# Patient Record
Sex: MALE | Race: WHITE | NOT HISPANIC OR LATINO | URBAN - METROPOLITAN AREA
[De-identification: names, ages, dates, MRNs, and addresses within clinical notes are randomized per-mention and may not be internally consistent; named-entity substitution may affect disease eponyms.]

---

## 2000-06-17 NOTE — ED ADULT NURSE REASSESSMENT NOTE - NS ED NURSE REASSESS COMMENT FT1
Pt on bicarb drip, upon hourly rounding noted 900 Ml or 1L bag  administered, fluids stopped Pt assessed, VSS no acute distress or  AMS. Sheela Mcintyre notified repeated VBG ordered, continuous cardiac monitoring.

## 2018-07-04 ENCOUNTER — INPATIENT (INPATIENT)
Facility: HOSPITAL | Age: 68
LOS: 5 days | Discharge: HOME | End: 2018-07-10
Attending: INTERNAL MEDICINE | Admitting: INTERNAL MEDICINE
Payer: MEDICARE

## 2018-07-04 VITALS
SYSTOLIC BLOOD PRESSURE: 108 MMHG | DIASTOLIC BLOOD PRESSURE: 59 MMHG | TEMPERATURE: 99 F | HEIGHT: 73 IN | OXYGEN SATURATION: 100 % | RESPIRATION RATE: 18 BRPM | WEIGHT: 173.94 LBS | HEART RATE: 51 BPM

## 2018-07-04 DIAGNOSIS — Z95.5 PRESENCE OF CORONARY ANGIOPLASTY IMPLANT AND GRAFT: Chronic | ICD-10-CM

## 2018-07-04 DIAGNOSIS — L08.9 LOCAL INFECTION OF THE SKIN AND SUBCUTANEOUS TISSUE, UNSPECIFIED: ICD-10-CM

## 2018-07-04 LAB
ALBUMIN SERPL ELPH-MCNC: 2.7 G/DL — LOW (ref 3.5–5.2)
ALP SERPL-CCNC: 145 U/L — HIGH (ref 30–115)
ALT FLD-CCNC: 33 U/L — SIGNIFICANT CHANGE UP (ref 0–41)
ANION GAP SERPL CALC-SCNC: 13 MMOL/L — SIGNIFICANT CHANGE UP (ref 7–14)
APTT BLD: 31.6 SEC — SIGNIFICANT CHANGE UP (ref 27–39.2)
AST SERPL-CCNC: 67 U/L — HIGH (ref 0–41)
BASOPHILS # BLD AUTO: 0 K/UL — SIGNIFICANT CHANGE UP (ref 0–0.2)
BASOPHILS NFR BLD AUTO: 0 % — SIGNIFICANT CHANGE UP (ref 0–1)
BILIRUB SERPL-MCNC: 0.7 MG/DL — SIGNIFICANT CHANGE UP (ref 0.2–1.2)
BUN SERPL-MCNC: 15 MG/DL — SIGNIFICANT CHANGE UP (ref 10–20)
CALCIUM SERPL-MCNC: 8 MG/DL — LOW (ref 8.5–10.1)
CHLORIDE SERPL-SCNC: 97 MMOL/L — LOW (ref 98–110)
CK SERPL-CCNC: 392 U/L — HIGH (ref 0–225)
CK SERPL-CCNC: 498 U/L — HIGH (ref 0–225)
CO2 SERPL-SCNC: 28 MMOL/L — SIGNIFICANT CHANGE UP (ref 17–32)
CREAT SERPL-MCNC: 1.1 MG/DL — SIGNIFICANT CHANGE UP (ref 0.7–1.5)
EOSINOPHIL # BLD AUTO: 0.08 K/UL — SIGNIFICANT CHANGE UP (ref 0–0.7)
EOSINOPHIL NFR BLD AUTO: 4 % — SIGNIFICANT CHANGE UP (ref 0–8)
ESTIMATED AVERAGE GLUCOSE: 105 MG/DL — SIGNIFICANT CHANGE UP (ref 68–114)
GLUCOSE SERPL-MCNC: 170 MG/DL — HIGH (ref 70–99)
HBA1C BLD-MCNC: 5.3 % — SIGNIFICANT CHANGE UP (ref 4–5.6)
HCT VFR BLD CALC: 31.9 % — LOW (ref 42–52)
HGB BLD-MCNC: 10.9 G/DL — LOW (ref 14–18)
INR BLD: 1.14 RATIO — SIGNIFICANT CHANGE UP (ref 0.65–1.3)
LACTATE SERPL-SCNC: 2.3 MMOL/L — HIGH (ref 0.5–2.2)
LYMPHOCYTES # BLD AUTO: 0.51 K/UL — LOW (ref 1.2–3.4)
LYMPHOCYTES # BLD AUTO: 25 % — SIGNIFICANT CHANGE UP (ref 20.5–51.1)
MACROCYTES BLD QL: SLIGHT — SIGNIFICANT CHANGE UP
MAGNESIUM SERPL-MCNC: 1.7 MG/DL — LOW (ref 1.8–2.4)
MCHC RBC-ENTMCNC: 32.7 PG — HIGH (ref 27–31)
MCHC RBC-ENTMCNC: 34.2 G/DL — SIGNIFICANT CHANGE UP (ref 32–37)
MCV RBC AUTO: 95.8 FL — HIGH (ref 80–94)
MICROCYTES BLD QL: SLIGHT — SIGNIFICANT CHANGE UP
MONOCYTES # BLD AUTO: 0.2 K/UL — SIGNIFICANT CHANGE UP (ref 0.1–0.6)
MONOCYTES NFR BLD AUTO: 10 % — HIGH (ref 1.7–9.3)
NEUTROPHILS # BLD AUTO: 1.24 K/UL — LOW (ref 1.4–6.5)
NEUTROPHILS NFR BLD AUTO: 61 % — SIGNIFICANT CHANGE UP (ref 42.2–75.2)
NRBC # BLD: 0 /100 — SIGNIFICANT CHANGE UP (ref 0–0)
NRBC # BLD: SIGNIFICANT CHANGE UP /100 WBCS (ref 0–0)
PLAT MORPH BLD: (no result)
PLATELET # BLD AUTO: 59 K/UL — LOW (ref 130–400)
PLATELET COUNT - ESTIMATE: (no result)
POTASSIUM SERPL-MCNC: 3.5 MMOL/L — SIGNIFICANT CHANGE UP (ref 3.5–5)
POTASSIUM SERPL-SCNC: 3.5 MMOL/L — SIGNIFICANT CHANGE UP (ref 3.5–5)
PROT SERPL-MCNC: 6.5 G/DL — SIGNIFICANT CHANGE UP (ref 6–8)
PROTHROM AB SERPL-ACNC: 12.3 SEC — SIGNIFICANT CHANGE UP (ref 9.95–12.87)
RBC # BLD: 3.33 M/UL — LOW (ref 4.7–6.1)
RBC # FLD: 14 % — SIGNIFICANT CHANGE UP (ref 11.5–14.5)
RBC BLD AUTO: (no result)
SODIUM SERPL-SCNC: 138 MMOL/L — SIGNIFICANT CHANGE UP (ref 135–146)
TROPONIN T SERPL-MCNC: <0.01 NG/ML — SIGNIFICANT CHANGE UP
TROPONIN T SERPL-MCNC: <0.01 NG/ML — SIGNIFICANT CHANGE UP
WBC # BLD: 2.04 K/UL — LOW (ref 4.8–10.8)
WBC # FLD AUTO: 2.04 K/UL — LOW (ref 4.8–10.8)

## 2018-07-04 PROCEDURE — 93970 EXTREMITY STUDY: CPT | Mod: 26

## 2018-07-04 RX ORDER — CEFAZOLIN SODIUM 1 G
2000 VIAL (EA) INJECTION EVERY 8 HOURS
Qty: 0 | Refills: 0 | Status: DISCONTINUED | OUTPATIENT
Start: 2018-07-04 | End: 2018-07-10

## 2018-07-04 RX ORDER — CEPHALEXIN 500 MG
500 CAPSULE ORAL ONCE
Qty: 0 | Refills: 0 | Status: COMPLETED | OUTPATIENT
Start: 2018-07-04 | End: 2018-07-04

## 2018-07-04 RX ORDER — MAGNESIUM OXIDE 400 MG ORAL TABLET 241.3 MG
400 TABLET ORAL
Qty: 0 | Refills: 0 | Status: DISCONTINUED | OUTPATIENT
Start: 2018-07-04 | End: 2018-07-10

## 2018-07-04 RX ORDER — ONDANSETRON 8 MG/1
4 TABLET, FILM COATED ORAL ONCE
Qty: 0 | Refills: 0 | Status: COMPLETED | OUTPATIENT
Start: 2018-07-04 | End: 2018-07-04

## 2018-07-04 RX ORDER — POTASSIUM CHLORIDE 20 MEQ
20 PACKET (EA) ORAL DAILY
Qty: 0 | Refills: 0 | Status: DISCONTINUED | OUTPATIENT
Start: 2018-07-04 | End: 2018-07-10

## 2018-07-04 RX ORDER — ENOXAPARIN SODIUM 100 MG/ML
40 INJECTION SUBCUTANEOUS EVERY 24 HOURS
Qty: 0 | Refills: 0 | Status: DISCONTINUED | OUTPATIENT
Start: 2018-07-04 | End: 2018-07-04

## 2018-07-04 RX ADMIN — Medication 500 MILLIGRAM(S): at 15:42

## 2018-07-04 RX ADMIN — Medication 100 MILLIGRAM(S): at 21:40

## 2018-07-04 RX ADMIN — ENOXAPARIN SODIUM 40 MILLIGRAM(S): 100 INJECTION SUBCUTANEOUS at 17:10

## 2018-07-04 RX ADMIN — ONDANSETRON 4 MILLIGRAM(S): 8 TABLET, FILM COATED ORAL at 15:43

## 2018-07-04 RX ADMIN — Medication 20 MILLIEQUIVALENT(S): at 17:10

## 2018-07-04 RX ADMIN — MAGNESIUM OXIDE 400 MG ORAL TABLET 400 MILLIGRAM(S): 241.3 TABLET ORAL at 17:10

## 2018-07-04 RX ADMIN — Medication 25 MILLIGRAM(S): at 15:43

## 2018-07-04 NOTE — ED PROVIDER NOTE - ATTENDING CONTRIBUTION TO CARE
Pt is a 69yo male who comes in to the ED today for developing mild to moderate mid-sternal nonradiating chest pain while shooting up heroin.  No SOB.  Reports 1 week of R 3rd finger paronchyia.  No fever/chills.  No vomiting/diarrhea.  Also reports 3-4 months of b/l LE edema.  No calf pain.    Exam: b/l LE edema, no calf tenderness, soft nontender abdomen, clear lungs, normal cardiac exam, 2+ radial pulses, cap refill <2s, normal neuro exam  Imp: IVDA chest pain with hx of CAD w/ stents  Plan: EKG, XR chest, trop, d/w cardio Dr. Beck

## 2018-07-04 NOTE — ED PROVIDER NOTE - CARE PLAN
Principal Discharge DX:	Chest pain  Secondary Diagnosis:	Finger infection  Secondary Diagnosis:	Opiate abuse, continuous

## 2018-07-04 NOTE — H&P ADULT - ASSESSMENT
Chest pain  Alcohol Abuse  Hypomagnesemia  Hypokalemia  Pancytopenia  Hepatitis C Patient is a 68y Male with h/o untreated Hepatitis C, HTN (hypertension), NIDDM, CAD/MI with heart artery stent, IVDA Heroin and Crack abuse presenting with complaints of chest pain following use of heroin ~0800a am the morning of presentation Pain was sharp/stabbing, sever, substernal associated with nausea but no vomiting, dyspnea and diaphoresis, localised with no radiation. He also mentions worsening bilateral leg swelling over past 2 months and painful swelling of the Rt 3rd finger.  He denied any cough, fever, chills, abdominal distention. Patient was admitted to r/o ACS, Rt index finger abscess.     Assessment and Plan:    Chest pain  Alcohol Abuse  Hypomagnesemia  Hypokalemia  Pancytopenia  Hepatitis C Patient is a 68y Male with h/o untreated Hepatitis C, HTN (hypertension), NIDDM, CAD/MI with heart artery stent, IVDA Heroin and Crack abuse presenting with complaints of chest pain following use of heroin ~0800a am the morning of presentation Pain was sharp/stabbing, sever, substernal associated with nausea but no vomiting, dyspnea and diaphoresis, localised with no radiation. He also mentions worsening bilateral leg swelling over past 2 months and painful swelling of the Rt 3rd finger.  He denied any cough, fever, chills, abdominal distention. Patient was admitted to r/o ACS, Rt index finger abscess.     Assessment and Plan:    1. h/o CAD s/p stents with Chest pain: r/o ACS.  Trend Troponin, Continue Aspirin, Patient still on DAPT?  Continue Metroprolol. Not on statin.  Follow up Lipid Panel and A1c and ECHO.      2. Heroin Abuse:  Active IVDA, last use today.  Detox consulted. Busch tart Low dose methadone.      3. Hypomagnesemia/Hypokalemia:  Replaced. Follow up levels.      4. Pancytopenia:  Most likely due to hepatitis C and Alcohol abuse.  ? Baseline. Follow up counts.       5. Hepatitis C:  Untreated. Follow up PCR.        DVT prophylaxis: Heparin.

## 2018-07-04 NOTE — CONSULT NOTE ADULT - ASSESSMENT
69 y/o male came to er forchest pain, on and off for several days. + SOB, + diaphoresis. patient c/o worsening leg swelling over past 2 mos. Current IVDA use, infection right 3rd finger, RIght shoulder pain since fall several weeks ago. Patient would like Detox also. patient states he took extra doses of ASA PTA>pt c/o right middle finger swelling     Review of Systems:  REVIEW OF SYSTEMS:    CONSTITUTIONAL: No weakness, fevers or chills  EYES/ENT: No visual changes;  No vertigo or throat pain   NECK: No pain or stiffness  RESPIRATORY: No cough, wheezing, hemoptysis; No shortness of breath  CARDIOVASCULAR: No chest pain or palpitations  GASTROINTESTINAL: No abdominal or epigastric pain. No nausea, vomiting, or hematemesis; No diarrhea or constipation. No melena or hematochezia.  GENITOURINARY: No dysuria, frequency or hematuria  NEUROLOGICAL: No numbness or weakness  SKIN:  right middle finger swelling 67 y/o male came to er fo rchest pain, on and off for several days. pT NOW C/O RIGHT MIDDLE FINGER SWELLING AND TENDERNESS TO TOUCH. patient c/o worsening leg swelling over past 2 mos. Current IVDA use, infection right 3rd finger, RIght shoulder pain since fall several weeks ago. Patient would like Detox also. patient states he took extra doses of ASA PTA>pt c/o right middle finger swelling     Review of Systems:  REVIEW OF SYSTEMS:    CONSTITUTIONAL: No weakness, fevers or chills  EYES/ENT: No visual changes;  No vertigo or throat pain   NECK: No pain or stiffness  RESPIRATORY: No cough, wheezing, hemoptysis; No shortness of breath  CARDIOVASCULAR: No chest pain or palpitations  GASTROINTESTINAL: No abdominal or epigastric pain. No nausea, vomiting, or hematemesis; No diarrhea or constipation. No melena or hematochezia.  GENITOURINARY: No dysuria, frequency or hematuria  NEUROLOGICAL: No numbness or weakness  SKIN:  right middle finger swelling

## 2018-07-04 NOTE — H&P ADULT - NSHPPHYSICALEXAM_GEN_ALL_CORE
VITALS:  T(F): 99 (07-04-18 @ 13:14), Max: 99 (07-04-18 @ 13:14)  HR: 53 (07-04-18 @ 14:45) (50 - 53)  BP: 113/61 (07-04-18 @ 14:45) (108/59 - 121/63)  RR: 18 (07-04-18 @ 14:45) (18 - 18)  SpO2: 98% (07-04-18 @ 14:45) (98% - 100%)    PHYSICAL EXAM:  GENERAL: NAD, well-groomed, well-developed  HEAD:  Atraumatic, Normocephalic  EYES: EOMI, PERRLA, conjunctiva and sclera clear  ENMT: No tonsillar erythema, exudates, or enlargement; Moist mucous membranes, Good dentition, No lesions  NECK: Supple, No JVD, Normal thyroid  NERVOUS SYSTEM:  Alert & Oriented X3, Good concentration; Motor Strength 5/5 B/L upper and lower extremities; DTRs 2+ intact and symmetric  CHEST/LUNG: Clear to percussion bilaterally; No rales, rhonchi, wheezing, or rubs  HEART: Regular rate and rhythm; No murmurs, rubs, or gallops  ABDOMEN: Soft, Nontender, Nondistended; Bowel sounds present  EXTREMITIES:  2+ Peripheral Pulses, No clubbing, cyanosis, or edema  LYMPH: No lymphadenopathy noted  SKIN: No rashes or lesions VITALS:  T(F): 99 (07-04-18 @ 13:14), Max: 99 (07-04-18 @ 13:14)  HR: 53 (07-04-18 @ 14:45) (50 - 53)  BP: 113/61 (07-04-18 @ 14:45) (108/59 - 121/63)  RR: 18 (07-04-18 @ 14:45) (18 - 18)  SpO2: 98% (07-04-18 @ 14:45) (98% - 100%)    PHYSICAL EXAM:  GENERAL: NAD  HEAD:  Atraumatic, Normocephalic  EYES: conjunctiva and sclera clear  ENMT: Moist mucous membranes  NECK: Supple, Normal thyroid  NERVOUS SYSTEM:  Alert & Oriented X 3, Good concentration; Motor Strength 5/5 B/L upper and lower extremities  CHEST/LUNG: Clear to auscultation bilaterally; No rales, rhonchi, wheezing, or rubs  HEART: Regular rate and rhythm; No murmurs, rubs, or gallops  ABDOMEN: Soft, Nontender, Nondistended; Bowel sounds present  EXTREMITIES:  2+ Peripheral Pulses, No clubbing, cyanosis, Bipedal pitting edema to the knee  LYMPH: No lymphadenopathy noted  SKIN: Swollen right middle finger, tender++, Fluctuant.

## 2018-07-04 NOTE — H&P ADULT - NSHPLABSRESULTS_GEN_ALL_CORE
10.9   2.04  )-----------( 59       ( 04 Jul 2018 13:06 )             31.9     07-04    138  |  97<L>  |  15  ----------------------------<  170<H>  3.5   |  28  |  1.1    Ca    8.0<L>      04 Jul 2018 13:06  Mg     1.7     07-04    TPro  6.5  /  Alb  2.7<L>  /  TBili  0.7  /  DBili  x   /  AST  67<H>  /  ALT  33  /  AlkPhos  145<H>  07-04    CARDIAC MARKERS ( 04 Jul 2018 13:06 )  x     / <0.01 ng/mL / 498 U/L / x     / x          X-ray Hand 2 Views, Right (07.04.18 @ 13:29)     No definite radiographic evidence of osteomyelitis.         X-ray Shoulder 2 Views, Left (07.04.18 @ 13:29) >    No evidence of acute fracture or dislocation.   The partially imaged left lung is unremarkable.  Degenerative change of left AC joint.    Impression:  No acute osseous abnormality.      < from: X-ray Chest 2 Views PA/Lat (07.04.18 @ 13:28) >      No radiographic evidence of acute cardiopulmonary disease.

## 2018-07-04 NOTE — H&P ADULT - HISTORY OF PRESENT ILLNESS
chest pain, on and off for several days. + SOB, + diaphoresis. patient c/o worsening leg swelling over past 2 mos. Current IVDA use, infection right 3rd finger, RIght shoulder pain since fall several weeks ago. Patient would like Detox also. patient states he took extra doses of ASA PTA Patient is a 68y Male with h/o untreated Hepatitis C, HTN (hypertension), NIDDM, CAD/MI with heart artery stent, IVDA Heroin and Crack abuse presenting with complaints of chest pain following use of heroin ~0800a am the morning of presentation Pain was sharp/stabbing, sever, substernal associated with nausea but no vomiting, dyspnea and diaphoresis, localised with no radiation. He also mentions worsening bilateral leg swelling over past 2 months and painful swelling of the Rt 3rd finger.  He denied any cough, fever, chills, abdominal distention. Patient was admitted to r/o ACS, Rt index finger abscess.

## 2018-07-04 NOTE — ED PROVIDER NOTE - OBJECTIVE STATEMENT
chest pain, on and off for several days. + SOB, + diaphoresis. patient c/o worsening leg swelling over past 2 mos. Current IVDA use, infection right 3rd finger, RIght shoulder pain since fall several weeks ago. Patient would like Detox also. patient states he took extra doses of ASA PTA

## 2018-07-04 NOTE — CONSULT NOTE ADULT - PROBLEM SELECTOR RECOMMENDATION 9
r/o abscess  right hand x ray  no osteomylitis no  acute pathology  to be seen by attending on the floor r/o abscess OR TINY FOREIGN BODY  right hand x ray  no osteomylitis no  acute pathology  to be seen by attending on the floor

## 2018-07-04 NOTE — CONSULT NOTE ADULT - SUBJECTIVE AND OBJECTIVE BOX
History of Present Illness:  Reason for Admission: Chest pain	  History of Present Illness: 	  chest pain, on and off for several days. + SOB, + diaphoresis. patient c/o worsening leg swelling over past 2 mos. Current IVDA use, infection right 3rd finger, RIght shoulder pain since fall several weeks ago. Patient would like Detox also. patient states he took extra doses of ASA PTA     Review of Systems:  Review of Systems: CONSTITUTIONAL: No fever, weight loss, or fatigue  EYES: No eye pain, visual disturbances, or discharge  ENMT:  No difficulty hearing, tinnitus, vertigo; No sinus or throat pain  NECK: No pain or stiffness  BREASTS: No pain, masses, or nipple discharge  RESPIRATORY: No cough, wheezing, chills or hemoptysis; No shortness of breath  CARDIOVASCULAR: No chest pain, palpitations, dizziness, or leg swelling  GASTROINTESTINAL: No abdominal or epigastric pain. No nausea, vomiting, or hematemesis; No diarrhea or constipation. No melena or hematochezia.  GENITOURINARY: No dysuria, frequency, hematuria, or incontinence  NEUROLOGICAL: No headaches, memory loss, loss of strength, numbness, or tremors  SKIN: right middle finger swelling tender to touch  LYMPH NODES: No enlarged glands  ENDOCRINE: No heat or cold intolerance; No hair loss  MUSCULOSKELETAL: No joint pain or swelling; No muscle, back, or extremity pain  PSYCHIATRIC: No depression, anxiety, mood swings, or difficulty sleeping  HEME/LYMPH: No easy bruising, or bleeding gums ALLERGY AND IMMUNOLOGIC: No hives or eczema	      Allergies and Intolerances:        Allergies:  	No Known Allergies:     Home Medications:   * Patient Currently Takes Medications as of 04-Jul-2018 12:43 documented in Structured Notes  · 	Plavix 75 mg oral tablet: 1 tab(s) orally once a day, Last Dose Taken:    · 	Lopressor: orally 2 times a day, Last Dose Taken:    · 	enalapril: orally once a day, Last Dose Taken:    · 	gemfibrozil: Last Dose Taken:    · 	aspirin 81 mg oral tablet: 1 tab(s) orally once a day, Last Dose Taken:    · 	metFORMIN 500 mg oral tablet: 1 tab(s) orally 2 times a day, Last Dose Taken:      Patient History:    Tobacco Screening:  · Core Measure Site	No	       Physical Exam:  Physical Exam: VITALS:  T(F): 99 (07-04-18 @ 13:14), Max: 99 (07-04-18 @ 13:14)  HR: 53 (07-04-18 @ 14:45) (50 - 53)  BP: 113/61 (07-04-18 @ 14:45) (108/59 - 121/63)  RR: 18 (07-04-18 @ 14:45) (18 - 18)  SpO2: 98% (07-04-18 @ 14:45) (98% - 100%)   PHYSICAL EXAM:  GENERAL: NAD, well-groomed, well-developed  HEAD:  Atraumatic, Normocephalic  EYES: EOMI, PERRLA, conjunctiva and sclera clear  ENMT: No tonsillar erythema, exudates, or enlargement; Moist mucous membranes, Good dentition, No lesions  NECK: Supple, No JVD, Normal thyroid  NERVOUS SYSTEM:  Alert & Oriented X3, Good concentration; Motor Strength 5/5 B/L upper and lower extremities; DTRs 2+ intact and symmetric  CHEST/LUNG: Clear to percussion bilaterally; No rales, rhonchi, wheezing, or rubs  HEART: Regular rate and rhythm; No murmurs, rubs, or gallops  ABDOMEN: Soft, Nontender, Nondistended; Bowel sounds present  EXTREMITIES:  2+ Peripheral Pulses, No clubbing, cyanosis, or edema  LYMPH: No lymphadenopathy noted SKIN: No rashes or lesions	       Labs and Results:  Labs, Radiology, Cardiology, and Other Results: 10.9   2.04  )-----------( 59       ( 04 Jul 2018 13:06 )             31.9    07-04   138  |  97<L>  |  15  ----------------------------<  170<H>  3.5   |  28  |  1.1   Ca    8.0<L>      04 Jul 2018 13:06  Mg     1.7     07-04   TPro  6.5  /  Alb  2.7<L>  /  TBili  0.7  /  DBili  x   /  AST  67<H>  /  ALT  33  /  AlkPhos  145<H>  07-04   CARDIAC MARKERS ( 04 Jul 2018 13:06 )  x     / <0.01 ng/mL / 498 U/L / x     / x        X-ray Hand 2 Views, Right (07.04.18 @ 13:29)    No definite radiographic evidence of osteomyelitis.      X-ray Shoulder 2 Views, Left (07.04.18 @ 13:29) >   No evidence of acute fracture or dislocation.   The partially imaged left lung is unremarkable.  Degenerative change of left AC joint.   Impression:  No acute osseous abnormality.    < from: X-ray Chest 2 Views PA/Lat (07.04.18 @ 13:28) >    No radiographic evidence of acute cardiopulmonary disease. History of Present Illness:  Reason for Admission: Chest pain	  History of Present Illness: 	  67 y/o male addmitted for chest pain, on and off for several days. Pt now c/o right middle finger swelling and tenderness. + SOB, + diaphoresis. patient c/o worsening leg swelling over past 2 mos. Current IVDA use, infection right 3rd finger, RIght shoulder pain since fall several weeks ago. Patient would like Detox also. patient states he took extra doses of ASA PTA     Review of Systems:  Review of Systems: CONSTITUTIONAL: No fever, weight loss, or fatigue  EYES: No eye pain, visual disturbances, or discharge  ENMT:  No difficulty hearing, tinnitus, vertigo; No sinus or throat pain  NECK: No pain or stiffness  BREASTS: No pain, masses, or nipple discharge  RESPIRATORY: No cough, wheezing, chills or hemoptysis; No shortness of breath  CARDIOVASCULAR: No chest pain, palpitations, dizziness, or leg swelling  GASTROINTESTINAL: No abdominal or epigastric pain. No nausea, vomiting, or hematemesis; No diarrhea or constipation. No melena or hematochezia.  GENITOURINARY: No dysuria, frequency, hematuria, or incontinence  NEUROLOGICAL: No headaches, memory loss, loss of strength, numbness, or tremors  SKIN: right middle finger swelling tender to touch  LYMPH NODES: No enlarged glands  ENDOCRINE: No heat or cold intolerance; No hair loss  MUSCULOSKELETAL: No joint pain or swelling; No muscle, back, or extremity pain  PSYCHIATRIC: No depression, anxiety, mood swings, or difficulty sleeping  HEME/LYMPH: No easy bruising, or bleeding gums ALLERGY AND IMMUNOLOGIC: No hives or eczema	      Allergies and Intolerances:        Allergies:  	No Known Allergies:     Home Medications:   * Patient Currently Takes Medications as of 04-Jul-2018 12:43 documented in Structured Notes  · 	Plavix 75 mg oral tablet: 1 tab(s) orally once a day, Last Dose Taken:    · 	Lopressor: orally 2 times a day, Last Dose Taken:    · 	enalapril: orally once a day, Last Dose Taken:    · 	gemfibrozil: Last Dose Taken:    · 	aspirin 81 mg oral tablet: 1 tab(s) orally once a day, Last Dose Taken:    · 	metFORMIN 500 mg oral tablet: 1 tab(s) orally 2 times a day, Last Dose Taken:      Patient History:    Tobacco Screening:  · Core Measure Site	No	       Physical Exam:  Physical Exam: VITALS:  T(F): 99 (07-04-18 @ 13:14), Max: 99 (07-04-18 @ 13:14)  HR: 53 (07-04-18 @ 14:45) (50 - 53)  BP: 113/61 (07-04-18 @ 14:45) (108/59 - 121/63)  RR: 18 (07-04-18 @ 14:45) (18 - 18)  SpO2: 98% (07-04-18 @ 14:45) (98% - 100%)   PHYSICAL EXAM:  GENERAL: NAD, well-groomed, well-developed  HEAD:  Atraumatic, Normocephalic  EYES: EOMI, PERRLA, conjunctiva and sclera clear  ENMT: No tonsillar erythema, exudates, or enlargement; Moist mucous membranes, Good dentition, No lesions  NECK: Supple, No JVD, Normal thyroid  NERVOUS SYSTEM:  Alert & Oriented X3, Good concentration; Motor Strength 5/5 B/L upper and lower extremities; DTRs 2+ intact and symmetric  CHEST/LUNG: Clear to percussion bilaterally; No rales, rhonchi, wheezing, or rubs  HEART: Regular rate and rhythm; No murmurs, rubs, or gallops  ABDOMEN: Soft, Nontender, Nondistended; Bowel sounds present  EXTREMITIES:  2+ Peripheral Pulses, No clubbing, cyanosis, or edema  LYMPH: No lymphadenopathy noted SKIN: RIGHT MIDDLE FINGER SWELLING  AND TENDER TO TOUCH MOST LIKELY SEROUS FLUID DUE TO SMALL FOREIGN BODY IN THE MIDDLE FINGER NO PUS AT THIS TIME	       Labs and Results:  Labs, Radiology, Cardiology, and Other Results: 10.9   2.04  )-----------( 59       ( 04 Jul 2018 13:06 )             31.9    07-04   138  |  97<L>  |  15  ----------------------------<  170<H>  3.5   |  28  |  1.1   Ca    8.0<L>      04 Jul 2018 13:06  Mg     1.7     07-04   TPro  6.5  /  Alb  2.7<L>  /  TBili  0.7  /  DBili  x   /  AST  67<H>  /  ALT  33  /  AlkPhos  145<H>  07-04   CARDIAC MARKERS ( 04 Jul 2018 13:06 )  x     / <0.01 ng/mL / 498 U/L / x     / x        X-ray Hand 2 Views, Right (07.04.18 @ 13:29)    No definite radiographic evidence of osteomyelitis.      X-ray Shoulder 2 Views, Left (07.04.18 @ 13:29) >   No evidence of acute fracture or dislocation.   The partially imaged left lung is unremarkable.  Degenerative change of left AC joint.   Impression:  No acute osseous abnormality.    < from: X-ray Chest 2 Views PA/Lat (07.04.18 @ 13:28) >    No radiographic evidence of acute cardiopulmonary disease. History of Present Illness:  Reason for Admission: Chest pain	  History of Present Illness: 	  Called to evaluate this 67 y/o male addmitted for chest pain, on and off for several days. Pt now c/o right middle finger swelling and tenderness. + SOB, + diaphoresis. patient c/o worsening leg swelling over past 2 mos. Current IVDA use, infection right 3rd finger, RIght shoulder pain since fall several weeks ago. Patient would like Detox also. patient states he took extra doses of ASA PTA     Review of Systems:  Review of Systems: CONSTITUTIONAL: No fever, weight loss, or fatigue  EYES: No eye pain, visual disturbances, or discharge  ENMT:  No difficulty hearing, tinnitus, vertigo; No sinus or throat pain  NECK: No pain or stiffness  BREASTS: No pain, masses, or nipple discharge  RESPIRATORY: No cough, wheezing, chills or hemoptysis; No shortness of breath  CARDIOVASCULAR: No chest pain, palpitations, dizziness, or leg swelling  GASTROINTESTINAL: No abdominal or epigastric pain. No nausea, vomiting, or hematemesis; No diarrhea or constipation. No melena or hematochezia.  GENITOURINARY: No dysuria, frequency, hematuria, or incontinence  NEUROLOGICAL: No headaches, memory loss, loss of strength, numbness, or tremors  SKIN: right middle finger swelling tender to touch  LYMPH NODES: No enlarged glands  ENDOCRINE: No heat or cold intolerance; No hair loss  MUSCULOSKELETAL: No joint pain or swelling; No muscle, back, or extremity pain  PSYCHIATRIC: No depression, anxiety, mood swings, or difficulty sleeping  HEME/LYMPH: No easy bruising, or bleeding gums ALLERGY AND IMMUNOLOGIC: No hives or eczema	      Allergies and Intolerances:        Allergies:  	No Known Allergies:     Home Medications:   * Patient Currently Takes Medications as of 04-Jul-2018 12:43 documented in Structured Notes  · 	Plavix 75 mg oral tablet: 1 tab(s) orally once a day, Last Dose Taken:    · 	Lopressor: orally 2 times a day, Last Dose Taken:    · 	enalapril: orally once a day, Last Dose Taken:    · 	gemfibrozil: Last Dose Taken:    · 	aspirin 81 mg oral tablet: 1 tab(s) orally once a day, Last Dose Taken:    · 	metFORMIN 500 mg oral tablet: 1 tab(s) orally 2 times a day, Last Dose Taken:      Patient History:    Tobacco Screening:  · Core Measure Site	No	       Physical Exam:  Physical Exam: VITALS:  T(F): 99 (07-04-18 @ 13:14), Max: 99 (07-04-18 @ 13:14)  HR: 53 (07-04-18 @ 14:45) (50 - 53)  BP: 113/61 (07-04-18 @ 14:45) (108/59 - 121/63)  RR: 18 (07-04-18 @ 14:45) (18 - 18)  SpO2: 98% (07-04-18 @ 14:45) (98% - 100%)   PHYSICAL EXAM:  GENERAL: NAD, well-groomed, well-developed  HEAD:  Atraumatic, Normocephalic  EYES: EOMI, PERRLA, conjunctiva and sclera clear  ENMT: No tonsillar erythema, exudates, or enlargement; Moist mucous membranes, Good dentition, No lesions  NECK: Supple, No JVD, Normal thyroid  NERVOUS SYSTEM:  Alert & Oriented X3, Good concentration; Motor Strength 5/5 B/L upper and lower extremities; DTRs 2+ intact and symmetric  CHEST/LUNG: Clear to percussion bilaterally; No rales, rhonchi, wheezing, or rubs  HEART: Regular rate and rhythm; No murmurs, rubs, or gallops  ABDOMEN: Soft, Nontender, Nondistended; Bowel sounds present  EXTREMITIES:  2+ Peripheral Pulses, No clubbing, cyanosis, or edema  LYMPH: No lymphadenopathy noted SKIN: RIGHT MIDDLE FINGER SWELLING  AND TENDER TO TOUCH MOST LIKELY SEROUS FLUID DUE TO SMALL FOREIGN BODY IN THE MIDDLE FINGER NO PUS AT THIS TIME	       Labs and Results:  Labs, Radiology, Cardiology, and Other Results: 10.9   2.04  )-----------( 59       ( 04 Jul 2018 13:06 )             31.9    07-04   138  |  97<L>  |  15  ----------------------------<  170<H>  3.5   |  28  |  1.1   Ca    8.0<L>      04 Jul 2018 13:06  Mg     1.7     07-04   TPro  6.5  /  Alb  2.7<L>  /  TBili  0.7  /  DBili  x   /  AST  67<H>  /  ALT  33  /  AlkPhos  145<H>  07-04   CARDIAC MARKERS ( 04 Jul 2018 13:06 )  x     / <0.01 ng/mL / 498 U/L / x     / x        X-ray Hand 2 Views, Right (07.04.18 @ 13:29)    No definite radiographic evidence of osteomyelitis.      X-ray Shoulder 2 Views, Left (07.04.18 @ 13:29) >   No evidence of acute fracture or dislocation.   The partially imaged left lung is unremarkable.  Degenerative change of left AC joint.   Impression:  No acute osseous abnormality.    < from: X-ray Chest 2 Views PA/Lat (07.04.18 @ 13:28) >    No radiographic evidence of acute cardiopulmonary disease.

## 2018-07-04 NOTE — H&P ADULT - NSHPREVIEWOFSYSTEMS_GEN_ALL_CORE
CONSTITUTIONAL: No fever, weight loss, or fatigue  EYES: No eye pain, visual disturbances, or discharge  ENMT:  No difficulty hearing, tinnitus, vertigo; No sinus or throat pain  NECK: No pain or stiffness  BREASTS: No pain, masses, or nipple discharge  RESPIRATORY: No cough, wheezing, chills or hemoptysis; No shortness of breath  CARDIOVASCULAR: No chest pain, palpitations, dizziness, or leg swelling  GASTROINTESTINAL: No abdominal or epigastric pain. No nausea, vomiting, or hematemesis; No diarrhea or constipation. No melena or hematochezia.  GENITOURINARY: No dysuria, frequency, hematuria, or incontinence  NEUROLOGICAL: No headaches, memory loss, loss of strength, numbness, or tremors  SKIN: No itching, burning, rashes, or lesions   LYMPH NODES: No enlarged glands  ENDOCRINE: No heat or cold intolerance; No hair loss  MUSCULOSKELETAL: No joint pain or swelling; No muscle, back, or extremity pain  PSYCHIATRIC: No depression, anxiety, mood swings, or difficulty sleeping  HEME/LYMPH: No easy bruising, or bleeding gums  ALLERGY AND IMMUNOLOGIC: No hives or eczema CONSTITUTIONAL: No fever, weight loss, or fatigue  EYES: No eye pain, visual disturbances, or discharge  ENMT:  No difficulty hearing, tinnitus, vertigo; No sinus or throat pain  NECK: No pain or stiffness  BREASTS: No pain, masses, or nipple discharge  RESPIRATORY: No cough, wheezing, chills or hemoptysis; No shortness of breath  CARDIOVASCULAR: No chest pain, palpitations, dizziness, or leg swelling  GASTROINTESTINAL: No abdominal or epigastric pain. No nausea, vomiting, or hematemesis; No diarrhea or constipation. No melena or hematochezia.  GENITOURINARY: No dysuria, frequency, hematuria, or incontinence, +Straining, +Dribbling, incomplete emptying.  NEUROLOGICAL: No headaches, memory loss, loss of strength, numbness, or tremors  SKIN: Painful swelling of the Rt middle finger.  LYMPH NODES: No enlarged glands  ENDOCRINE: No heat or cold intolerance; No hair loss  MUSCULOSKELETAL: No joint pain or swelling; No muscle, back, or extremity pain  PSYCHIATRIC: No depression, anxiety, mood swings, or difficulty sleeping  HEME/LYMPH: No easy bruising, or bleeding gums  ALLERGY AND IMMUNOLOGIC: No hives or eczema

## 2018-07-05 DIAGNOSIS — L98.491 NON-PRESSURE CHRONIC ULCER OF SKIN OF OTHER SITES LIMITED TO BREAKDOWN OF SKIN: ICD-10-CM

## 2018-07-05 DIAGNOSIS — Z98.890 OTHER SPECIFIED POSTPROCEDURAL STATES: Chronic | ICD-10-CM

## 2018-07-05 LAB
ALBUMIN SERPL ELPH-MCNC: 2.8 G/DL — LOW (ref 3.5–5.2)
ALP SERPL-CCNC: 133 U/L — HIGH (ref 30–115)
ALT FLD-CCNC: 30 U/L — SIGNIFICANT CHANGE UP (ref 0–41)
ANION GAP SERPL CALC-SCNC: 13 MMOL/L — SIGNIFICANT CHANGE UP (ref 7–14)
AST SERPL-CCNC: 61 U/L — HIGH (ref 0–41)
BASOPHILS # BLD AUTO: 0.01 K/UL — SIGNIFICANT CHANGE UP (ref 0–0.2)
BASOPHILS NFR BLD AUTO: 0.4 % — SIGNIFICANT CHANGE UP (ref 0–1)
BILIRUB SERPL-MCNC: 0.7 MG/DL — SIGNIFICANT CHANGE UP (ref 0.2–1.2)
BUN SERPL-MCNC: 17 MG/DL — SIGNIFICANT CHANGE UP (ref 10–20)
CALCIUM SERPL-MCNC: 7.8 MG/DL — LOW (ref 8.5–10.1)
CHLORIDE SERPL-SCNC: 98 MMOL/L — SIGNIFICANT CHANGE UP (ref 98–110)
CHOLEST SERPL-MCNC: 117 MG/DL — SIGNIFICANT CHANGE UP (ref 100–200)
CK SERPL-CCNC: 251 U/L — HIGH (ref 0–225)
CO2 SERPL-SCNC: 29 MMOL/L — SIGNIFICANT CHANGE UP (ref 17–32)
CREAT SERPL-MCNC: 1.1 MG/DL — SIGNIFICANT CHANGE UP (ref 0.7–1.5)
EOSINOPHIL # BLD AUTO: 0.08 K/UL — SIGNIFICANT CHANGE UP (ref 0–0.7)
EOSINOPHIL NFR BLD AUTO: 3.5 % — SIGNIFICANT CHANGE UP (ref 0–8)
GLUCOSE SERPL-MCNC: 103 MG/DL — HIGH (ref 70–99)
GRAM STN FLD: SIGNIFICANT CHANGE UP
HCT VFR BLD CALC: 35.7 % — LOW (ref 42–52)
HDLC SERPL-MCNC: 50 MG/DL — SIGNIFICANT CHANGE UP (ref 40–125)
HGB BLD-MCNC: 11.8 G/DL — LOW (ref 14–18)
IMM GRANULOCYTES NFR BLD AUTO: 0 % — LOW (ref 0.1–0.3)
LIPID PNL WITH DIRECT LDL SERPL: 52 MG/DL — SIGNIFICANT CHANGE UP (ref 4–129)
LYMPHOCYTES # BLD AUTO: 0.52 K/UL — LOW (ref 1.2–3.4)
LYMPHOCYTES # BLD AUTO: 22.6 % — SIGNIFICANT CHANGE UP (ref 20.5–51.1)
MAGNESIUM SERPL-MCNC: 1.9 MG/DL — SIGNIFICANT CHANGE UP (ref 1.8–2.4)
MCHC RBC-ENTMCNC: 31.7 PG — HIGH (ref 27–31)
MCHC RBC-ENTMCNC: 33.1 G/DL — SIGNIFICANT CHANGE UP (ref 32–37)
MCV RBC AUTO: 96 FL — HIGH (ref 80–94)
METHOD TYPE: SIGNIFICANT CHANGE UP
MONOCYTES # BLD AUTO: 0.23 K/UL — SIGNIFICANT CHANGE UP (ref 0.1–0.6)
MONOCYTES NFR BLD AUTO: 10 % — HIGH (ref 1.7–9.3)
MSSA DNA SPEC QL NAA+PROBE: SIGNIFICANT CHANGE UP
NEUTROPHILS # BLD AUTO: 1.46 K/UL — SIGNIFICANT CHANGE UP (ref 1.4–6.5)
NEUTROPHILS NFR BLD AUTO: 63.5 % — SIGNIFICANT CHANGE UP (ref 42.2–75.2)
NRBC # BLD: 0 /100 WBCS — SIGNIFICANT CHANGE UP (ref 0–0)
PHOSPHATE SERPL-MCNC: 3.1 MG/DL — SIGNIFICANT CHANGE UP (ref 2.1–4.9)
PLATELET # BLD AUTO: 51 K/UL — LOW (ref 130–400)
POTASSIUM SERPL-MCNC: 3.6 MMOL/L — SIGNIFICANT CHANGE UP (ref 3.5–5)
POTASSIUM SERPL-SCNC: 3.6 MMOL/L — SIGNIFICANT CHANGE UP (ref 3.5–5)
PROT SERPL-MCNC: 6.4 G/DL — SIGNIFICANT CHANGE UP (ref 6–8)
RBC # BLD: 3.72 M/UL — LOW (ref 4.7–6.1)
RBC # FLD: 13.8 % — SIGNIFICANT CHANGE UP (ref 11.5–14.5)
SODIUM SERPL-SCNC: 140 MMOL/L — SIGNIFICANT CHANGE UP (ref 135–146)
SPECIMEN SOURCE: SIGNIFICANT CHANGE UP
TOTAL CHOLESTEROL/HDL RATIO MEASUREMENT: 2.3 RATIO — LOW (ref 4–5.5)
TRIGL SERPL-MCNC: 91 MG/DL — SIGNIFICANT CHANGE UP (ref 10–149)
TROPONIN T SERPL-MCNC: <0.01 NG/ML — SIGNIFICANT CHANGE UP
WBC # BLD: 2.3 K/UL — LOW (ref 4.8–10.8)
WBC # FLD AUTO: 2.3 K/UL — LOW (ref 4.8–10.8)

## 2018-07-05 RX ORDER — DEXTROSE 50 % IN WATER 50 %
15 SYRINGE (ML) INTRAVENOUS ONCE
Qty: 0 | Refills: 0 | Status: DISCONTINUED | OUTPATIENT
Start: 2018-07-05 | End: 2018-07-10

## 2018-07-05 RX ORDER — DEXTROSE 50 % IN WATER 50 %
25 SYRINGE (ML) INTRAVENOUS ONCE
Qty: 0 | Refills: 0 | Status: DISCONTINUED | OUTPATIENT
Start: 2018-07-05 | End: 2018-07-10

## 2018-07-05 RX ORDER — GEMFIBROZIL 600 MG
0 TABLET ORAL
Qty: 0 | Refills: 0 | COMMUNITY

## 2018-07-05 RX ORDER — METHADONE HYDROCHLORIDE 40 MG/1
15 TABLET ORAL ONCE
Qty: 0 | Refills: 0 | Status: DISCONTINUED | OUTPATIENT
Start: 2018-07-05 | End: 2018-07-05

## 2018-07-05 RX ORDER — DEXTROSE 50 % IN WATER 50 %
12.5 SYRINGE (ML) INTRAVENOUS ONCE
Qty: 0 | Refills: 0 | Status: DISCONTINUED | OUTPATIENT
Start: 2018-07-05 | End: 2018-07-10

## 2018-07-05 RX ORDER — METHADONE HYDROCHLORIDE 40 MG/1
10 TABLET ORAL ONCE
Qty: 0 | Refills: 0 | Status: DISCONTINUED | OUTPATIENT
Start: 2018-07-05 | End: 2018-07-05

## 2018-07-05 RX ORDER — METHADONE HYDROCHLORIDE 40 MG/1
5 TABLET ORAL EVERY 12 HOURS
Qty: 0 | Refills: 0 | Status: DISCONTINUED | OUTPATIENT
Start: 2018-07-06 | End: 2018-07-08

## 2018-07-05 RX ORDER — METHADONE HYDROCHLORIDE 40 MG/1
TABLET ORAL
Qty: 0 | Refills: 0 | Status: COMPLETED | OUTPATIENT
Start: 2018-07-06 | End: 2018-07-08

## 2018-07-05 RX ORDER — METHADONE HYDROCHLORIDE 40 MG/1
10 TABLET ORAL EVERY 12 HOURS
Qty: 0 | Refills: 0 | Status: DISCONTINUED | OUTPATIENT
Start: 2018-07-06 | End: 2018-07-06

## 2018-07-05 RX ORDER — INSULIN LISPRO 100/ML
VIAL (ML) SUBCUTANEOUS
Qty: 0 | Refills: 0 | Status: DISCONTINUED | OUTPATIENT
Start: 2018-07-05 | End: 2018-07-10

## 2018-07-05 RX ORDER — GEMFIBROZIL 600 MG
600 TABLET ORAL
Qty: 0 | Refills: 0 | Status: DISCONTINUED | OUTPATIENT
Start: 2018-07-05 | End: 2018-07-10

## 2018-07-05 RX ORDER — SODIUM CHLORIDE 9 MG/ML
1000 INJECTION, SOLUTION INTRAVENOUS
Qty: 0 | Refills: 0 | Status: DISCONTINUED | OUTPATIENT
Start: 2018-07-05 | End: 2018-07-10

## 2018-07-05 RX ORDER — METOPROLOL TARTRATE 50 MG
25 TABLET ORAL
Qty: 0 | Refills: 0 | Status: DISCONTINUED | OUTPATIENT
Start: 2018-07-05 | End: 2018-07-10

## 2018-07-05 RX ORDER — METFORMIN HYDROCHLORIDE 850 MG/1
500 TABLET ORAL
Qty: 0 | Refills: 0 | Status: DISCONTINUED | OUTPATIENT
Start: 2018-07-05 | End: 2018-07-10

## 2018-07-05 RX ORDER — GLUCAGON INJECTION, SOLUTION 0.5 MG/.1ML
1 INJECTION, SOLUTION SUBCUTANEOUS ONCE
Qty: 0 | Refills: 0 | Status: DISCONTINUED | OUTPATIENT
Start: 2018-07-05 | End: 2018-07-10

## 2018-07-05 RX ORDER — ASPIRIN/CALCIUM CARB/MAGNESIUM 324 MG
81 TABLET ORAL DAILY
Qty: 0 | Refills: 0 | Status: DISCONTINUED | OUTPATIENT
Start: 2018-07-05 | End: 2018-07-10

## 2018-07-05 RX ADMIN — Medication 1: at 12:35

## 2018-07-05 RX ADMIN — MAGNESIUM OXIDE 400 MG ORAL TABLET 400 MILLIGRAM(S): 241.3 TABLET ORAL at 08:06

## 2018-07-05 RX ADMIN — Medication 81 MILLIGRAM(S): at 12:35

## 2018-07-05 RX ADMIN — METFORMIN HYDROCHLORIDE 500 MILLIGRAM(S): 850 TABLET ORAL at 17:49

## 2018-07-05 RX ADMIN — MAGNESIUM OXIDE 400 MG ORAL TABLET 400 MILLIGRAM(S): 241.3 TABLET ORAL at 17:49

## 2018-07-05 RX ADMIN — Medication 100 MILLIGRAM(S): at 21:37

## 2018-07-05 RX ADMIN — MAGNESIUM OXIDE 400 MG ORAL TABLET 400 MILLIGRAM(S): 241.3 TABLET ORAL at 12:36

## 2018-07-05 RX ADMIN — Medication 20 MILLIEQUIVALENT(S): at 12:35

## 2018-07-05 RX ADMIN — Medication 600 MILLIGRAM(S): at 17:50

## 2018-07-05 RX ADMIN — METHADONE HYDROCHLORIDE 10 MILLIGRAM(S): 40 TABLET ORAL at 21:37

## 2018-07-05 RX ADMIN — Medication 100 MILLIGRAM(S): at 14:32

## 2018-07-05 RX ADMIN — Medication 100 MILLIGRAM(S): at 05:42

## 2018-07-05 RX ADMIN — METHADONE HYDROCHLORIDE 15 MILLIGRAM(S): 40 TABLET ORAL at 14:15

## 2018-07-05 NOTE — CONSULT NOTE ADULT - ASSESSMENT
Opioid abuse        counselling done  place on methadone taper protocol  interested in detox  May transfer to detox once medically cleared, if beds available

## 2018-07-05 NOTE — CONSULT NOTE ADULT - ASSESSMENT
Lupe of right 3rd finger, s/p bedside I&D 7/5     - Case D/W Dr. Pugh: pt to have I&d, then betadine soaks q 8 hrs   - Will follow

## 2018-07-05 NOTE — CONSULT NOTE ADULT - SUBJECTIVE AND OBJECTIVE BOX
HPI:  Patient is a 68y Male with h/o untreated Hepatitis C, HTN (hypertension), NIDDM, CAD/MI with heart artery stent, IVDA Heroin and Crack abuse presenting with complaints of chest pain following use of heroin ~0800a am the morning of presentation Pain was sharp/stabbing, sever, substernal associated with nausea but no vomiting, dyspnea and diaphoresis, localised with no radiation. He also mentions worsening bilateral leg swelling over past 2 months and painful swelling of the Rt 3rd finger.  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 Since then, the finger has become more swollen, especially around tip of finger. Denies any redness or streaking to hand/arm    PAST MEDICAL & SURGICAL HISTORY:  Hepatitis: Untreated Hepatitis C  HTN (hypertension)  Diabetes  H/O knee surgery  H/O heart artery stent    Home Medications:  aspirin 81 mg oral tablet: 1 tab(s) orally once a day (04 Jul 2018 12:43)  enalapril: orally once a day (04 Jul 2018 12:43)  gemfibrozil:  (04 Jul 2018 12:43)  Lopressor: orally 2 times a day (04 Jul 2018 12:43)  metFORMIN 500 mg oral tablet: 1 tab(s) orally 2 times a day (04 Jul 2018 12:43)  Plavix 75 mg oral tablet: 1 tab(s) orally once a day (04 Jul 2018 12:43)    Allergies    No Known Allergies    Social HX:  (+) polysubstance abuse (IVDA heroin; crack)  (+) tob  undomiciled at present;  HPI:  Patient is a 68y Male with h/o untreated Hepatitis C, HTN (hypertension), NIDDM, CAD/MI with heart artery stent, IVDA Heroin and Crack abuse presenting with complaints of chest pain following use of heroin ~0800a am the morning of presentation Pain was sharp/stabbing, sever, substernal associated with nausea but no vomiting, dyspnea and diaphoresis, localised with no radiation. He also mentions worsening bilateral leg swelling over past 2 months and painful swelling of the Rt 3rd finger.  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 Since then, the finger has become more swollen, especially around tip of finger. Denies any redness or streaking to hand/arm    PAST MEDICAL & SURGICAL HISTORY:  Hepatitis: Untreated Hepatitis C  HTN (hypertension)  Diabetes  H/O knee surgery  H/O heart artery stent    Home Medications:  aspirin 81 mg oral tablet: 1 tab(s) orally once a day (04 Jul 2018 12:43)  enalapril: orally once a day (04 Jul 2018 12:43)  gemfibrozil:  (04 Jul 2018 12:43)  Lopressor: orally 2 times a day (04 Jul 2018 12:43)  metFORMIN 500 mg oral tablet: 1 tab(s) orally 2 times a day (04 Jul 2018 12:43)  Plavix 75 mg oral tablet: 1 tab(s) orally once a day (04 Jul 2018 12:43)     Allergies    No Known Allergies    Social HX:  (+) polysubstance abuse (IVDA heroin; crack)  (+) tob  undomiciled at present;

## 2018-07-05 NOTE — CONSULT NOTE ADULT - EXTREMITIES COMMENTS
right 3rd digit with swelling from MCP to tip of finger; (+) tenderness (greatest around nail area and to palmar surface of finger); no ulcerations noted; no foreign object noted; able to move finger

## 2018-07-05 NOTE — PROCEDURE NOTE - NSASSISTBY_GEN_A_CORE
Dehydration    The human body is comprised largely of water. If you lose more fluids than you take in, you can become dehydrated. This means there are not enough fluids in your body for it to function right. Mild dehydration can cause weakness, confusion, or muscle cramps. In extreme cases, it can lead to brain damage and even death. That's why prompt treatment is crucial.  Risk factors  Anyone can become dehydrated. But infants, children, and older adults are at greatest risk. You are most likely to lose fluids with severe vomiting, diarrhea, or a fever. Exercising or working hard--especially in hot weather--can also cause excess fluid loss.  What to do  Drinking liquids is the best way to prevent dehydration. Water is best, but juice or frozen pops can also help. Your doctor may suggest electrolyte solutions for sick infants and young children.  When to go to the emergency room (ER)  Go to an ER right away for these symptoms:  Adults  · Very dark urine and little urine output  · Dizziness, weakness, confusion, fainting  Children  · Sunken eyes  · Little or no urine output (for infants, no wet diaper in 8 hours)  · Very dark urine  · Skin that doesn't bounce back quickly when pinched  · Crying without tears  What to expect in the ER  Your blood pressure, temperature, and heart rate will be checked. You may have blood or urine tests. The main treatment for dehydration is fluids. You may be given these to drink. Or, you may receive them through a vein in your arm. You also may be treated for diarrhea, vomiting, or a high fever.   Date Last Reviewed: 7/18/2015  © 4554-3473 [employer], [employer]. [address] [place] [address], [place] [address]. All rights reserved. This information is not intended as a substitute for professional medical care. Always follow your healthcare professional's instructions.        
Myself

## 2018-07-05 NOTE — CHART NOTE - NSCHARTNOTEFT_GEN_A_CORE
Case D/W Dr. Miles: pt has swollen third right digit...he should be seen by hand surgeon, not general surgeon. Will speak with hospitalist to cancel general surgery consult and request hand surgery consult.

## 2018-07-05 NOTE — CONSULT NOTE ADULT - SUBJECTIVE AND OBJECTIVE BOX
DINO LANCE 68yMalePatient is a 68y old  Male who presents with a chief complaint of Chest pain (04 Jul 2018 15:39)      Patient has history of:  No Known Allergies    PMH - hepatitis - ?? cirrhosis  subs abuse - poly    FSH - smoker    ROS - not contributory       Patient treated with:  ceFAZolin   IVPB 2000 milliGRAM(s) IV Intermittent every 8 hours    PHYSICAL EXAM  T(F): 98.8 (07-05-18 @ 06:03), Max: 99 (07-04-18 @ 13:14)  HR: 51 (07-05-18 @ 06:03) (50 - 53)  BP: 117/59 (07-05-18 @ 06:03) (108/59 - 132/62)  RR: 16 (07-05-18 @ 06:03) (14 - 18)  SpO2: 98% (07-04-18 @ 14:45) (98% - 100%)  Daily Height in cm: 185.42 (04 Jul 2018 16:08)    Daily     unkempt   comfortable   HEENT: normal, no nuchal rigidity  Cor: RSR Nl S1 S2  Lungs: clear  Abdomen: Nontender, Nl BS,   Ext: various cuts/ scratches all over his extremities. right middle finger tip - minor ulcer. tender & mild swelling . no crepitus / bullae    LAB & RADIOLOGIC RESULTS:                        10.9   2.04  )-----------( 59       ( 04 Jul 2018 13:06 )             31.9         07-04    138  |  97<L>  |  15  ----------------------------<  170<H>  3.5   |  28  |  1.1    Ca    8.0<L>      04 Jul 2018 13:06  Mg     1.7     07-04    TPro  6.5  /  Alb  2.7<L>  /  TBili  0.7  /  DBili  x   /  AST  67<H>  /  ALT  33  /  AlkPhos  145<H>  07-04    Sodium, Serum: 138 mmol/L (07-04-18 @ 13:06)     Creatinine, Serum: 1.1 mg/dL (07-04-18 @ 13:06)  eGFR if Non African American: 70 mL/min/1.73M2 (07-04-18 @ 13:06)  eGFR if : 81 mL/min/1.73M2 (07-04-18 @ 13:06)  LIVER FUNCTIONS - ( 04 Jul 2018 13:06 )  Alb: 2.7 g/dL / Pro: 6.5 g/dL / ALK PHOS: 145 U/L / ALT: 33 U/L / AST: 67 U/L / GGT: x           PT/INR - ( 04 Jul 2018 13:06 )   PT: 12.30 sec;   INR: 1.14 ratio         PTT - ( 04 Jul 2018 13:06 )  PTT:31.6 sec  Consult Note Adult-Surgery PA [BILLY Montes] (07-04-18 @ 22:57)  Patient Profile Adult [E. Antonov] (07-04-18 @ 16:19)  ED Provider Note [PATRICIA Dang] (07-04-18 @ 13:27)  ED ADULT Nurse Note [F. Amico, M. Crowe] (07-04-18 @ 12:36)  ED ADULT Triage Note [PATRICIA Colon] (07-04-18 @ 12:33)

## 2018-07-05 NOTE — CONSULT NOTE ADULT - ATTENDING COMMENTS
as above
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 Possible splinter related injury to right 3rd digit under his nail; no visible foreign object per pt.  Reports increasing finger pain and swelling at the fingertip.     Presentation and examination discussed with me in detail and is c/w right 3rd digit felon.  No acute pain at joints with flexion/extension. No clinical history suggesting septic arthritis.    Pancytopenic.    -c/w IV abx per ID  -recommend I&D of right 3rd digit felon  -betadine dilution soaks TID  -hand elevation  -packing change daily    -will continue to follow

## 2018-07-05 NOTE — CONSULT NOTE ADULT - PROBLEM SELECTOR RECOMMENDATION 9
? underlying infection   pt not permitting full exam    Surgical eval - r/o underlying abscess/ FB  IV abx for now    If no abscess - PO Keflex 750 mg Q 8 - 7 days    IF abscess - drainage & then PO abx

## 2018-07-05 NOTE — CONSULT NOTE ADULT - SUBJECTIVE AND OBJECTIVE BOX
detox consult      Pt has hx of IVDA/ and also been abusing opioid pills eg oxycodone  percocets. Uses __5-10___ bags/day average and or  _x____ pills / day.  has had variable periods of sobriety, Has attended some programs before  Hx of overdose_____Yes  __x__No  Currently preoccupied with his other medical issues.  was in [facility] over 20 yrs ago  clean for 20 yrs  and relapse past 6 mo  having opioid w/d symptoms    SOCIAL HISTORY:opioids  IVDA    MEDICATIONS  (STANDING):  aspirin enteric coated 81 milliGRAM(s) Oral daily  ceFAZolin   IVPB 2000 milliGRAM(s) IV Intermittent every 8 hours  dextrose 5%. 1000 milliLiter(s) (50 mL/Hr) IV Continuous <Continuous>  dextrose 50% Injectable 12.5 Gram(s) IV Push once  dextrose 50% Injectable 25 Gram(s) IV Push once  dextrose 50% Injectable 25 Gram(s) IV Push once  gemfibrozil 600 milliGRAM(s) Oral two times a day  insulin lispro (HumaLOG) corrective regimen sliding scale   SubCutaneous three times a day before meals  magnesium oxide 400 milliGRAM(s) Oral three times a day with meals  metFORMIN 500 milliGRAM(s) Oral two times a day  methadone    Tablet 10 milliGRAM(s) Oral once  methadone    Tablet 15 milliGRAM(s) Oral once  metoprolol tartrate 25 milliGRAM(s) Oral two times a day  potassium chloride    Tablet ER 20 milliEquivalent(s) Oral daily    MEDICATIONS  (PRN):  dextrose 40% Gel 15 Gram(s) Oral once PRN Blood Glucose LESS THAN 70 milliGRAM(s)/deciliter  glucagon  Injectable 1 milliGRAM(s) IntraMuscular once PRN Glucose LESS THAN 70 milligrams/deciliter      Vital Signs Last 24 Hrs  T(C): 37.1 (05 Jul 2018 06:03), Max: 37.2 (04 Jul 2018 13:14)  T(F): 98.8 (05 Jul 2018 06:03), Max: 99 (04 Jul 2018 13:14)  HR: 51 (05 Jul 2018 06:03) (50 - 53)  BP: 117/59 (05 Jul 2018 06:03) (108/59 - 132/62)  BP(mean): --  RR: 16 (05 Jul 2018 06:03) (14 - 18)  SpO2: 98% (04 Jul 2018 14:45) (98% - 100%)    PHYSICAL EXAM:    Constitutional: NAD, well-groomed, well-developed  HEENT: PERRLA, EOMI, Normal Hearing, MMM  Neck: No LAD, No JVD  Back: Normal spine flexure, No CVA tenderness  Respiratory: CTAB/L  Cardiovascular: S1 and S2, RRR, no M/G/R  Gastrointestinal: BS+, soft, NT/ND  Extremities: No peripheral edema  Vascular: 2+ peripheral pulses  Neurological: A/O x 3, no focal deficits    LABS:                        11.8   2.30  )-----------( 51       ( 05 Jul 2018 07:00 )             35.7     07-05    140  |  98  |  17  ----------------------------<  103<H>  3.6   |  29  |  1.1    Ca    7.8<L>      05 Jul 2018 07:00  Phos  3.1     07-05  Mg     1.9     07-05    TPro  6.4  /  Alb  2.8<L>  /  TBili  0.7  /  DBili  x   /  AST  61<H>  /  ALT  30  /  AlkPhos  133<H>  07-05    PT/INR - ( 04 Jul 2018 13:06 )   PT: 12.30 sec;   INR: 1.14 ratio         PTT - ( 04 Jul 2018 13:06 )  PTT:31.6 sec    Drug Screen Urine:  Alcohol Level n/a        RADIOLOGY & ADDITIONAL STUDIES:  noted

## 2018-07-06 DIAGNOSIS — L02.511 CUTANEOUS ABSCESS OF RIGHT HAND: ICD-10-CM

## 2018-07-06 DIAGNOSIS — R78.81 BACTEREMIA: ICD-10-CM

## 2018-07-06 LAB
ERYTHROCYTE [SEDIMENTATION RATE] IN BLOOD: 13 MM/HR — HIGH (ref 0–10)
ERYTHROCYTE [SEDIMENTATION RATE] IN BLOOD: 15 MM/HR — HIGH (ref 0–10)
ESTIMATED AVERAGE GLUCOSE: 108 MG/DL — SIGNIFICANT CHANGE UP (ref 68–114)
FERRITIN SERPL-MCNC: 55 NG/ML — SIGNIFICANT CHANGE UP (ref 30–400)
FOLATE SERPL-MCNC: 9.1 NG/ML — SIGNIFICANT CHANGE UP
GRAM STN FLD: SIGNIFICANT CHANGE UP
HBA1C BLD-MCNC: 5.4 % — SIGNIFICANT CHANGE UP (ref 4–5.6)
IRON SATN MFR SERPL: 23 % — SIGNIFICANT CHANGE UP (ref 15–50)
IRON SATN MFR SERPL: 63 UG/DL — SIGNIFICANT CHANGE UP (ref 35–150)
TIBC SERPL-MCNC: 279 UG/DL — SIGNIFICANT CHANGE UP (ref 220–430)
UIBC SERPL-MCNC: 216 UG/DL — SIGNIFICANT CHANGE UP (ref 110–370)
VIT B12 SERPL-MCNC: 478 PG/ML — SIGNIFICANT CHANGE UP (ref 232–1245)

## 2018-07-06 RX ADMIN — METFORMIN HYDROCHLORIDE 500 MILLIGRAM(S): 850 TABLET ORAL at 06:14

## 2018-07-06 RX ADMIN — Medication 600 MILLIGRAM(S): at 06:14

## 2018-07-06 RX ADMIN — MAGNESIUM OXIDE 400 MG ORAL TABLET 400 MILLIGRAM(S): 241.3 TABLET ORAL at 12:21

## 2018-07-06 RX ADMIN — Medication 100 MILLIGRAM(S): at 21:23

## 2018-07-06 RX ADMIN — Medication 600 MILLIGRAM(S): at 17:04

## 2018-07-06 RX ADMIN — Medication 25 MILLIGRAM(S): at 17:04

## 2018-07-06 RX ADMIN — METHADONE HYDROCHLORIDE 10 MILLIGRAM(S): 40 TABLET ORAL at 09:44

## 2018-07-06 RX ADMIN — MAGNESIUM OXIDE 400 MG ORAL TABLET 400 MILLIGRAM(S): 241.3 TABLET ORAL at 09:44

## 2018-07-06 RX ADMIN — Medication 25 MILLIGRAM(S): at 06:14

## 2018-07-06 RX ADMIN — Medication 100 MILLIGRAM(S): at 06:14

## 2018-07-06 RX ADMIN — METFORMIN HYDROCHLORIDE 500 MILLIGRAM(S): 850 TABLET ORAL at 17:04

## 2018-07-06 RX ADMIN — METHADONE HYDROCHLORIDE 5 MILLIGRAM(S): 40 TABLET ORAL at 21:21

## 2018-07-06 RX ADMIN — MAGNESIUM OXIDE 400 MG ORAL TABLET 400 MILLIGRAM(S): 241.3 TABLET ORAL at 17:04

## 2018-07-06 RX ADMIN — Medication 100 MILLIGRAM(S): at 15:12

## 2018-07-06 RX ADMIN — Medication 20 MILLIEQUIVALENT(S): at 12:21

## 2018-07-06 RX ADMIN — Medication 81 MILLIGRAM(S): at 12:21

## 2018-07-07 LAB
-  AMPICILLIN/SULBACTAM: SIGNIFICANT CHANGE UP
-  CEFAZOLIN: SIGNIFICANT CHANGE UP
-  CLINDAMYCIN: SIGNIFICANT CHANGE UP
-  ERYTHROMYCIN: SIGNIFICANT CHANGE UP
-  GENTAMICIN: SIGNIFICANT CHANGE UP
-  OXACILLIN: SIGNIFICANT CHANGE UP
-  PENICILLIN: SIGNIFICANT CHANGE UP
-  RIFAMPIN: SIGNIFICANT CHANGE UP
-  TETRACYCLINE: SIGNIFICANT CHANGE UP
-  TRIMETHOPRIM/SULFAMETHOXAZOLE: SIGNIFICANT CHANGE UP
-  VANCOMYCIN: SIGNIFICANT CHANGE UP
CRP SERPL-MCNC: <0.2 MG/DL — SIGNIFICANT CHANGE UP (ref 0–0.4)
CULTURE RESULTS: SIGNIFICANT CHANGE UP
METHOD TYPE: SIGNIFICANT CHANGE UP
ORGANISM # SPEC MICROSCOPIC CNT: SIGNIFICANT CHANGE UP
SPECIMEN SOURCE: SIGNIFICANT CHANGE UP

## 2018-07-07 RX ORDER — TRAMADOL HYDROCHLORIDE 50 MG/1
50 TABLET ORAL EVERY 6 HOURS
Qty: 0 | Refills: 0 | Status: DISCONTINUED | OUTPATIENT
Start: 2018-07-07 | End: 2018-07-10

## 2018-07-07 RX ADMIN — TRAMADOL HYDROCHLORIDE 50 MILLIGRAM(S): 50 TABLET ORAL at 17:48

## 2018-07-07 RX ADMIN — METHADONE HYDROCHLORIDE 5 MILLIGRAM(S): 40 TABLET ORAL at 21:39

## 2018-07-07 RX ADMIN — METFORMIN HYDROCHLORIDE 500 MILLIGRAM(S): 850 TABLET ORAL at 17:43

## 2018-07-07 RX ADMIN — METFORMIN HYDROCHLORIDE 500 MILLIGRAM(S): 850 TABLET ORAL at 05:59

## 2018-07-07 RX ADMIN — Medication 100 MILLIGRAM(S): at 05:58

## 2018-07-07 RX ADMIN — Medication 81 MILLIGRAM(S): at 11:12

## 2018-07-07 RX ADMIN — Medication 600 MILLIGRAM(S): at 05:59

## 2018-07-07 RX ADMIN — METHADONE HYDROCHLORIDE 5 MILLIGRAM(S): 40 TABLET ORAL at 08:28

## 2018-07-07 RX ADMIN — MAGNESIUM OXIDE 400 MG ORAL TABLET 400 MILLIGRAM(S): 241.3 TABLET ORAL at 08:28

## 2018-07-07 RX ADMIN — Medication 20 MILLIEQUIVALENT(S): at 11:12

## 2018-07-07 RX ADMIN — MAGNESIUM OXIDE 400 MG ORAL TABLET 400 MILLIGRAM(S): 241.3 TABLET ORAL at 17:44

## 2018-07-07 RX ADMIN — Medication 600 MILLIGRAM(S): at 17:43

## 2018-07-07 RX ADMIN — Medication 100 MILLIGRAM(S): at 15:40

## 2018-07-07 RX ADMIN — Medication 100 MILLIGRAM(S): at 21:40

## 2018-07-07 NOTE — PROGRESS NOTE ADULT - ATTENDING COMMENTS
Pt seen and examined at bedside this morning  No acute overnight events. Denies fevers, chills, SOB, CP  Ambulating. Pain controlled.    Gen: NAD  Right breast erythema less red, extent unchanged, +tender to palpation, I&D wound open with no purulent drainage, no additional loculations appreciated, packing changed    WBC    s/p I&D of right breast abscess; resolving cellulitis    -c/w IV abx per ID  -BID packing change to right breast wound  -pain control; pt packing change poorly tolerated with morphine  -DVT ppx with SCDs and ambulation; pt refusing HSQ  -follow up abscess cultures  -possible DC Monday after resolution of cellulitis and pre-authorization for home VNS Case d/w surgery resident  No new issues. compliant with hand soaks, but refusing packing to stent I&D site    CRP wnl    right 3 digit finger pad wound open with no drainage    c/w abx per ID  hand soaks TID  hand elevation  medical mgmt per primary team

## 2018-07-08 LAB
-  AMPICILLIN/SULBACTAM: SIGNIFICANT CHANGE UP
-  CEFAZOLIN: SIGNIFICANT CHANGE UP
-  CLINDAMYCIN: SIGNIFICANT CHANGE UP
-  ERYTHROMYCIN: SIGNIFICANT CHANGE UP
-  GENTAMICIN: SIGNIFICANT CHANGE UP
-  OXACILLIN: SIGNIFICANT CHANGE UP
-  PENICILLIN: SIGNIFICANT CHANGE UP
-  RIFAMPIN: SIGNIFICANT CHANGE UP
-  TETRACYCLINE: SIGNIFICANT CHANGE UP
-  TRIMETHOPRIM/SULFAMETHOXAZOLE: SIGNIFICANT CHANGE UP
-  VANCOMYCIN: SIGNIFICANT CHANGE UP
CK SERPL-CCNC: 104 U/L — SIGNIFICANT CHANGE UP (ref 0–225)
CULTURE RESULTS: SIGNIFICANT CHANGE UP
CULTURE RESULTS: SIGNIFICANT CHANGE UP
HCT VFR BLD CALC: 32.3 % — LOW (ref 42–52)
HGB BLD-MCNC: 10.8 G/DL — LOW (ref 14–18)
MCHC RBC-ENTMCNC: 32 PG — HIGH (ref 27–31)
MCHC RBC-ENTMCNC: 33.4 G/DL — SIGNIFICANT CHANGE UP (ref 32–37)
MCV RBC AUTO: 95.8 FL — HIGH (ref 80–94)
METHOD TYPE: SIGNIFICANT CHANGE UP
NRBC # BLD: 0 /100 WBCS — SIGNIFICANT CHANGE UP (ref 0–0)
ORGANISM # SPEC MICROSCOPIC CNT: SIGNIFICANT CHANGE UP
ORGANISM # SPEC MICROSCOPIC CNT: SIGNIFICANT CHANGE UP
PLATELET # BLD AUTO: 71 K/UL — LOW (ref 130–400)
RBC # BLD: 3.37 M/UL — LOW (ref 4.7–6.1)
RBC # FLD: 14.3 % — SIGNIFICANT CHANGE UP (ref 11.5–14.5)
SPECIMEN SOURCE: SIGNIFICANT CHANGE UP
SPECIMEN SOURCE: SIGNIFICANT CHANGE UP
WBC # BLD: 2.03 K/UL — LOW (ref 4.8–10.8)
WBC # FLD AUTO: 2.03 K/UL — LOW (ref 4.8–10.8)

## 2018-07-08 RX ORDER — METHADONE HYDROCHLORIDE 40 MG/1
5 TABLET ORAL ONCE
Qty: 0 | Refills: 0 | Status: DISCONTINUED | OUTPATIENT
Start: 2018-07-08 | End: 2018-07-08

## 2018-07-08 RX ADMIN — METFORMIN HYDROCHLORIDE 500 MILLIGRAM(S): 850 TABLET ORAL at 05:39

## 2018-07-08 RX ADMIN — Medication 1: at 12:00

## 2018-07-08 RX ADMIN — MAGNESIUM OXIDE 400 MG ORAL TABLET 400 MILLIGRAM(S): 241.3 TABLET ORAL at 11:17

## 2018-07-08 RX ADMIN — Medication 600 MILLIGRAM(S): at 05:39

## 2018-07-08 RX ADMIN — Medication 600 MILLIGRAM(S): at 18:01

## 2018-07-08 RX ADMIN — MAGNESIUM OXIDE 400 MG ORAL TABLET 400 MILLIGRAM(S): 241.3 TABLET ORAL at 08:15

## 2018-07-08 RX ADMIN — Medication 100 MILLIGRAM(S): at 15:04

## 2018-07-08 RX ADMIN — METFORMIN HYDROCHLORIDE 500 MILLIGRAM(S): 850 TABLET ORAL at 18:01

## 2018-07-08 RX ADMIN — Medication 20 MILLIEQUIVALENT(S): at 11:17

## 2018-07-08 RX ADMIN — TRAMADOL HYDROCHLORIDE 50 MILLIGRAM(S): 50 TABLET ORAL at 20:57

## 2018-07-08 RX ADMIN — TRAMADOL HYDROCHLORIDE 50 MILLIGRAM(S): 50 TABLET ORAL at 21:00

## 2018-07-08 RX ADMIN — Medication 100 MILLIGRAM(S): at 22:48

## 2018-07-08 RX ADMIN — Medication 100 MILLIGRAM(S): at 05:38

## 2018-07-08 RX ADMIN — METHADONE HYDROCHLORIDE 5 MILLIGRAM(S): 40 TABLET ORAL at 08:17

## 2018-07-08 RX ADMIN — METHADONE HYDROCHLORIDE 5 MILLIGRAM(S): 40 TABLET ORAL at 22:47

## 2018-07-08 RX ADMIN — MAGNESIUM OXIDE 400 MG ORAL TABLET 400 MILLIGRAM(S): 241.3 TABLET ORAL at 18:01

## 2018-07-08 RX ADMIN — Medication 81 MILLIGRAM(S): at 11:17

## 2018-07-09 LAB
ALBUMIN SERPL ELPH-MCNC: 2.7 G/DL — LOW (ref 3.5–5.2)
ALP SERPL-CCNC: 118 U/L — HIGH (ref 30–115)
ALT FLD-CCNC: 20 U/L — SIGNIFICANT CHANGE UP (ref 0–41)
ANION GAP SERPL CALC-SCNC: 10 MMOL/L — SIGNIFICANT CHANGE UP (ref 7–14)
AST SERPL-CCNC: 57 U/L — HIGH (ref 0–41)
BILIRUB SERPL-MCNC: 0.7 MG/DL — SIGNIFICANT CHANGE UP (ref 0.2–1.2)
BUN SERPL-MCNC: 16 MG/DL — SIGNIFICANT CHANGE UP (ref 10–20)
CALCIUM SERPL-MCNC: 7.9 MG/DL — LOW (ref 8.5–10.1)
CHLORIDE SERPL-SCNC: 104 MMOL/L — SIGNIFICANT CHANGE UP (ref 98–110)
CO2 SERPL-SCNC: 22 MMOL/L — SIGNIFICANT CHANGE UP (ref 17–32)
CREAT SERPL-MCNC: 0.8 MG/DL — SIGNIFICANT CHANGE UP (ref 0.7–1.5)
GLUCOSE SERPL-MCNC: 105 MG/DL — HIGH (ref 70–99)
MAGNESIUM SERPL-MCNC: 1.9 MG/DL — SIGNIFICANT CHANGE UP (ref 1.8–2.4)
PHOSPHATE SERPL-MCNC: 3.7 MG/DL — SIGNIFICANT CHANGE UP (ref 2.1–4.9)
POTASSIUM SERPL-MCNC: 4.3 MMOL/L — SIGNIFICANT CHANGE UP (ref 3.5–5)
POTASSIUM SERPL-SCNC: 4.3 MMOL/L — SIGNIFICANT CHANGE UP (ref 3.5–5)
PROT SERPL-MCNC: 6.2 G/DL — SIGNIFICANT CHANGE UP (ref 6–8)
SODIUM SERPL-SCNC: 136 MMOL/L — SIGNIFICANT CHANGE UP (ref 135–146)

## 2018-07-09 RX ORDER — METHADONE HYDROCHLORIDE 40 MG/1
10 TABLET ORAL ONCE
Qty: 0 | Refills: 0 | Status: DISCONTINUED | OUTPATIENT
Start: 2018-07-09 | End: 2018-07-09

## 2018-07-09 RX ORDER — METHADONE HYDROCHLORIDE 40 MG/1
15 TABLET ORAL ONCE
Qty: 0 | Refills: 0 | Status: DISCONTINUED | OUTPATIENT
Start: 2018-07-09 | End: 2018-07-09

## 2018-07-09 RX ADMIN — TRAMADOL HYDROCHLORIDE 50 MILLIGRAM(S): 50 TABLET ORAL at 19:05

## 2018-07-09 RX ADMIN — Medication 81 MILLIGRAM(S): at 12:14

## 2018-07-09 RX ADMIN — Medication 1: at 18:15

## 2018-07-09 RX ADMIN — Medication 20 MILLIEQUIVALENT(S): at 12:14

## 2018-07-09 RX ADMIN — Medication 600 MILLIGRAM(S): at 18:16

## 2018-07-09 RX ADMIN — METFORMIN HYDROCHLORIDE 500 MILLIGRAM(S): 850 TABLET ORAL at 18:16

## 2018-07-09 RX ADMIN — Medication 25 MILLIGRAM(S): at 06:03

## 2018-07-09 RX ADMIN — METHADONE HYDROCHLORIDE 10 MILLIGRAM(S): 40 TABLET ORAL at 22:21

## 2018-07-09 RX ADMIN — MAGNESIUM OXIDE 400 MG ORAL TABLET 400 MILLIGRAM(S): 241.3 TABLET ORAL at 18:16

## 2018-07-09 RX ADMIN — Medication 100 MILLIGRAM(S): at 16:00

## 2018-07-09 RX ADMIN — METFORMIN HYDROCHLORIDE 500 MILLIGRAM(S): 850 TABLET ORAL at 06:03

## 2018-07-09 RX ADMIN — MAGNESIUM OXIDE 400 MG ORAL TABLET 400 MILLIGRAM(S): 241.3 TABLET ORAL at 12:14

## 2018-07-09 RX ADMIN — METHADONE HYDROCHLORIDE 15 MILLIGRAM(S): 40 TABLET ORAL at 12:14

## 2018-07-09 RX ADMIN — TRAMADOL HYDROCHLORIDE 50 MILLIGRAM(S): 50 TABLET ORAL at 18:23

## 2018-07-09 RX ADMIN — Medication 100 MILLIGRAM(S): at 06:03

## 2018-07-09 RX ADMIN — Medication 600 MILLIGRAM(S): at 06:03

## 2018-07-09 RX ADMIN — Medication 100 MILLIGRAM(S): at 21:19

## 2018-07-10 ENCOUNTER — TRANSCRIPTION ENCOUNTER (OUTPATIENT)
Age: 68
End: 2018-07-10

## 2018-07-10 VITALS — HEART RATE: 51 BPM

## 2018-07-10 PROBLEM — K75.9 INFLAMMATORY LIVER DISEASE, UNSPECIFIED: Chronic | Status: ACTIVE | Noted: 2018-07-04

## 2018-07-10 PROBLEM — I10 ESSENTIAL (PRIMARY) HYPERTENSION: Chronic | Status: ACTIVE | Noted: 2018-07-04

## 2018-07-10 PROBLEM — E11.9 TYPE 2 DIABETES MELLITUS WITHOUT COMPLICATIONS: Chronic | Status: ACTIVE | Noted: 2018-07-04

## 2018-07-10 RX ORDER — FUROSEMIDE 40 MG
1 TABLET ORAL
Qty: 30 | Refills: 0
Start: 2018-07-10 | End: 2018-08-08

## 2018-07-10 RX ORDER — METOPROLOL TARTRATE 50 MG
0 TABLET ORAL
Qty: 0 | Refills: 0 | COMMUNITY

## 2018-07-10 RX ORDER — CLOPIDOGREL BISULFATE 75 MG/1
1 TABLET, FILM COATED ORAL
Qty: 0 | Refills: 0 | COMMUNITY

## 2018-07-10 RX ORDER — POTASSIUM CHLORIDE 20 MEQ
1 PACKET (EA) ORAL
Qty: 30 | Refills: 0 | OUTPATIENT
Start: 2018-07-10 | End: 2018-08-08

## 2018-07-10 RX ORDER — CEPHALEXIN 500 MG
750 CAPSULE ORAL EVERY 8 HOURS
Qty: 0 | Refills: 0 | Status: DISCONTINUED | OUTPATIENT
Start: 2018-07-10 | End: 2018-07-10

## 2018-07-10 RX ORDER — CEPHALEXIN 500 MG
1 CAPSULE ORAL
Qty: 30 | Refills: 0 | OUTPATIENT
Start: 2018-07-10 | End: 2018-07-19

## 2018-07-10 RX ORDER — SPIRONOLACTONE 25 MG/1
1 TABLET, FILM COATED ORAL
Qty: 30 | Refills: 0 | OUTPATIENT
Start: 2018-07-10 | End: 2018-08-08

## 2018-07-10 RX ADMIN — METFORMIN HYDROCHLORIDE 500 MILLIGRAM(S): 850 TABLET ORAL at 05:59

## 2018-07-10 RX ADMIN — Medication 600 MILLIGRAM(S): at 05:59

## 2018-07-10 RX ADMIN — TRAMADOL HYDROCHLORIDE 50 MILLIGRAM(S): 50 TABLET ORAL at 03:24

## 2018-07-10 RX ADMIN — Medication 100 MILLIGRAM(S): at 06:00

## 2018-07-10 NOTE — DISCHARGE NOTE ADULT - PROVIDER TOKENS
FREE:[LAST:[[facility]],PHONE:[(   )    -],FAX:[(   )    -]],TOKEN:'[id number]',FREE:[LAST:[Dr. Ivan Pugh],PHONE:[phone]],FAX:[(   )    -],ADDRESS:[[address] [place] [address]]],FREE:[LAST:[Gastroenetrologist/Hepatologist],PHONE:[(   )    -],FAX:[(   )    -]]

## 2018-07-10 NOTE — DISCHARGE NOTE ADULT - PATIENT PORTAL LINK FT
You can access the [facility] Patient Portal, offered by [facility], by registering with the following website: [email or web address]

## 2018-07-10 NOTE — PROGRESS NOTE ADULT - PROBLEM SELECTOR PLAN 2
ECHO - negative  ESR - normal    Sepsis sec to finger abscess
check Echo   check ESR    repeat blood cx in am    no change in abx     check blood cx q 48 till neg  if Positive Echo - will need 6 weeks of abx   if low ESR and negative  repeat cx - then likely sec to finger infection & IV to PO abx - 14 days

## 2018-07-10 NOTE — PROGRESS NOTE ADULT - ASSESSMENT
68M s/p I & D of Right Hand 3rd digit Felon w 2 cc of pus, all loculation ruptured, no FTS association. Doing well, patients compliant with TID betadine soaks, but refusing any further packing changes, wound cultures pending, blood cultures pending.  No acute pain at joints with flexion/extension. No clinical history suggesting septic arthritis.    PLAN  - F/U Wound cultures pending, IV-->PO abx per ID on discharge  - Hand soaks (baby shampoo) TID, hand elevation  - Strict glycemic control  - follow up in office in 1 week. call for appointment [phone]
Patient is a 68y Male with h/o untreated Hepatitis C, HTN (hypertension), NIDDM, CAD/MI with heart artery stent, IVDA Heroin and Crack abuse presenting with complaints of chest pain following use of heroin ~0800a am the morning of presentation Pain was sharp/stabbing, sever, substernal associated with nausea but no vomiting, dyspnea and diaphoresis, localised with no radiation. He also mentions worsening bilateral leg swelling over past 2 months and painful swelling of the Rt 3rd finger.  He denied any cough, fever, chills, abdominal distention. Patient was admitted to r/o ACS, Rt index finger abscess.     Assessment and Plan:    1. h/o CAD s/p stents with Chest pain: ACS ruled out.  Cardiac Enzymes negative x 3. Continue Aspirin, Patient still on DAPT?.  Follow up with Dr. Beck outpatient.  Continue Metroprolol, dose decreased. Not on statin.  Lipid Panel reviewed LDL 52 and A1c 5.3%.   ECHO done: EF >55%, no RWMA.       2. Heroin Abuse:  Active IVDA, last use 7/4/18.  Detox consulted: Completed Methadone protocol.  Discharge to Detox when medically cleared. Patient prefers an outpatient program.  Patient will be discharged today to Intake for enrollment in the Methadone clinic.      3. Hypomagnesemia/Hypokalemia:  Replaced. Follow up levels.      4. Pancytopenia:  Most likely due to hepatitis C/Cirrhosis and Alcohol abuse.  ? Baseline. Counts stable.       5. Hepatitis C with Liver cirrhosis:  Untreated. Follow up PCR.   Liver Ultrasound: Hepatic cirrhosis with moderate abdominal ascites.  Follow up with GI out patient. Low salt diet. Lasix and Aldactone low dose.      6. Right Middle finger Felon:  Surgery consulted: s/p I&D 7/5/18. Recommended soaking finger in betadine q8h  Still on Ancef. Cultures positive for MSSA.   Per ID if repeat Blood cultures and TTE negative and ESR low then PO antibiotics for 14 days will be recommended.  Final recommendation per Dr. Zimmerman:  PO Keflex 750 mg Q 8 for 10 days.      7. Gram Positive cocci Bacteremia: MSSA  Repeat Blood cultures negative  TTE negative for Endocarditis.  Continue Ancef and discharge home on PO Keflex 750 mg Q 8 for 10 days.                                                                                                                                                                                                                                                                                                                                                                                                                                                                                                                                                                                                                                                                                                                                                                                                                                                                                                                                                                                                                                                                                                                                                                                                                                                                                                                                                                                                                                                                                                                                                                                                                                                                                                                                                                                                                                                                                                                                                                                                                                                                                                                                                                                                                                                                                                                                                                                                                                                                                                                                                                                                                                                                                                                                                                                                                                                                                                                                                                                                                                                                                                                                                                                                                                                                                                                                                                                                                                                                                                                                                                                                                                                                                                                                                                                                                                                                                                                                                                                                                                                                                                            DVT prophylaxis: Ambulate as tolerated.
68M s/p I & D of Right Hand 3rd digit Felon w 2 cc of pus, all loculation ruptured, no FTS association. Doing well, patients compliant with TID betadine soaks, but refusing any further packing changes, wound cultures and blood cultures results showing MSSA.  No acute pain at joints with flexion/extension. No clinical history suggesting septic arthritis.    PLAN  - IV-->PO abx per ID on discharge  - Hand soaks (baby shampoo) TID, hand elevation  - Strict glycemic control  - follow up in office in 1 week. call for appointment [phone]
68M s/p I & D of Right Hand 3rd digit Felon w 2 cc of pus, all loculation ruptured, no FTS association. Wound cultures sent, packing and TID betadine soaks started. No acute pain at joints with flexion/extension. No clinical history suggesting septic arthritis.    PLAN  - Hand soaks (baby shampoo) TID, hand elevation  - Strict glycemic control  - PO abx per ID on discharge  - follow up in office in 1 week. call for appointment [phone]
Patient is a 68y Male with h/o untreated Hepatitis C, HTN (hypertension), NIDDM, CAD/MI with heart artery stent, IVDA Heroin and Crack abuse presenting with complaints of chest pain following use of heroin ~0800a am the morning of presentation Pain was sharp/stabbing, sever, substernal associated with nausea but no vomiting, dyspnea and diaphoresis, localised with no radiation. He also mentions worsening bilateral leg swelling over past 2 months and painful swelling of the Rt 3rd finger.  He denied any cough, fever, chills, abdominal distention. Patient was admitted to r/o ACS, Rt index finger abscess.     Assessment and Plan:    1. h/o CAD s/p stents with Chest pain: ACS ruled out.  Cardiac Enzymes negative x 3. Continue Aspirin, Patient still on DAPT?.  Follow up with Dr. Beck outpatient.  Continue Metroprolol, dose decreased. Not on statin.  Lipid Panel reviewed LDL 52 and A1c 5.3%.   ECHO done: EF >55%, no RWMA.       2. Heroin Abuse:  Active IVDA, last use 7/4/18.  Detox consulted: Completed Methadone protocol.  Discharge to Detox when medically cleared. Patient prefers an outpatient program.  Discussed with Dr. Murphy: Given the need for IV antibiotics he is not a candidate for the inpatient Detox program. Will discuss with CM to see if he can be enrolled whilst inpatient. If not will continue low dose methadone.       3. Hypomagnesemia/Hypokalemia:  Replaced. Follow up levels.      4. Pancytopenia:  Most likely due to hepatitis C/Cirrhosis and Alcohol abuse.  ? Baseline. Counts stable.       5. Hepatitis C with Liver cirrhosis:  Untreated. Follow up PCR.   Liver Ultrasound: Hepatic cirrhosis with moderate abdominal ascites.  Followup with GI out patient.      6. Right Middle finger Felon:  Surgery consulted: s/p I&D 7/5/18. Recommended soaking finger in betadine q8h  Still on Ancef. Cultures positive for MSSA.   Per ID if repeat Blood cultures and TTE negative and ESR low then PO antibiotics for 14 days will be recommended but case discussed with Dr. Ware who recommended IV antibiotics for 2 weeks given Staph aureus Bacteremia.      7. Gram Positive cocci Bacteremia: MSSA  Repeat Blood cultures negative  TTE negative for Endocarditis.  Continue Ancef and discharge home on                                                                                                                                                                                                                                                                                                                                                                                                                                                                                                                                                                                                                                                                                                                                                                                                                                                                                                                                                                                                                                                                                                                                                                                                                                                                                                                                                                                                                                                                                                                                                                                                                                                                                                                                                                                                                                                                                                                                                                                                                                                                                                                                                                                                                                                                                                                                                                                                                                                                                                                                                                                                                                                                                                                                                                                                                                                                                                                                                                                                                                                                                                                                                                                                                                                                                                                                                                                                                                                                                                                                                                                                                                                                                                                                                                                                                                                                                                                                                                                                                                                                                                                  DVT prophylaxis: Ambulate as tolerated.
Patient is a 68y Male with h/o untreated Hepatitis C, HTN (hypertension), NIDDM, CAD/MI with heart artery stent, IVDA Heroin and Crack abuse presenting with complaints of chest pain following use of heroin ~0800a am the morning of presentation Pain was sharp/stabbing, sever, substernal associated with nausea but no vomiting, dyspnea and diaphoresis, localised with no radiation. He also mentions worsening bilateral leg swelling over past 2 months and painful swelling of the Rt 3rd finger.  He denied any cough, fever, chills, abdominal distention. Patient was admitted to r/o ACS, Rt index finger abscess.     Assessment and Plan:    1. h/o CAD s/p stents with Chest pain: ACS ruled out.  Cardiac Enzymes negative x 3. Continue Aspirin, Patient still on DAPT?.  Follow up with Dr. Beck outpatient.  Continue Metroprolol. Not on statin.  Lipid Panel reviewed LDL 52 and A1c 5.3%.   ECHO done: EF >55%, no RWMA.       2. Heroin Abuse:  Active IVDA, last use 7/4/18.  Detox consulted: Started on Methadone protocol.  Discharge to Detox when medically cleared.      3. Hypomagnesemia/Hypokalemia:  Replaced. Follow up levels.      4. Pancytopenia:  Most likely due to hepatitis C/Cirrhosis and Alcohol abuse.  ? Baseline. Follow up counts.       5. Hepatitis C with Liver cirrhosis:  Untreated. Follow up PCR.  Liver Ultrasound: Hepatic cirrhosis with moderate abdominal ascites.  Followup with GI out patient.      6. Right Middle finger Felon:  Surgery consulted: s/p I&D. Recommended soaking finger in betadine q8h  Continue Ancef for now. ID following.      7. Gram Positive cocci Bacteremia:  Repeat Blood cultures pending.  TTE negative for Endocarditis.  Continue Ancef.       DVT prophylaxis: Ambulate as tolerated.
Patient is a 68y Male with h/o untreated Hepatitis C, HTN (hypertension), NIDDM, CAD/MI with heart artery stent, IVDA Heroin and Crack abuse presenting with complaints of chest pain following use of heroin ~0800a am the morning of presentation Pain was sharp/stabbing, sever, substernal associated with nausea but no vomiting, dyspnea and diaphoresis, localised with no radiation. He also mentions worsening bilateral leg swelling over past 2 months and painful swelling of the Rt 3rd finger.  He denied any cough, fever, chills, abdominal distention. Patient was admitted to r/o ACS, Rt index finger abscess.     Assessment and Plan:    1. h/o CAD s/p stents with Chest pain: ACS ruled out.  Cardiac Enzymes negative x 3. Continue Aspirin, Patient still on DAPT?.  Follow up with Dr. Beck outpatient.  Continue Metroprolol. Not on statin.  Lipid Panel reviewed LDL 52 and A1c 5.3%.   ECHO done: EF >55%, no RWMA.       2. Heroin Abuse:  Active IVDA, last use 7/4/18.  Detox consulted: Started on Methadone protocol.  Discharge to Detox when medically cleared.      3. Hypomagnesemia/Hypokalemia:  Replaced. Follow up levels.      4. Pancytopenia:  Most likely due to hepatitis C/Cirrhosis and Alcohol abuse.  ? Baseline. Follow up counts.       5. Hepatitis C with Liver cirrhosis:  Untreated. Follow up PCR.  Liver Ultrasound: Hepatic cirrhosis with moderate abdominal ascites.  Followup with GI out patient.      6. Right Middle finger Felon:  Surgery consulted: s/p I&D. Recommended soaking finger in betadine q8h  Continue Ancef for now. ID following.      7. Gram Positive cocci Bacteremia:  Repeat Blood cultures pending.  TTE negative for Endocarditis.  Continue Ancef.       DVT prophylaxis: Ambulate as tolerated.
Patient is a 68y Male with h/o untreated Hepatitis C, HTN (hypertension), NIDDM, CAD/MI with heart artery stent, IVDA Heroin and Crack abuse presenting with complaints of chest pain following use of heroin ~0800a am the morning of presentation Pain was sharp/stabbing, sever, substernal associated with nausea but no vomiting, dyspnea and diaphoresis, localised with no radiation. He also mentions worsening bilateral leg swelling over past 2 months and painful swelling of the Rt 3rd finger.  He denied any cough, fever, chills, abdominal distention. Patient was admitted to r/o ACS, Rt index finger abscess.     Assessment and Plan:    1. h/o CAD s/p stents with Chest pain: ACS ruled out.  Cardiac Enzymes negative x 3. Continue Aspirin, Patient still on DAPT?.  Follow up with Dr. Beck outpatient.  Continue Metroprolol. Not on statin.  Lipid Panel reviewed LDL 52 and A1c 5.3%.   ECHO done: EF >55%, no RWMA.       2. Heroin Abuse:  Active IVDA, last use 7/4/18.  Detox consulted: Started on Methadone protocol.  Discharge to Detox when medically cleared. Patient prefers an outpatient program.       3. Hypomagnesemia/Hypokalemia:  Replaced. Follow up levels.      4. Pancytopenia:  Most likely due to hepatitis C/Cirrhosis and Alcohol abuse.  ? Baseline. Counts stable.       5. Hepatitis C with Liver cirrhosis:  Untreated. Follow up PCR.   Liver Ultrasound: Hepatic cirrhosis with moderate abdominal ascites.  Followup with GI out patient.      6. Right Middle finger Felon:  Surgery consulted: s/p I&D 7/5/18. Recommended soaking finger in betadine q8h  Continue Ancef for now. ID following, Final recommendations pending.      7. Gram Positive cocci Bacteremia: MSSA  Repeat Blood cultures negative  TTE negative for Endocarditis.  Continue Ancef.       DVT prophylaxis: Ambulate as tolerated.
Patient is a 68y Male with h/o untreated Hepatitis C, HTN (hypertension), NIDDM, CAD/MI with heart artery stent, IVDA Heroin and Crack abuse presenting with complaints of chest pain following use of heroin ~0800a am the morning of presentation Pain was sharp/stabbing, sever, substernal associated with nausea but no vomiting, dyspnea and diaphoresis, localised with no radiation. He also mentions worsening bilateral leg swelling over past 2 months and painful swelling of the Rt 3rd finger.  He denied any cough, fever, chills, abdominal distention. Patient was admitted to r/o ACS, Rt index finger abscess.     Assessment and Plan:    1. h/o CAD s/p stents with Chest pain: r/o ACS.  Cardiac Enzymes negative x 3. Continue Aspirin, Patient still on DAPT?. Will Clarify with Dr. Beck.  Continue Metroprolol. Not on statin.  Lipid Panel reviewed LDL 52 and A1c 5.3%.   ECHO done, read pending.      2. Heroin Abuse:  Active IVDA, last use today.  Detox consulted: Started on Methadone protocol.  Discharge to Detox when medically cleared.      3. Hypomagnesemia/Hypokalemia:  Replaced. Follow up levels.      4. Pancytopenia:  Most likely due to hepatitis C and Alcohol abuse.  ? Baseline. Follow up counts.       5. Hepatitis C:  Untreated. Follow up PCR.      6. Right Middle finger swelling:  r/o Abscess. Surgery consulted  Continue Ancef for now. ID following.      DVT prophylaxis: Ambulate as tolerated.

## 2018-07-10 NOTE — DISCHARGE NOTE ADULT - MEDICATION SUMMARY - MEDICATIONS TO TAKE
I will START or STAY ON the medications listed below when I get home from the hospital:    Aldactone 25 mg oral tablet  -- 1 tab(s) by mouth once a day   -- It is very important that you take or use this exactly as directed.  Do not skip doses or discontinue unless directed by your doctor.  May cause drowsiness or dizziness.    -- Indication: For Liver Cirrhosis    aspirin 81 mg oral tablet  -- 1 tab(s) by mouth once a day  -- Indication: For Coronary Heart Disease    metFORMIN 500 mg oral tablet  -- 1 tab(s) by mouth 2 times a day  -- Indication: For Diabetes mellitus    gemfibrozil 600 mg oral tablet  -- 1 tab(s) by mouth 2 times a day  -- Indication: For Hyperlipidemia    cephalexin 750 mg oral capsule  -- 1 cap(s) by mouth every 8 hours   -- Indication: For Finger Abscess    Lasix 20 mg oral tablet  -- 1 tab(s) by mouth once a day   -- Avoid prolonged or excessive exposure to direct and/or artificial sunlight while taking this medication.  It is very important that you take or use this exactly as directed.  Do not skip doses or discontinue unless directed by your doctor.  It may be advisable to drink a full glass orange juice or eat a banana daily while taking this medication.    -- Indication: For Liver Cirrhosis    potassium chloride 10 mEq oral tablet, extended release  -- 1 tab(s) by mouth once a day   -- It is very important that you take or use this exactly as directed.  Do not skip doses or discontinue unless directed by your doctor.  Medication should be taken with plenty of water.  Take with food or milk.    -- Indication: For Hypokalemia

## 2018-07-10 NOTE — DISCHARGE NOTE ADULT - MEDICATION SUMMARY - MEDICATIONS TO STOP TAKING
I will STOP taking the medications listed below when I get home from the hospital:    Plavix 75 mg oral tablet  -- 1 tab(s) by mouth once a day    Lopressor  -- orally 2 times a day    enalapril  -- orally once a day

## 2018-07-10 NOTE — DISCHARGE NOTE ADULT - PLAN OF CARE
Complete healing. Continue Hand soaks BETADINE TID, hand elevation  Follow up in the office in 1 week with Dr. Ivan Pugh. Call for appointment [phone]. Ensure complete Treatment. Please Take antibiotics as prescribed. Resolved Given History of Coronary heart disease you will need to [id number] up with Your cardiologist ASAP.  Continue Aspirin but not Plavix due to Low platelet count. Prevent associated complications. History of untreated Hepatitis C with Ascites, Low white blood and Platelet count.   Please follow up with hepatologist / Gastroenterologist within 1 week after discharge.  Please stop using any Alcohol.  Take prescribed doses of Water pills until seen by Gastroenterologist. Continue a Low salt diet. Monitor Blood pressures closely on the water pills. You will need an Endoscopy to check for Esophageal varices. Abstinence Please Follow up at the methadone clinic immediately after discharge for Enrollment.

## 2018-07-10 NOTE — DISCHARGE NOTE ADULT - HOSPITAL COURSE
Patient is a 68y Male with h/o untreated Hepatitis C, HTN (hypertension), NIDDM, CAD/MI with heart artery stent, IVDA Heroin and Crack abuse presenting with complaints of chest pain following use of heroin ~0800a am the morning of presentation Pain was sharp/stabbing, sever, substernal associated with nausea but no vomiting, dyspnea and diaphoresis, localised with no radiation. He also mentions worsening bilateral leg swelling over past 2 months and painful swelling of the Rt 3rd finger.  He denied any cough, fever, chills, abdominal distention. Patient was admitted to r/o ACS, Rt index finger abscess.     Assessment and Plan:    1. h/o CAD s/p stents with Chest pain: ACS ruled out.  Cardiac Enzymes negative x 3. Continue Aspirin, Patient still on DAPT?.  Follow up with Dr. Beck outpatient.  Continue Metroprolol, dose decreased. Not on statin.  Lipid Panel reviewed LDL 52 and A1c 5.3%.   ECHO done: EF >55%, no RWMA.       2. Heroin Abuse:  Active IVDA, last use 7/4/18.  Detox consulted: Completed Methadone protocol.  Discharge to Detox when medically cleared. Patient prefers an outpatient program.  Patient will be discharged today to Intake for enrollment in the Methadone clinic.      3. Hypomagnesemia/Hypokalemia:  Replaced. Follow up levels.      4. Pancytopenia:  Most likely due to hepatitis C/Cirrhosis and Alcohol abuse.  ? Baseline. Counts stable.       5. Hepatitis C with Liver cirrhosis:  Untreated. Follow up PCR.   Liver Ultrasound: Hepatic cirrhosis with moderate abdominal ascites.  Follow up with GI out patient. Low salt diet. Lasix and Aldactone low dose.      6. Right Middle finger Felon:  Surgery consulted: s/p I&D 7/5/18. Recommended soaking finger in betadine q8h  Started on Ancef. Cultures positive for MSSA.   Per ID if repeat Blood cultures and TTE negative and ESR low then PO antibiotics for 14 days will be recommended.  Final recommendation per Dr. Zimmerman:  PO Keflex 750 mg Q 8 for 10 days.      7. Gram Positive cocci Bacteremia: MSSA  Repeat Blood cultures negative  TTE negative for Endocarditis.  Continue Ancef and discharge home on PO Keflex 750 mg Q 8 for 10 days.

## 2018-07-10 NOTE — PROGRESS NOTE ADULT - SUBJECTIVE AND OBJECTIVE BOX
DINO LANCE  68y  Male    Patient is a 68y old  Male who presents with a chief complaint of Chest pain (04 Jul 2018 15:39)      INTERVAL HPI/OVERNIGHT EVENTS:  Patient having symptoms of withdrawal. He was given another dose of Methadone 10 mg. Patient has no new complaints.      REVIEW OF SYSTEMS:  CONSTITUTIONAL: No fever, weight loss, or fatigue  EYES: No eye pain, visual disturbances, or discharge  ENMT:  No difficulty hearing, tinnitus, vertigo; No sinus or throat pain  NECK: No pain or stiffness  BREASTS: No pain, masses, or nipple discharge  RESPIRATORY: No cough, wheezing, chills or hemoptysis; No shortness of breath  CARDIOVASCULAR: No chest pain, palpitations, dizziness, or leg swelling  GASTROINTESTINAL: No abdominal or epigastric pain. No nausea, vomiting, or hematemesis; No diarrhea or constipation. No melena or hematochezia.  GENITOURINARY: No dysuria, frequency, hematuria, or incontinence  NEUROLOGICAL: No headaches, memory loss, loss of strength, numbness, or tremors  SKIN: No itching, burning, rashes, or lesions   LYMPH NODES: No enlarged glands  ENDOCRINE: No heat or cold intolerance; No hair loss  MUSCULOSKELETAL: Rt middle finger swelling and pain, diffuse joint pains.  PSYCHIATRIC: No depression, anxiety, mood swings, or difficulty sleeping  HEME/LYMPH: No easy bruising, or bleeding gums  ALLERGY AND IMMUNOLOGIC: No hives or eczema      VITALS:  T(C): 36.4 (10 Jul 2018 05:53), Max: 37.1 (09 Jul 2018 22:06)  T(F): 97.5 (10 Jul 2018 05:53), Max: 98.7 (09 Jul 2018 22:06)  HR: 51 (10 Jul 2018 05:57) (51 - 55)  BP: 114/59 (10 Jul 2018 05:53) (110/59 - 114/59)  BP(mean): --  RR: 16 (10 Jul 2018 05:53) (16 - 16)  SpO2: --      PHYSICAL EXAM:  GENERAL: NAD  HEAD:  Atraumatic, Normocephalic  EYES: conjunctiva and sclera clear  ENMT: Moist mucous membranes  NECK: Supple, Normal thyroid  NERVOUS SYSTEM:  Alert & Oriented X 3, Good concentration; Motor Strength 5/5 B/L upper and lower extremities  CHEST/LUNG: Clear to auscultation bilaterally; No rales, rhonchi, wheezing, or rubs  HEART: Regular rate and rhythm; No murmurs, rubs, or gallops  ABDOMEN: Soft, Nontender, Nondistended; Bowel sounds present  EXTREMITIES:  2+ Peripheral Pulses, No clubbing, cyanosis, Bipedal edema  LYMPH: No lymphadenopathy noted  SKIN: Right middle finger swelling improved, tender+. dressing c/d/i.      Consultant(s) Notes Reviewed:  [x ] YES  [ ] NO  Care Discussed with Consultants/Other Providers [ x] YES  [ ] NO    LABS:                        10.8   2.03  )-----------( 71       ( 08 Jul 2018 07:04 )             32.3                          07-09    136  |  104  |  16  ----------------------------<  105<H>  4.3   |  22  |  0.8    Ca    7.9<L>      09 Jul 2018 06:52  Phos  3.7     07-09  Mg     1.9     07-09    TPro  6.2  /  Alb  2.7<L>  /  TBili  0.7  /  DBili  x   /  AST  57<H>  /  ALT  20  /  AlkPhos  118<H>  07-09        CARDIAC MARKERS ( 05 Jul 2018 07:00 )  x     / <0.01 ng/mL / 251 U/L / x     / x      CARDIAC MARKERS ( 04 Jul 2018 18:54 )  x     / <0.01 ng/mL / 392 U/L / x     / x      CARDIAC MARKERS ( 04 Jul 2018 13:06 )  x     / <0.01 ng/mL / 498 U/L / x     / x        Lipid Profile in AM (07.05.18 @ 07:00)    Total Cholesterol/HDL Ratio Measurement: 2.3 Ratio    Cholesterol, Serum: 117 mg/dL    Triglycerides, Serum: 91 mg/dL    HDL Cholesterol, Serum: 50 mg/dL    Direct LDL: 52    Hemoglobin A1C, Whole Blood: 5.3 % (07.04.18 @ 18:54)      MICROBIOLOGY:   Culture - Blood in AM (07.08.18 @ 07:04)    Specimen Source: .Blood Blood-Peripheral    Culture Results: No growth to date.      Culture - Blood (07.06.18 @ 11:52)    Specimen Source: .Blood None    Culture Results: No growth to date.    Culture - Other (07.05.18 @ 18:55)    -  Ampicillin/Sulbactam: S <=8/4    -  Trimethoprim/Sulfamethoxazole: S <=0.5/9.5    -  Vancomycin: S 1    -  Cefazolin: S <=4    -  Clindamycin: S 0.5    -  Erythromycin: S <=0.25    -  Gentamicin: S <=1    -  Oxacillin: S <=0.25    -  Penicillin: R >8    -  RIF- Rifampin: S <=1    -  Tetra/Doxy: S <=1    Specimen Source: .Other Rt 3rd distal digit abscess    Culture Results:   Moderate Staphylococcus aureus    Organism Identification: Staphylococcus aureus    Organism: Staphylococcus aureus    Method Type: BIJAN      Culture - Blood (07.04.18 @ 13:06)    -  Cefazolin: S <=4    Gram Stain:   Growth in aerobic bottle: Gram Positive Cocci in Clusters    -  Ampicillin/Sulbactam: S <=8/4    -  Staphylococcus aureus: Detec    -  Vancomycin: S 2    -  Trimethoprim/Sulfamethoxazole: S <=0.5/9.5    -  Tetra/Doxy: S <=1    -  RIF- Rifampin: S <=1    -  Penicillin: R >8    -  Oxacillin: S <=0.25    -  Gentamicin: S <=1    -  Erythromycin: S 0.5    -  Clindamycin: S 0.5    Specimen Source: .Blood     Organism: Blood Culture PCR    Organism: Staphylococcus aureus    Culture Results:   Growth in aerobic bottle: Staphylococcus aureus  "Due to technical problems, Proteus sp. will Not be reported as part of  the BCID panel until further notice"    Organism Identification: Blood Culture PCR  Staphylococcus aureus    Method Type: PCR    Method Type: BIJAN      12 Lead ECG (07.04.18 @ 12:36)   Ventricular Rate 53 BPM  Atrial Rate 53 BPM  P-R Interval 170 ms  QRS Duration 96 ms  Q-T Interval 466 ms  QTC Calculation(Bezet) 437 ms  P Axis 81 degrees  R Axis 86 degrees  T Axis 45 degrees    Diagnosis Line: Sinus bradycardia with Premature supraventricular complexes  Nonspecific ST-T changes      RADIOLOGY & ADDITIONAL TESTS:  X-ray Hand 2 Views, Right (07.04.18 @ 13:29)   No definite radiographic evidence of osteomyelitis.  Mild degenerative change of interphalangeal joints.  No evidence of acute fracture or dislocation.    Impression:  No definite radiographic evidence of osteomyelitis.    X-ray Shoulder 2 Views, Left (07.04.18 @ 13:29)   No evidence of acute fracture or dislocation.   The partially imaged left lung is unremarkable.  Degenerative change of left AC joint.    Impression:  No acute osseous abnormality.      X-ray Chest 2 Views PA/Lat (07.04.18 @ 13:28)   No radiographic evidence of acute cardiopulmonary disease.        US Abdomen Limited (07.05.18 @ 10:21)   Hepatic cirrhosis with moderate abdominal ascites.    Gallbladder wall edema without definite acute sonographic evidence of   acute cholecystitis.    VA Duplex Lower Ext Vein Scan, Bilat (07.04.18 @ 13:44)  No evidence of deep venous thrombosis in the bilateral lower extremities.        Imaging Personally Reviewed:  [x] YES  [ ] NO    MEDICATIONS  (STANDING):  aspirin enteric coated 81 milliGRAM(s) Oral daily  cephalexin 750 milliGRAM(s) Oral every 8 hours  dextrose 5%. 1000 milliLiter(s) (50 mL/Hr) IV Continuous <Continuous>  dextrose 50% Injectable 12.5 Gram(s) IV Push once  dextrose 50% Injectable 25 Gram(s) IV Push once  dextrose 50% Injectable 25 Gram(s) IV Push once  gemfibrozil 600 milliGRAM(s) Oral two times a day  insulin lispro (HumaLOG) corrective regimen sliding scale   SubCutaneous three times a day before meals  magnesium oxide 400 milliGRAM(s) Oral three times a day with meals  metFORMIN 500 milliGRAM(s) Oral two times a day  metoprolol tartrate 25 milliGRAM(s) Oral two times a day  potassium chloride    Tablet ER 20 milliEquivalent(s) Oral daily    MEDICATIONS  (PRN):  dextrose 40% Gel 15 Gram(s) Oral once PRN Blood Glucose LESS THAN 70 milliGRAM(s)/deciliter  glucagon  Injectable 1 milliGRAM(s) IntraMuscular once PRN Glucose LESS THAN 70 milligrams/deciliter  traMADol 50 milliGRAM(s) Oral every 6 hours PRN Severe Pain (7 - 10)          HEALTH ISSUES - PROBLEM Dx:  Skin ulcer of finger, limited to breakdown of skin  Finger infection  Untreated Hepatitis C  HTN (hypertension)  Diabetes  H/O knee surgery  H/O heart artery stent
GENERAL SURGERY PROGRESS NOTE    Patient: DINO LANCE , 68y ([birth year])Male   MRN: [id number]  Location: [facility] [address] [place] [address] 2  Visit: 07-04-18 Inpatient  Date: 07-07-18 @ 08:29    Procedure/Dx/Injuries: Procedure/Dx/Injuries: S/P I & D of Right Hand 3rd digit Felon    Events of past 24 hours: Doing well, patients compliant with TID betadine soaks, but refusing any further packing changes, wound cultures pending, blood cultures pending.     PAST MEDICAL & SURGICAL HISTORY:  Hepatitis: Untreated Hepatitis C  HTN (hypertension)  Diabetes  H/O knee surgery  H/O heart artery stent    Vitals: T(F): 98.3 (07-07-18 @ 05:29), Max: 98.6 (07-06-18 @ 22:25)  HR: 52 (07-07-18 @ 05:29)  BP: 112/60 (07-07-18 @ 05:29)  RR: 16 (07-07-18 @ 05:29)    Diet, DASH/TLC:   Sodium & Cholesterol Restricted    Fluids: IVL    PHYSICAL EXAM  GEN: NAD  EXT: Right Hand 3rd digit distal finger tip pad w 1.5 cm incision packing in place. minimal drainage, no pus. sensation intact, improved ROM, no evidence of FTS involvement, no evidence of septic  arthritis.  INCISION: C/D/I    MEDICATIONS  (STANDING):  aspirin enteric coated 81 milliGRAM(s) Oral daily  ceFAZolin   IVPB 2000 milliGRAM(s) IV Intermittent every 8 hours  dextrose 5%. 1000 milliLiter(s) (50 mL/Hr) IV Continuous <Continuous>  dextrose 50% Injectable 12.5 Gram(s) IV Push once  dextrose 50% Injectable 25 Gram(s) IV Push once  dextrose 50% Injectable 25 Gram(s) IV Push once  gemfibrozil 600 milliGRAM(s) Oral two times a day  insulin lispro (HumaLOG) corrective regimen sliding scale   SubCutaneous three times a day before meals  magnesium oxide 400 milliGRAM(s) Oral three times a day with meals  metFORMIN 500 milliGRAM(s) Oral two times a day  methadone    Tablet 5 milliGRAM(s) Oral every 12 hours  metoprolol tartrate 25 milliGRAM(s) Oral two times a day  potassium chloride    Tablet ER 20 milliEquivalent(s) Oral daily    MEDICATIONS  (PRN):  dextrose 40% Gel 15 Gram(s) Oral once PRN Blood Glucose LESS THAN 70 milliGRAM(s)/deciliter  glucagon  Injectable 1 milliGRAM(s) IntraMuscular once PRN Glucose LESS THAN 70 milligrams/deciliter    ANTIBIOTICS: [x] YES [] NO ceFAZolin   IVPB 2000 milliGRAM(s)    LAB/STUDIES:  CAPILLARY BLOOD GLUCOSE  135 (07 Jul 2018 07:45)  163 (06 Jul 2018 22:25)  129 (06 Jul 2018 11:25)    Culture - Blood (collected 04 Jul 2018 13:06)  Source: .Blood Blood  Gram Stain (05 Jul 2018 19:07):    Growth in aerobic bottle: Gram Positive Cocci in Clusters  Preliminary Report (06 Jul 2018 18:04):    Growth in aerobic bottle: Staphylococcus aureus    "Due to technical problems, Proteus sp. will Not be reported as part of    the BCID panel until further notice"    ***Blood Panel PCR results on this specimen are available    approximately 3 hours after the Gram stain result.***    Gram stain, PCR, and/or culture results may not always    correspond due to difference in methodologies.    ************************************************************    This PCR assay was performed using [employer].    The following targets are tested for: Enterococcus,    vancomycin resistant enterococci, Listeria monocytogenes,    coagulase negative staphylococci, S. aureus,    methicillin resistant S. aureus, Streptococcus agalactiae    (Group B), S. pneumoniae, S. pyogenes (Group A),    Acinetobacter baumannii, Enterobacter cloacae, E. coli,    Klebsiella oxytoca, K. pneumoniae, Proteus sp.,    Serratia marcescens, Haemophilus influenzae,    Neisseria meningitidis, Pseudomonas aeruginosa, Candida    albicans, C. glabrata, C krusei, C parapsilosis,    C. tropicalis and the KPC resistance gene.  Organism: Blood Culture PCR (05 Jul 2018 20:58)  Organism: Blood Culture PCR (05 Jul 2018 20:58)    Culture - Blood (collected 04 Jul 2018 13:06)  Source: .Blood Blood  Gram Stain (06 Jul 2018 10:18):    Growth in anaerobic bottle:    Gram Positive Cocci in Clusters  Preliminary Report (06 Jul 2018 10:19):    Growth in anaerobic bottle:    Gram Positive Cocci in Clusters
infectious diseases progress note:  DINO LANCE is a 68yMale patient    CHEST PAIN FINGER INFECTION OPIATE ABUSE    Bacteremia due to Staphylococcus aureus  Abscess of finger, right  Skin ulcer of finger, limited to breakdown of skin  Finger infection      ROS:  not relevant     Allergies    No Known Allergies    Intolerances        ANTIBIOTICS/RELEVANT:  antimicrobials  ceFAZolin   IVPB 2000 milliGRAM(s) IV Intermittent every 8 hours    immunologic:    OTHER:  aspirin enteric coated 81 milliGRAM(s) Oral daily  dextrose 40% Gel 15 Gram(s) Oral once PRN  dextrose 5%. 1000 milliLiter(s) IV Continuous <Continuous>  dextrose 50% Injectable 12.5 Gram(s) IV Push once  dextrose 50% Injectable 25 Gram(s) IV Push once  dextrose 50% Injectable 25 Gram(s) IV Push once  gemfibrozil 600 milliGRAM(s) Oral two times a day  glucagon  Injectable 1 milliGRAM(s) IntraMuscular once PRN  insulin lispro (HumaLOG) corrective regimen sliding scale   SubCutaneous three times a day before meals  magnesium oxide 400 milliGRAM(s) Oral three times a day with meals  metFORMIN 500 milliGRAM(s) Oral two times a day  metoprolol tartrate 25 milliGRAM(s) Oral two times a day  potassium chloride    Tablet ER 20 milliEquivalent(s) Oral daily  traMADol 50 milliGRAM(s) Oral every 6 hours PRN      Objective:  T(F): 97.5 (07-10-18 @ 05:53), Max: 98.7 (07-09-18 @ 22:06)  HR: 51 (07-10-18 @ 05:57) (51 - 55)  BP: 114/59 (07-10-18 @ 05:53) (110/59 - 114/59)  RR: 16 (07-10-18 @ 05:53) (16 - 16)  SpO2: --    PHYSICAL EXAM:  Constitutional:Well-developed, well nourished  Eyes:SYEDA, EOMI  Ear/Nose/Throat: no oral lesion, no sinus tenderness on percussion	  Neck:no JVD, no lymphadenopathy, supple  Respiratory: CTA hector  Cardiovascular: S1S2 RRR, no murmurs  Gastrointestinal:soft, (+) BS, no HSM  Extremities: dressed finger - right hand         LABS:                        10.8   2.03  )-----------( 71       ( 08 Jul 2018 07:04 )             32.3     07-09    136  |  104  |  16  ----------------------------<  105<H>  4.3   |  22  |  0.8    Ca    7.9<L>      09 Jul 2018 06:52  Phos  3.7     07-09  Mg     1.9     07-09    TPro  6.2  /  Alb  2.7<L>  /  TBili  0.7  /  DBili  x   /  AST  57<H>  /  ALT  20  /  AlkPhos  118<H>  07-09            MICROBIOLOGY:    Culture - Blood (collected 07-08-18 @ 07:04)  Source: .Blood Blood-Peripheral  Preliminary Report (07-09-18 @ 17:01):    No growth to date.    Culture - Blood (collected 07-06-18 @ 11:52)  Source: .Blood None  Preliminary Report (07-08-18 @ 01:01):    No growth to date.    Culture - Blood (collected 07-04-18 @ 13:06)  Source: .Blood Blood  Gram Stain (07-05-18 @ 19:07):    Growth in aerobic bottle: Gram Positive Cocci in Clusters  Final Report (07-07-18 @ 23:08):    Growth in aerobic bottle: Staphylococcus aureus    "Due to technical problems, Proteus sp. will Not be reported as part of    the BCID panel until further notice"  Organism: Blood Culture PCR  Staphylococcus aureus (07-07-18 @ 23:08)  Organism: Staphylococcus aureus (07-07-18 @ 23:08)      -  Ampicillin/Sulbactam: S <=8/4      -  Cefazolin: S <=4      -  Clindamycin: S 0.5      -  Erythromycin: S 0.5      -  Gentamicin: S <=1      -  Oxacillin: S <=0.25      -  Penicillin: R >8      -  RIF- Rifampin: S <=1      -  Tetra/Doxy: S <=1      -  Trimethoprim/Sulfamethoxazole: S <=0.5/9.5      -  Vancomycin: S 2      Method Type: BIJAN  Organism: Blood Culture PCR (07-07-18 @ 23:08)      -  Staphylococcus aureus: Detec      Method Type: PCR    Culture - Blood (collected 07-04-18 @ 13:06)  Source: .Blood Blood  Gram Stain (07-06-18 @ 10:18):    Growth in anaerobic bottle:    Gram Positive Cocci in Clusters  Final Report (07-08-18 @ 11:33):    Growth in anaerobic bottle: Staphylococcus aureus    See previous culture [id number]        Culture - Blood (collected 08 Jul 2018 07:04)  Source: .Blood Blood-Peripheral  Preliminary Report (09 Jul 2018 17:01):    No growth to date.      Culture Results:   No growth to date. (07-08 @ 07:04)  Culture Results:   No growth to date. (07-06 @ 11:52)  Culture Results:   Moderate Staphylococcus aureus (07-05 @ 18:55)  Culture Results:   Growth in anaerobic bottle: Staphylococcus aureus  See previous culture [id number] (07-04 @ 13:06)  Culture Results:   Growth in aerobic bottle: Staphylococcus aureus  "Due to technical problems, Proteus sp. will Not be reported as part of  the BCID panel until further notice" (07-04 @ 13:06)        RADIOLOGY & ADDITIONAL STUDIES:
CEDURE: post op note  · Procedure Name	Incision & Drainage	  · Procedure Date/Time	05-Jul-2018 19:10	  · Informed Consent	Benefits, risks, and possible complications of procedure explained to patient/caregiver who verbalized understanding and gave written consent.	  · Procedure Performed By	Myself	  · Procedure Assisted By	Myself	  · Indications	abscess- Right hand 3rd distal phalanx	  · Procedure was	Therapeutic	  · Site Prep	povidone iodine (if allergic to chlorhexidine)	  · Anatomic Location	finger; Right hand 3rd distal phalanx	  · Finger	3rd digit, Right hand 3rd distal phalanx	  · Patient Position	sitting	  · Approach	open	  · Local Anesthesia	2% lidocaine	  · Procedure Details	incision left open, packing placed	  · Level	subqutaneous	  · Specimen Obtained and Sent to Lab	wound culture, sent	  · Complications	no complications	        a/p:   post i and d     soak finger  in betadine q8   pt tyolerated procedure well  rufino current management
Called for positive blood culture-GPC in clusters. Already on ancef
DINO LANCE  68y  Male    Patient is a 68y old  Male who presents with a chief complaint of Chest pain (04 Jul 2018 15:39)      INTERVAL HPI/OVERNIGHT EVENTS:  No interval events. Patient complaining of not "feeling well" most likely withdrawal for Heroin. Also having sever pain in the Finger.  Chest pain resolved.     REVIEW OF SYSTEMS:  CONSTITUTIONAL: No fever, weight loss, or fatigue  EYES: No eye pain, visual disturbances, or discharge  ENMT:  No difficulty hearing, tinnitus, vertigo; No sinus or throat pain  NECK: No pain or stiffness  BREASTS: No pain, masses, or nipple discharge  RESPIRATORY: No cough, wheezing, chills or hemoptysis; No shortness of breath  CARDIOVASCULAR: No chest pain, palpitations, dizziness, or leg swelling  GASTROINTESTINAL: No abdominal or epigastric pain. No nausea, vomiting, or hematemesis; No diarrhea or constipation. No melena or hematochezia.  GENITOURINARY: No dysuria, frequency, hematuria, or incontinence  NEUROLOGICAL: No headaches, memory loss, loss of strength, numbness, or tremors  SKIN: No itching, burning, rashes, or lesions   LYMPH NODES: No enlarged glands  ENDOCRINE: No heat or cold intolerance; No hair loss  MUSCULOSKELETAL: No joint pain or swelling; No muscle, back, or extremity pain  PSYCHIATRIC: No depression, anxiety, mood swings, or difficulty sleeping  HEME/LYMPH: No easy bruising, or bleeding gums  ALLERGY AND IMMUNOLOGIC: No hives or eczema    VITALS:  T(F): 98.8 (07-05-18 @ 06:03), Max: 98.8 (07-05-18 @ 06:03)  HR: 51 (07-05-18 @ 06:03) (50 - 53)  BP: 117/59 (07-05-18 @ 06:03) (113/61 - 132/62)  RR: 16 (07-05-18 @ 06:03) (14 - 18)  SpO2: 98% (07-04-18 @ 14:45) (98% - 98%)      PHYSICAL EXAM:  GENERAL: NAD  HEAD:  Atraumatic, Normocephalic  EYES: conjunctiva and sclera clear  ENMT: Moist mucous membranes  NECK: Supple, Normal thyroid  NERVOUS SYSTEM:  Alert & Oriented X3, Good concentration; Motor Strength 5/5 B/L upper and lower extremities  CHEST/LUNG: Clear to auscultation bilaterally; No rales, rhonchi, wheezing, or rubs  HEART: Regular rate and rhythm; No murmurs, rubs, or gallops  ABDOMEN: Soft, Nontender, Nondistended; Bowel sounds present  EXTREMITIES:  2+ Peripheral Pulses, No clubbing, cyanosis, Bipedal edema  LYMPH: No lymphadenopathy noted  SKIN: Right middle finger swelling.      Consultant(s) Notes Reviewed:  [x ] YES  [ ] NO  Care Discussed with Consultants/Other Providers [ x] YES  [ ] NO    LABS:                        11.8   2.30  )-----------( 51       ( 05 Jul 2018 07:00 )             35.7     07-05    140  |  98  |  17  ----------------------------<  103<H>  3.6   |  29  |  1.1    Ca    7.8<L>      05 Jul 2018 07:00  Phos  3.1     07-05  Mg     1.9     07-05    TPro  6.4  /  Alb  2.8<L>  /  TBili  0.7  /  DBili  x   /  AST  61<H>  /  ALT  30  /  AlkPhos  133<H>  07-05    CARDIAC MARKERS ( 05 Jul 2018 07:00 )  x     / <0.01 ng/mL / 251 U/L / x     / x      CARDIAC MARKERS ( 04 Jul 2018 18:54 )  x     / <0.01 ng/mL / 392 U/L / x     / x      CARDIAC MARKERS ( 04 Jul 2018 13:06 )  x     / <0.01 ng/mL / 498 U/L / x     / x        Lipid Profile in AM (07.05.18 @ 07:00)    Total Cholesterol/HDL Ratio Measurement: 2.3 Ratio    Cholesterol, Serum: 117 mg/dL    Triglycerides, Serum: 91 mg/dL    HDL Cholesterol, Serum: 50 mg/dL    Direct LDL: 52    Hemoglobin A1C, Whole Blood: 5.3 % (07.04.18 @ 18:54)      MICROBIOLOGY: None      12 Lead ECG (07.04.18 @ 12:36)   Ventricular Rate 53 BPM  Atrial Rate 53 BPM  P-R Interval 170 ms  QRS Duration 96 ms  Q-T Interval 466 ms  QTC Calculation(Bezet) 437 ms  P Axis 81 degrees  R Axis 86 degrees  T Axis 45 degrees    Diagnosis Line: Sinus bradycardia with Premature supraventricular complexes  Nonspecific ST-T changes      RADIOLOGY & ADDITIONAL TESTS:  X-ray Hand 2 Views, Right (07.04.18 @ 13:29)   No definite radiographic evidence of osteomyelitis.  Mild degenerative change of interphalangeal joints.  No evidence of acute fracture or dislocation.    Impression:  No definite radiographic evidence of osteomyelitis.    X-ray Shoulder 2 Views, Left (07.04.18 @ 13:29)   No evidence of acute fracture or dislocation.   The partially imaged left lung is unremarkable.  Degenerative change of left AC joint.    Impression:  No acute osseous abnormality.      Xray Chest 2 Views PA/Lat (07.04.18 @ 13:28)   No radiographic evidence of acute cardiopulmonary disease.        Imaging Personally Reviewed:  [x] YES  [ ] NO    MEDICATIONS  (STANDING):  aspirin enteric coated 81 milliGRAM(s) Oral daily  ceFAZolin   IVPB 2000 milliGRAM(s) IV Intermittent every 8 hours  dextrose 5%. 1000 milliLiter(s) (50 mL/Hr) IV Continuous <Continuous>  dextrose 50% Injectable 12.5 Gram(s) IV Push once  dextrose 50% Injectable 25 Gram(s) IV Push once  dextrose 50% Injectable 25 Gram(s) IV Push once  gemfibrozil 600 milliGRAM(s) Oral two times a day  insulin lispro (HumaLOG) corrective regimen sliding scale   SubCutaneous three times a day before meals  magnesium oxide 400 milliGRAM(s) Oral three times a day with meals  metFORMIN 500 milliGRAM(s) Oral two times a day  methadone    Tablet 10 milliGRAM(s) Oral once  methadone    Tablet 15 milliGRAM(s) Oral once  metoprolol tartrate 25 milliGRAM(s) Oral two times a day  potassium chloride    Tablet ER 20 milliEquivalent(s) Oral daily    MEDICATIONS  (PRN):  dextrose 40% Gel 15 Gram(s) Oral once PRN Blood Glucose LESS THAN 70 milliGRAM(s)/deciliter  glucagon  Injectable 1 milliGRAM(s) IntraMuscular once PRN Glucose LESS THAN 70 milligrams/deciliter        HEALTH ISSUES - PROBLEM Dx:  Skin ulcer of finger, limited to breakdown of skin  Finger infection  Untreated Hepatitis C  HTN (hypertension)  Diabetes  H/O knee surgery  H/O heart artery stent
DINO LANCE  68y  Male    Patient is a 68y old  Male who presents with a chief complaint of Chest pain (04 Jul 2018 15:39)      INTERVAL HPI/OVERNIGHT EVENTS:  No interval events. Patient has no new complaints.      REVIEW OF SYSTEMS:  CONSTITUTIONAL: No fever, weight loss, or fatigue  EYES: No eye pain, visual disturbances, or discharge  ENMT:  No difficulty hearing, tinnitus, vertigo; No sinus or throat pain  NECK: No pain or stiffness  BREASTS: No pain, masses, or nipple discharge  RESPIRATORY: No cough, wheezing, chills or hemoptysis; No shortness of breath  CARDIOVASCULAR: No chest pain, palpitations, dizziness, or leg swelling  GASTROINTESTINAL: No abdominal or epigastric pain. No nausea, vomiting, or hematemesis; No diarrhea or constipation. No melena or hematochezia.  GENITOURINARY: No dysuria, frequency, hematuria, or incontinence  NEUROLOGICAL: No headaches, memory loss, loss of strength, numbness, or tremors  SKIN: No itching, burning, rashes, or lesions   LYMPH NODES: No enlarged glands  ENDOCRINE: No heat or cold intolerance; No hair loss  MUSCULOSKELETAL: Rt middle finger swelling and pain, diffuse joint pains.  PSYCHIATRIC: No depression, anxiety, mood swings, or difficulty sleeping  HEME/LYMPH: No easy bruising, or bleeding gums  ALLERGY AND IMMUNOLOGIC: No hives or eczema      VITALS:  T(C): 36.4 (08 Jul 2018 05:31), Max: 36.7 (07 Jul 2018 22:00)  T(F): 97.6 (08 Jul 2018 05:31), Max: 98 (07 Jul 2018 22:00)  HR: 57 (08 Jul 2018 05:31) (57 - 61)  BP: 115/62 (08 Jul 2018 05:31) (114/59 - 116/62)  BP(mean): --  RR: 16 (08 Jul 2018 05:31) (16 - 16)  SpO2: --      PHYSICAL EXAM:  GENERAL: NAD  HEAD:  Atraumatic, Normocephalic  EYES: conjunctiva and sclera clear  ENMT: Moist mucous membranes  NECK: Supple, Normal thyroid  NERVOUS SYSTEM:  Alert & Oriented X3, Good concentration; Motor Strength 5/5 B/L upper and lower extremities  CHEST/LUNG: Clear to auscultation bilaterally; No rales, rhonchi, wheezing, or rubs  HEART: Regular rate and rhythm; No murmurs, rubs, or gallops  ABDOMEN: Soft, Nontender, Nondistended; Bowel sounds present  EXTREMITIES:  2+ Peripheral Pulses, No clubbing, cyanosis, Bipedal edema  LYMPH: No lymphadenopathy noted  SKIN: Right middle finger swelling, tender+. dressing c/d/i.      Consultant(s) Notes Reviewed:  [x ] YES  [ ] NO  Care Discussed with Consultants/Other Providers [ x] YES  [ ] NO    LABS:                                    10.8   2.03  )-----------( 71       ( 08 Jul 2018 07:04 )             32.3         CARDIAC MARKERS ( 05 Jul 2018 07:00 )  x     / <0.01 ng/mL / 251 U/L / x     / x      CARDIAC MARKERS ( 04 Jul 2018 18:54 )  x     / <0.01 ng/mL / 392 U/L / x     / x      CARDIAC MARKERS ( 04 Jul 2018 13:06 )  x     / <0.01 ng/mL / 498 U/L / x     / x        Lipid Profile in AM (07.05.18 @ 07:00)    Total Cholesterol/HDL Ratio Measurement: 2.3 Ratio    Cholesterol, Serum: 117 mg/dL    Triglycerides, Serum: 91 mg/dL    HDL Cholesterol, Serum: 50 mg/dL    Direct LDL: 52    Hemoglobin A1C, Whole Blood: 5.3 % (07.04.18 @ 18:54)      MICROBIOLOGY:   Culture - Blood (07.06.18 @ 11:52)    Specimen Source: .Blood None    Culture Results: No growth to date.    Culture - Other (07.05.18 @ 18:55)    -  Ampicillin/Sulbactam: S <=8/4    -  Trimethoprim/Sulfamethoxazole: S <=0.5/9.5    -  Vancomycin: S 1    -  Cefazolin: S <=4    -  Clindamycin: S 0.5    -  Erythromycin: S <=0.25    -  Gentamicin: S <=1    -  Oxacillin: S <=0.25    -  Penicillin: R >8    -  RIF- Rifampin: S <=1    -  Tetra/Doxy: S <=1    Specimen Source: .Other Rt 3rd distal digit abscess    Culture Results:   Moderate Staphylococcus aureus    Organism Identification: Staphylococcus aureus    Organism: Staphylococcus aureus    Method Type: BIJAN      Culture - Blood (07.04.18 @ 13:06)    -  Cefazolin: S <=4    Gram Stain:   Growth in aerobic bottle: Gram Positive Cocci in Clusters    -  Ampicillin/Sulbactam: S <=8/4    -  Staphylococcus aureus: Detec    -  Vancomycin: S 2    -  Trimethoprim/Sulfamethoxazole: S <=0.5/9.5    -  Tetra/Doxy: S <=1    -  RIF- Rifampin: S <=1    -  Penicillin: R >8    -  Oxacillin: S <=0.25    -  Gentamicin: S <=1    -  Erythromycin: S 0.5    -  Clindamycin: S 0.5    Specimen Source: .Blood Blood    Organism: Blood Culture PCR    Organism: Staphylococcus aureus    Culture Results:   Growth in aerobic bottle: Staphylococcus aureus  "Due to technical problems, Proteus sp. will Not be reported as part of  the BCID panel until further notice"    Organism Identification: Blood Culture PCR  Staphylococcus aureus    Method Type: PCR    Method Type: BIJAN      12 Lead ECG (07.04.18 @ 12:36)   Ventricular Rate 53 BPM  Atrial Rate 53 BPM  P-R Interval 170 ms  QRS Duration 96 ms  Q-T Interval 466 ms  QTC Calculation(Bezet) 437 ms  P Axis 81 degrees  R Axis 86 degrees  T Axis 45 degrees    Diagnosis Line: Sinus bradycardia with Premature supraventricular complexes  Nonspecific ST-T changes      RADIOLOGY & ADDITIONAL TESTS:  X-ray Hand 2 Views, Right (07.04.18 @ 13:29)   No definite radiographic evidence of osteomyelitis.  Mild degenerative change of interphalangeal joints.  No evidence of acute fracture or dislocation.    Impression:  No definite radiographic evidence of osteomyelitis.    X-ray Shoulder 2 Views, Left (07.04.18 @ 13:29)   No evidence of acute fracture or dislocation.   The partially imaged left lung is unremarkable.  Degenerative change of left AC joint.    Impression:  No acute osseous abnormality.      X-ray Chest 2 Views PA/Lat (07.04.18 @ 13:28)   No radiographic evidence of acute cardiopulmonary disease.        US Abdomen Limited (07.05.18 @ 10:21)   Hepatic cirrhosis with moderate abdominal ascites.    Gallbladder wall edema without definite acute sonographic evidence of   acute cholecystitis.    VA Duplex Lower Ext Vein Scan, Bilat (07.04.18 @ 13:44)  No evidence of deep venous thrombosis in the bilateral lower extremities.        Imaging Personally Reviewed:  [x] YES  [ ] NO    MEDICATIONS  (STANDING):  aspirin enteric coated 81 milliGRAM(s) Oral daily  ceFAZolin   IVPB 2000 milliGRAM(s) IV Intermittent every 8 hours  dextrose 5%. 1000 milliLiter(s) (50 mL/Hr) IV Continuous <Continuous>  dextrose 50% Injectable 12.5 Gram(s) IV Push once  dextrose 50% Injectable 25 Gram(s) IV Push once  dextrose 50% Injectable 25 Gram(s) IV Push once  gemfibrozil 600 milliGRAM(s) Oral two times a day  insulin lispro (HumaLOG) corrective regimen sliding scale   SubCutaneous three times a day before meals  magnesium oxide 400 milliGRAM(s) Oral three times a day with meals  metFORMIN 500 milliGRAM(s) Oral two times a day  metoprolol tartrate 25 milliGRAM(s) Oral two times a day  potassium chloride    Tablet ER 20 milliEquivalent(s) Oral daily    MEDICATIONS  (PRN):  dextrose 40% Gel 15 Gram(s) Oral once PRN Blood Glucose LESS THAN 70 milliGRAM(s)/deciliter  glucagon  Injectable 1 milliGRAM(s) IntraMuscular once PRN Glucose LESS THAN 70 milligrams/deciliter  traMADol 50 milliGRAM(s) Oral every 6 hours PRN Severe Pain (7 - 10)        HEALTH ISSUES - PROBLEM Dx:  Skin ulcer of finger, limited to breakdown of skin  Finger infection  Untreated Hepatitis C  HTN (hypertension)  Diabetes  H/O knee surgery  H/O heart artery stent
DINO LANCE  68y  Male    Patient is a 68y old  Male who presents with a chief complaint of Chest pain (04 Jul 2018 15:39)      INTERVAL HPI/OVERNIGHT EVENTS:  No interval events. Patient has no new complaints.   I& D done 7/5/18 at bedside. Chest pain resolved.     REVIEW OF SYSTEMS:  CONSTITUTIONAL: No fever, weight loss, or fatigue  EYES: No eye pain, visual disturbances, or discharge  ENMT:  No difficulty hearing, tinnitus, vertigo; No sinus or throat pain  NECK: No pain or stiffness  BREASTS: No pain, masses, or nipple discharge  RESPIRATORY: No cough, wheezing, chills or hemoptysis; No shortness of breath  CARDIOVASCULAR: No chest pain, palpitations, dizziness, or leg swelling  GASTROINTESTINAL: No abdominal or epigastric pain. No nausea, vomiting, or hematemesis; No diarrhea or constipation. No melena or hematochezia.  GENITOURINARY: No dysuria, frequency, hematuria, or incontinence  NEUROLOGICAL: No headaches, memory loss, loss of strength, numbness, or tremors  SKIN: No itching, burning, rashes, or lesions   LYMPH NODES: No enlarged glands  ENDOCRINE: No heat or cold intolerance; No hair loss  MUSCULOSKELETAL: No joint pain or swelling; No muscle, back, or extremity pain  PSYCHIATRIC: No depression, anxiety, mood swings, or difficulty sleeping  HEME/LYMPH: No easy bruising, or bleeding gums  ALLERGY AND IMMUNOLOGIC: No hives or eczema    VITALS:  T(C): 36.6 (06 Jul 2018 05:49), Max: 36.8 (05 Jul 2018 22:12)  T(F): 97.8 (06 Jul 2018 05:49), Max: 98.2 (05 Jul 2018 22:12)  HR: 56 (06 Jul 2018 05:49) (56 - 63)  BP: 123/66 (06 Jul 2018 05:49) (119/58 - 142/69)  BP(mean): --  RR: 16 (06 Jul 2018 05:49) (14 - 16)  SpO2: --      PHYSICAL EXAM:  GENERAL: NAD  HEAD:  Atraumatic, Normocephalic  EYES: conjunctiva and sclera clear  ENMT: Moist mucous membranes  NECK: Supple, Normal thyroid  NERVOUS SYSTEM:  Alert & Oriented X3, Good concentration; Motor Strength 5/5 B/L upper and lower extremities  CHEST/LUNG: Clear to auscultation bilaterally; No rales, rhonchi, wheezing, or rubs  HEART: Regular rate and rhythm; No murmurs, rubs, or gallops  ABDOMEN: Soft, Nontender, Nondistended; Bowel sounds present  EXTREMITIES:  2+ Peripheral Pulses, No clubbing, cyanosis, Bipedal edema  LYMPH: No lymphadenopathy noted  SKIN: Right middle finger swelling, tender+.      Consultant(s) Notes Reviewed:  [x ] YES  [ ] NO  Care Discussed with Consultants/Other Providers [ x] YES  [ ] NO    LABS:                         11.8   2.30  )-----------( 51       ( 05 Jul 2018 07:00 )             35.7     07-05    140  |  98  |  17  ----------------------------<  103<H>  3.6   |  29  |  1.1    Ca    7.8<L>      05 Jul 2018 07:00  Phos  3.1     07-05  Mg     1.9     07-05    TPro  6.4  /  Alb  2.8<L>  /  TBili  0.7  /  DBili  x   /  AST  61<H>  /  ALT  30  /  AlkPhos  133<H>  07-05    CARDIAC MARKERS ( 05 Jul 2018 07:00 )  x     / <0.01 ng/mL / 251 U/L / x     / x      CARDIAC MARKERS ( 04 Jul 2018 18:54 )  x     / <0.01 ng/mL / 392 U/L / x     / x      CARDIAC MARKERS ( 04 Jul 2018 13:06 )  x     / <0.01 ng/mL / 498 U/L / x     / x        Lipid Profile in AM (07.05.18 @ 07:00)    Total Cholesterol/HDL Ratio Measurement: 2.3 Ratio    Cholesterol, Serum: 117 mg/dL    Triglycerides, Serum: 91 mg/dL    HDL Cholesterol, Serum: 50 mg/dL    Direct LDL: 52    Hemoglobin A1C, Whole Blood: 5.3 % (07.04.18 @ 18:54)      MICROBIOLOGY: Culture - Blood (07.04.18 @ 13:06)    -  Staphylococcus aureus: Detec    Gram Stain:   Growth in aerobic bottle: Gram Positive Cocci in Clusters    Specimen Source: .Blood Blood    Organism: Blood Culture PCR    Culture Results:   Growth in aerobic bottle: Gram Positive Cocci in Clusters  "Due to technical problems, Proteus sp. will Not be reported as part of  the BCID panel until further notice"  ***Blood Panel PCR results on this specimen are available  approximately 3 hours after the Gram stain result.***      12 Lead ECG (07.04.18 @ 12:36)   Ventricular Rate 53 BPM  Atrial Rate 53 BPM  P-R Interval 170 ms  QRS Duration 96 ms  Q-T Interval 466 ms  QTC Calculation(Bezet) 437 ms  P Axis 81 degrees  R Axis 86 degrees  T Axis 45 degrees    Diagnosis Line: Sinus bradycardia with Premature supraventricular complexes  Nonspecific ST-T changes      RADIOLOGY & ADDITIONAL TESTS:  X-ray Hand 2 Views, Right (07.04.18 @ 13:29)   No definite radiographic evidence of osteomyelitis.  Mild degenerative change of interphalangeal joints.  No evidence of acute fracture or dislocation.    Impression:  No definite radiographic evidence of osteomyelitis.    X-ray Shoulder 2 Views, Left (07.04.18 @ 13:29)   No evidence of acute fracture or dislocation.   The partially imaged left lung is unremarkable.  Degenerative change of left AC joint.    Impression:  No acute osseous abnormality.      X-ray Chest 2 Views PA/Lat (07.04.18 @ 13:28)   No radiographic evidence of acute cardiopulmonary disease.        US Abdomen Limited (07.05.18 @ 10:21)   Hepatic cirrhosis with moderate abdominal ascites.    Gallbladder wall edema without definite acute sonographic evidence of   acute cholecystitis.    VA Duplex Lower Ext Vein Scan, Bilat (07.04.18 @ 13:44)  No evidence of deep venous thrombosis in the bilateral lower extremities.        Imaging Personally Reviewed:  [x] YES  [ ] NO    MEDICATIONS  (STANDING):  aspirin enteric coated 81 milliGRAM(s) Oral daily  ceFAZolin   IVPB 2000 milliGRAM(s) IV Intermittent every 8 hours  dextrose 5%. 1000 milliLiter(s) (50 mL/Hr) IV Continuous <Continuous>  dextrose 50% Injectable 12.5 Gram(s) IV Push once  dextrose 50% Injectable 25 Gram(s) IV Push once  dextrose 50% Injectable 25 Gram(s) IV Push once  gemfibrozil 600 milliGRAM(s) Oral two times a day  insulin lispro (HumaLOG) corrective regimen sliding scale   SubCutaneous three times a day before meals  magnesium oxide 400 milliGRAM(s) Oral three times a day with meals  metFORMIN 500 milliGRAM(s) Oral two times a day  methadone    Tablet 5 milliGRAM(s) Oral every 12 hours  metoprolol tartrate 25 milliGRAM(s) Oral two times a day  potassium chloride    Tablet ER 20 milliEquivalent(s) Oral daily    MEDICATIONS  (PRN):  dextrose 40% Gel 15 Gram(s) Oral once PRN Blood Glucose LESS THAN 70 milliGRAM(s)/deciliter  glucagon  Injectable 1 milliGRAM(s) IntraMuscular once PRN Glucose LESS THAN 70 milligrams/deciliter        HEALTH ISSUES - PROBLEM Dx:  Skin ulcer of finger, limited to breakdown of skin  Finger infection  Untreated Hepatitis C  HTN (hypertension)  Diabetes  H/O knee surgery  H/O heart artery stent
DINO LANCE  68y  Male    Patient is a 68y old  Male who presents with a chief complaint of Chest pain (04 Jul 2018 15:39)      INTERVAL HPI/OVERNIGHT EVENTS:  No interval events. Patient refused blood work this AM.  I& D done 7/5/18 at bedside. Chest pain resolved but complaining of generalized aches and pain and pain in the Rt Middle finger.       REVIEW OF SYSTEMS:  CONSTITUTIONAL: No fever, weight loss, or fatigue  EYES: No eye pain, visual disturbances, or discharge  ENMT:  No difficulty hearing, tinnitus, vertigo; No sinus or throat pain  NECK: No pain or stiffness  BREASTS: No pain, masses, or nipple discharge  RESPIRATORY: No cough, wheezing, chills or hemoptysis; No shortness of breath  CARDIOVASCULAR: No chest pain, palpitations, dizziness, or leg swelling  GASTROINTESTINAL: No abdominal or epigastric pain. No nausea, vomiting, or hematemesis; No diarrhea or constipation. No melena or hematochezia.  GENITOURINARY: No dysuria, frequency, hematuria, or incontinence  NEUROLOGICAL: No headaches, memory loss, loss of strength, numbness, or tremors  SKIN: No itching, burning, rashes, or lesions   LYMPH NODES: No enlarged glands  ENDOCRINE: No heat or cold intolerance; No hair loss  MUSCULOSKELETAL: Rt middle finger swelling and pain, diffuse joint pains.  PSYCHIATRIC: No depression, anxiety, mood swings, or difficulty sleeping  HEME/LYMPH: No easy bruising, or bleeding gums  ALLERGY AND IMMUNOLOGIC: No hives or eczema      VITALS:  T(C): 36.8 (07 Jul 2018 05:29), Max: 37 (06 Jul 2018 22:25)  T(F): 98.3 (07 Jul 2018 05:29), Max: 98.6 (06 Jul 2018 22:25)  HR: 52 (07 Jul 2018 05:29) (50 - 52)  BP: 112/60 (07 Jul 2018 05:29) (106/51 - 130/65)  BP(mean): --  RR: 16 (07 Jul 2018 05:29) (16 - 16)  SpO2: --      PHYSICAL EXAM:  GENERAL: NAD  HEAD:  Atraumatic, Normocephalic  EYES: conjunctiva and sclera clear  ENMT: Moist mucous membranes  NECK: Supple, Normal thyroid  NERVOUS SYSTEM:  Alert & Oriented X3, Good concentration; Motor Strength 5/5 B/L upper and lower extremities  CHEST/LUNG: Clear to auscultation bilaterally; No rales, rhonchi, wheezing, or rubs  HEART: Regular rate and rhythm; No murmurs, rubs, or gallops  ABDOMEN: Soft, Nontender, Nondistended; Bowel sounds present  EXTREMITIES:  2+ Peripheral Pulses, No clubbing, cyanosis, Bipedal edema  LYMPH: No lymphadenopathy noted  SKIN: Right middle finger swelling, tender+. dressing c/d/i.      Consultant(s) Notes Reviewed:  [x ] YES  [ ] NO  Care Discussed with Consultants/Other Providers [ x] YES  [ ] NO    LABS:                         11.8   2.30  )-----------( 51       ( 05 Jul 2018 07:00 )             35.7     07-05    140  |  98  |  17  ----------------------------<  103<H>  3.6   |  29  |  1.1    Ca    7.8<L>      05 Jul 2018 07:00  Phos  3.1     07-05  Mg     1.9     07-05    TPro  6.4  /  Alb  2.8<L>  /  TBili  0.7  /  DBili  x   /  AST  61<H>  /  ALT  30  /  AlkPhos  133<H>  07-05    CARDIAC MARKERS ( 05 Jul 2018 07:00 )  x     / <0.01 ng/mL / 251 U/L / x     / x      CARDIAC MARKERS ( 04 Jul 2018 18:54 )  x     / <0.01 ng/mL / 392 U/L / x     / x      CARDIAC MARKERS ( 04 Jul 2018 13:06 )  x     / <0.01 ng/mL / 498 U/L / x     / x        Lipid Profile in AM (07.05.18 @ 07:00)    Total Cholesterol/HDL Ratio Measurement: 2.3 Ratio    Cholesterol, Serum: 117 mg/dL    Triglycerides, Serum: 91 mg/dL    HDL Cholesterol, Serum: 50 mg/dL    Direct LDL: 52    Hemoglobin A1C, Whole Blood: 5.3 % (07.04.18 @ 18:54)      MICROBIOLOGY: Culture - Blood (07.04.18 @ 13:06)  Culture - Other (07.05.18 @ 18:55)    Specimen Source: .Other Rt 3rd distal digit abscess    Culture Results:   Moderate Staphylococcus aureus    Culture - Blood (07.04.18 @ 13:06)    -  Staphylococcus aureus: Detec    Gram Stain:   Growth in aerobic bottle: Gram Positive Cocci in Clusters    Specimen Source: .Blood Blood    Organism: Blood Culture PCR    Culture Results:   Growth in aerobic bottle: Staphylococcus aureus  "Due to technical problems, Proteus sp. will Not be reported as part of  the BCID panel until further notice"  ***Blood Panel PCR results on this specimen are available  approximately 3 hours after the Gram stain result.***  Gram stain, PCR, and/or culture results may not always  correspond due to difference in methodologies.      12 Lead ECG (07.04.18 @ 12:36)   Ventricular Rate 53 BPM  Atrial Rate 53 BPM  P-R Interval 170 ms  QRS Duration 96 ms  Q-T Interval 466 ms  QTC Calculation(Bezet) 437 ms  P Axis 81 degrees  R Axis 86 degrees  T Axis 45 degrees    Diagnosis Line: Sinus bradycardia with Premature supraventricular complexes  Nonspecific ST-T changes      RADIOLOGY & ADDITIONAL TESTS:  X-ray Hand 2 Views, Right (07.04.18 @ 13:29)   No definite radiographic evidence of osteomyelitis.  Mild degenerative change of interphalangeal joints.  No evidence of acute fracture or dislocation.    Impression:  No definite radiographic evidence of osteomyelitis.    X-ray Shoulder 2 Views, Left (07.04.18 @ 13:29)   No evidence of acute fracture or dislocation.   The partially imaged left lung is unremarkable.  Degenerative change of left AC joint.    Impression:  No acute osseous abnormality.      X-ray Chest 2 Views PA/Lat (07.04.18 @ 13:28)   No radiographic evidence of acute cardiopulmonary disease.        US Abdomen Limited (07.05.18 @ 10:21)   Hepatic cirrhosis with moderate abdominal ascites.    Gallbladder wall edema without definite acute sonographic evidence of   acute cholecystitis.    VA Duplex Lower Ext Vein Scan, Bilat (07.04.18 @ 13:44)  No evidence of deep venous thrombosis in the bilateral lower extremities.        Imaging Personally Reviewed:  [x] YES  [ ] NO    MEDICATIONS  (STANDING):  aspirin enteric coated 81 milliGRAM(s) Oral daily  ceFAZolin   IVPB 2000 milliGRAM(s) IV Intermittent every 8 hours  dextrose 5%. 1000 milliLiter(s) (50 mL/Hr) IV Continuous <Continuous>  dextrose 50% Injectable 12.5 Gram(s) IV Push once  dextrose 50% Injectable 25 Gram(s) IV Push once  dextrose 50% Injectable 25 Gram(s) IV Push once  gemfibrozil 600 milliGRAM(s) Oral two times a day  insulin lispro (HumaLOG) corrective regimen sliding scale   SubCutaneous three times a day before meals  magnesium oxide 400 milliGRAM(s) Oral three times a day with meals  metFORMIN 500 milliGRAM(s) Oral two times a day  methadone    Tablet 5 milliGRAM(s) Oral every 12 hours  metoprolol tartrate 25 milliGRAM(s) Oral two times a day  potassium chloride    Tablet ER 20 milliEquivalent(s) Oral daily    MEDICATIONS  (PRN):  dextrose 40% Gel 15 Gram(s) Oral once PRN Blood Glucose LESS THAN 70 milliGRAM(s)/deciliter  glucagon  Injectable 1 milliGRAM(s) IntraMuscular once PRN Glucose LESS THAN 70 milligrams/deciliter          HEALTH ISSUES - PROBLEM Dx:  Skin ulcer of finger, limited to breakdown of skin  Finger infection  Untreated Hepatitis C  HTN (hypertension)  Diabetes  H/O knee surgery  H/O heart artery stent
DINO LANEC  68y  Male    Patient is a 68y old  Male who presents with a chief complaint of Chest pain (04 Jul 2018 15:39)      INTERVAL HPI/OVERNIGHT EVENTS:  No interval events. Patient has no new complaints but feels ill after the methadone protocol.  He wants to be discharged so he can follow up at the Clinic outpatient top be enrolled in the Methadone program.       REVIEW OF SYSTEMS:  CONSTITUTIONAL: No fever, weight loss, or fatigue  EYES: No eye pain, visual disturbances, or discharge  ENMT:  No difficulty hearing, tinnitus, vertigo; No sinus or throat pain  NECK: No pain or stiffness  BREASTS: No pain, masses, or nipple discharge  RESPIRATORY: No cough, wheezing, chills or hemoptysis; No shortness of breath  CARDIOVASCULAR: No chest pain, palpitations, dizziness, or leg swelling  GASTROINTESTINAL: No abdominal or epigastric pain. No nausea, vomiting, or hematemesis; No diarrhea or constipation. No melena or hematochezia.  GENITOURINARY: No dysuria, frequency, hematuria, or incontinence  NEUROLOGICAL: No headaches, memory loss, loss of strength, numbness, or tremors  SKIN: No itching, burning, rashes, or lesions   LYMPH NODES: No enlarged glands  ENDOCRINE: No heat or cold intolerance; No hair loss  MUSCULOSKELETAL: Rt middle finger swelling and pain, diffuse joint pains.  PSYCHIATRIC: No depression, anxiety, mood swings, or difficulty sleeping  HEME/LYMPH: No easy bruising, or bleeding gums  ALLERGY AND IMMUNOLOGIC: No hives or eczema      VITALS:  T(C): 36.7 (09 Jul 2018 05:42), Max: 37.1 (08 Jul 2018 14:06)  T(F): 98 (09 Jul 2018 05:42), Max: 98.7 (08 Jul 2018 14:06)  HR: 106 (09 Jul 2018 05:42) (61 - 106)  BP: 113/59 (09 Jul 2018 05:42) (113/59 - 125/62)  BP(mean): --  RR: 16 (09 Jul 2018 05:42) (16 - 16)  SpO2: --      PHYSICAL EXAM:  GENERAL: NAD  HEAD:  Atraumatic, Normocephalic  EYES: conjunctiva and sclera clear  ENMT: Moist mucous membranes  NECK: Supple, Normal thyroid  NERVOUS SYSTEM:  Alert & Oriented X 3, Good concentration; Motor Strength 5/5 B/L upper and lower extremities  CHEST/LUNG: Clear to auscultation bilaterally; No rales, rhonchi, wheezing, or rubs  HEART: Regular rate and rhythm; No murmurs, rubs, or gallops  ABDOMEN: Soft, Nontender, Nondistended; Bowel sounds present  EXTREMITIES:  2+ Peripheral Pulses, No clubbing, cyanosis, Bipedal edema  LYMPH: No lymphadenopathy noted  SKIN: Right middle finger swelling improved, tender+. dressing c/d/i.      Consultant(s) Notes Reviewed:  [x ] YES  [ ] NO  Care Discussed with Consultants/Other Providers [ x] YES  [ ] NO    LABS:                        10.8   2.03  )-----------( 71       ( 08 Jul 2018 07:04 )             32.3                          07-09    136  |  104  |  16  ----------------------------<  105<H>  4.3   |  22  |  0.8    Ca    7.9<L>      09 Jul 2018 06:52  Phos  3.7     07-09  Mg     1.9     07-09    TPro  6.2  /  Alb  2.7<L>  /  TBili  0.7  /  DBili  x   /  AST  57<H>  /  ALT  20  /  AlkPhos  118<H>  07-09        CARDIAC MARKERS ( 05 Jul 2018 07:00 )  x     / <0.01 ng/mL / 251 U/L / x     / x      CARDIAC MARKERS ( 04 Jul 2018 18:54 )  x     / <0.01 ng/mL / 392 U/L / x     / x      CARDIAC MARKERS ( 04 Jul 2018 13:06 )  x     / <0.01 ng/mL / 498 U/L / x     / x        Lipid Profile in AM (07.05.18 @ 07:00)    Total Cholesterol/HDL Ratio Measurement: 2.3 Ratio    Cholesterol, Serum: 117 mg/dL    Triglycerides, Serum: 91 mg/dL    HDL Cholesterol, Serum: 50 mg/dL    Direct LDL: 52    Hemoglobin A1C, Whole Blood: 5.3 % (07.04.18 @ 18:54)      MICROBIOLOGY:     Culture - Blood (07.06.18 @ 11:52)    Specimen Source: .Blood None    Culture Results: No growth to date.    Culture - Other (07.05.18 @ 18:55)    -  Ampicillin/Sulbactam: S <=8/4    -  Trimethoprim/Sulfamethoxazole: S <=0.5/9.5    -  Vancomycin: S 1    -  Cefazolin: S <=4    -  Clindamycin: S 0.5    -  Erythromycin: S <=0.25    -  Gentamicin: S <=1    -  Oxacillin: S <=0.25    -  Penicillin: R >8    -  RIF- Rifampin: S <=1    -  Tetra/Doxy: S <=1    Specimen Source: .Other Rt 3rd distal digit abscess    Culture Results:   Moderate Staphylococcus aureus    Organism Identification: Staphylococcus aureus    Organism: Staphylococcus aureus    Method Type: BIJAN      Culture - Blood (07.04.18 @ 13:06)    -  Cefazolin: S <=4    Gram Stain:   Growth in aerobic bottle: Gram Positive Cocci in Clusters    -  Ampicillin/Sulbactam: S <=8/4    -  Staphylococcus aureus: Detec    -  Vancomycin: S 2    -  Trimethoprim/Sulfamethoxazole: S <=0.5/9.5    -  Tetra/Doxy: S <=1    -  RIF- Rifampin: S <=1    -  Penicillin: R >8    -  Oxacillin: S <=0.25    -  Gentamicin: S <=1    -  Erythromycin: S 0.5    -  Clindamycin: S 0.5    Specimen Source: .Blood     Organism: Blood Culture PCR    Organism: Staphylococcus aureus    Culture Results:   Growth in aerobic bottle: Staphylococcus aureus  "Due to technical problems, Proteus sp. will Not be reported as part of  the BCID panel until further notice"    Organism Identification: Blood Culture PCR  Staphylococcus aureus    Method Type: PCR    Method Type: BIJAN      12 Lead ECG (07.04.18 @ 12:36)   Ventricular Rate 53 BPM  Atrial Rate 53 BPM  P-R Interval 170 ms  QRS Duration 96 ms  Q-T Interval 466 ms  QTC Calculation(Bezet) 437 ms  P Axis 81 degrees  R Axis 86 degrees  T Axis 45 degrees    Diagnosis Line: Sinus bradycardia with Premature supraventricular complexes  Nonspecific ST-T changes      RADIOLOGY & ADDITIONAL TESTS:  X-ray Hand 2 Views, Right (07.04.18 @ 13:29)   No definite radiographic evidence of osteomyelitis.  Mild degenerative change of interphalangeal joints.  No evidence of acute fracture or dislocation.    Impression:  No definite radiographic evidence of osteomyelitis.    X-ray Shoulder 2 Views, Left (07.04.18 @ 13:29)   No evidence of acute fracture or dislocation.   The partially imaged left lung is unremarkable.  Degenerative change of left AC joint.    Impression:  No acute osseous abnormality.      X-ray Chest 2 Views PA/Lat (07.04.18 @ 13:28)   No radiographic evidence of acute cardiopulmonary disease.        US Abdomen Limited (07.05.18 @ 10:21)   Hepatic cirrhosis with moderate abdominal ascites.    Gallbladder wall edema without definite acute sonographic evidence of   acute cholecystitis.    VA Duplex Lower Ext Vein Scan, Bilat (07.04.18 @ 13:44)  No evidence of deep venous thrombosis in the bilateral lower extremities.        Imaging Personally Reviewed:  [x] YES  [ ] NO    MEDICATIONS  (STANDING):  aspirin enteric coated 81 milliGRAM(s) Oral daily  ceFAZolin   IVPB 2000 milliGRAM(s) IV Intermittent every 8 hours  dextrose 5%. 1000 milliLiter(s) (50 mL/Hr) IV Continuous <Continuous>  dextrose 50% Injectable 12.5 Gram(s) IV Push once  dextrose 50% Injectable 25 Gram(s) IV Push once  dextrose 50% Injectable 25 Gram(s) IV Push once  gemfibrozil 600 milliGRAM(s) Oral two times a day  insulin lispro (HumaLOG) corrective regimen sliding scale   SubCutaneous three times a day before meals  magnesium oxide 400 milliGRAM(s) Oral three times a day with meals  metFORMIN 500 milliGRAM(s) Oral two times a day  metoprolol tartrate 25 milliGRAM(s) Oral two times a day  potassium chloride    Tablet ER 20 milliEquivalent(s) Oral daily    MEDICATIONS  (PRN):  dextrose 40% Gel 15 Gram(s) Oral once PRN Blood Glucose LESS THAN 70 milliGRAM(s)/deciliter  glucagon  Injectable 1 milliGRAM(s) IntraMuscular once PRN Glucose LESS THAN 70 milligrams/deciliter  traMADol 50 milliGRAM(s) Oral every 6 hours PRN Severe Pain (7 - 10)          HEALTH ISSUES - PROBLEM Dx:  Skin ulcer of finger, limited to breakdown of skin  Finger infection  Untreated Hepatitis C  HTN (hypertension)  Diabetes  H/O knee surgery  H/O heart artery stent
GENERAL SURGERY PROGRESS NOTE    Patient: DINO LANCE , 68y ([birth year])Male   MRN: [id number]  Location: [facility] [address] [place] [address] 2  Visit: 07-04-18 Inpatient  Date: 07-06-18 @ 09:55    Procedure/Dx/Injuries: S/P I & D of Right Hand 3rd digit Felon    Events of past 24 hours: s/p I & D of Right Hand 3rd digit Felon w 2 cc of pus, all loculation ruptured, no FTS association. Wound cultures sent, packing and TID betadine soaks started.     PAST MEDICAL & SURGICAL HISTORY:  Hepatitis: Untreated Hepatitis C  HTN (hypertension)  Diabetes  H/O knee surgery  H/O heart artery stent    Vitals: T(F): 97.8 (07-06-18 @ 05:49), Max: 98.2 (07-05-18 @ 22:12)  HR: 56 (07-06-18 @ 05:49)  BP: 123/66 (07-06-18 @ 05:49)  RR: 16 (07-06-18 @ 05:49)    Diet, DASH/TLC:   Sodium & Cholesterol Restricted    Fluids: IVL    PHYSICAL EXAM  GEN:  EXT: Right Hand 3rd digit distal finger tip pad w 1.5 cm incision packing in place. minimal drainage, no pus. sensation intact, improved ROM, no evidence of FTS involvement, no evidence of septic  arthritis.  INCISION: C/D/I    MEDICATIONS  (STANDING):  aspirin enteric coated 81 milliGRAM(s) Oral daily  ceFAZolin   IVPB 2000 milliGRAM(s) IV Intermittent every 8 hours  dextrose 5%. 1000 milliLiter(s) (50 mL/Hr) IV Continuous <Continuous>  dextrose 50% Injectable 12.5 Gram(s) IV Push once  dextrose 50% Injectable 25 Gram(s) IV Push once  dextrose 50% Injectable 25 Gram(s) IV Push once  gemfibrozil 600 milliGRAM(s) Oral two times a day  insulin lispro (HumaLOG) corrective regimen sliding scale   SubCutaneous three times a day before meals  magnesium oxide 400 milliGRAM(s) Oral three times a day with meals  metFORMIN 500 milliGRAM(s) Oral two times a day  methadone    Tablet 5 milliGRAM(s) Oral every 12 hours  metoprolol tartrate 25 milliGRAM(s) Oral two times a day  potassium chloride    Tablet ER 20 milliEquivalent(s) Oral daily    MEDICATIONS  (PRN):  dextrose 40% Gel 15 Gram(s) Oral once PRN Blood Glucose LESS THAN 70 milliGRAM(s)/deciliter  glucagon  Injectable 1 milliGRAM(s) IntraMuscular once PRN Glucose LESS THAN 70 milligrams/deciliter    LAB/STUDIES:  CAPILLARY BLOOD GLUCOSE  105 (06 Jul 2018 07:20)  92 (05 Jul 2018 22:12)  150 (05 Jul 2018 16:03)  158 (05 Jul 2018 11:30)               11.8   2.30  )-----------( 51       ( 05 Jul 2018 07:00 )             35.7     07-05    140  |  98  |  17  ----------------------------<  103<H>  3.6   |  29  |  1.1    Ca    7.8<L>      05 Jul 2018 07:00  Phos  3.1     07-05  Mg     1.9     07-05    TPro  6.4  /  Alb  2.8<L>  /  TBili  0.7  /  DBili  x   /  AST  61<H>  /  ALT  30  /  AlkPhos  133<H>  07-05               6.4  | 0.7  | 61       ------------------[133     ( 05 Jul 2018 07:00 )  2.8  | x    | 30          PT/INR - ( 04 Jul 2018 13:06 )   PT: 12.30 sec;   INR: 1.14 ratio     PTT - ( 04 Jul 2018 13:06 )  PTT:31.6 sec    CARDIAC MARKERS ( 05 Jul 2018 07:00 )  x     / <0.01 ng/mL / 251 U/L / x     / x      CARDIAC MARKERS ( 04 Jul 2018 18:54 )  x     / <0.01 ng/mL / 392 U/L / x     / x      CARDIAC MARKERS ( 04 Jul 2018 13:06 )  x     / <0.01 ng/mL / 498 U/L / x     / x          Culture - Blood (collected 04 Jul 2018 13:06)  Source: .Blood Blood  Gram Stain (05 Jul 2018 19:07):    Growth in aerobic bottle: Gram Positive Cocci in Clusters  Preliminary Report (05 Jul 2018 19:08):    Growth in aerobic bottle: Gram Positive Cocci in Clusters    "Due to technical problems, Proteus sp. will Not be reported as part of    the BCID panel until further notice"    ***Blood Panel PCR results on this specimen are available    approximately 3 hours after the Gram stain result.***    Gram stain, PCR, and/or culture results may not always    correspond due to difference in methodologies.    ************************************************************    This PCR assay was performed using [employer].    The following targets are tested for: Enterococcus,    vancomycin resistant enterococci, Listeria monocytogenes,    coagulase negative staphylococci, S. aureus,    methicillin resistant S. aureus, Streptococcus agalactiae    (Group B), S. pneumoniae, S.pyogenes (Group A),    Acinetobacter baumannii, Enterobacter cloacae, E. coli,    Klebsiella oxytoca, K. pneumoniae, Proteus sp.,    Serratia marcescens, Haemophilus influenzae,    Neisseria meningitidis, Pseudomonas aeruginosa, Candida    albicans, C. glabrata, C krusei, C parapsilosis,    C. tropicalis and the KPC resistance gene.  Organism: Blood Culture PCR (05 Jul 2018 20:58)  Organism: Blood Culture PCR (05 Jul 2018 20:58)    Culture - Blood (collected 04 Jul 2018 13:06)  Source: .Blood Blood  Preliminary Report (05 Jul 2018 23:01):    No growth to date.
HPI:  Patient reports finger feels better. Has no new c/o.      PAST MEDICAL & SURGICAL HISTORY:  Hepatitis: Untreated Hepatitis C  HTN (hypertension)  Diabetes  H/O knee surgery  H/O heart artery stent      Allergies  No Known Allergies    MEDICATIONS  (STANDING):  aspirin enteric coated 81 milliGRAM(s) Oral daily  ceFAZolin   IVPB 2000 milliGRAM(s) IV Intermittent every 8 hours  dextrose 5%. 1000 milliLiter(s) (50 mL/Hr) IV Continuous <Continuous>  dextrose 50% Injectable 12.5 Gram(s) IV Push once  dextrose 50% Injectable 25 Gram(s) IV Push once  dextrose 50% Injectable 25 Gram(s) IV Push once  gemfibrozil 600 milliGRAM(s) Oral two times a day  insulin lispro (HumaLOG) corrective regimen sliding scale   SubCutaneous three times a day before meals  magnesium oxide 400 milliGRAM(s) Oral three times a day with meals  metFORMIN 500 milliGRAM(s) Oral two times a day  metoprolol tartrate 25 milliGRAM(s) Oral two times a day  potassium chloride    Tablet ER 20 milliEquivalent(s) Oral daily    MEDICATIONS  (PRN):  dextrose 40% Gel 15 Gram(s) Oral once PRN Blood Glucose LESS THAN 70 milliGRAM(s)/deciliter  glucagon  Injectable 1 milliGRAM(s) IntraMuscular once PRN Glucose LESS THAN 70 milligrams/deciliter  traMADol 50 milliGRAM(s) Oral every 6 hours PRN Severe Pain (7 - 10)      Vital Signs Last 24 Hrs  T(F): 97.6 (08 Jul 2018 05:31), Max: 98 (07 Jul 2018 22:00)  HR: 57 (08 Jul 2018 05:31) (57 - 61)  BP: 115/62 (08 Jul 2018 05:31) (114/59 - 116/62)  RR: 16 (08 Jul 2018 05:31) (16 - 16)      PHYSICAL EXAM:      Constitutional: A&Ox4, NAD, sitting in BED  EXT: Right Hand 3rd digit distal finger tip pad w 1.5 cm incision with no drainage, no pus, decreased swelling, sensation intact, improved ROM, no evidence of FTS involvement, no evidence of septic  arthritis.  INCISION: C/D/I                          10.8   2.03  )-----------( 71       ( 08 Jul 2018 07:04 )             32.3    .Blood None  07-06 @ 11:52   No growth to date.  --  --      .Other Rt 3rd distal digit abscess  07-05 @ 18:55   Moderate Staphylococcus aureus  --  Staphylococcus aureus      .Blood Blood  07-04 @ 13:06   Growth in anaerobic bottle: Staphylococcus aureus  See previous culture [id number]  --  Blood Culture PCR  Staphylococcus aureus
Will order a stat dose of methadone 10 mg
infectious diseases progress note:  DINO LANCE is a 68yMale patient    CHEST PAIN FINGER INFECTION OPIATE ABUSE    Skin ulcer of finger, limited to breakdown of skin  Finger infection      ROS:  not relevant     Allergies    No Known Allergies    Intolerances        ANTIBIOTICS/RELEVANT:  antimicrobials  ceFAZolin   IVPB 2000 milliGRAM(s) IV Intermittent every 8 hours    immunologic:    OTHER:  aspirin enteric coated 81 milliGRAM(s) Oral daily  dextrose 40% Gel 15 Gram(s) Oral once PRN  dextrose 5%. 1000 milliLiter(s) IV Continuous <Continuous>  dextrose 50% Injectable 12.5 Gram(s) IV Push once  dextrose 50% Injectable 25 Gram(s) IV Push once  dextrose 50% Injectable 25 Gram(s) IV Push once  gemfibrozil 600 milliGRAM(s) Oral two times a day  glucagon  Injectable 1 milliGRAM(s) IntraMuscular once PRN  insulin lispro (HumaLOG) corrective regimen sliding scale   SubCutaneous three times a day before meals  magnesium oxide 400 milliGRAM(s) Oral three times a day with meals  metFORMIN 500 milliGRAM(s) Oral two times a day  methadone    Tablet 10 milliGRAM(s) Oral every 12 hours  methadone    Tablet 5 milliGRAM(s) Oral every 12 hours  metoprolol tartrate 25 milliGRAM(s) Oral two times a day  potassium chloride    Tablet ER 20 milliEquivalent(s) Oral daily      Objective:  T(F): 97.8 (07-06-18 @ 05:49), Max: 98.8 (07-05-18 @ 06:03)  HR: 56 (07-06-18 @ 05:49) (51 - 63)  BP: 123/66 (07-06-18 @ 05:49) (117/59 - 142/69)  RR: 16 (07-06-18 @ 05:49) (14 - 16)  SpO2: --    PHYSICAL EXAM:  Constitutional: unkempt   Eyes:SYEDA, EOMI  Ear/Nose/Throat: no oral lesion, no sinus tenderness on percussion	  Neck:no JVD, no lymphadenopathy, supple  Respiratory: CTA hector  Cardiovascular: S1S2 RRR,  Gastrointestinal:soft, (+) BS, no HSM  Extremities: soaking finger - s/p I & D         LABS:                        11.8   2.30  )-----------( 51       ( 05 Jul 2018 07:00 )             35.7     07-05    140  |  98  |  17  ----------------------------<  103<H>  3.6   |  29  |  1.1    Ca    7.8<L>      05 Jul 2018 07:00  Phos  3.1     07-05  Mg     1.9     07-05    TPro  6.4  /  Alb  2.8<L>  /  TBili  0.7  /  DBili  x   /  AST  61<H>  /  ALT  30  /  AlkPhos  133<H>  07-05    PT/INR - ( 04 Jul 2018 13:06 )   PT: 12.30 sec;   INR: 1.14 ratio         PTT - ( 04 Jul 2018 13:06 )  PTT:31.6 sec        MICROBIOLOGY:    Culture - Blood (collected 07-04-18 @ 13:06)  Source: .Blood Blood  Gram Stain (07-05-18 @ 19:07):    Growth in aerobic bottle: Gram Positive Cocci in Clusters  Preliminary Report (07-05-18 @ 19:08):    Growth in aerobic bottle: Gram Positive Cocci in Clusters    "Due to technical problems, Proteus sp. will Not be reported as part of    the BCID panel until further notice"    ***Blood Panel PCR results on this specimen are available    approximately 3 hours after the Gram stain result.***    Gram stain, PCR, and/or culture results may not always    correspond due to difference in methodologies.    ************************************************************    This PCR assay was performed using [employer].    The following targets are tested for: Enterococcus,    vancomycin resistant enterococci, Listeria monocytogenes,    coagulase negative staphylococci, S. aureus,    methicillin resistant S. aureus, Streptococcus agalactiae    (Group B), S. pneumoniae, S.pyogenes (Group A),    Acinetobacter baumannii, Enterobacter cloacae, E. coli,    Klebsiella oxytoca, K. pneumoniae, Proteus sp.,    Serratia marcescens, Haemophilus influenzae,    Neisseria meningitidis, Pseudomonas aeruginosa, Candida    albicans, C. glabrata, C krusei, C parapsilosis,    C. tropicalis and the KPC resistance gene.  Organism: Blood Culture PCR (07-05-18 @ 20:58)  Organism: Blood Culture PCR (07-05-18 @ 20:58)      -  Staphylococcus aureus: Detec      Method Type: PCR    Culture - Blood (collected 07-04-18 @ 13:06)  Source: .Blood Blood  Preliminary Report (07-05-18 @ 23:01):    No growth to date.        Culture - Blood (collected 04 Jul 2018 13:06)  Source: .Blood Blood  Gram Stain (05 Jul 2018 19:07):    Growth in aerobic bottle: Gram Positive Cocci in Clusters  Preliminary Report (05 Jul 2018 19:08):    Growth in aerobic bottle: Gram Positive Cocci in Clusters    "Due to technical problems, Proteus sp. will Not be reported as part of    the BCID panel until further notice"    ***Blood Panel PCR results on this specimen are available    approximately 3 hours after the Gram stain result.***    Gram stain, PCR, and/or culture results may not always    correspond due to difference in methodologies.    ************************************************************    This PCR assay was performed using [employer].    The following targets are tested for: Enterococcus,    vancomycin resistant enterococci, Listeria monocytogenes,    coagulase negative staphylococci, S. aureus,    methicillin resistant S. aureus, Streptococcus agalactiae    (Group B), S. pneumoniae, S.pyogenes (Group A),    Acinetobacter baumannii, Enterobacter cloacae, E. coli,    Klebsiella oxytoca, K. pneumoniae, Proteus sp.,    Serratia marcescens, Haemophilus influenzae,    Neisseria meningitidis, Pseudomonas aeruginosa, Candida    albicans, C. glabrata, C krusei, C parapsilosis,    C. tropicalis and the KPC resistance gene.  Organism: Blood Culture PCR (05 Jul 2018 20:58)  Organism: Blood Culture PCR (05 Jul 2018 20:58)    Culture - Blood (collected 04 Jul 2018 13:06)  Source: .Blood Blood  Preliminary Report (05 Jul 2018 23:01):    No growth to date.      Culture Results:   No growth to date. (07-04 @ 13:06)  Culture Results:   Growth in aerobic bottle: Gram Positive Cocci in Clusters  "Due to technical problems, Proteus sp. will Not be reported as part of  the BCID panel until further notice"  ***Blood Panel PCR results on this specimen are available  approximately 3 hours after the Gram stain result.***  Gram stain, PCR, and/or culture results may not always  correspond due to difference in methodologies.  ************************************************************  This PCR assay was performed using [employer].  The following targets are tested for: Enterococcus,  vancomycin resistant enterococci, Listeria monocytogenes,  coagulase negative staphylococci, S. aureus,  methicillin resistant S. aureus, Streptococcus agalactiae  (Group B), S. pneumoniae, S.pyogenes (Group A),  Acinetobacter baumannii, Enterobacter cloacae, E. coli,  Klebsiella oxytoca, K. pneumoniae, Proteus sp.,  Serratia marcescens, Haemophilus influenzae,  Neisseria meningitidis, Pseudomonas aeruginosa, Candida  albicans, C. glabrata, C krusei, C parapsilosis,  C. tropicalis and the KPC resistance gene. (07-04 @ 13:06)        RADIOLOGY & ADDITIONAL STUDIES:

## 2018-07-10 NOTE — DISCHARGE NOTE ADULT - CARE PLAN
Principal Discharge DX:	Abscess of finger, right  Goal:	Complete healing.  Assessment and plan of treatment:	Continue Hand soaks BETADINE TID, hand elevation  Follow up in the office in 1 week with Dr. Ivan Pugh. Call for appointment [phone].  Secondary Diagnosis:	Bacteremia due to Staphylococcus aureus  Goal:	Ensure complete Treatment.  Assessment and plan of treatment:	Please Take antibiotics as prescribed.  Secondary Diagnosis:	Chest pain  Goal:	Resolved  Assessment and plan of treatment:	Given History of Coronary heart disease you will need to [id number] up with Your cardiologist ASAP.  Continue Aspirin but not Plavix due to Low platelet count.  Secondary Diagnosis:	Liver cirrhosis  Goal:	Prevent associated complications.  Assessment and plan of treatment:	History of untreated Hepatitis C with Ascites, Low white blood and Platelet count.   Please follow up with hepatologist / Gastroenterologist within 1 week after discharge.  Please stop using any Alcohol.  Take prescribed doses of Water pills until seen by Gastroenterologist. Continue a Low salt diet. Monitor Blood pressures closely on the water pills. You will need an Endoscopy to check for Esophageal varices.  Secondary Diagnosis:	Opiate abuse, continuous  Goal:	Abstinence  Assessment and plan of treatment:	Please Follow up at the methadone clinic immediately after discharge for Enrollment.

## 2018-07-12 LAB
CULTURE RESULTS: SIGNIFICANT CHANGE UP
HCV RNA SERPL NAA DL=5-ACNC: SIGNIFICANT CHANGE UP IU/ML
HCV RNA SPEC NAA+PROBE-LOG IU: 6.38 LOGIU/ML — SIGNIFICANT CHANGE UP
SPECIMEN SOURCE: SIGNIFICANT CHANGE UP

## 2018-07-13 LAB
CULTURE RESULTS: SIGNIFICANT CHANGE UP
SPECIMEN SOURCE: SIGNIFICANT CHANGE UP

## 2018-07-15 DIAGNOSIS — F11.23 OPIOID DEPENDENCE WITH WITHDRAWAL: ICD-10-CM

## 2018-07-15 DIAGNOSIS — D61.818 OTHER PANCYTOPENIA: ICD-10-CM

## 2018-07-15 DIAGNOSIS — F17.210 NICOTINE DEPENDENCE, CIGARETTES, UNCOMPLICATED: ICD-10-CM

## 2018-07-15 DIAGNOSIS — I25.2 OLD MYOCARDIAL INFARCTION: ICD-10-CM

## 2018-07-15 DIAGNOSIS — K74.69 OTHER CIRRHOSIS OF LIVER: ICD-10-CM

## 2018-07-15 DIAGNOSIS — B19.20 UNSPECIFIED VIRAL HEPATITIS C WITHOUT HEPATIC COMA: ICD-10-CM

## 2018-07-15 DIAGNOSIS — L02.511 CUTANEOUS ABSCESS OF RIGHT HAND: ICD-10-CM

## 2018-07-15 DIAGNOSIS — Z79.84 LONG TERM (CURRENT) USE OF ORAL HYPOGLYCEMIC DRUGS: ICD-10-CM

## 2018-07-15 DIAGNOSIS — A41.01 SEPSIS DUE TO METHICILLIN SUSCEPTIBLE STAPHYLOCOCCUS AUREUS: ICD-10-CM

## 2018-07-15 DIAGNOSIS — E87.6 HYPOKALEMIA: ICD-10-CM

## 2018-07-15 DIAGNOSIS — T40.1X1A POISONING BY HEROIN, ACCIDENTAL (UNINTENTIONAL), INITIAL ENCOUNTER: ICD-10-CM

## 2018-07-15 DIAGNOSIS — I10 ESSENTIAL (PRIMARY) HYPERTENSION: ICD-10-CM

## 2018-07-15 DIAGNOSIS — Z95.5 PRESENCE OF CORONARY ANGIOPLASTY IMPLANT AND GRAFT: ICD-10-CM

## 2018-07-15 DIAGNOSIS — R07.89 OTHER CHEST PAIN: ICD-10-CM

## 2018-07-15 DIAGNOSIS — R07.9 CHEST PAIN, UNSPECIFIED: ICD-10-CM

## 2018-07-15 DIAGNOSIS — R18.8 OTHER ASCITES: ICD-10-CM

## 2018-07-15 DIAGNOSIS — I25.10 ATHEROSCLEROTIC HEART DISEASE OF NATIVE CORONARY ARTERY WITHOUT ANGINA PECTORIS: ICD-10-CM

## 2018-07-15 DIAGNOSIS — L98.491 NON-PRESSURE CHRONIC ULCER OF SKIN OF OTHER SITES LIMITED TO BREAKDOWN OF SKIN: ICD-10-CM

## 2018-07-15 DIAGNOSIS — Z79.82 LONG TERM (CURRENT) USE OF ASPIRIN: ICD-10-CM

## 2018-07-15 DIAGNOSIS — E83.42 HYPOMAGNESEMIA: ICD-10-CM

## 2018-07-23 ENCOUNTER — APPOINTMENT (OUTPATIENT)
Dept: HEPATOLOGY | Facility: CLINIC | Age: 68
End: 2018-07-23

## 2018-11-06 NOTE — ED PROVIDER NOTE - TEMPLATE, MLM
VITAL SIGNS: I have reviewed nursing notes and confirm.  CONSTITUTIONAL: Well-developed; well-nourished; in no acute distress.  SKIN: Agree with RN documentation regarding decubitus evaluation. Remainder of skin exam is warm and dry, no acute rash.  HEAD: Normocephalic; atraumatic.  EYES: PERRL, EOM intact; conjunctiva and sclera clear.  ENT: No nasal discharge; airway clear.  NECK: Supple; non tender.  CARD: S1, S2 normal; no murmurs, gallops, or rubs. RRR  RESP: No wheezes, rales or rhonchi. CTA w/good excursion, no inc wob  ABD: Normal bowel sounds; soft; + TTP LLQ, no mcburney's pt TTP, negative walton's sign  EXT: Normal ROM. No clubbing, cyanosis or edema.  LYMPH: No acute cervical adenopathy.  NEURO: Alert, oriented. Grossly unremarkable.  PSYCH: Cooperative, appropriate.
General

## 2018-11-12 ENCOUNTER — INPATIENT (INPATIENT)
Facility: HOSPITAL | Age: 68
LOS: 0 days | Discharge: HOME | End: 2018-11-13
Attending: INTERNAL MEDICINE | Admitting: INTERNAL MEDICINE

## 2018-11-12 VITALS
TEMPERATURE: 97 F | RESPIRATION RATE: 18 BRPM | DIASTOLIC BLOOD PRESSURE: 35 MMHG | SYSTOLIC BLOOD PRESSURE: 100 MMHG | HEIGHT: 73 IN | WEIGHT: 184.09 LBS | HEART RATE: 51 BPM | OXYGEN SATURATION: 96 %

## 2018-11-12 DIAGNOSIS — Z95.5 PRESENCE OF CORONARY ANGIOPLASTY IMPLANT AND GRAFT: Chronic | ICD-10-CM

## 2018-11-12 DIAGNOSIS — Z98.890 OTHER SPECIFIED POSTPROCEDURAL STATES: Chronic | ICD-10-CM

## 2018-11-12 LAB
ALBUMIN SERPL ELPH-MCNC: 4.1 G/DL — SIGNIFICANT CHANGE UP (ref 3.5–5.2)
ALP SERPL-CCNC: 96 U/L — SIGNIFICANT CHANGE UP (ref 30–115)
ALT FLD-CCNC: 17 U/L — SIGNIFICANT CHANGE UP (ref 0–41)
ANION GAP SERPL CALC-SCNC: 13 MMOL/L — SIGNIFICANT CHANGE UP (ref 7–14)
APPEARANCE UR: CLEAR — SIGNIFICANT CHANGE UP
APTT BLD: 29.6 SEC — SIGNIFICANT CHANGE UP (ref 27–39.2)
AST SERPL-CCNC: 38 U/L — SIGNIFICANT CHANGE UP (ref 0–41)
BASOPHILS # BLD AUTO: 0.02 K/UL — SIGNIFICANT CHANGE UP (ref 0–0.2)
BASOPHILS NFR BLD AUTO: 0.4 % — SIGNIFICANT CHANGE UP (ref 0–1)
BILIRUB SERPL-MCNC: 0.8 MG/DL — SIGNIFICANT CHANGE UP (ref 0.2–1.2)
BILIRUB UR-MCNC: NEGATIVE — SIGNIFICANT CHANGE UP
BUN SERPL-MCNC: 78 MG/DL — CRITICAL HIGH (ref 10–20)
CALCIUM SERPL-MCNC: 9.2 MG/DL — SIGNIFICANT CHANGE UP (ref 8.5–10.1)
CHLORIDE SERPL-SCNC: 92 MMOL/L — LOW (ref 98–110)
CO2 SERPL-SCNC: 21 MMOL/L — SIGNIFICANT CHANGE UP (ref 17–32)
COLOR SPEC: YELLOW — SIGNIFICANT CHANGE UP
CREAT SERPL-MCNC: 1.1 MG/DL — SIGNIFICANT CHANGE UP (ref 0.7–1.5)
DIFF PNL FLD: NEGATIVE — SIGNIFICANT CHANGE UP
EOSINOPHIL # BLD AUTO: 0.05 K/UL — SIGNIFICANT CHANGE UP (ref 0–0.7)
EOSINOPHIL NFR BLD AUTO: 1.1 % — SIGNIFICANT CHANGE UP (ref 0–8)
GLUCOSE BLDC GLUCOMTR-MCNC: 102 MG/DL — HIGH (ref 70–99)
GLUCOSE BLDC GLUCOMTR-MCNC: 124 MG/DL — HIGH (ref 70–99)
GLUCOSE BLDC GLUCOMTR-MCNC: 133 MG/DL — HIGH (ref 70–99)
GLUCOSE BLDC GLUCOMTR-MCNC: 164 MG/DL — HIGH (ref 70–99)
GLUCOSE SERPL-MCNC: 125 MG/DL — HIGH (ref 70–99)
GLUCOSE UR QL: NEGATIVE MG/DL — SIGNIFICANT CHANGE UP
HCT VFR BLD CALC: 38 % — LOW (ref 42–52)
HGB BLD-MCNC: 13.6 G/DL — LOW (ref 14–18)
IMM GRANULOCYTES NFR BLD AUTO: 0.4 % — HIGH (ref 0.1–0.3)
INR BLD: 1.11 RATIO — SIGNIFICANT CHANGE UP (ref 0.65–1.3)
KETONES UR-MCNC: NEGATIVE — SIGNIFICANT CHANGE UP
LACTATE SERPL-SCNC: 0.8 MMOL/L — SIGNIFICANT CHANGE UP (ref 0.5–2.2)
LEUKOCYTE ESTERASE UR-ACNC: NEGATIVE — SIGNIFICANT CHANGE UP
LIDOCAIN IGE QN: 80 U/L — HIGH (ref 7–60)
LYMPHOCYTES # BLD AUTO: 0.6 K/UL — LOW (ref 1.2–3.4)
LYMPHOCYTES # BLD AUTO: 13.3 % — LOW (ref 20.5–51.1)
MCHC RBC-ENTMCNC: 31.9 PG — HIGH (ref 27–31)
MCHC RBC-ENTMCNC: 35.8 G/DL — SIGNIFICANT CHANGE UP (ref 32–37)
MCV RBC AUTO: 89 FL — SIGNIFICANT CHANGE UP (ref 80–94)
MONOCYTES # BLD AUTO: 0.35 K/UL — SIGNIFICANT CHANGE UP (ref 0.1–0.6)
MONOCYTES NFR BLD AUTO: 7.8 % — SIGNIFICANT CHANGE UP (ref 1.7–9.3)
NEUTROPHILS # BLD AUTO: 3.46 K/UL — SIGNIFICANT CHANGE UP (ref 1.4–6.5)
NEUTROPHILS NFR BLD AUTO: 77 % — HIGH (ref 42.2–75.2)
NITRITE UR-MCNC: NEGATIVE — SIGNIFICANT CHANGE UP
NRBC # BLD: 0 /100 WBCS — SIGNIFICANT CHANGE UP (ref 0–0)
PH UR: 6 — SIGNIFICANT CHANGE UP (ref 5–8)
PLATELET # BLD AUTO: 75 K/UL — LOW (ref 130–400)
POTASSIUM SERPL-MCNC: 5.7 MMOL/L — HIGH (ref 3.5–5)
POTASSIUM SERPL-SCNC: 5.7 MMOL/L — HIGH (ref 3.5–5)
PROT SERPL-MCNC: 8 G/DL — SIGNIFICANT CHANGE UP (ref 6–8)
PROT UR-MCNC: NEGATIVE MG/DL — SIGNIFICANT CHANGE UP
PROTHROM AB SERPL-ACNC: 12 SEC — SIGNIFICANT CHANGE UP (ref 9.95–12.87)
RBC # BLD: 4.27 M/UL — LOW (ref 4.7–6.1)
RBC # FLD: 14 % — SIGNIFICANT CHANGE UP (ref 11.5–14.5)
SODIUM SERPL-SCNC: 126 MMOL/L — LOW (ref 135–146)
SP GR SPEC: 1.01 — SIGNIFICANT CHANGE UP (ref 1.01–1.03)
UROBILINOGEN FLD QL: 0.2 MG/DL — SIGNIFICANT CHANGE UP (ref 0.2–0.2)
WBC # BLD: 4.5 K/UL — LOW (ref 4.8–10.8)
WBC # FLD AUTO: 4.5 K/UL — LOW (ref 4.8–10.8)

## 2018-11-12 RX ORDER — DIPHENHYDRAMINE HCL 50 MG
25 CAPSULE ORAL ONCE
Qty: 0 | Refills: 0 | Status: COMPLETED | OUTPATIENT
Start: 2018-11-12 | End: 2018-11-12

## 2018-11-12 RX ORDER — BUPRENORPHINE AND NALOXONE 2; .5 MG/1; MG/1
1 TABLET SUBLINGUAL AT BEDTIME
Qty: 0 | Refills: 0 | Status: DISCONTINUED | OUTPATIENT
Start: 2018-11-12 | End: 2018-11-13

## 2018-11-12 RX ORDER — METOPROLOL TARTRATE 50 MG
0 TABLET ORAL
Qty: 0 | Refills: 0 | COMMUNITY

## 2018-11-12 RX ORDER — NICOTINE POLACRILEX 2 MG
1 GUM BUCCAL DAILY
Qty: 0 | Refills: 0 | Status: DISCONTINUED | OUTPATIENT
Start: 2018-11-12 | End: 2018-11-13

## 2018-11-12 RX ORDER — ASPIRIN/CALCIUM CARB/MAGNESIUM 324 MG
81 TABLET ORAL DAILY
Qty: 0 | Refills: 0 | Status: DISCONTINUED | OUTPATIENT
Start: 2018-11-12 | End: 2018-11-13

## 2018-11-12 RX ORDER — SODIUM CHLORIDE 9 MG/ML
1000 INJECTION, SOLUTION INTRAVENOUS
Qty: 0 | Refills: 0 | Status: DISCONTINUED | OUTPATIENT
Start: 2018-11-12 | End: 2018-11-12

## 2018-11-12 RX ORDER — INFLUENZA VIRUS VACCINE 15; 15; 15; 15 UG/.5ML; UG/.5ML; UG/.5ML; UG/.5ML
0.5 SUSPENSION INTRAMUSCULAR ONCE
Qty: 0 | Refills: 0 | Status: COMPLETED | OUTPATIENT
Start: 2018-11-12 | End: 2018-11-13

## 2018-11-12 RX ORDER — SODIUM CHLORIDE 9 MG/ML
1000 INJECTION INTRAMUSCULAR; INTRAVENOUS; SUBCUTANEOUS
Qty: 0 | Refills: 0 | Status: DISCONTINUED | OUTPATIENT
Start: 2018-11-12 | End: 2018-11-13

## 2018-11-12 RX ORDER — LORATADINE 10 MG/1
10 TABLET ORAL DAILY
Qty: 0 | Refills: 0 | Status: DISCONTINUED | OUTPATIENT
Start: 2018-11-12 | End: 2018-11-13

## 2018-11-12 RX ORDER — HEPARIN SODIUM 5000 [USP'U]/ML
5000 INJECTION INTRAVENOUS; SUBCUTANEOUS EVERY 12 HOURS
Qty: 0 | Refills: 0 | Status: DISCONTINUED | OUTPATIENT
Start: 2018-11-12 | End: 2018-11-13

## 2018-11-12 RX ORDER — GEMFIBROZIL 600 MG
600 TABLET ORAL
Qty: 0 | Refills: 0 | Status: DISCONTINUED | OUTPATIENT
Start: 2018-11-12 | End: 2018-11-13

## 2018-11-12 RX ORDER — SPIRONOLACTONE 25 MG/1
100 TABLET, FILM COATED ORAL DAILY
Qty: 0 | Refills: 0 | Status: DISCONTINUED | OUTPATIENT
Start: 2018-11-12 | End: 2018-11-13

## 2018-11-12 RX ORDER — DIPHENHYDRAMINE HCL 50 MG
25 CAPSULE ORAL EVERY 6 HOURS
Qty: 0 | Refills: 0 | Status: DISCONTINUED | OUTPATIENT
Start: 2018-11-12 | End: 2018-11-12

## 2018-11-12 RX ORDER — BUPRENORPHINE AND NALOXONE 2; .5 MG/1; MG/1
2 TABLET SUBLINGUAL DAILY
Qty: 0 | Refills: 0 | Status: DISCONTINUED | OUTPATIENT
Start: 2018-11-12 | End: 2018-11-13

## 2018-11-12 RX ORDER — FAMOTIDINE 10 MG/ML
20 INJECTION INTRAVENOUS ONCE
Qty: 0 | Refills: 0 | Status: COMPLETED | OUTPATIENT
Start: 2018-11-12 | End: 2018-11-12

## 2018-11-12 RX ORDER — DIPHENHYDRAMINE HCL 50 MG
25 CAPSULE ORAL EVERY 6 HOURS
Qty: 0 | Refills: 0 | Status: DISCONTINUED | OUTPATIENT
Start: 2018-11-12 | End: 2018-11-13

## 2018-11-12 RX ORDER — FUROSEMIDE 40 MG
20 TABLET ORAL DAILY
Qty: 0 | Refills: 0 | Status: DISCONTINUED | OUTPATIENT
Start: 2018-11-12 | End: 2018-11-13

## 2018-11-12 RX ORDER — METOPROLOL TARTRATE 50 MG
25 TABLET ORAL
Qty: 0 | Refills: 0 | Status: DISCONTINUED | OUTPATIENT
Start: 2018-11-12 | End: 2018-11-13

## 2018-11-12 RX ORDER — FAMOTIDINE 10 MG/ML
20 INJECTION INTRAVENOUS
Qty: 0 | Refills: 0 | Status: DISCONTINUED | OUTPATIENT
Start: 2018-11-12 | End: 2018-11-13

## 2018-11-12 RX ADMIN — SODIUM CHLORIDE 75 MILLILITER(S): 9 INJECTION INTRAMUSCULAR; INTRAVENOUS; SUBCUTANEOUS at 09:06

## 2018-11-12 RX ADMIN — Medication 25 MILLIGRAM(S): at 17:14

## 2018-11-12 RX ADMIN — Medication 60 MILLIGRAM(S): at 13:41

## 2018-11-12 RX ADMIN — BUPRENORPHINE AND NALOXONE 1 TABLET(S): 2; .5 TABLET SUBLINGUAL at 22:29

## 2018-11-12 RX ADMIN — Medication 81 MILLIGRAM(S): at 13:02

## 2018-11-12 RX ADMIN — Medication 125 MILLIGRAM(S): at 03:27

## 2018-11-12 RX ADMIN — Medication 600 MILLIGRAM(S): at 17:14

## 2018-11-12 RX ADMIN — Medication 20 MILLIGRAM(S): at 13:02

## 2018-11-12 RX ADMIN — LORATADINE 10 MILLIGRAM(S): 10 TABLET ORAL at 11:43

## 2018-11-12 RX ADMIN — BUPRENORPHINE AND NALOXONE 1 TABLET(S): 2; .5 TABLET SUBLINGUAL at 21:48

## 2018-11-12 RX ADMIN — FAMOTIDINE 100 MILLIGRAM(S): 10 INJECTION INTRAVENOUS at 17:14

## 2018-11-12 RX ADMIN — BUPRENORPHINE AND NALOXONE 2 TABLET(S): 2; .5 TABLET SUBLINGUAL at 11:43

## 2018-11-12 RX ADMIN — Medication 60 MILLIGRAM(S): at 21:49

## 2018-11-12 RX ADMIN — HEPARIN SODIUM 5000 UNIT(S): 5000 INJECTION INTRAVENOUS; SUBCUTANEOUS at 07:02

## 2018-11-12 RX ADMIN — HEPARIN SODIUM 5000 UNIT(S): 5000 INJECTION INTRAVENOUS; SUBCUTANEOUS at 17:14

## 2018-11-12 RX ADMIN — Medication 1 PATCH: at 20:00

## 2018-11-12 RX ADMIN — Medication 25 MILLIGRAM(S): at 11:49

## 2018-11-12 RX ADMIN — SPIRONOLACTONE 100 MILLIGRAM(S): 25 TABLET, FILM COATED ORAL at 16:28

## 2018-11-12 RX ADMIN — SODIUM CHLORIDE 75 MILLILITER(S): 9 INJECTION INTRAMUSCULAR; INTRAVENOUS; SUBCUTANEOUS at 21:47

## 2018-11-12 RX ADMIN — Medication 1 PATCH: at 11:42

## 2018-11-12 RX ADMIN — SODIUM CHLORIDE 75 MILLILITER(S): 9 INJECTION, SOLUTION INTRAVENOUS at 06:31

## 2018-11-12 RX ADMIN — SODIUM CHLORIDE 75 MILLILITER(S): 9 INJECTION, SOLUTION INTRAVENOUS at 07:49

## 2018-11-12 RX ADMIN — Medication 25 MILLIGRAM(S): at 03:27

## 2018-11-12 RX ADMIN — BUPRENORPHINE AND NALOXONE 2 TABLET(S): 2; .5 TABLET SUBLINGUAL at 01:21

## 2018-11-12 NOTE — H&P ADULT - ATTENDING COMMENTS
Patient seen and evaluated independently medical resident note reviewed, I agree with plan and management, except as I have noted. would hold enalapril and Harvoni for now

## 2018-11-12 NOTE — PROGRESS NOTE ADULT - ASSESSMENT
IMPRESSION:    Angioedema - enalapril related possibly   Hepatitis C Cirrhosis on rx   Pancytopenia   IVDA hx on suboxone     PLAN:    CNS: Avoid sedatives     HEENT: Oral care, ENT follow up. Solumedrol 60mg for now, benadryl.     PULMONARY:  HOB @ 45 degrees.     CARDIOVASCULAR: I = O, DC enalapril.    GI:  NPO, Pepcid     RENAL:  Follow up bmp.  Correct as needed. DC LR and switch to normal saline    INFECTIOUS DISEASE: Follow up cultures    HEMATOLOGICAL:  DVT prophylaxis, get perioheral smear, hem eval for cytopenia    ENDOCRINE:  Follow up FS.      CBC, BMP     Can be downgraded to medical floor IMPRESSION:    Angioedema - enalapril related possibly   Hepatitis C Cirrhosis on rx   Pancytopenia   IVDA hx on suboxone     PLAN:    CNS: Avoid sedatives     HEENT: Oral care, ENT follow up. Solumedrol 60mg for now, benadryl.     PULMONARY:  HOB @ 45 degrees.     CARDIOVASCULAR: I = O, DC enalapril.    GI: PO diet as tolerated, Pepcid. continue hep c meds.    RENAL:  Follow up bmp.  Correct as needed. DC LR and switch to normal saline    INFECTIOUS DISEASE: Follow up cultures    HEMATOLOGICAL:  DVT prophylaxis, get perioheral smear, hem eval for cytopenia    ENDOCRINE:  Follow up FS.      Downgrade to tele    Can be downgraded to medical floor IMPRESSION:    Angioedema - enalapril related possibly   Hepatitis C Cirrhosis on rx   Pancytopenia   IVDA hx on suboxone     PLAN:    CNS: Avoid sedatives     HEENT: Oral care, ENT follow up. Solumedrol 60mg for now, benadryl.     PULMONARY:  HOB @ 45 degrees.     CARDIOVASCULAR: I = O, DC enalapril.    GI: PO diet as tolerated, Pepcid. continue hep c meds.    RENAL:  Follow up bmp.  Correct as needed. DC LR and switch to normal saline    INFECTIOUS DISEASE: Follow up cultures    HEMATOLOGICAL:  DVT prophylaxis, get perioheral smear, hem eval for cytopenia    ENDOCRINE:  Follow up FS.      Downgrade to tele

## 2018-11-12 NOTE — ED ADULT NURSE REASSESSMENT NOTE - NS ED NURSE REASSESS COMMENT FT1
Patient is being admitted to ICU for further evaluation. Alert and oriented X 3. VSS. Report given to receiving nurse Adrienne. Awaiting transport.

## 2018-11-12 NOTE — H&P ADULT - ASSESSMENT
68 M with PMH DM, HTN, Hep C, former IVDA presents with acute onset of facial edema. Was on Enalapril for many years, no known history of allergies and no family history of edema.     1) Angioedema likely 2/2 ACE-I   - Hold all ACE-I/ARBs  - Monitor facial edema: now much improved  - NPO for now- Speech/swallow eval to assess ability to eat  - No evidence of airway compromise    2) DM-   - Monitor FS  - Start Insulin if FS elevated and on diet 68 M with PMH DM, HTN, Hep C, Cirrhosis, former IVDA presents with acute onset of facial edema. Was on Enalapril for many years, no known history of allergies and no family history of edema.     1) Angioedema likely 2/2 ACE-I   - Hold all ACE-I/ARBs  - Monitor facial edema: now much improved  - NPO for now- bedside swallow eval  - No evidence of airway compromise  - Solumedrol/Benadryl    2) DM  - Monitor FS  - Start Insulin if FS elevated and on diet    3) HTN: well controlled  - Hold ACE/ARB  - c/w Metoprolol, Spironolactone, Lasix    4) Hep C/Cirrhosis  - c/w Harvoni, Spironolactone, Lasix  - Cytopenia likely 2/2 cirrhosis, chronic  - OP GI and Heme onc follow up     5) PPx/Diet/Dispo  - NPO until Swallow eval, then carb consistent diet  - OOB to chair  - Full code, from home  - Nicotine patch

## 2018-11-12 NOTE — SWALLOW BEDSIDE ASSESSMENT ADULT - SWALLOW EVAL: DIAGNOSIS
Oropharyngeal function appears to be within functional limits. No overt signs or symptoms of aspiration or penetration observed with regular consistency and thin liquid trials. Pt reports he feels swallowing has significantly improved as swelling has reduced. Recommend regular diet with thin liquids.

## 2018-11-12 NOTE — SWALLOW BEDSIDE ASSESSMENT ADULT - SLP GENERAL OBSERVATIONS
Alert and attentive, received upright in bed. O2 via nasal cannula at 2L/min. Noted with wet cough at baseline

## 2018-11-12 NOTE — H&P ADULT - HISTORY OF PRESENT ILLNESS
69 YO M PMH HTN, DM, Hep C on Harvoni, IVDA on suboxone, OA, p/w c/o lip swelling, nausea, sob, feels like his throat is closing up. His symptoms started last night. Pt is on enalapril for years. Recently started on Harvoni. Ate salad last night that he usually eats. No suspected or unusual foods yesterday, no history of allegies. Denies itching. Denies change in voice. Feels his symptoms have improved since he presented to ED.     ICU Vital Signs Last 24 Hrs  T(C): 36.7 (12 Nov 2018 07:01), Max: 36.7 (12 Nov 2018 07:01)  T(F): 98 (12 Nov 2018 07:01), Max: 98 (12 Nov 2018 07:01)  HR: 55 (12 Nov 2018 09:05) (51 - 59)  BP: 106/53 (12 Nov 2018 09:05) (100/35 - 124/58)  BP(mean): 76 (12 Nov 2018 09:05) (76 - 83)  ABP: --  ABP(mean): --  RR: 19 (12 Nov 2018 09:05) (18 - 19)  SpO2: 97% (12 Nov 2018 09:05) (95% - 99%)

## 2018-11-12 NOTE — H&P ADULT - NSHPPHYSICALEXAM_GEN_ALL_CORE
PHYSICAL EXAM:  GENERAL: Elderly M, sitting in chair, in NAD  HEAD:  Atraumatic, Normocephalic  EYES: R>L sided facial swelling, tongue appears normal, uvula visualized, no oropharynx edema. EOMI, PERRLA, conjunctiva and sclera clear  NECK: Supple, No JVD  CHEST/LUNG: Clear to auscultation bilaterally; No wheeze  HEART: Regular rate and rhythm; No murmurs, rubs, or gallops  ABDOMEN: Soft, Nontender, Nondistended; Bowel sounds present  EXTREMITIES:  2+ Peripheral Pulses, No clubbing, cyanosis, or edema  PSYCH: AAOx3  NEUROLOGY: non-focal  SKIN: No rashes or lesions

## 2018-11-12 NOTE — CONSULT NOTE ADULT - SUBJECTIVE AND OBJECTIVE BOX
Patient is a 68y old  Male who presents with a chief complaint of angioedema    HPI:  69 YO M p/w c/o lip swelling, nausea, sob, feels like his throat is closing up. His symptoms started last night. Pt is on enalapril for years. Recently started on harvoni. Ate salad last night that he usually eats. No suspected or unsual food yesterday. Denies itching. Denies change in voice. Feels his symptoms have improved since he presented to ED.     PAST MEDICAL & SURGICAL HISTORY:  Hepatitis: Untreated Hepatitis C  HTN (hypertension)  Diabetes  H/O knee surgery  H/O heart artery stent      SOCIAL HX:   Smoking      1ppd 52 years                   ETOH           -ve                  Other ivda, SMOKES MARIJUANA daily     FAMILY HISTORY:  Family history of diabetes mellitus (DM) (Father, Mother): Parents  :  No known cardiovacular family hisotry     ROS:  See HPI     Allergies    No Known Allergies    Intolerances          PHYSICAL EXAM    ICU Vital Signs Last 24 Hrs  T(C): 36.2 (12 Nov 2018 02:31), Max: 36.2 (12 Nov 2018 02:31)  T(F): 97.1 (12 Nov 2018 02:31), Max: 97.1 (12 Nov 2018 02:31)  HR: 51 (12 Nov 2018 02:31) (51 - 51)  BP: 100/35 (12 Nov 2018 02:31) (100/35 - 100/35)  BP(mean): --  ABP: --  ABP(mean): --  RR: 18 (12 Nov 2018 02:31) (18 - 18)  SpO2: 96% (12 Nov 2018 02:31) (96% - 96%)      General: In NAD   HEENT:  SYEDA            lip swelling, no hoarsens in voice, no posterior pharyngeal swelling    Lymphatic system: No cervical LN   Lungs: Bilateral BS, bilateral ae,   Cardiovascular: Regular,   Gastrointestinal: Soft, Positive BS  Musculoskeletal: No clubbing.  Moves all extremities.  Full range of motion   Skin: Warm.  Intact  Neurological: No motor or sensory deficit     LABS:                                                  PENDING                                              X-Rays        none                                                                              ECHO    MEDICATIONS  (STANDING):  famotidine  IVPB 20 milliGRAM(s) IV Intermittent Once    MEDICATIONS  (PRN):

## 2018-11-12 NOTE — ED PROVIDER NOTE - OBJECTIVE STATEMENT
Patient is a 69 yo m who presents for evaluation of lip and facial swelling that started suddenly within 6 hours prior to arrival. this has never occurred in the past he denies any other new medications at this time.. He denies any headaches, visucal changes, he denies any sore throat, he deneis any chest pain. he denies any feelings of dyspnea. of note the patient takes enalapril.

## 2018-11-12 NOTE — CONSULT NOTE ADULT - ASSESSMENT
IMPRESSION:    Angioedema - enalapril related possibly   Hepatitis C Cirrhosis on rx   Pancytopenia   IVDA hx on suboxone     PLAN:    CNS: Avoid sedatives     HEENT: Oral care, ENT evaluation, ICU monitoring, If worsening status low threshold for intubation - early intuation. Dexamethasone 4 q 6, benadryl. Loratidine     PULMONARY:  HOB @ 45 degrees.     CARDIOVASCULAR: I = O, DC enalapril, hold all meds, restart all meds once syx improved     GI:  NPO, Pepcid     RENAL:  Follow up bmp.  Correct as needed    INFECTIOUS DISEASE: Follow up cultures    HEMATOLOGICAL:  DVT prophylaxis.    ENDOCRINE:  Follow up FS.      CBC, BMP     Admit to ICU. IMPRESSION:    Angioedema - enalapril related possibly   Hepatitis C Cirrhosis on rx   Pancytopenia   IVDA hx on suboxone     PLAN:    CNS: Avoid sedatives     HEENT: Oral care, ENT evaluation, ICU monitoring, If worsening status low threshold for intubation - early intuation. Solumedrol 60 q 8, benadryl. Loratidine     PULMONARY:  HOB @ 45 degrees.     CARDIOVASCULAR: I = O, DC enalapril, hold all meds, restart all meds once syx improved     GI:  NPO, Pepcid     RENAL:  Follow up bmp.  Correct as needed    INFECTIOUS DISEASE: Follow up cultures    HEMATOLOGICAL:  DVT prophylaxis.    ENDOCRINE:  Follow up FS.      CBC, BMP     Admit to ICU.

## 2018-11-12 NOTE — ED ADULT NURSE NOTE - NSIMPLEMENTINTERV_GEN_ALL_ED
Implemented All Universal Safety Interventions:  [place] to call system. Call bell, personal items and telephone within reach. Instruct patient to call for assistance. Room bathroom lighting operational. Non-slip footwear when patient is off stretcher. Physically safe environment: no spills, clutter or unnecessary equipment. Stretcher in lowest position, wheels locked, appropriate side rails in place.

## 2018-11-12 NOTE — ED PROVIDER NOTE - MEDICAL DECISION MAKING DETAILS
Patient presents for evaluation of lip swelling most consistent with angioedema, he was given IV solumedrol, iv benadryl,  he is able to speak in clear full sentences with no resp compromise he is clear I consulted ICU who evaluated patient in the ED and recommend ICU admission

## 2018-11-12 NOTE — H&P ADULT - NSHPLABSRESULTS_GEN_ALL_CORE
13.6   4.50  )-----------( 75       ( 2018 03:10 )             38.0           126<L>  |  92<L>  |  78<HH>  ----------------------------<  125<H>  5.7<H>   |  21  |  1.1    Ca    9.2      2018 03:10    TPro  8.0  /  Alb  4.1  /  TBili  0.8  /  DBili  x   /  AST  38  /  ALT  17  /  AlkPhos  96                Urinalysis Basic - ( 2018 08:41 )    Color: Yellow / Appearance: Clear / S.015 / pH: x  Gluc: x / Ketone: Negative  / Bili: Negative / Urobili: 0.2 mg/dL   Blood: x / Protein: Negative mg/dL / Nitrite: Negative   Leuk Esterase: Negative / RBC: x / WBC x   Sq Epi: x / Non Sq Epi: x / Bacteria: x      PT/INR - ( 2018 03:10 )   PT: 12.00 sec;   INR: 1.11 ratio         PTT - ( 2018 03:10 )  PTT:29.6 sec    Lactate Trend   @ 03:10 Lactate:0.8       POCT Blood Glucose.: 133 mg/dL (2018 08:12)        CXR: clear, no evidence of CP disease, prelim

## 2018-11-13 ENCOUNTER — TRANSCRIPTION ENCOUNTER (OUTPATIENT)
Age: 68
End: 2018-11-13

## 2018-11-13 VITALS — SYSTOLIC BLOOD PRESSURE: 135 MMHG | OXYGEN SATURATION: 96 % | HEART RATE: 55 BPM | DIASTOLIC BLOOD PRESSURE: 65 MMHG

## 2018-11-13 LAB
ALBUMIN SERPL ELPH-MCNC: 3.7 G/DL — SIGNIFICANT CHANGE UP (ref 3.5–5.2)
ALP SERPL-CCNC: 69 U/L — SIGNIFICANT CHANGE UP (ref 30–115)
ALT FLD-CCNC: 15 U/L — SIGNIFICANT CHANGE UP (ref 0–41)
ANION GAP SERPL CALC-SCNC: 12 MMOL/L — SIGNIFICANT CHANGE UP (ref 7–14)
AST SERPL-CCNC: 29 U/L — SIGNIFICANT CHANGE UP (ref 0–41)
BASOPHILS # BLD AUTO: 0 K/UL — SIGNIFICANT CHANGE UP (ref 0–0.2)
BASOPHILS NFR BLD AUTO: 0 % — SIGNIFICANT CHANGE UP (ref 0–1)
BILIRUB SERPL-MCNC: 0.8 MG/DL — SIGNIFICANT CHANGE UP (ref 0.2–1.2)
BUN SERPL-MCNC: 49 MG/DL — HIGH (ref 10–20)
CALCIUM SERPL-MCNC: 9.2 MG/DL — SIGNIFICANT CHANGE UP (ref 8.5–10.1)
CHLORIDE SERPL-SCNC: 98 MMOL/L — SIGNIFICANT CHANGE UP (ref 98–110)
CO2 SERPL-SCNC: 23 MMOL/L — SIGNIFICANT CHANGE UP (ref 17–32)
CREAT SERPL-MCNC: 1.2 MG/DL — SIGNIFICANT CHANGE UP (ref 0.7–1.5)
EOSINOPHIL # BLD AUTO: 0 K/UL — SIGNIFICANT CHANGE UP (ref 0–0.7)
EOSINOPHIL NFR BLD AUTO: 0 % — SIGNIFICANT CHANGE UP (ref 0–8)
GLUCOSE BLDC GLUCOMTR-MCNC: 120 MG/DL — HIGH (ref 70–99)
GLUCOSE BLDC GLUCOMTR-MCNC: 129 MG/DL — HIGH (ref 70–99)
GLUCOSE BLDC GLUCOMTR-MCNC: 130 MG/DL — HIGH (ref 70–99)
GLUCOSE SERPL-MCNC: 127 MG/DL — HIGH (ref 70–99)
HCT VFR BLD CALC: 34.3 % — LOW (ref 42–52)
HGB BLD-MCNC: 12.2 G/DL — LOW (ref 14–18)
IMM GRANULOCYTES NFR BLD AUTO: 0.3 % — SIGNIFICANT CHANGE UP (ref 0.1–0.3)
LYMPHOCYTES # BLD AUTO: 0.46 K/UL — LOW (ref 1.2–3.4)
LYMPHOCYTES # BLD AUTO: 14.3 % — LOW (ref 20.5–51.1)
MAGNESIUM SERPL-MCNC: 2.4 MG/DL — SIGNIFICANT CHANGE UP (ref 1.8–2.4)
MCHC RBC-ENTMCNC: 31.9 PG — HIGH (ref 27–31)
MCHC RBC-ENTMCNC: 35.6 G/DL — SIGNIFICANT CHANGE UP (ref 32–37)
MCV RBC AUTO: 89.8 FL — SIGNIFICANT CHANGE UP (ref 80–94)
MONOCYTES # BLD AUTO: 0.17 K/UL — SIGNIFICANT CHANGE UP (ref 0.1–0.6)
MONOCYTES NFR BLD AUTO: 5.3 % — SIGNIFICANT CHANGE UP (ref 1.7–9.3)
NEUTROPHILS # BLD AUTO: 2.58 K/UL — SIGNIFICANT CHANGE UP (ref 1.4–6.5)
NEUTROPHILS NFR BLD AUTO: 80.1 % — HIGH (ref 42.2–75.2)
PLATELET # BLD AUTO: 53 K/UL — LOW (ref 130–400)
POTASSIUM SERPL-MCNC: 5.8 MMOL/L — HIGH (ref 3.5–5)
POTASSIUM SERPL-SCNC: 5.8 MMOL/L — HIGH (ref 3.5–5)
PROT SERPL-MCNC: 7.3 G/DL — SIGNIFICANT CHANGE UP (ref 6–8)
RBC # BLD: 3.82 M/UL — LOW (ref 4.7–6.1)
RBC # FLD: 13.5 % — SIGNIFICANT CHANGE UP (ref 11.5–14.5)
SODIUM SERPL-SCNC: 133 MMOL/L — LOW (ref 135–146)
WBC # BLD: 3.22 K/UL — LOW (ref 4.8–10.8)
WBC # FLD AUTO: 3.22 K/UL — LOW (ref 4.8–10.8)

## 2018-11-13 RX ORDER — LEDIPASVIR AND SOFOSBUVIR 90; 400 MG/1; MG/1
1 TABLET, FILM COATED ORAL
Qty: 0 | Refills: 0 | COMMUNITY

## 2018-11-13 RX ORDER — DIPHENHYDRAMINE HCL 50 MG
1 CAPSULE ORAL
Qty: 30 | Refills: 0
Start: 2018-11-13

## 2018-11-13 RX ORDER — EPINEPHRINE 0.3 MG/.3ML
0.3 INJECTION INTRAMUSCULAR; SUBCUTANEOUS
Qty: 1 | Refills: 0
Start: 2018-11-13

## 2018-11-13 RX ADMIN — Medication 1 PATCH: at 07:59

## 2018-11-13 RX ADMIN — HEPARIN SODIUM 5000 UNIT(S): 5000 INJECTION INTRAVENOUS; SUBCUTANEOUS at 05:21

## 2018-11-13 RX ADMIN — Medication 60 MILLIGRAM(S): at 05:21

## 2018-11-13 RX ADMIN — Medication 1 PATCH: at 11:57

## 2018-11-13 RX ADMIN — Medication 20 MILLIGRAM(S): at 05:22

## 2018-11-13 RX ADMIN — Medication 600 MILLIGRAM(S): at 05:22

## 2018-11-13 RX ADMIN — INFLUENZA VIRUS VACCINE 0.5 MILLILITER(S): 15; 15; 15; 15 SUSPENSION INTRAMUSCULAR at 17:05

## 2018-11-13 RX ADMIN — SPIRONOLACTONE 100 MILLIGRAM(S): 25 TABLET, FILM COATED ORAL at 05:21

## 2018-11-13 RX ADMIN — LORATADINE 10 MILLIGRAM(S): 10 TABLET ORAL at 11:56

## 2018-11-13 RX ADMIN — BUPRENORPHINE AND NALOXONE 2 TABLET(S): 2; .5 TABLET SUBLINGUAL at 16:11

## 2018-11-13 RX ADMIN — SODIUM CHLORIDE 75 MILLILITER(S): 9 INJECTION INTRAMUSCULAR; INTRAVENOUS; SUBCUTANEOUS at 08:00

## 2018-11-13 RX ADMIN — Medication 40 MILLIGRAM(S): at 11:56

## 2018-11-13 RX ADMIN — Medication 25 MILLIGRAM(S): at 05:22

## 2018-11-13 RX ADMIN — FAMOTIDINE 100 MILLIGRAM(S): 10 INJECTION INTRAVENOUS at 05:21

## 2018-11-13 RX ADMIN — BUPRENORPHINE AND NALOXONE 2 TABLET(S): 2; .5 TABLET SUBLINGUAL at 11:57

## 2018-11-13 RX ADMIN — Medication 81 MILLIGRAM(S): at 11:56

## 2018-11-13 NOTE — PROGRESS NOTE ADULT - ASSESSMENT
68 M with PMH DM, HTN, Hep C, Cirrhosis, former IVDA presents with acute onset of facial edema. Was on Enalapril for many years, no known history of allergies and no family history of edema.     1) Angioedema likely 2/2 ACE-I vs recently started Harvoni  - Hold all ACE-I/ARBs, hold Harvoni for now  - Monitor facial edema: now much improved  - Soft diet  - No evidence of airway compromise  - switched to prednisone/Benadryl    2) DM  - Monitor FS  - Start Insulin if FS elevated and on diet    3) HTN: well controlled  - Hold ACE/ARB  - c/w Metoprolol, Spironolactone, Lasix    4) Hep C/Cirrhosis  - holding harvoni, Spironolactone, Lasix  - Cytopenia likely 2/2 cirrhosis, chronic  - OP GI for Hep C, and Heme onc follow up     5) PPx/Diet/Dispo  - soft carb consistent diet  - OOB to chair  - Full code, from home  - Nicotine patch  - Likely DC today

## 2018-11-13 NOTE — PROGRESS NOTE ADULT - SUBJECTIVE AND OBJECTIVE BOX
Patient is a 68y old  Male who presents with a chief complaint of Facial swelling/angioedema (2018 10:46)      Over Night Events:  Patient seen and examined.   feel good sitting in bed eating no complain     ROS:  See HPI    PHYSICAL EXAM    ICU Vital Signs Last 24 Hrs  T(C): 36.2 (2018 07:15), Max: 36.8 (2018 23:33)  T(F): 97.2 (2018 07:15), Max: 98.2 (2018 23:33)  HR: 55 (2018 23:03) (55 - 68)  BP: 117/59 (2018 23:03) (106/53 - 128/64)  BP(mean): 85 (2018 23:03) (76 - 90)  ABP: --  ABP(mean): --  RR: 18 (2018 07:15) (17 - 19)  SpO2: 97% (2018 23:03) (96% - 97%)      General: Aox3  HEENT:    Julee            Lymph Nodes: NO cervical LN   Lungs: Bilateral BS  Cardiovascular: Regular   Abdomen: Soft, Positive BS  Extremities: No clubbing   Skin: warm   Neurological: no focal deficit   Musculoskeletal: move all ext     I&O's Detail    2018 07:01  -  2018 07:00  --------------------------------------------------------  IN:    IV PiggyBack: 50 mL    lactated ringers.: 225 mL    Oral Fluid: 340 mL    sodium chloride 0.9%.: 1500 mL  Total IN: 2115 mL    OUT:    Voided: 3175 mL  Total OUT: 3175 mL    Total NET: -1060 mL      2018 07:01  -  2018 08:24  --------------------------------------------------------  IN:  Total IN: 0 mL    OUT:    Voided: 950 mL  Total OUT: 950 mL    Total NET: -950 mL          LABS:                          12.2   3.22  )-----------( 53       ( 2018 05:13 )             34.3         2018 05:13    133    |  98     |  49     ----------------------------<  127    5.8     |  23     |  1.2      Ca    9.2        2018 05:13  Mg     2.4       2018 05:13    TPro  7.3    /  Alb  3.7    /  TBili  0.8    /  DBili  x      /  AST  29     /  ALT  15     /  AlkPhos  69     2018 05:13  Amylase x     lipase x                                                 PT/INR - ( 2018 03:10 )   PT: 12.00 sec;   INR: 1.11 ratio         PTT - ( 2018 03:10 )  PTT:29.6 sec                                       Urinalysis Basic - ( 2018 08:41 )    Color: Yellow / Appearance: Clear / S.015 / pH: x  Gluc: x / Ketone: Negative  / Bili: Negative / Urobili: 0.2 mg/dL   Blood: x / Protein: Negative mg/dL / Nitrite: Negative   Leuk Esterase: Negative / RBC: x / WBC x   Sq Epi: x / Non Sq Epi: x / Bacteria: x        Lactate, Blood: 0.8 mmol/L (18 @ 03:10)                                                                                                                                               MEDICATIONS  (STANDING):  aspirin  chewable 81 milliGRAM(s) Oral daily  buprenorphine 2 mG/naloxone 0.5 mG SL  Tablet 2 Tablet(s) SubLingual daily  buprenorphine 2 mG/naloxone 0.5 mG SL  Tablet 1 Tablet(s) SubLingual at bedtime  famotidine  IVPB 20 milliGRAM(s) IV Intermittent two times a day  furosemide    Tablet 20 milliGRAM(s) Oral daily  gemfibrozil 600 milliGRAM(s) Oral two times a day  heparin  Injectable 5000 Unit(s) SubCutaneous every 12 hours  influenza   Vaccine 0.5 milliLiter(s) IntraMuscular once  loratadine 10 milliGRAM(s) Oral daily  methylPREDNISolone sodium succinate Injectable 60 milliGRAM(s) IV Push three times a day  metoprolol tartrate 25 milliGRAM(s) Oral two times a day  nicotine - 21 mG/24Hr(s) Patch 1 patch Transdermal daily  sodium chloride 0.9%. 1000 milliLiter(s) (75 mL/Hr) IV Continuous <Continuous>  spironolactone 100 milliGRAM(s) Oral daily    MEDICATIONS  (PRN):  diphenhydrAMINE 25 milliGRAM(s) Oral every 6 hours PRN Rash and/or Itching          Xrays:  TLC:  OG:  ET tube:                                                                                       ECHO:

## 2018-11-13 NOTE — DISCHARGE NOTE ADULT - PATIENT PORTAL LINK FT
You can access the [facility] Patient Portal, offered by [facility], by registering with the following website: [email or web address]

## 2018-11-13 NOTE — DISCHARGE NOTE ADULT - MEDICATION SUMMARY - MEDICATIONS TO STOP TAKING
I will STOP taking the medications listed below when I get home from the hospital:    Harvoni 90 mg-400 mg oral tablet  -- 1 tab(s) by mouth once a day    enalapril 5 mg oral tablet  -- 1 tab(s) by mouth once a day

## 2018-11-13 NOTE — DISCHARGE NOTE ADULT - CARE PLAN
Principal Discharge DX:	Angioedema, initial encounter  Goal:	Resolved  Assessment and plan of treatment:	Please follow up with your PMD within 3-5 days  Please stop taking enalapril and Harvoni  Please monitor for swelling or difficulty swallowing, if recurs please use EpiPen and go to the nearest ER.  Please follow up with Allergist Dr. Croft  Secondary Diagnosis:	Hepatitis  Assessment and plan of treatment:	Please stop Harvoni for now and follow up with your Hepatologist as soon as possible  Secondary Diagnosis:	Hypertension, unspecified type  Assessment and plan of treatment:	Please stop enalapril and all ACE-I or ARB medications  continue other home medications as prescribed

## 2018-11-13 NOTE — DISCHARGE NOTE ADULT - NURSING SECTION COMPLETE
Bedside and Verbal shift change report given to Angie Bedolla RN (oncoming nurse) by Nabila Martin RN (offgoing nurse). Report given with SBAR, Kardex, Intake/Output and MAR. Patient/Caregiver provided printed discharge information.

## 2018-11-13 NOTE — DISCHARGE NOTE ADULT - PROVIDER TOKENS
TOKEN:'[id number]',FREE:[LAST:[Barrington],FIRST:[Michael],PHONE:[phone]],FAX:[(   )    -]],TOKEN:'[id number]'

## 2018-11-13 NOTE — PROGRESS NOTE ADULT - ASSESSMENT
IMPRESSION:    Angioedema - enalapril related possibly   Hepatitis C Cirrhosis on rx   Pancytopenia   IVDA hx on suboxone   hyperkalemia   PLAN:    CNS: Avoid sedatives     HEENT: Oral care, ENT follow up. switch to prednisone 40 mg and taper   benadryl   PULMONARY:  HOB @ 45 degrees.     CARDIOVASCULAR: I = O, DC enalapril.    GI: PO diet as tolerated, Pepcid. continue hep c meds. hold monse for now for elevated K   repeat BMP   follow GI to adjust K monse    RENAL:  Follow up bmp.  Correct as needed. DC LR and switch to normal saline    INFECTIOUS DISEASE: Follow up cultures    HEMATOLOGICAL:  DVT prophylaxis, get perioheral smear, hem eval for cytopenia    ENDOCRINE:  Follow up FS.      transfer to floor

## 2018-11-13 NOTE — DISCHARGE NOTE ADULT - CARE PROVIDER_API CALL
Fransisco Pierre (PA-C), Physician Assistant Services  [address] [place] [address]  Phone: [phone]  Fax: [phone]    Michael Ferris  Phone: [phone]  Fax: (   )    -    Katharine Croft), Allergy and Immunology; Internal Medicine  [address] [place] [address]  Phone: [phone]  Fax: [phone]

## 2018-11-13 NOTE — DISCHARGE NOTE ADULT - MEDICATION SUMMARY - MEDICATIONS TO TAKE
I will START or STAY ON the medications listed below when I get home from the hospital:    predniSONE 20 mg oral tablet  -- 2 tab(s) by mouth once a day   -- Indication: For ANGIOEDEMA    spironolactone 100 mg oral tablet  -- 1 tab(s) by mouth once a day  -- Indication: For Cirrhosis    aspirin 81 mg oral tablet  -- 1 tab(s) by mouth once a day  -- Indication: For CAD    Suboxone 8 mg-2 mg sublingual tablet  -- 2 tab(s) under tongue once a day  -- Indication: For Opiate dependenace    metFORMIN 500 mg oral tablet  -- 1 tab(s) by mouth 2 times a day  -- Indication: For Diabetes    diphenhydrAMINE 25 mg oral capsule  -- 1 cap(s) by mouth every 6 hours, As needed, Rash and/or Itching  -- Indication: For ANGIOEDEMA    gemfibrozil 600 mg oral tablet  -- 1 tab(s) by mouth 2 times a day  -- Indication: For Hyperlipidemia    metoprolol succinate  -- 25 milligram(s) by mouth once a day  -- Indication: For Hypertension    EpiPen Auto-Injector 0.3 mg injectable kit  -- 0.3 milligram(s) intramuscularly once a day   -- Obtain medical advice before taking any non-prescription drugs as some may affect the action of this medication.    -- Indication: For ANGIOEDEMA    Lasix 20 mg oral tablet  -- 1 tab(s) by mouth once a day   -- Avoid prolonged or excessive exposure to direct and/or artificial sunlight while taking this medication.  It is very important that you take or use this exactly as directed.  Do not skip doses or discontinue unless directed by your doctor.  It may be advisable to drink a full glass orange juice or eat a banana daily while taking this medication.    -- Indication: For Cirrhosis

## 2018-11-13 NOTE — DISCHARGE NOTE ADULT - PLAN OF CARE
Resolved Please follow up with your PMD within 3-5 days  Please stop taking enalapril and Harvoni  Please monitor for swelling or difficulty swallowing, if recurs please use EpiPen and go to the nearest ER.  Please follow up with Allergist Dr. Croft Please stop Hermelinda for now and follow up with your Hepatologist as soon as possible Please stop enalapril and all ACE-I or ARB medications  continue other home medications as prescribed

## 2018-11-13 NOTE — PROGRESS NOTE ADULT - SUBJECTIVE AND OBJECTIVE BOX
SUBJECTIVE:    Patient is a 68y old Male who presents with a chief complaint of Facial swelling/angioedema (2018 08:23)    Currently admitted to medicine with the primary diagnosis of Angioedema     Today is hospital day 1d. This morning he is resting comfortably in bed and reports no new issues or overnight events. No complaints this am. Swelling improving.    PAST MEDICAL & SURGICAL HISTORY  Arthritis  Hepatitis: Untreated Hepatitis C  HTN (hypertension)  Diabetes  H/O knee surgery  H/O heart artery stent    SOCIAL HISTORY:  Negative for smoking/alcohol/drug use.     ALLERGIES:  No Known Allergies    MEDICATIONS:  STANDING MEDICATIONS  aspirin  chewable 81 milliGRAM(s) Oral daily  buprenorphine 2 mG/naloxone 0.5 mG SL  Tablet 2 Tablet(s) SubLingual daily  buprenorphine 2 mG/naloxone 0.5 mG SL  Tablet 1 Tablet(s) SubLingual at bedtime  famotidine  IVPB 20 milliGRAM(s) IV Intermittent two times a day  furosemide    Tablet 20 milliGRAM(s) Oral daily  gemfibrozil 600 milliGRAM(s) Oral two times a day  heparin  Injectable 5000 Unit(s) SubCutaneous every 12 hours  influenza   Vaccine 0.5 milliLiter(s) IntraMuscular once  loratadine 10 milliGRAM(s) Oral daily  metoprolol tartrate 25 milliGRAM(s) Oral two times a day  nicotine - 21 mG/24Hr(s) Patch 1 patch Transdermal daily  predniSONE   Tablet 40 milliGRAM(s) Oral daily  sodium chloride 0.9%. 1000 milliLiter(s) IV Continuous <Continuous>  spironolactone 100 milliGRAM(s) Oral daily    PRN MEDICATIONS  diphenhydrAMINE 25 milliGRAM(s) Oral every 6 hours PRN    VITALS:   T(F): 97.2  HR: 55  BP: 117/59  RR: 18  SpO2: 97%    LABS:                        12.2   3.22  )-----------( 53       ( 2018 05:13 )             34.3     11-13    133<L>  |  98  |  49<H>  ----------------------------<  127<H>  5.8<H>   |  23  |  1.2    Ca    9.2      2018 05:13  Mg     2.4     11-13    TPro  7.3  /  Alb  3.7  /  TBili  0.8  /  DBili  x   /  AST  29  /  ALT  15  /  AlkPhos  69  11-13    PT/INR - ( 2018 03:10 )   PT: 12.00 sec;   INR: 1.11 ratio         PTT - ( 2018 03:10 )  PTT:29.6 sec  Urinalysis Basic - ( 2018 08:41 )    Color: Yellow / Appearance: Clear / S.015 / pH: x  Gluc: x / Ketone: Negative  / Bili: Negative / Urobili: 0.2 mg/dL   Blood: x / Protein: Negative mg/dL / Nitrite: Negative   Leuk Esterase: Negative / RBC: x / WBC x   Sq Epi: x / Non Sq Epi: x / Bacteria: x        PHYSICAL EXAM:  GEN: Middle aged M, lying in bed. In  No acute distress  HEENT: Improved R sided swelling  LUNGS: Clear to auscultation bilaterally   HEART: S1/S2 present. RRR.   ABD: Soft, non-tender, non-distended. Bowel sounds present  EXT: NC/NC/NE/2+PP/CHOI  NEURO: AAOX3

## 2018-11-13 NOTE — DISCHARGE NOTE ADULT - HOSPITAL COURSE
Patient admitted for angioedema due to ACE-I or Harvoni. Facial swelling resolved, patient to be discharged with 5 day course of prednisone, Benadryl PRN, and EpiPen, with instructions to DC enalapril and Harvoni and follow up with PMD, GI, and Allergist as in office.

## 2018-11-20 DIAGNOSIS — D61.818 OTHER PANCYTOPENIA: ICD-10-CM

## 2018-11-20 DIAGNOSIS — T78.3XXA ANGIONEUROTIC EDEMA, INITIAL ENCOUNTER: ICD-10-CM

## 2018-11-20 DIAGNOSIS — T50.995A ADVERSE EFFECT OF OTHER DRUGS, MEDICAMENTS AND BIOLOGICAL SUBSTANCES, INITIAL ENCOUNTER: ICD-10-CM

## 2018-11-20 DIAGNOSIS — Z87.891 PERSONAL HISTORY OF NICOTINE DEPENDENCE: ICD-10-CM
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
## 2018-11-20 DIAGNOSIS — B18.2 CHRONIC VIRAL HEPATITIS C: ICD-10-CM

## 2018-11-20 DIAGNOSIS — T46.4X5A ADVERSE EFFECT OF ANGIOTENSIN-CONVERTING-ENZYME INHIBITORS, INITIAL ENCOUNTER: ICD-10-CM

## 2018-11-20 DIAGNOSIS — E87.5 HYPERKALEMIA: ICD-10-CM

## 2018-11-20 DIAGNOSIS — F11.20 OPIOID DEPENDENCE, UNCOMPLICATED: ICD-10-CM

## 2018-11-20 DIAGNOSIS — Z79.84 LONG TERM (CURRENT) USE OF ORAL HYPOGLYCEMIC DRUGS: ICD-10-CM

## 2018-11-20 DIAGNOSIS — K74.60 UNSPECIFIED CIRRHOSIS OF LIVER: ICD-10-CM

## 2018-11-20 DIAGNOSIS — E11.9 TYPE 2 DIABETES MELLITUS WITHOUT COMPLICATIONS: ICD-10-CM

## 2018-11-20 DIAGNOSIS — I10 ESSENTIAL (PRIMARY) HYPERTENSION: ICD-10-CM

## 2018-11-20 DIAGNOSIS — F12.20 CANNABIS DEPENDENCE, UNCOMPLICATED: ICD-10-CM

## 2018-11-20 DIAGNOSIS — M19.90 UNSPECIFIED OSTEOARTHRITIS, UNSPECIFIED SITE: ICD-10-CM

## 2018-11-20 DIAGNOSIS — Z95.5 PRESENCE OF CORONARY ANGIOPLASTY IMPLANT AND GRAFT: ICD-10-CM

## 2018-12-04 PROBLEM — M19.90 UNSPECIFIED OSTEOARTHRITIS, UNSPECIFIED SITE: Chronic | Status: ACTIVE | Noted: 2018-11-12

## 2018-12-19 NOTE — ED PROVIDER NOTE - RESPIRATORY NEGATIVE STATEMENT, MLM
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    
no chest pain, no cough, and no shortness of breath.

## 2019-01-02 ENCOUNTER — OUTPATIENT (OUTPATIENT)
Dept: OUTPATIENT SERVICES | Facility: HOSPITAL | Age: 69
LOS: 1 days | Discharge: HOME | End: 2019-01-02

## 2019-01-02 DIAGNOSIS — Z95.5 PRESENCE OF CORONARY ANGIOPLASTY IMPLANT AND GRAFT: Chronic | ICD-10-CM

## 2019-01-02 DIAGNOSIS — F11.20 OPIOID DEPENDENCE, UNCOMPLICATED: ICD-10-CM

## 2019-01-02 DIAGNOSIS — Z98.890 OTHER SPECIFIED POSTPROCEDURAL STATES: Chronic | ICD-10-CM

## 2019-01-30 ENCOUNTER — OUTPATIENT (OUTPATIENT)
Dept: OUTPATIENT SERVICES | Facility: HOSPITAL | Age: 69
LOS: 1 days | Discharge: HOME | End: 2019-01-30

## 2019-01-30 DIAGNOSIS — Z95.5 PRESENCE OF CORONARY ANGIOPLASTY IMPLANT AND GRAFT: Chronic | ICD-10-CM

## 2019-01-30 DIAGNOSIS — F11.20 OPIOID DEPENDENCE, UNCOMPLICATED: ICD-10-CM

## 2019-01-30 DIAGNOSIS — Z98.890 OTHER SPECIFIED POSTPROCEDURAL STATES: Chronic | ICD-10-CM

## 2019-01-31 ENCOUNTER — OUTPATIENT (OUTPATIENT)
Dept: OUTPATIENT SERVICES | Facility: HOSPITAL | Age: 69
LOS: 1 days | Discharge: HOME | End: 2019-01-31

## 2019-01-31 DIAGNOSIS — E55.9 VITAMIN D DEFICIENCY, UNSPECIFIED: ICD-10-CM

## 2019-01-31 DIAGNOSIS — Z95.5 PRESENCE OF CORONARY ANGIOPLASTY IMPLANT AND GRAFT: Chronic | ICD-10-CM

## 2019-01-31 DIAGNOSIS — Z98.890 OTHER SPECIFIED POSTPROCEDURAL STATES: Chronic | ICD-10-CM

## 2019-01-31 DIAGNOSIS — Z02.89 ENCOUNTER FOR OTHER ADMINISTRATIVE EXAMINATIONS: ICD-10-CM

## 2019-01-31 DIAGNOSIS — R68.89 OTHER GENERAL SYMPTOMS AND SIGNS: ICD-10-CM

## 2019-03-01 ENCOUNTER — OUTPATIENT (OUTPATIENT)
Dept: OUTPATIENT SERVICES | Facility: HOSPITAL | Age: 69
LOS: 1 days | Discharge: HOME | End: 2019-03-01

## 2019-03-01 DIAGNOSIS — F11.20 OPIOID DEPENDENCE, UNCOMPLICATED: ICD-10-CM

## 2019-03-01 DIAGNOSIS — Z95.5 PRESENCE OF CORONARY ANGIOPLASTY IMPLANT AND GRAFT: Chronic | ICD-10-CM

## 2019-03-01 DIAGNOSIS — Z98.890 OTHER SPECIFIED POSTPROCEDURAL STATES: Chronic | ICD-10-CM

## 2019-04-02 ENCOUNTER — OUTPATIENT (OUTPATIENT)
Dept: OUTPATIENT SERVICES | Facility: HOSPITAL | Age: 69
LOS: 1 days | Discharge: HOME | End: 2019-04-02

## 2019-04-02 DIAGNOSIS — F11.20 OPIOID DEPENDENCE, UNCOMPLICATED: ICD-10-CM

## 2019-04-02 DIAGNOSIS — Z95.5 PRESENCE OF CORONARY ANGIOPLASTY IMPLANT AND GRAFT: Chronic | ICD-10-CM

## 2019-04-02 DIAGNOSIS — Z98.890 OTHER SPECIFIED POSTPROCEDURAL STATES: Chronic | ICD-10-CM

## 2019-05-01 ENCOUNTER — OUTPATIENT (OUTPATIENT)
Dept: OUTPATIENT SERVICES | Facility: HOSPITAL | Age: 69
LOS: 1 days | Discharge: HOME | End: 2019-05-01
Payer: MEDICARE

## 2019-05-01 DIAGNOSIS — F11.20 OPIOID DEPENDENCE, UNCOMPLICATED: ICD-10-CM

## 2019-05-01 DIAGNOSIS — Z95.5 PRESENCE OF CORONARY ANGIOPLASTY IMPLANT AND GRAFT: Chronic | ICD-10-CM

## 2019-05-01 DIAGNOSIS — Z98.890 OTHER SPECIFIED POSTPROCEDURAL STATES: Chronic | ICD-10-CM

## 2019-05-01 PROCEDURE — 99213 OFFICE O/P EST LOW 20 MIN: CPT

## 2019-05-23 ENCOUNTER — OUTPATIENT (OUTPATIENT)
Dept: OUTPATIENT SERVICES | Facility: HOSPITAL | Age: 69
LOS: 1 days | Discharge: HOME | End: 2019-05-23
Payer: MEDICARE

## 2019-05-23 DIAGNOSIS — F11.20 OPIOID DEPENDENCE, UNCOMPLICATED: ICD-10-CM

## 2019-05-23 DIAGNOSIS — Z95.5 PRESENCE OF CORONARY ANGIOPLASTY IMPLANT AND GRAFT: Chronic | ICD-10-CM

## 2019-05-23 DIAGNOSIS — Z98.890 OTHER SPECIFIED POSTPROCEDURAL STATES: Chronic | ICD-10-CM

## 2019-05-23 PROCEDURE — 99213 OFFICE O/P EST LOW 20 MIN: CPT

## 2019-06-24 ENCOUNTER — OUTPATIENT (OUTPATIENT)
Dept: OUTPATIENT SERVICES | Facility: HOSPITAL | Age: 69
LOS: 1 days | Discharge: HOME | End: 2019-06-24

## 2019-06-24 DIAGNOSIS — Z95.5 PRESENCE OF CORONARY ANGIOPLASTY IMPLANT AND GRAFT: Chronic | ICD-10-CM

## 2019-06-24 DIAGNOSIS — F11.20 OPIOID DEPENDENCE, UNCOMPLICATED: ICD-10-CM

## 2019-06-24 DIAGNOSIS — Z98.890 OTHER SPECIFIED POSTPROCEDURAL STATES: Chronic | ICD-10-CM

## 2019-07-22 ENCOUNTER — OUTPATIENT (OUTPATIENT)
Dept: OUTPATIENT SERVICES | Facility: HOSPITAL | Age: 69
LOS: 1 days | Discharge: HOME | End: 2019-07-22
Payer: MEDICARE

## 2019-07-22 DIAGNOSIS — F11.20 OPIOID DEPENDENCE, UNCOMPLICATED: ICD-10-CM

## 2019-07-22 DIAGNOSIS — Z98.890 OTHER SPECIFIED POSTPROCEDURAL STATES: Chronic | ICD-10-CM

## 2019-07-22 DIAGNOSIS — Z95.5 PRESENCE OF CORONARY ANGIOPLASTY IMPLANT AND GRAFT: Chronic | ICD-10-CM

## 2019-07-22 PROCEDURE — 99213 OFFICE O/P EST LOW 20 MIN: CPT

## 2019-08-23 ENCOUNTER — OUTPATIENT (OUTPATIENT)
Dept: OUTPATIENT SERVICES | Facility: HOSPITAL | Age: 69
LOS: 1 days | Discharge: HOME | End: 2019-08-23

## 2019-08-23 DIAGNOSIS — Z98.890 OTHER SPECIFIED POSTPROCEDURAL STATES: Chronic | ICD-10-CM

## 2019-08-23 DIAGNOSIS — Z95.5 PRESENCE OF CORONARY ANGIOPLASTY IMPLANT AND GRAFT: Chronic | ICD-10-CM

## 2019-08-23 DIAGNOSIS — F11.20 OPIOID DEPENDENCE, UNCOMPLICATED: ICD-10-CM

## 2019-09-20 ENCOUNTER — OUTPATIENT (OUTPATIENT)
Dept: OUTPATIENT SERVICES | Facility: HOSPITAL | Age: 69
LOS: 1 days | Discharge: HOME | End: 2019-09-20

## 2019-09-20 DIAGNOSIS — Z95.5 PRESENCE OF CORONARY ANGIOPLASTY IMPLANT AND GRAFT: Chronic | ICD-10-CM

## 2019-09-20 DIAGNOSIS — F11.20 OPIOID DEPENDENCE, UNCOMPLICATED: ICD-10-CM

## 2019-09-20 DIAGNOSIS — Z98.890 OTHER SPECIFIED POSTPROCEDURAL STATES: Chronic | ICD-10-CM

## 2019-10-22 ENCOUNTER — OUTPATIENT (OUTPATIENT)
Dept: OUTPATIENT SERVICES | Facility: HOSPITAL | Age: 69
LOS: 1 days | Discharge: HOME | End: 2019-10-22

## 2019-10-22 DIAGNOSIS — F11.20 OPIOID DEPENDENCE, UNCOMPLICATED: ICD-10-CM

## 2019-10-22 DIAGNOSIS — Z98.890 OTHER SPECIFIED POSTPROCEDURAL STATES: Chronic | ICD-10-CM

## 2019-10-22 DIAGNOSIS — Z95.5 PRESENCE OF CORONARY ANGIOPLASTY IMPLANT AND GRAFT: Chronic | ICD-10-CM

## 2019-11-20 ENCOUNTER — OUTPATIENT (OUTPATIENT)
Dept: OUTPATIENT SERVICES | Facility: HOSPITAL | Age: 69
LOS: 1 days | Discharge: HOME | End: 2019-11-20

## 2019-11-20 DIAGNOSIS — Z95.5 PRESENCE OF CORONARY ANGIOPLASTY IMPLANT AND GRAFT: Chronic | ICD-10-CM

## 2019-11-20 DIAGNOSIS — Z98.890 OTHER SPECIFIED POSTPROCEDURAL STATES: Chronic | ICD-10-CM

## 2019-11-20 DIAGNOSIS — F11.20 OPIOID DEPENDENCE, UNCOMPLICATED: ICD-10-CM

## 2019-12-20 ENCOUNTER — OUTPATIENT (OUTPATIENT)
Dept: OUTPATIENT SERVICES | Facility: HOSPITAL | Age: 69
LOS: 1 days | Discharge: HOME | End: 2019-12-20

## 2019-12-20 DIAGNOSIS — F11.20 OPIOID DEPENDENCE, UNCOMPLICATED: ICD-10-CM

## 2019-12-20 DIAGNOSIS — Z95.5 PRESENCE OF CORONARY ANGIOPLASTY IMPLANT AND GRAFT: Chronic | ICD-10-CM

## 2019-12-20 DIAGNOSIS — Z98.890 OTHER SPECIFIED POSTPROCEDURAL STATES: Chronic | ICD-10-CM

## 2020-01-21 ENCOUNTER — OUTPATIENT (OUTPATIENT)
Dept: OUTPATIENT SERVICES | Facility: HOSPITAL | Age: 70
LOS: 1 days | Discharge: HOME | End: 2020-01-21
Payer: MEDICARE

## 2020-01-21 DIAGNOSIS — F11.20 OPIOID DEPENDENCE, UNCOMPLICATED: ICD-10-CM

## 2020-01-21 DIAGNOSIS — Z98.890 OTHER SPECIFIED POSTPROCEDURAL STATES: Chronic | ICD-10-CM

## 2020-01-21 DIAGNOSIS — Z95.5 PRESENCE OF CORONARY ANGIOPLASTY IMPLANT AND GRAFT: Chronic | ICD-10-CM

## 2020-01-21 PROCEDURE — 99212 OFFICE O/P EST SF 10 MIN: CPT

## 2020-02-21 ENCOUNTER — OUTPATIENT (OUTPATIENT)
Dept: OUTPATIENT SERVICES | Facility: HOSPITAL | Age: 70
LOS: 1 days | Discharge: HOME | End: 2020-02-21
Payer: MEDICARE

## 2020-02-21 DIAGNOSIS — F11.20 OPIOID DEPENDENCE, UNCOMPLICATED: ICD-10-CM

## 2020-02-21 DIAGNOSIS — Z98.890 OTHER SPECIFIED POSTPROCEDURAL STATES: Chronic | ICD-10-CM

## 2020-02-21 DIAGNOSIS — Z95.5 PRESENCE OF CORONARY ANGIOPLASTY IMPLANT AND GRAFT: Chronic | ICD-10-CM

## 2020-02-21 PROCEDURE — 99212 OFFICE O/P EST SF 10 MIN: CPT

## 2020-04-17 ENCOUNTER — OUTPATIENT (OUTPATIENT)
Dept: OUTPATIENT SERVICES | Facility: HOSPITAL | Age: 70
LOS: 1 days | Discharge: HOME | End: 2020-04-17

## 2020-04-17 DIAGNOSIS — Z95.5 PRESENCE OF CORONARY ANGIOPLASTY IMPLANT AND GRAFT: Chronic | ICD-10-CM

## 2020-04-17 DIAGNOSIS — F11.20 OPIOID DEPENDENCE, UNCOMPLICATED: ICD-10-CM

## 2020-04-17 DIAGNOSIS — Z98.890 OTHER SPECIFIED POSTPROCEDURAL STATES: Chronic | ICD-10-CM

## 2020-04-20 DIAGNOSIS — F11.20 OPIOID DEPENDENCE, UNCOMPLICATED: ICD-10-CM

## 2020-05-15 ENCOUNTER — OUTPATIENT (OUTPATIENT)
Dept: OUTPATIENT SERVICES | Facility: HOSPITAL | Age: 70
LOS: 1 days | Discharge: HOME | End: 2020-05-15

## 2020-05-15 DIAGNOSIS — Z98.890 OTHER SPECIFIED POSTPROCEDURAL STATES: Chronic | ICD-10-CM

## 2020-05-15 DIAGNOSIS — Z95.5 PRESENCE OF CORONARY ANGIOPLASTY IMPLANT AND GRAFT: Chronic | ICD-10-CM

## 2020-05-18 DIAGNOSIS — F11.20 OPIOID DEPENDENCE, UNCOMPLICATED: ICD-10-CM

## 2020-07-02 ENCOUNTER — OUTPATIENT (OUTPATIENT)
Dept: OUTPATIENT SERVICES | Facility: HOSPITAL | Age: 70
LOS: 1 days | Discharge: HOME | End: 2020-07-02
Payer: COMMERCIAL

## 2020-07-02 DIAGNOSIS — F11.20 OPIOID DEPENDENCE, UNCOMPLICATED: ICD-10-CM

## 2020-07-02 DIAGNOSIS — Z98.890 OTHER SPECIFIED POSTPROCEDURAL STATES: Chronic | ICD-10-CM

## 2020-07-02 DIAGNOSIS — Z95.5 PRESENCE OF CORONARY ANGIOPLASTY IMPLANT AND GRAFT: Chronic | ICD-10-CM

## 2020-07-02 PROCEDURE — 99212 OFFICE O/P EST SF 10 MIN: CPT

## 2020-07-30 ENCOUNTER — OUTPATIENT (OUTPATIENT)
Dept: OUTPATIENT SERVICES | Facility: HOSPITAL | Age: 70
LOS: 1 days | Discharge: HOME | End: 2020-07-30
Payer: COMMERCIAL

## 2020-07-30 DIAGNOSIS — Z95.5 PRESENCE OF CORONARY ANGIOPLASTY IMPLANT AND GRAFT: Chronic | ICD-10-CM

## 2020-07-30 DIAGNOSIS — F11.20 OPIOID DEPENDENCE, UNCOMPLICATED: ICD-10-CM

## 2020-07-30 DIAGNOSIS — Z98.890 OTHER SPECIFIED POSTPROCEDURAL STATES: Chronic | ICD-10-CM

## 2020-07-30 PROCEDURE — 99212 OFFICE O/P EST SF 10 MIN: CPT

## 2020-08-28 ENCOUNTER — OUTPATIENT (OUTPATIENT)
Dept: OUTPATIENT SERVICES | Facility: HOSPITAL | Age: 70
LOS: 1 days | Discharge: HOME | End: 2020-08-28

## 2020-08-28 DIAGNOSIS — F11.20 OPIOID DEPENDENCE, UNCOMPLICATED: ICD-10-CM

## 2020-08-28 DIAGNOSIS — Z95.5 PRESENCE OF CORONARY ANGIOPLASTY IMPLANT AND GRAFT: Chronic | ICD-10-CM

## 2020-08-28 DIAGNOSIS — Z98.890 OTHER SPECIFIED POSTPROCEDURAL STATES: Chronic | ICD-10-CM

## 2020-09-28 ENCOUNTER — OUTPATIENT (OUTPATIENT)
Dept: OUTPATIENT SERVICES | Facility: HOSPITAL | Age: 70
LOS: 1 days | Discharge: HOME | End: 2020-09-28
Payer: MEDICARE

## 2020-09-28 DIAGNOSIS — Z95.5 PRESENCE OF CORONARY ANGIOPLASTY IMPLANT AND GRAFT: Chronic | ICD-10-CM

## 2020-09-28 DIAGNOSIS — R07.9 CHEST PAIN, UNSPECIFIED: ICD-10-CM

## 2020-09-28 DIAGNOSIS — Z98.890 OTHER SPECIFIED POSTPROCEDURAL STATES: Chronic | ICD-10-CM

## 2020-09-28 PROCEDURE — 78452 HT MUSCLE IMAGE SPECT MULT: CPT | Mod: 26

## 2020-09-30 ENCOUNTER — OUTPATIENT (OUTPATIENT)
Dept: OUTPATIENT SERVICES | Facility: HOSPITAL | Age: 70
LOS: 1 days | Discharge: HOME | End: 2020-09-30
Payer: MEDICARE

## 2020-09-30 DIAGNOSIS — F11.20 OPIOID DEPENDENCE, UNCOMPLICATED: ICD-10-CM

## 2020-09-30 DIAGNOSIS — Z95.5 PRESENCE OF CORONARY ANGIOPLASTY IMPLANT AND GRAFT: Chronic | ICD-10-CM

## 2020-09-30 DIAGNOSIS — Z98.890 OTHER SPECIFIED POSTPROCEDURAL STATES: Chronic | ICD-10-CM

## 2020-09-30 PROCEDURE — 99212 OFFICE O/P EST SF 10 MIN: CPT

## 2020-10-05 NOTE — DISCHARGE NOTE ADULT - CARE PROVIDER_API CALL
[facility],   Phone: (   )    -  Fax: (   )    -    Joaquim Beck), Cardiovascular Disease; Interventional Cardiology  [address] [place] [address]  Phone: [phone]  Fax: [phone]    Dr. Ivan Pugh,   [address] [place] [address]  Phone: [phone]  Fax: (   )    -    Gastroenetrologist/Hepatologist,   Phone: (   )    -  Fax: (   )    -
hurts

## 2020-10-29 ENCOUNTER — OUTPATIENT (OUTPATIENT)
Dept: OUTPATIENT SERVICES | Facility: HOSPITAL | Age: 70
LOS: 1 days | Discharge: HOME | End: 2020-10-29

## 2020-10-29 DIAGNOSIS — Z98.890 OTHER SPECIFIED POSTPROCEDURAL STATES: Chronic | ICD-10-CM

## 2020-10-29 DIAGNOSIS — F11.20 OPIOID DEPENDENCE, UNCOMPLICATED: ICD-10-CM

## 2020-10-29 DIAGNOSIS — Z95.5 PRESENCE OF CORONARY ANGIOPLASTY IMPLANT AND GRAFT: Chronic | ICD-10-CM

## 2020-11-23 ENCOUNTER — OUTPATIENT (OUTPATIENT)
Dept: OUTPATIENT SERVICES | Facility: HOSPITAL | Age: 70
LOS: 1 days | Discharge: HOME | End: 2020-11-23

## 2020-11-23 DIAGNOSIS — F11.20 OPIOID DEPENDENCE, UNCOMPLICATED: ICD-10-CM

## 2020-11-23 DIAGNOSIS — Z95.5 PRESENCE OF CORONARY ANGIOPLASTY IMPLANT AND GRAFT: Chronic | ICD-10-CM

## 2020-11-23 DIAGNOSIS — Z98.890 OTHER SPECIFIED POSTPROCEDURAL STATES: Chronic | ICD-10-CM

## 2020-12-24 ENCOUNTER — OUTPATIENT (OUTPATIENT)
Dept: OUTPATIENT SERVICES | Facility: HOSPITAL | Age: 70
LOS: 1 days | Discharge: HOME | End: 2020-12-24
Payer: MEDICARE

## 2020-12-24 ENCOUNTER — APPOINTMENT (OUTPATIENT)
Dept: PSYCHIATRY | Facility: CLINIC | Age: 70
End: 2020-12-24

## 2020-12-24 DIAGNOSIS — Z95.5 PRESENCE OF CORONARY ANGIOPLASTY IMPLANT AND GRAFT: Chronic | ICD-10-CM

## 2020-12-24 DIAGNOSIS — Z98.890 OTHER SPECIFIED POSTPROCEDURAL STATES: Chronic | ICD-10-CM

## 2020-12-24 DIAGNOSIS — F11.20 OPIOID DEPENDENCE, UNCOMPLICATED: ICD-10-CM

## 2020-12-24 PROCEDURE — 99213 OFFICE O/P EST LOW 20 MIN: CPT

## 2021-01-12 ENCOUNTER — APPOINTMENT (OUTPATIENT)
Dept: INTERNAL MEDICINE | Facility: CLINIC | Age: 71
End: 2021-01-12

## 2021-01-28 ENCOUNTER — OUTPATIENT (OUTPATIENT)
Dept: OUTPATIENT SERVICES | Facility: HOSPITAL | Age: 71
LOS: 1 days | Discharge: HOME | End: 2021-01-28
Payer: COMMERCIAL

## 2021-01-28 ENCOUNTER — APPOINTMENT (OUTPATIENT)
Dept: PSYCHIATRY | Facility: CLINIC | Age: 71
End: 2021-01-28

## 2021-01-28 DIAGNOSIS — F11.20 OPIOID DEPENDENCE, UNCOMPLICATED: ICD-10-CM

## 2021-01-28 DIAGNOSIS — Z95.5 PRESENCE OF CORONARY ANGIOPLASTY IMPLANT AND GRAFT: Chronic | ICD-10-CM

## 2021-01-28 DIAGNOSIS — Z98.890 OTHER SPECIFIED POSTPROCEDURAL STATES: Chronic | ICD-10-CM

## 2021-01-28 PROCEDURE — 99213 OFFICE O/P EST LOW 20 MIN: CPT

## 2021-02-05 ENCOUNTER — OUTPATIENT (OUTPATIENT)
Dept: OUTPATIENT SERVICES | Facility: HOSPITAL | Age: 71
LOS: 1 days | Discharge: HOME | End: 2021-02-05

## 2021-02-05 DIAGNOSIS — Z95.5 PRESENCE OF CORONARY ANGIOPLASTY IMPLANT AND GRAFT: Chronic | ICD-10-CM

## 2021-02-05 DIAGNOSIS — Z98.890 OTHER SPECIFIED POSTPROCEDURAL STATES: Chronic | ICD-10-CM

## 2021-02-05 DIAGNOSIS — F11.20 OPIOID DEPENDENCE, UNCOMPLICATED: ICD-10-CM

## 2021-02-25 ENCOUNTER — OUTPATIENT (OUTPATIENT)
Dept: OUTPATIENT SERVICES | Facility: HOSPITAL | Age: 71
LOS: 1 days | Discharge: HOME | End: 2021-02-25

## 2021-02-25 DIAGNOSIS — Z98.890 OTHER SPECIFIED POSTPROCEDURAL STATES: Chronic | ICD-10-CM

## 2021-02-25 DIAGNOSIS — Z95.5 PRESENCE OF CORONARY ANGIOPLASTY IMPLANT AND GRAFT: Chronic | ICD-10-CM

## 2021-02-25 DIAGNOSIS — F11.20 OPIOID DEPENDENCE, UNCOMPLICATED: ICD-10-CM

## 2021-03-17 ENCOUNTER — OUTPATIENT (OUTPATIENT)
Dept: OUTPATIENT SERVICES | Facility: HOSPITAL | Age: 71
LOS: 1 days | Discharge: HOME | End: 2021-03-17
Payer: MEDICARE

## 2021-03-17 ENCOUNTER — APPOINTMENT (OUTPATIENT)
Dept: INTERNAL MEDICINE | Facility: CLINIC | Age: 71
End: 2021-03-17
Payer: MEDICARE

## 2021-03-17 VITALS
TEMPERATURE: 98 F | DIASTOLIC BLOOD PRESSURE: 61 MMHG | HEART RATE: 54 BPM | WEIGHT: 203 LBS | SYSTOLIC BLOOD PRESSURE: 123 MMHG | HEIGHT: 73 IN | OXYGEN SATURATION: 98 % | BODY MASS INDEX: 26.9 KG/M2

## 2021-03-17 DIAGNOSIS — Z00.00 ENCOUNTER FOR GENERAL ADULT MEDICAL EXAMINATION W/OUT ABNORMAL FINDINGS: ICD-10-CM

## 2021-03-17 DIAGNOSIS — R60.0 LOCALIZED EDEMA: ICD-10-CM

## 2021-03-17 DIAGNOSIS — Z95.5 PRESENCE OF CORONARY ANGIOPLASTY IMPLANT AND GRAFT: Chronic | ICD-10-CM

## 2021-03-17 DIAGNOSIS — F19.10 OTHER PSYCHOACTIVE SUBSTANCE ABUSE, UNCOMPLICATED: ICD-10-CM

## 2021-03-17 DIAGNOSIS — E11.9 TYPE 2 DIABETES MELLITUS W/OUT COMPLICATIONS: ICD-10-CM

## 2021-03-17 DIAGNOSIS — N63.0 UNSPECIFIED LUMP IN UNSPECIFIED BREAST: ICD-10-CM

## 2021-03-17 DIAGNOSIS — Z98.890 OTHER SPECIFIED POSTPROCEDURAL STATES: Chronic | ICD-10-CM

## 2021-03-17 DIAGNOSIS — M15.9 POLYOSTEOARTHRITIS, UNSPECIFIED: ICD-10-CM

## 2021-03-17 DIAGNOSIS — I25.10 ATHEROSCLEROTIC HEART DISEASE OF NATIVE CORONARY ARTERY W/OUT ANGINA PECTORIS: ICD-10-CM

## 2021-03-17 DIAGNOSIS — E78.5 HYPERLIPIDEMIA, UNSPECIFIED: ICD-10-CM

## 2021-03-17 DIAGNOSIS — I10 ESSENTIAL (PRIMARY) HYPERTENSION: ICD-10-CM

## 2021-03-17 DIAGNOSIS — R25.2 CRAMP AND SPASM: ICD-10-CM

## 2021-03-17 DIAGNOSIS — F17.200 NICOTINE DEPENDENCE, UNSPECIFIED, UNCOMPLICATED: ICD-10-CM

## 2021-03-17 PROCEDURE — 99406 BEHAV CHNG SMOKING 3-10 MIN: CPT | Mod: GC

## 2021-03-17 PROCEDURE — 99214 OFFICE O/P EST MOD 30 MIN: CPT | Mod: GC

## 2021-03-17 PROCEDURE — 93010 ELECTROCARDIOGRAM REPORT: CPT

## 2021-03-17 RX ORDER — BUPRENORPHINE HYDROCHLORIDE, NALOXONE HYDROCHLORIDE 8; 2 MG/1; MG/1
8-2 FILM, SOLUBLE BUCCAL; SUBLINGUAL
Refills: 0 | Status: ACTIVE | COMMUNITY
Start: 2021-03-17

## 2021-03-17 RX ORDER — ELECTROLYTES/DEXTROSE
SOLUTION, ORAL ORAL
Refills: 0 | Status: ACTIVE | COMMUNITY
Start: 2021-03-17

## 2021-03-17 RX ORDER — METFORMIN HYDROCHLORIDE 500 MG/1
500 TABLET, COATED ORAL
Qty: 60 | Refills: 3 | Status: ACTIVE | COMMUNITY
Start: 2021-03-17

## 2021-03-17 RX ORDER — ASPIRIN 81 MG/1
81 TABLET, COATED ORAL
Refills: 0 | Status: ACTIVE | COMMUNITY
Start: 2021-03-17

## 2021-03-17 RX ORDER — SPIRONOLACTONE 100 MG/1
100 TABLET ORAL DAILY
Refills: 0 | Status: ACTIVE | COMMUNITY
Start: 2021-03-17 | End: 1900-01-01

## 2021-03-17 RX ORDER — FUROSEMIDE 40 MG/1
40 TABLET ORAL DAILY
Refills: 0 | Status: ACTIVE | COMMUNITY
Start: 2021-03-17

## 2021-03-17 NOTE — END OF VISIT
[] : Resident [FreeTextEntry3] : 71yo male with CAD, s/p stent (follow by Dr. Beck, had negative nuclear stress test done 9/28/20), DM, HTN, hypothyroidism, vitamin d insuff., Hep. C s/p Harvoni treatment, cirrhosis, thrombocytopenia, substance abuse on suboxone.  Here to establish care.   \par Hep. C PCR, A1C, vitamin D, AFP; urine microalbumin), EKG ordered.  GI referral for colorectal cancer screening, podiatry/ophthal referral, diabetic education.  RTC 3 months.  Follow cardiology given pt. has 1 degree AV block on EKG.  Pt. needs low dose chest CT for lung cancer screening.  Pt. also has lump on chest, will refer to general surgery for evaluation

## 2021-03-17 NOTE — COUNSELING
[Risk of tobacco use and health benefits of smoking cessation discussed] : Risk of tobacco use and health benefits of smoking cessation discussed [Cessation strategies including cessation program discussed] : Cessation strategies including cessation program discussed [Use of nicotine replacement therapies and other medications discussed] : Use of nicotine replacement therapies and other medications discussed [Encouraged to pick a quit date and identify support needed to quit] : Encouraged to pick a quit date and identify support needed to quit [Willing to Quit Smoking] : Willing to quit smoking [Tobacco Use Cessation Intermediate Greater Than 3 Minutes Up to 10 Minutes] : Tobacco Use Cessation Intermediate Greater Than 3 Minutes Up to 10 Minutes [ - Annual Lung Cancer Screening/Share Decision Making Discussion] : Annual Lung Cancer Screening/Share Decision Making Discussion. (I have advised this patient to have a Low Dose CT (LDCT) scan of the lungs and have discussed the following with the patient in a shared decision making discussion:   Benefits of Detection and Early Treatment: There is adequate evidence that annual screening for lung cancer with LDCT in a population of high-risk persons can prevent a substantial number of lung cancer–related deaths. The magnitude of benefit depends on the individual patient's risk for lung cancer, as those who are at highest risk are most likely to benefit. Screening cannot prevent most lung cancer–related deaths, and does not replace smoking cessation. Harms of Detection and Early Intervention and Treatment: The harms associated with LDCT screening include false-negative and false-positive results, incidental findings, over diagnosis, and radiation exposure. False-positive LDCT results occur in a substantial proportion of screened persons; 95% of all positive results do not lead to a diagnosis of cancer. In a high-quality screening program, further imaging can resolve most false-positive results; however, some patients may require invasive procedures. Radiation harms, including cancer resulting from cumulative exposure to radiation, vary depending on the age at the start of screening; the number of scans received; and the person's exposure to other sources of radiation, particularly other medical imaging.) [Good understanding] : Patient has a good understanding of lifestyle changes and steps needed to achieve self management goal [FreeTextEntry1] : 10

## 2021-03-17 NOTE — ASSESSMENT
[FreeTextEntry1] : #HCM\par - NLCST Low Dose Chest CT\par - Routine labs\par - AAA screening\par - GI referral for c-scope\par - EKG 1 deg HB f/u cardio\par - podiatry\par - dental- optometry

## 2021-03-17 NOTE — PHYSICAL EXAM
[No Acute Distress] : no acute distress [Well Nourished] : well nourished [Well Developed] : well developed [Well-Appearing] : well-appearing [Normal Sclera/Conjunctiva] : normal sclera/conjunctiva [PERRL] : pupils equal round and reactive to light [Normal Outer Ear/Nose] : the outer ears and nose were normal in appearance [Normal Oropharynx] : the oropharynx was normal [No JVD] : no jugular venous distention [No Lymphadenopathy] : no lymphadenopathy [No Respiratory Distress] : no respiratory distress  [No Accessory Muscle Use] : no accessory muscle use [Clear to Auscultation] : lungs were clear to auscultation bilaterally [Regular Rhythm] : with a regular rhythm [Normal S1, S2] : normal S1 and S2 [Normal Rate] : normal [Rhythm Regular] : regular [Normal S1] : normal S1 [Normal S2] : normal S2 [Crescendo-Decrescendo] : crescendo-decrescendo [Medium] : medium pitched [___ +] : bilateral [unfilled]U+ pretibial pitting edema [Breast Palpation Diffuse Fibrous Tissue Bilateral] : fibrocystic changes [Normal] : normal [The Right Breast Was Examined] : a normal appearance [Mobile] : mobile [1:00] : in the 1:00 position [___cm] : a ~M [unfilled] ~Ucm superior medial quadrant mass was palpated [Superficial] : superficial [Firm] : firm [Fixed] : fixed [Nontender] : nontender [] : in the 11:00 position [Soft] : abdomen soft [Non Tender] : non-tender [Non-distended] : non-distended [No HSM] : no HSM [Normal Bowel Sounds] : normal bowel sounds [No Joint Swelling] : no joint swelling [Grossly Normal Strength/Tone] : grossly normal strength/tone [No Rash] : no rash [Coordination Grossly Intact] : coordination grossly intact [No Focal Deficits] : no focal deficits [Normal Gait] : normal gait [Speech Grossly Normal] : speech grossly normal [Normal Affect] : the affect was normal [Normal Mood] : the mood was normal [Normal Insight/Judgement] : insight and judgment were intact

## 2021-03-17 NOTE — REVIEW OF SYSTEMS
[Claudication] : leg claudication [Lower Ext Edema] : lower extremity edema [Joint Pain] : joint pain [Muscle Pain] : muscle pain [Negative] : Neurological [Chest Pain] : no chest pain [Palpitations] : no palpitations [Orthopena] : no orthopnea [Shortness Of Breath] : no shortness of breath [Wheezing] : no wheezing [Abdominal Pain] : no abdominal pain [Nausea] : no nausea [Vomiting] : no vomiting

## 2021-03-17 NOTE — HISTORY OF PRESENT ILLNESS
[FreeTextEntry1] : CPE [de-identified] : 71 YO M w PMHx of untreated Hepatitis C, HTN (hypertension), OA, NIDDM, CAD/MI with stent placed 2011, former IVDU Heroin and Crack abuse. Presenting to establish care after referral from [facility] suboxone clinic. Complaining of leg cramps twisting/tightening in nature, when they occur he cannot stand on his legs, occur spontaneously and intermittently, is brought on by walking a short distance, standing too long or doing light physical activity. Is relieved by xanax and icy/hot by his own report. Last IV drug usage 2 years ago. Also complaining of R nipple lump, x 2 years, growing in size.

## 2021-03-18 ENCOUNTER — NON-APPOINTMENT (OUTPATIENT)
Age: 71
End: 2021-03-18

## 2021-03-18 DIAGNOSIS — R60.0 LOCALIZED EDEMA: ICD-10-CM

## 2021-03-18 DIAGNOSIS — I10 ESSENTIAL (PRIMARY) HYPERTENSION: ICD-10-CM

## 2021-03-18 DIAGNOSIS — F19.10 OTHER PSYCHOACTIVE SUBSTANCE ABUSE, UNCOMPLICATED: ICD-10-CM

## 2021-03-18 DIAGNOSIS — B19.20 UNSPECIFIED VIRAL HEPATITIS C WITHOUT HEPATIC COMA: ICD-10-CM

## 2021-03-18 DIAGNOSIS — I25.10 ATHEROSCLEROTIC HEART DISEASE OF NATIVE CORONARY ARTERY WITHOUT ANGINA PECTORIS: ICD-10-CM

## 2021-03-18 DIAGNOSIS — M15.9 POLYOSTEOARTHRITIS, UNSPECIFIED: ICD-10-CM

## 2021-03-18 DIAGNOSIS — Z00.00 ENCOUNTER FOR GENERAL ADULT MEDICAL EXAMINATION WITHOUT ABNORMAL FINDINGS: ICD-10-CM

## 2021-03-18 DIAGNOSIS — K74.60 UNSPECIFIED CIRRHOSIS OF LIVER: ICD-10-CM

## 2021-03-18 DIAGNOSIS — N63.0 UNSPECIFIED LUMP IN UNSPECIFIED BREAST: ICD-10-CM

## 2021-03-18 DIAGNOSIS — E11.9 TYPE 2 DIABETES MELLITUS WITHOUT COMPLICATIONS: ICD-10-CM

## 2021-03-26 ENCOUNTER — APPOINTMENT (OUTPATIENT)
Dept: PSYCHIATRY | Facility: CLINIC | Age: 71
End: 2021-03-26

## 2021-03-26 ENCOUNTER — OUTPATIENT (OUTPATIENT)
Dept: OUTPATIENT SERVICES | Facility: HOSPITAL | Age: 71
LOS: 1 days | Discharge: HOME | End: 2021-03-26

## 2021-03-26 DIAGNOSIS — Z98.890 OTHER SPECIFIED POSTPROCEDURAL STATES: Chronic | ICD-10-CM

## 2021-03-26 DIAGNOSIS — F11.20 OPIOID DEPENDENCE, UNCOMPLICATED: ICD-10-CM

## 2021-03-26 DIAGNOSIS — Z95.5 PRESENCE OF CORONARY ANGIOPLASTY IMPLANT AND GRAFT: Chronic | ICD-10-CM

## 2021-04-16 ENCOUNTER — LABORATORY RESULT (OUTPATIENT)
Age: 71
End: 2021-04-16

## 2021-04-16 ENCOUNTER — APPOINTMENT (OUTPATIENT)
Dept: GASTROENTEROLOGY | Facility: CLINIC | Age: 71
End: 2021-04-16
Payer: MEDICARE

## 2021-04-16 ENCOUNTER — OUTPATIENT (OUTPATIENT)
Dept: OUTPATIENT SERVICES | Facility: HOSPITAL | Age: 71
LOS: 1 days | Discharge: HOME | End: 2021-04-16

## 2021-04-16 VITALS
HEART RATE: 60 BPM | HEIGHT: 73 IN | WEIGHT: 218 LBS | DIASTOLIC BLOOD PRESSURE: 72 MMHG | SYSTOLIC BLOOD PRESSURE: 136 MMHG | BODY MASS INDEX: 28.89 KG/M2 | TEMPERATURE: 96.6 F

## 2021-04-16 DIAGNOSIS — Z95.5 PRESENCE OF CORONARY ANGIOPLASTY IMPLANT AND GRAFT: Chronic | ICD-10-CM

## 2021-04-16 DIAGNOSIS — Z98.890 OTHER SPECIFIED POSTPROCEDURAL STATES: Chronic | ICD-10-CM

## 2021-04-16 DIAGNOSIS — K74.60 UNSPECIFIED CIRRHOSIS OF LIVER: ICD-10-CM

## 2021-04-16 DIAGNOSIS — B19.20 UNSPECIFIED VIRAL HEPATITIS C W/OUT HEPATIC COMA: ICD-10-CM

## 2021-04-16 PROCEDURE — 99204 OFFICE O/P NEW MOD 45 MIN: CPT | Mod: GC

## 2021-04-16 NOTE — ASSESSMENT
[FreeTextEntry1] : This is a 71 year old male with PMHx of untreated Hepatitis C, HTN (hypertension), OA, NIDDM, CAD/MI with stent placed 2011, former IVDU Heroin and Crack abuse now on suboxone who presented to the GI clinic from his PCP for history of Cirrhosis.\par \par #H/o Hep C\par - Awaiting lab results from labs drawn today\par - States he no longer has any hep C. \par - Not on any medications \par \par #Cirrhosis\par - Continue on Aldactone 100mg and Lasix 40mg daily \par - Check US of Abdomen, AFP for HCC screening\par - No history of Variceal bleeds; will follow up patient's CBC to assess plt levels - pt would like to hold off on EGD for now until labs are back\par - Check K and Creatinine on recent drawn labs \par - F/u with hepatology clinic in 1 month \par \par #Colon Cancer Screening\par - Discussed with patient about colonoscopy; he would like to avoid procedures\par - Will do FIT-DNA stool test to assess

## 2021-04-16 NOTE — PHYSICAL EXAM
[General Appearance - Alert] : alert [General Appearance - In No Acute Distress] : in no acute distress [General Appearance - Well Nourished] : well nourished [General Appearance - Well Developed] : well developed [Sclera] : the sclera and conjunctiva were normal [PERRL With Normal Accommodation] : pupils were equal in size, round, and reactive to light [Extraocular Movements] : extraocular movements were intact [Outer Ear] : the ears and nose were normal in appearance [Both Tympanic Membranes Were Examined] : both tympanic membranes were normal [Neck Appearance] : the appearance of the neck was normal [Neck Cervical Mass (___cm)] : no neck mass was observed [Respiration, Rhythm And Depth] : normal respiratory rhythm and effort [Exaggerated Use Of Accessory Muscles For Inspiration] : no accessory muscle use [Apical Impulse] : the apical impulse was normal [Heart Rate And Rhythm] : heart rate was normal and rhythm regular [Heart Sounds] : normal S1 and S2 [Heart Sounds Gallop] : no gallops [Murmurs] : no murmurs [Heart Sounds Pericardial Friction Rub] : no pericardial rub [Bowel Sounds] : normal bowel sounds [Abdomen Soft] : soft [Abdomen Tenderness] : non-tender [] : no hepato-splenomegaly [FreeTextEntry1] : 2-3+ Pitting Edema; worse on the left

## 2021-04-16 NOTE — END OF VISIT
[] : Resident [FreeTextEntry3] : pt with hx of EtOh abuse and HCV (reports eradicated), hx of cirrhosis, presents for initial visit to GI clinic. Denies hx of varices bleed or EGD, no hx of HE or ascites. Currently on lasix and aldactone, will cont for now. F/u on recent bloodwork for Cr and K. Pt also likely need EGD but would like to hold off for now until labs are back (spoke to pt that chances of varices are lower with higher platelets). AFP and US for HCC screening. Pt would like to hold off on colonoscopy, will order cologuard for now (explained that colonoscopy could potentially PREVENT colon cancer while cologuard just detects colon cancer or precancerous polyps). f/u HCV PCR to confirm HCV eradication.

## 2021-04-16 NOTE — HISTORY OF PRESENT ILLNESS
[Heartburn] : denies heartburn [Nausea] : denies nausea [Vomiting] : denies vomiting [Diarrhea] : denies diarrhea [Constipation] : denies constipation [Yellow Skin Or Eyes (Jaundice)] : denies jaundice [Abdominal Pain] : denies abdominal pain [Abdominal Swelling] : denies abdominal swelling [Rectal Pain] : denies rectal pain [GERD] : no gastroesophageal reflux disease [Peptic Ulcer Disease] : no peptic ulcer disease [Pancreatitis] : no pancreatitis [Cholelithiasis] : no cholelithiasis [Kidney Stone] : no kidney stone [Inflammatory Bowel Disease] : no inflammatory bowel disease [Irritable Bowel Syndrome] : no irritable bowel syndrome [Diverticulitis] : no diverticulitis [Alcohol Abuse] : no alcohol abuse [Malignancy] : no malignancy [Abdominal Surgery] : no abdominal surgery [Appendectomy] : no appendectomy [Cholecystectomy] : no cholecystectomy [de-identified] : This is a 71 year old male with PMHx of Hepatitis C (reported treated), HTN, OA, NIDDM, CAD/MI with stent placed 2011, former IVDU Heroin and Crack abuse now on suboxone who presented to the GI clinic from his PCP for history of Cirrhosis. As per the patient he was a former alcoholic drinking a case a day with cessation about 10 years ago. States that he has not seen a GI doctor for a long time. Of note history of Hep C he reports has been eradicated. Went for labs just prior to this visit. He states maybe being diagnosed with Cirrhosis about 4 years ago by a Dr. Ferris. He has been taking Aldactone 100 and Lasix 40. States minimal weight gain which he states is intentional. He has never had a colonoscopy and prefers never to have one. He reports no history of ascites, spontaneous bleeding or episodes of confusion

## 2021-04-17 DIAGNOSIS — D69.6 THROMBOCYTOPENIA, UNSPECIFIED: ICD-10-CM

## 2021-04-17 DIAGNOSIS — R76.8 OTHER SPECIFIED ABNORMAL IMMUNOLOGICAL FINDINGS IN SERUM: ICD-10-CM

## 2021-04-17 DIAGNOSIS — E55.9 VITAMIN D DEFICIENCY, UNSPECIFIED: ICD-10-CM

## 2021-04-17 DIAGNOSIS — E03.9 HYPOTHYROIDISM, UNSPECIFIED: ICD-10-CM

## 2021-04-17 DIAGNOSIS — D72.819 DECREASED WHITE BLOOD CELL COUNT, UNSPECIFIED: ICD-10-CM

## 2021-04-17 LAB
25(OH)D3 SERPL-MCNC: 13 NG/ML
AFP-TM SERPL-MCNC: 6 NG/ML
ALBUMIN SERPL ELPH-MCNC: 4.6 G/DL
ALP BLD-CCNC: 112 U/L
ALT SERPL-CCNC: 12 U/L
ANION GAP SERPL CALC-SCNC: 13 MMOL/L
APTT BLD: 36 SEC
AST SERPL-CCNC: 29 U/L
BASOPHILS # BLD AUTO: 0.01 K/UL
BASOPHILS NFR BLD AUTO: 0.5 %
BILIRUB SERPL-MCNC: 1 MG/DL
BUN SERPL-MCNC: 13 MG/DL
CALCIUM SERPL-MCNC: 9.1 MG/DL
CHLORIDE SERPL-SCNC: 94 MMOL/L
CHOLEST SERPL-MCNC: 154 MG/DL
CO2 SERPL-SCNC: 25 MMOL/L
CREAT SERPL-MCNC: 1 MG/DL
CREAT SPEC-SCNC: 124 MG/DL
EOSINOPHIL # BLD AUTO: 0.11 K/UL
EOSINOPHIL NFR BLD AUTO: 5.8 %
ESTIMATED AVERAGE GLUCOSE: 100 MG/DL
GLUCOSE SERPL-MCNC: 97 MG/DL
HBA1C MFR BLD HPLC: 5.1 %
HCT VFR BLD CALC: 34.2 %
HCV AB SER QL: REACTIVE
HCV S/CO RATIO: 14.93 S/CO
HDLC SERPL-MCNC: 93 MG/DL
HGB BLD-MCNC: 11.4 G/DL
IMM GRANULOCYTES NFR BLD AUTO: 0 %
INR PPP: 1.14 RATIO
LDLC SERPL CALC-MCNC: 67 MG/DL
LYMPHOCYTES # BLD AUTO: 0.48 K/UL
LYMPHOCYTES NFR BLD AUTO: 25.1 %
MAN DIFF?: NORMAL
MCHC RBC-ENTMCNC: 31.2 PG
MCHC RBC-ENTMCNC: 33.3 G/DL
MCV RBC AUTO: 93.7 FL
MICROALBUMIN 24H UR DL<=1MG/L-MCNC: <1.2 MG/DL
MICROALBUMIN/CREAT 24H UR-RTO: NORMAL MG/G
MONOCYTES # BLD AUTO: 0.26 K/UL
MONOCYTES NFR BLD AUTO: 13.6 %
NEUTROPHILS # BLD AUTO: 1.05 K/UL
NEUTROPHILS NFR BLD AUTO: 55 %
NONHDLC SERPL-MCNC: 61 MG/DL
PLATELET # BLD AUTO: 78 K/UL
POTASSIUM SERPL-SCNC: 4.8 MMOL/L
PROT SERPL-MCNC: 7.3 G/DL
PT BLD: 13.1 SEC
RBC # BLD: 3.65 M/UL
RBC # FLD: 12.9 %
SODIUM SERPL-SCNC: 132 MMOL/L
TRIGL SERPL-MCNC: 55 MG/DL
TSH SERPL-ACNC: 8.44 UIU/ML
WBC # FLD AUTO: 1.91 K/UL

## 2021-04-20 DIAGNOSIS — B19.20 UNSPECIFIED VIRAL HEPATITIS C WITHOUT HEPATIC COMA: ICD-10-CM

## 2021-04-20 DIAGNOSIS — K74.60 UNSPECIFIED CIRRHOSIS OF LIVER: ICD-10-CM

## 2021-04-22 ENCOUNTER — NON-APPOINTMENT (OUTPATIENT)
Age: 71
End: 2021-04-22

## 2021-04-23 ENCOUNTER — APPOINTMENT (OUTPATIENT)
Dept: PSYCHIATRY | Facility: CLINIC | Age: 71
End: 2021-04-23

## 2021-04-23 ENCOUNTER — OUTPATIENT (OUTPATIENT)
Dept: OUTPATIENT SERVICES | Facility: HOSPITAL | Age: 71
LOS: 1 days | Discharge: HOME | End: 2021-04-23

## 2021-04-23 DIAGNOSIS — Z98.890 OTHER SPECIFIED POSTPROCEDURAL STATES: Chronic | ICD-10-CM

## 2021-04-23 DIAGNOSIS — F11.20 OPIOID DEPENDENCE, UNCOMPLICATED: ICD-10-CM

## 2021-04-23 DIAGNOSIS — Z95.5 PRESENCE OF CORONARY ANGIOPLASTY IMPLANT AND GRAFT: Chronic | ICD-10-CM

## 2021-04-23 LAB
HCV RNA SERPL NAA DL=5-ACNC: NOT DETECTED IU/ML
HCV RNA SERPL NAA+PROBE-LOG IU: NOT DETECTED LOG10IU/ML

## 2021-04-28 ENCOUNTER — APPOINTMENT (OUTPATIENT)
Dept: INTERNAL MEDICINE | Facility: CLINIC | Age: 71
End: 2021-04-28

## 2021-04-28 ENCOUNTER — OUTPATIENT (OUTPATIENT)
Dept: OUTPATIENT SERVICES | Facility: HOSPITAL | Age: 71
LOS: 1 days | Discharge: HOME | End: 2021-04-28
Payer: COMMERCIAL

## 2021-04-28 ENCOUNTER — APPOINTMENT (OUTPATIENT)
Dept: PSYCHIATRY | Facility: CLINIC | Age: 71
End: 2021-04-28

## 2021-04-28 VITALS
OXYGEN SATURATION: 98 % | SYSTOLIC BLOOD PRESSURE: 129 MMHG | HEIGHT: 73 IN | WEIGHT: 218 LBS | TEMPERATURE: 97.5 F | BODY MASS INDEX: 28.89 KG/M2 | DIASTOLIC BLOOD PRESSURE: 81 MMHG | HEART RATE: 59 BPM

## 2021-04-28 DIAGNOSIS — Z95.5 PRESENCE OF CORONARY ANGIOPLASTY IMPLANT AND GRAFT: Chronic | ICD-10-CM

## 2021-04-28 DIAGNOSIS — Z98.890 OTHER SPECIFIED POSTPROCEDURAL STATES: Chronic | ICD-10-CM

## 2021-04-28 DIAGNOSIS — F11.20 OPIOID DEPENDENCE, UNCOMPLICATED: ICD-10-CM

## 2021-04-28 PROCEDURE — 99212 OFFICE O/P EST SF 10 MIN: CPT

## 2021-04-30 ENCOUNTER — NON-APPOINTMENT (OUTPATIENT)
Age: 71
End: 2021-04-30

## 2021-05-09 DIAGNOSIS — F10.11 ALCOHOL ABUSE, IN REMISSION: ICD-10-CM

## 2021-05-26 ENCOUNTER — OUTPATIENT (OUTPATIENT)
Dept: OUTPATIENT SERVICES | Facility: HOSPITAL | Age: 71
LOS: 1 days | Discharge: HOME | End: 2021-05-26
Payer: MEDICARE

## 2021-05-26 ENCOUNTER — APPOINTMENT (OUTPATIENT)
Dept: PSYCHIATRY | Facility: CLINIC | Age: 71
End: 2021-05-26

## 2021-05-26 DIAGNOSIS — Z95.5 PRESENCE OF CORONARY ANGIOPLASTY IMPLANT AND GRAFT: Chronic | ICD-10-CM

## 2021-05-26 DIAGNOSIS — F11.20 OPIOID DEPENDENCE, UNCOMPLICATED: ICD-10-CM

## 2021-05-26 DIAGNOSIS — Z98.890 OTHER SPECIFIED POSTPROCEDURAL STATES: Chronic | ICD-10-CM

## 2021-05-26 PROCEDURE — 99212 OFFICE O/P EST SF 10 MIN: CPT

## 2021-05-27 ENCOUNTER — OUTPATIENT (OUTPATIENT)
Dept: OUTPATIENT SERVICES | Facility: HOSPITAL | Age: 71
LOS: 1 days | Discharge: HOME | End: 2021-05-27

## 2021-05-27 ENCOUNTER — APPOINTMENT (OUTPATIENT)
Dept: PSYCHIATRY | Facility: CLINIC | Age: 71
End: 2021-05-27

## 2021-05-27 DIAGNOSIS — Z95.5 PRESENCE OF CORONARY ANGIOPLASTY IMPLANT AND GRAFT: Chronic | ICD-10-CM

## 2021-05-27 DIAGNOSIS — F11.20 OPIOID DEPENDENCE, UNCOMPLICATED: ICD-10-CM

## 2021-05-27 DIAGNOSIS — Z98.890 OTHER SPECIFIED POSTPROCEDURAL STATES: Chronic | ICD-10-CM

## 2021-05-28 ENCOUNTER — APPOINTMENT (OUTPATIENT)
Dept: GASTROENTEROLOGY | Facility: CLINIC | Age: 71
End: 2021-05-28

## 2021-06-23 ENCOUNTER — APPOINTMENT (OUTPATIENT)
Dept: PSYCHIATRY | Facility: CLINIC | Age: 71
End: 2021-06-23

## 2021-06-24 ENCOUNTER — APPOINTMENT (OUTPATIENT)
Dept: PSYCHIATRY | Facility: CLINIC | Age: 71
End: 2021-06-24

## 2021-06-24 ENCOUNTER — OUTPATIENT (OUTPATIENT)
Dept: OUTPATIENT SERVICES | Facility: HOSPITAL | Age: 71
LOS: 1 days | Discharge: HOME | End: 2021-06-24

## 2021-06-24 DIAGNOSIS — F11.20 OPIOID DEPENDENCE, UNCOMPLICATED: ICD-10-CM

## 2021-06-24 DIAGNOSIS — Z98.890 OTHER SPECIFIED POSTPROCEDURAL STATES: Chronic | ICD-10-CM

## 2021-06-24 DIAGNOSIS — Z95.5 PRESENCE OF CORONARY ANGIOPLASTY IMPLANT AND GRAFT: Chronic | ICD-10-CM

## 2021-07-06 ENCOUNTER — APPOINTMENT (OUTPATIENT)
Dept: CARDIOLOGY | Facility: CLINIC | Age: 71
End: 2021-07-06

## 2021-07-11 ENCOUNTER — RX RENEWAL (OUTPATIENT)
Age: 71
End: 2021-07-11

## 2021-07-21 ENCOUNTER — OUTPATIENT (OUTPATIENT)
Dept: OUTPATIENT SERVICES | Facility: HOSPITAL | Age: 71
LOS: 1 days | Discharge: HOME | End: 2021-07-21
Payer: MEDICARE

## 2021-07-21 ENCOUNTER — APPOINTMENT (OUTPATIENT)
Dept: PSYCHIATRY | Facility: CLINIC | Age: 71
End: 2021-07-21

## 2021-07-21 DIAGNOSIS — Z95.5 PRESENCE OF CORONARY ANGIOPLASTY IMPLANT AND GRAFT: Chronic | ICD-10-CM

## 2021-07-21 DIAGNOSIS — F11.20 OPIOID DEPENDENCE, UNCOMPLICATED: ICD-10-CM

## 2021-07-21 DIAGNOSIS — Z98.890 OTHER SPECIFIED POSTPROCEDURAL STATES: Chronic | ICD-10-CM

## 2021-07-21 PROCEDURE — 99213 OFFICE O/P EST LOW 20 MIN: CPT

## 2021-08-21 ENCOUNTER — APPOINTMENT (OUTPATIENT)
Dept: INTERNAL MEDICINE | Facility: CLINIC | Age: 71
End: 2021-08-21

## 2021-08-25 ENCOUNTER — APPOINTMENT (OUTPATIENT)
Dept: PSYCHIATRY | Facility: CLINIC | Age: 71
End: 2021-08-25

## 2021-10-06 PROBLEM — I10 ESSENTIAL HYPERTENSION: Status: ACTIVE | Noted: 2021-03-17

## 2022-01-01 ENCOUNTER — TRANSCRIPTION ENCOUNTER (OUTPATIENT)
Age: 72
End: 2022-01-01

## 2022-01-01 ENCOUNTER — APPOINTMENT (OUTPATIENT)
Dept: CARDIOTHORACIC SURGERY | Facility: CLINIC | Age: 72
End: 2022-01-01

## 2022-01-01 ENCOUNTER — APPOINTMENT (OUTPATIENT)
Dept: HEMATOLOGY ONCOLOGY | Facility: CLINIC | Age: 72
End: 2022-01-01

## 2022-01-01 ENCOUNTER — APPOINTMENT (OUTPATIENT)
Dept: VASCULAR SURGERY | Facility: CLINIC | Age: 72
End: 2022-01-01

## 2022-01-01 ENCOUNTER — INPATIENT (INPATIENT)
Facility: HOSPITAL | Age: 72
LOS: 0 days | Discharge: HOME | End: 2022-03-16
Attending: INTERNAL MEDICINE | Admitting: INTERNAL MEDICINE
Payer: COMMERCIAL

## 2022-01-01 ENCOUNTER — INPATIENT (INPATIENT)
Facility: HOSPITAL | Age: 72
LOS: 5 days | Discharge: HOME | End: 2022-03-31
Attending: STUDENT IN AN ORGANIZED HEALTH CARE EDUCATION/TRAINING PROGRAM | Admitting: STUDENT IN AN ORGANIZED HEALTH CARE EDUCATION/TRAINING PROGRAM
Payer: COMMERCIAL

## 2022-01-01 ENCOUNTER — INPATIENT (INPATIENT)
Facility: HOSPITAL | Age: 72
LOS: 12 days | Discharge: HOME HEALTH | End: 2022-06-30
Attending: INTERNAL MEDICINE | Admitting: INTERNAL MEDICINE
Payer: COMMERCIAL

## 2022-01-01 ENCOUNTER — APPOINTMENT (OUTPATIENT)
Dept: CARDIOLOGY | Facility: CLINIC | Age: 72
End: 2022-01-01

## 2022-01-01 ENCOUNTER — EMERGENCY (EMERGENCY)
Facility: HOSPITAL | Age: 72
LOS: 0 days | End: 2022-09-11
Attending: EMERGENCY MEDICINE | Admitting: EMERGENCY MEDICINE

## 2022-01-01 VITALS
RESPIRATION RATE: 18 BRPM | TEMPERATURE: 98 F | DIASTOLIC BLOOD PRESSURE: 57 MMHG | HEART RATE: 66 BPM | SYSTOLIC BLOOD PRESSURE: 116 MMHG

## 2022-01-01 VITALS — TEMPERATURE: 95 F | HEIGHT: 73 IN

## 2022-01-01 VITALS
RESPIRATION RATE: 18 BRPM | HEART RATE: 58 BPM | DIASTOLIC BLOOD PRESSURE: 60 MMHG | SYSTOLIC BLOOD PRESSURE: 126 MMHG | WEIGHT: 178.57 LBS | TEMPERATURE: 97 F

## 2022-01-01 VITALS
HEIGHT: 73 IN | WEIGHT: 160.06 LBS | OXYGEN SATURATION: 96 % | TEMPERATURE: 98 F | RESPIRATION RATE: 20 BRPM | DIASTOLIC BLOOD PRESSURE: 62 MMHG | SYSTOLIC BLOOD PRESSURE: 152 MMHG | HEART RATE: 70 BPM

## 2022-01-01 VITALS
OXYGEN SATURATION: 95 % | RESPIRATION RATE: 20 BRPM | DIASTOLIC BLOOD PRESSURE: 65 MMHG | HEIGHT: 73 IN | SYSTOLIC BLOOD PRESSURE: 145 MMHG | HEART RATE: 68 BPM | TEMPERATURE: 98 F

## 2022-01-01 VITALS
HEART RATE: 55 BPM | TEMPERATURE: 97 F | SYSTOLIC BLOOD PRESSURE: 118 MMHG | DIASTOLIC BLOOD PRESSURE: 56 MMHG | RESPIRATION RATE: 18 BRPM

## 2022-01-01 VITALS
HEIGHT: 73 IN | WEIGHT: 225.09 LBS | SYSTOLIC BLOOD PRESSURE: 107 MMHG | RESPIRATION RATE: 20 BRPM | OXYGEN SATURATION: 95 % | DIASTOLIC BLOOD PRESSURE: 48 MMHG | HEART RATE: 50 BPM

## 2022-01-01 DIAGNOSIS — R60.0 LOCALIZED EDEMA: ICD-10-CM

## 2022-01-01 DIAGNOSIS — F17.210 NICOTINE DEPENDENCE, CIGARETTES, UNCOMPLICATED: ICD-10-CM

## 2022-01-01 DIAGNOSIS — R16.1 SPLENOMEGALY, NOT ELSEWHERE CLASSIFIED: ICD-10-CM

## 2022-01-01 DIAGNOSIS — Z95.5 PRESENCE OF CORONARY ANGIOPLASTY IMPLANT AND GRAFT: ICD-10-CM

## 2022-01-01 DIAGNOSIS — Z86.19 PERSONAL HISTORY OF OTHER INFECTIOUS AND PARASITIC DISEASES: ICD-10-CM

## 2022-01-01 DIAGNOSIS — E11.22 TYPE 2 DIABETES MELLITUS WITH DIABETIC CHRONIC KIDNEY DISEASE: ICD-10-CM

## 2022-01-01 DIAGNOSIS — F19.11 OTHER PSYCHOACTIVE SUBSTANCE ABUSE, IN REMISSION: ICD-10-CM

## 2022-01-01 DIAGNOSIS — E03.9 HYPOTHYROIDISM, UNSPECIFIED: ICD-10-CM

## 2022-01-01 DIAGNOSIS — Z95.5 PRESENCE OF CORONARY ANGIOPLASTY IMPLANT AND GRAFT: Chronic | ICD-10-CM

## 2022-01-01 DIAGNOSIS — F17.200 NICOTINE DEPENDENCE, UNSPECIFIED, UNCOMPLICATED: ICD-10-CM

## 2022-01-01 DIAGNOSIS — E11.9 TYPE 2 DIABETES MELLITUS WITHOUT COMPLICATIONS: ICD-10-CM

## 2022-01-01 DIAGNOSIS — D61.818 OTHER PANCYTOPENIA: ICD-10-CM

## 2022-01-01 DIAGNOSIS — Z79.84 LONG TERM (CURRENT) USE OF ORAL HYPOGLYCEMIC DRUGS: ICD-10-CM

## 2022-01-01 DIAGNOSIS — E87.5 HYPERKALEMIA: ICD-10-CM

## 2022-01-01 DIAGNOSIS — K74.60 UNSPECIFIED CIRRHOSIS OF LIVER: ICD-10-CM

## 2022-01-01 DIAGNOSIS — D69.6 THROMBOCYTOPENIA, UNSPECIFIED: ICD-10-CM

## 2022-01-01 DIAGNOSIS — I82.452 ACUTE EMBOLISM AND THROMBOSIS OF LEFT PERONEAL VEIN: ICD-10-CM

## 2022-01-01 DIAGNOSIS — I25.110 ATHEROSCLEROTIC HEART DISEASE OF NATIVE CORONARY ARTERY WITH UNSTABLE ANGINA PECTORIS: ICD-10-CM

## 2022-01-01 DIAGNOSIS — N17.0 ACUTE KIDNEY FAILURE WITH TUBULAR NECROSIS: ICD-10-CM

## 2022-01-01 DIAGNOSIS — E87.1 HYPO-OSMOLALITY AND HYPONATREMIA: ICD-10-CM

## 2022-01-01 DIAGNOSIS — E87.2 ACIDOSIS: ICD-10-CM

## 2022-01-01 DIAGNOSIS — Z87.19 PERSONAL HISTORY OF OTHER DISEASES OF THE DIGESTIVE SYSTEM: ICD-10-CM

## 2022-01-01 DIAGNOSIS — I10 ESSENTIAL (PRIMARY) HYPERTENSION: ICD-10-CM

## 2022-01-01 DIAGNOSIS — I25.10 ATHEROSCLEROTIC HEART DISEASE OF NATIVE CORONARY ARTERY WITHOUT ANGINA PECTORIS: ICD-10-CM

## 2022-01-01 DIAGNOSIS — K76.6 PORTAL HYPERTENSION: ICD-10-CM

## 2022-01-01 DIAGNOSIS — I50.33 ACUTE ON CHRONIC DIASTOLIC (CONGESTIVE) HEART FAILURE: ICD-10-CM

## 2022-01-01 DIAGNOSIS — J44.1 CHRONIC OBSTRUCTIVE PULMONARY DISEASE WITH (ACUTE) EXACERBATION: ICD-10-CM

## 2022-01-01 DIAGNOSIS — J44.9 CHRONIC OBSTRUCTIVE PULMONARY DISEASE, UNSPECIFIED: ICD-10-CM

## 2022-01-01 DIAGNOSIS — E78.5 HYPERLIPIDEMIA, UNSPECIFIED: ICD-10-CM

## 2022-01-01 DIAGNOSIS — B19.20 UNSPECIFIED VIRAL HEPATITIS C WITHOUT HEPATIC COMA: ICD-10-CM

## 2022-01-01 DIAGNOSIS — D68.9 COAGULATION DEFECT, UNSPECIFIED: ICD-10-CM

## 2022-01-01 DIAGNOSIS — I13.0 HYPERTENSIVE HEART AND CHRONIC KIDNEY DISEASE WITH HEART FAILURE AND STAGE 1 THROUGH STAGE 4 CHRONIC KIDNEY DISEASE, OR UNSPECIFIED CHRONIC KIDNEY DISEASE: ICD-10-CM

## 2022-01-01 DIAGNOSIS — R00.1 BRADYCARDIA, UNSPECIFIED: ICD-10-CM

## 2022-01-01 DIAGNOSIS — Z79.82 LONG TERM (CURRENT) USE OF ASPIRIN: ICD-10-CM

## 2022-01-01 DIAGNOSIS — I11.0 HYPERTENSIVE HEART DISEASE WITH HEART FAILURE: ICD-10-CM

## 2022-01-01 DIAGNOSIS — D62 ACUTE POSTHEMORRHAGIC ANEMIA: ICD-10-CM

## 2022-01-01 DIAGNOSIS — I44.0 ATRIOVENTRICULAR BLOCK, FIRST DEGREE: ICD-10-CM

## 2022-01-01 DIAGNOSIS — I08.1 RHEUMATIC DISORDERS OF BOTH MITRAL AND TRICUSPID VALVES: ICD-10-CM

## 2022-01-01 DIAGNOSIS — Z98.890 OTHER SPECIFIED POSTPROCEDURAL STATES: Chronic | ICD-10-CM

## 2022-01-01 DIAGNOSIS — K74.69 OTHER CIRRHOSIS OF LIVER: ICD-10-CM

## 2022-01-01 DIAGNOSIS — R07.9 CHEST PAIN, UNSPECIFIED: ICD-10-CM

## 2022-01-01 DIAGNOSIS — I50.32 CHRONIC DIASTOLIC (CONGESTIVE) HEART FAILURE: ICD-10-CM

## 2022-01-01 DIAGNOSIS — F11.20 OPIOID DEPENDENCE, UNCOMPLICATED: ICD-10-CM

## 2022-01-01 DIAGNOSIS — I85.00 ESOPHAGEAL VARICES WITHOUT BLEEDING: ICD-10-CM

## 2022-01-01 DIAGNOSIS — I49.5 SICK SINUS SYNDROME: ICD-10-CM

## 2022-01-01 DIAGNOSIS — E87.70 FLUID OVERLOAD, UNSPECIFIED: ICD-10-CM

## 2022-01-01 DIAGNOSIS — I46.9 CARDIAC ARREST, CAUSE UNSPECIFIED: ICD-10-CM

## 2022-01-01 DIAGNOSIS — M19.90 UNSPECIFIED OSTEOARTHRITIS, UNSPECIFIED SITE: ICD-10-CM

## 2022-01-01 DIAGNOSIS — D50.9 IRON DEFICIENCY ANEMIA, UNSPECIFIED: ICD-10-CM

## 2022-01-01 DIAGNOSIS — Z95.0 PRESENCE OF CARDIAC PACEMAKER: ICD-10-CM

## 2022-01-01 DIAGNOSIS — D64.9 ANEMIA, UNSPECIFIED: ICD-10-CM

## 2022-01-01 DIAGNOSIS — I85.10 SECONDARY ESOPHAGEAL VARICES WITHOUT BLEEDING: ICD-10-CM

## 2022-01-01 DIAGNOSIS — I25.84 CORONARY ATHEROSCLEROSIS DUE TO CALCIFIED CORONARY LESION: ICD-10-CM

## 2022-01-01 DIAGNOSIS — Z83.3 FAMILY HISTORY OF DIABETES MELLITUS: ICD-10-CM

## 2022-01-01 DIAGNOSIS — N18.9 CHRONIC KIDNEY DISEASE, UNSPECIFIED: ICD-10-CM

## 2022-01-01 DIAGNOSIS — B17.10 ACUTE HEPATITIS C WITHOUT HEPATIC COMA: ICD-10-CM

## 2022-01-01 DIAGNOSIS — I48.0 PAROXYSMAL ATRIAL FIBRILLATION: ICD-10-CM

## 2022-01-01 LAB
A1C WITH ESTIMATED AVERAGE GLUCOSE RESULT: 4.7 % — SIGNIFICANT CHANGE UP (ref 4–5.6)
A1C WITH ESTIMATED AVERAGE GLUCOSE RESULT: 5.3 % — SIGNIFICANT CHANGE UP (ref 4–5.6)
ABO RH CONFIRMATION: SIGNIFICANT CHANGE UP
AFP-TM SERPL-MCNC: 4.5 NG/ML — SIGNIFICANT CHANGE UP
ALBUMIN SERPL ELPH-MCNC: 3.3 G/DL — LOW (ref 3.5–5.2)
ALBUMIN SERPL ELPH-MCNC: 3.4 G/DL — LOW (ref 3.5–5.2)
ALBUMIN SERPL ELPH-MCNC: 3.5 G/DL — SIGNIFICANT CHANGE UP (ref 3.5–5.2)
ALBUMIN SERPL ELPH-MCNC: 3.5 G/DL — SIGNIFICANT CHANGE UP (ref 3.5–5.2)
ALBUMIN SERPL ELPH-MCNC: 3.6 G/DL — SIGNIFICANT CHANGE UP (ref 3.5–5.2)
ALBUMIN SERPL ELPH-MCNC: 3.7 G/DL — SIGNIFICANT CHANGE UP (ref 3.5–5.2)
ALBUMIN SERPL ELPH-MCNC: 3.8 G/DL — SIGNIFICANT CHANGE UP (ref 3.5–5.2)
ALBUMIN SERPL ELPH-MCNC: 3.9 G/DL — SIGNIFICANT CHANGE UP (ref 3.5–5.2)
ALBUMIN SERPL ELPH-MCNC: 3.9 G/DL — SIGNIFICANT CHANGE UP (ref 3.5–5.2)
ALBUMIN SERPL ELPH-MCNC: 4 G/DL — SIGNIFICANT CHANGE UP (ref 3.5–5.2)
ALBUMIN SERPL ELPH-MCNC: 4.1 G/DL — SIGNIFICANT CHANGE UP (ref 3.5–5.2)
ALBUMIN SERPL ELPH-MCNC: 4.2 G/DL — SIGNIFICANT CHANGE UP (ref 3.5–5.2)
ALBUMIN SERPL ELPH-MCNC: 4.2 G/DL — SIGNIFICANT CHANGE UP (ref 3.5–5.2)
ALBUMIN SERPL ELPH-MCNC: 4.5 G/DL — SIGNIFICANT CHANGE UP (ref 3.5–5.2)
ALP SERPL-CCNC: 100 U/L — SIGNIFICANT CHANGE UP (ref 30–115)
ALP SERPL-CCNC: 101 U/L — SIGNIFICANT CHANGE UP (ref 30–115)
ALP SERPL-CCNC: 103 U/L — SIGNIFICANT CHANGE UP (ref 30–115)
ALP SERPL-CCNC: 112 U/L — SIGNIFICANT CHANGE UP (ref 30–115)
ALP SERPL-CCNC: 68 U/L — SIGNIFICANT CHANGE UP (ref 30–115)
ALP SERPL-CCNC: 69 U/L — SIGNIFICANT CHANGE UP (ref 30–115)
ALP SERPL-CCNC: 71 U/L — SIGNIFICANT CHANGE UP (ref 30–115)
ALP SERPL-CCNC: 72 U/L — SIGNIFICANT CHANGE UP (ref 30–115)
ALP SERPL-CCNC: 73 U/L — SIGNIFICANT CHANGE UP (ref 30–115)
ALP SERPL-CCNC: 74 U/L — SIGNIFICANT CHANGE UP (ref 30–115)
ALP SERPL-CCNC: 77 U/L — SIGNIFICANT CHANGE UP (ref 30–115)
ALP SERPL-CCNC: 81 U/L — SIGNIFICANT CHANGE UP (ref 30–115)
ALP SERPL-CCNC: 81 U/L — SIGNIFICANT CHANGE UP (ref 30–115)
ALP SERPL-CCNC: 85 U/L — SIGNIFICANT CHANGE UP (ref 30–115)
ALP SERPL-CCNC: 95 U/L — SIGNIFICANT CHANGE UP (ref 30–115)
ALP SERPL-CCNC: 97 U/L — SIGNIFICANT CHANGE UP (ref 30–115)
ALT FLD-CCNC: 10 U/L — SIGNIFICANT CHANGE UP (ref 0–41)
ALT FLD-CCNC: 10 U/L — SIGNIFICANT CHANGE UP (ref 0–41)
ALT FLD-CCNC: 11 U/L — SIGNIFICANT CHANGE UP (ref 0–41)
ALT FLD-CCNC: 12 U/L — SIGNIFICANT CHANGE UP (ref 0–41)
ALT FLD-CCNC: 13 U/L — SIGNIFICANT CHANGE UP (ref 0–41)
ALT FLD-CCNC: 14 U/L — SIGNIFICANT CHANGE UP (ref 0–41)
ALT FLD-CCNC: 15 U/L — SIGNIFICANT CHANGE UP (ref 0–41)
ALT FLD-CCNC: 15 U/L — SIGNIFICANT CHANGE UP (ref 0–41)
ALT FLD-CCNC: 16 U/L — SIGNIFICANT CHANGE UP (ref 0–41)
ALT FLD-CCNC: 18 U/L — SIGNIFICANT CHANGE UP (ref 0–41)
ALT FLD-CCNC: 20 U/L — SIGNIFICANT CHANGE UP (ref 0–41)
ALT FLD-CCNC: 20 U/L — SIGNIFICANT CHANGE UP (ref 0–41)
ALT FLD-CCNC: 23 U/L — SIGNIFICANT CHANGE UP (ref 0–41)
AMMONIA BLD-MCNC: 59 UMOL/L — HIGH (ref 11–55)
ANION GAP SERPL CALC-SCNC: 10 MMOL/L — SIGNIFICANT CHANGE UP (ref 7–14)
ANION GAP SERPL CALC-SCNC: 11 MMOL/L — SIGNIFICANT CHANGE UP (ref 7–14)
ANION GAP SERPL CALC-SCNC: 12 MMOL/L — SIGNIFICANT CHANGE UP (ref 7–14)
ANION GAP SERPL CALC-SCNC: 14 MMOL/L — SIGNIFICANT CHANGE UP (ref 7–14)
ANION GAP SERPL CALC-SCNC: 14 MMOL/L — SIGNIFICANT CHANGE UP (ref 7–14)
ANION GAP SERPL CALC-SCNC: 17 MMOL/L — HIGH (ref 7–14)
ANION GAP SERPL CALC-SCNC: 18 MMOL/L — HIGH (ref 7–14)
ANION GAP SERPL CALC-SCNC: 7 MMOL/L — SIGNIFICANT CHANGE UP (ref 7–14)
ANION GAP SERPL CALC-SCNC: 8 MMOL/L — SIGNIFICANT CHANGE UP (ref 7–14)
ANION GAP SERPL CALC-SCNC: 9 MMOL/L — SIGNIFICANT CHANGE UP (ref 7–14)
ANISOCYTOSIS BLD QL: SLIGHT — SIGNIFICANT CHANGE UP
APPEARANCE UR: CLEAR — SIGNIFICANT CHANGE UP
APTT BLD: 29.2 SEC — SIGNIFICANT CHANGE UP (ref 27–39.2)
APTT BLD: 29.6 SEC — SIGNIFICANT CHANGE UP (ref 27–39.2)
APTT BLD: 31.2 SEC — SIGNIFICANT CHANGE UP (ref 27–39.2)
APTT BLD: 31.7 SEC — SIGNIFICANT CHANGE UP (ref 27–39.2)
APTT BLD: 31.8 SEC — SIGNIFICANT CHANGE UP (ref 27–39.2)
APTT BLD: 34.9 SEC — SIGNIFICANT CHANGE UP (ref 27–39.2)
AST SERPL-CCNC: 24 U/L — SIGNIFICANT CHANGE UP (ref 0–41)
AST SERPL-CCNC: 24 U/L — SIGNIFICANT CHANGE UP (ref 0–41)
AST SERPL-CCNC: 25 U/L — SIGNIFICANT CHANGE UP (ref 0–41)
AST SERPL-CCNC: 28 U/L — SIGNIFICANT CHANGE UP (ref 0–41)
AST SERPL-CCNC: 29 U/L — SIGNIFICANT CHANGE UP (ref 0–41)
AST SERPL-CCNC: 30 U/L — SIGNIFICANT CHANGE UP (ref 0–41)
AST SERPL-CCNC: 31 U/L — SIGNIFICANT CHANGE UP (ref 0–41)
AST SERPL-CCNC: 31 U/L — SIGNIFICANT CHANGE UP (ref 0–41)
AST SERPL-CCNC: 32 U/L — SIGNIFICANT CHANGE UP (ref 0–41)
AST SERPL-CCNC: 33 U/L — SIGNIFICANT CHANGE UP (ref 0–41)
AST SERPL-CCNC: 35 U/L — SIGNIFICANT CHANGE UP (ref 0–41)
AST SERPL-CCNC: 35 U/L — SIGNIFICANT CHANGE UP (ref 0–41)
AST SERPL-CCNC: 37 U/L — SIGNIFICANT CHANGE UP (ref 0–41)
AST SERPL-CCNC: 37 U/L — SIGNIFICANT CHANGE UP (ref 0–41)
AST SERPL-CCNC: 47 U/L — HIGH (ref 0–41)
BACTERIA # UR AUTO: NEGATIVE — SIGNIFICANT CHANGE UP
BASE EXCESS BLDV CALC-SCNC: -0.4 MMOL/L — SIGNIFICANT CHANGE UP (ref -2–3)
BASE EXCESS BLDV CALC-SCNC: -9.7 MMOL/L — LOW (ref -2–3)
BASE EXCESS BLDV CALC-SCNC: 6.3 MMOL/L — HIGH (ref -2–3)
BASOPHILS # BLD AUTO: 0 K/UL — SIGNIFICANT CHANGE UP (ref 0–0.2)
BASOPHILS # BLD AUTO: 0.01 K/UL — SIGNIFICANT CHANGE UP (ref 0–0.2)
BASOPHILS # BLD AUTO: 0.02 K/UL — SIGNIFICANT CHANGE UP (ref 0–0.2)
BASOPHILS # BLD AUTO: 0.03 K/UL — SIGNIFICANT CHANGE UP (ref 0–0.2)
BASOPHILS # BLD AUTO: 0.04 K/UL — SIGNIFICANT CHANGE UP (ref 0–0.2)
BASOPHILS # BLD AUTO: 0.04 K/UL — SIGNIFICANT CHANGE UP (ref 0–0.2)
BASOPHILS # BLD AUTO: 0.05 K/UL — SIGNIFICANT CHANGE UP (ref 0–0.2)
BASOPHILS NFR BLD AUTO: 0 % — SIGNIFICANT CHANGE UP (ref 0–1)
BASOPHILS NFR BLD AUTO: 0.3 % — SIGNIFICANT CHANGE UP (ref 0–1)
BASOPHILS NFR BLD AUTO: 0.5 % — SIGNIFICANT CHANGE UP (ref 0–1)
BASOPHILS NFR BLD AUTO: 0.6 % — SIGNIFICANT CHANGE UP (ref 0–1)
BASOPHILS NFR BLD AUTO: 0.6 % — SIGNIFICANT CHANGE UP (ref 0–1)
BASOPHILS NFR BLD AUTO: 0.8 % — SIGNIFICANT CHANGE UP (ref 0–1)
BASOPHILS NFR BLD AUTO: 0.9 % — SIGNIFICANT CHANGE UP (ref 0–1)
BASOPHILS NFR BLD AUTO: 1.1 % — HIGH (ref 0–1)
BASOPHILS NFR BLD AUTO: 1.7 % — HIGH (ref 0–1)
BASOPHILS NFR BLD AUTO: 2.6 % — HIGH (ref 0–1)
BASOPHILS NFR BLD AUTO: 2.6 % — HIGH (ref 0–1)
BILIRUB DIRECT SERPL-MCNC: 0.8 MG/DL — HIGH (ref 0–0.3)
BILIRUB INDIRECT FLD-MCNC: 2.6 MG/DL — HIGH (ref 0.2–1.2)
BILIRUB SERPL-MCNC: 0.6 MG/DL — SIGNIFICANT CHANGE UP (ref 0.2–1.2)
BILIRUB SERPL-MCNC: 0.7 MG/DL — SIGNIFICANT CHANGE UP (ref 0.2–1.2)
BILIRUB SERPL-MCNC: 0.8 MG/DL — SIGNIFICANT CHANGE UP (ref 0.2–1.2)
BILIRUB SERPL-MCNC: 0.9 MG/DL — SIGNIFICANT CHANGE UP (ref 0.2–1.2)
BILIRUB SERPL-MCNC: 1 MG/DL — SIGNIFICANT CHANGE UP (ref 0.2–1.2)
BILIRUB SERPL-MCNC: 1.1 MG/DL — SIGNIFICANT CHANGE UP (ref 0.2–1.2)
BILIRUB SERPL-MCNC: 1.1 MG/DL — SIGNIFICANT CHANGE UP (ref 0.2–1.2)
BILIRUB SERPL-MCNC: 1.2 MG/DL — SIGNIFICANT CHANGE UP (ref 0.2–1.2)
BILIRUB SERPL-MCNC: 1.3 MG/DL — HIGH (ref 0.2–1.2)
BILIRUB SERPL-MCNC: 1.3 MG/DL — HIGH (ref 0.2–1.2)
BILIRUB SERPL-MCNC: 1.4 MG/DL — HIGH (ref 0.2–1.2)
BILIRUB SERPL-MCNC: 1.5 MG/DL — HIGH (ref 0.2–1.2)
BILIRUB SERPL-MCNC: 1.5 MG/DL — HIGH (ref 0.2–1.2)
BILIRUB SERPL-MCNC: 1.6 MG/DL — HIGH (ref 0.2–1.2)
BILIRUB SERPL-MCNC: 3.4 MG/DL — HIGH (ref 0.2–1.2)
BILIRUB UR-MCNC: NEGATIVE — SIGNIFICANT CHANGE UP
BLD GP AB SCN SERPL QL: SIGNIFICANT CHANGE UP
BUN SERPL-MCNC: 101 MG/DL — CRITICAL HIGH (ref 10–20)
BUN SERPL-MCNC: 111 MG/DL — CRITICAL HIGH (ref 10–20)
BUN SERPL-MCNC: 14 MG/DL — SIGNIFICANT CHANGE UP (ref 10–20)
BUN SERPL-MCNC: 15 MG/DL — SIGNIFICANT CHANGE UP (ref 10–20)
BUN SERPL-MCNC: 16 MG/DL — SIGNIFICANT CHANGE UP (ref 10–20)
BUN SERPL-MCNC: 17 MG/DL — SIGNIFICANT CHANGE UP (ref 10–20)
BUN SERPL-MCNC: 18 MG/DL — SIGNIFICANT CHANGE UP (ref 10–20)
BUN SERPL-MCNC: 18 MG/DL — SIGNIFICANT CHANGE UP (ref 10–20)
BUN SERPL-MCNC: 19 MG/DL — SIGNIFICANT CHANGE UP (ref 10–20)
BUN SERPL-MCNC: 20 MG/DL — SIGNIFICANT CHANGE UP (ref 10–20)
BUN SERPL-MCNC: 21 MG/DL — HIGH (ref 10–20)
BUN SERPL-MCNC: 23 MG/DL — HIGH (ref 10–20)
BUN SERPL-MCNC: 25 MG/DL — HIGH (ref 10–20)
BUN SERPL-MCNC: 27 MG/DL — HIGH (ref 10–20)
BUN SERPL-MCNC: 35 MG/DL — HIGH (ref 10–20)
BUN SERPL-MCNC: 43 MG/DL — HIGH (ref 10–20)
BUN SERPL-MCNC: 50 MG/DL — HIGH (ref 10–20)
BUN SERPL-MCNC: 61 MG/DL — CRITICAL HIGH (ref 10–20)
BUN SERPL-MCNC: 70 MG/DL — CRITICAL HIGH (ref 10–20)
BUN SERPL-MCNC: 73 MG/DL — CRITICAL HIGH (ref 10–20)
BUN SERPL-MCNC: 83 MG/DL — CRITICAL HIGH (ref 10–20)
BUN SERPL-MCNC: 89 MG/DL — CRITICAL HIGH (ref 10–20)
BUN SERPL-MCNC: 93 MG/DL — CRITICAL HIGH (ref 10–20)
BURR CELLS BLD QL SMEAR: PRESENT — SIGNIFICANT CHANGE UP
BURR CELLS BLD QL SMEAR: SIGNIFICANT CHANGE UP
CA-I SERPL-SCNC: 1.08 MMOL/L — LOW (ref 1.15–1.33)
CA-I SERPL-SCNC: 1.1 MMOL/L — LOW (ref 1.15–1.33)
CA-I SERPL-SCNC: 1.13 MMOL/L — LOW (ref 1.15–1.33)
CALCIUM SERPL-MCNC: 8.3 MG/DL — LOW (ref 8.5–10.1)
CALCIUM SERPL-MCNC: 8.4 MG/DL — LOW (ref 8.5–10.1)
CALCIUM SERPL-MCNC: 8.5 MG/DL — SIGNIFICANT CHANGE UP (ref 8.5–10.1)
CALCIUM SERPL-MCNC: 8.6 MG/DL — SIGNIFICANT CHANGE UP (ref 8.5–10.1)
CALCIUM SERPL-MCNC: 8.7 MG/DL — SIGNIFICANT CHANGE UP (ref 8.5–10.1)
CALCIUM SERPL-MCNC: 8.8 MG/DL — SIGNIFICANT CHANGE UP (ref 8.5–10.1)
CALCIUM SERPL-MCNC: 8.9 MG/DL — SIGNIFICANT CHANGE UP (ref 8.5–10.1)
CALCIUM SERPL-MCNC: 9 MG/DL — SIGNIFICANT CHANGE UP (ref 8.5–10.1)
CALCIUM SERPL-MCNC: 9.1 MG/DL — SIGNIFICANT CHANGE UP (ref 8.5–10.1)
CALCIUM SERPL-MCNC: 9.2 MG/DL — SIGNIFICANT CHANGE UP (ref 8.5–10.1)
CALCIUM SERPL-MCNC: 9.3 MG/DL — SIGNIFICANT CHANGE UP (ref 8.5–10.1)
CHLORIDE SERPL-SCNC: 101 MMOL/L — SIGNIFICANT CHANGE UP (ref 98–110)
CHLORIDE SERPL-SCNC: 80 MMOL/L — LOW (ref 98–110)
CHLORIDE SERPL-SCNC: 80 MMOL/L — LOW (ref 98–110)
CHLORIDE SERPL-SCNC: 82 MMOL/L — LOW (ref 98–110)
CHLORIDE SERPL-SCNC: 84 MMOL/L — LOW (ref 98–110)
CHLORIDE SERPL-SCNC: 86 MMOL/L — LOW (ref 98–110)
CHLORIDE SERPL-SCNC: 88 MMOL/L — LOW (ref 98–110)
CHLORIDE SERPL-SCNC: 89 MMOL/L — LOW (ref 98–110)
CHLORIDE SERPL-SCNC: 92 MMOL/L — LOW (ref 98–110)
CHLORIDE SERPL-SCNC: 93 MMOL/L — LOW (ref 98–110)
CHLORIDE SERPL-SCNC: 94 MMOL/L — LOW (ref 98–110)
CHLORIDE SERPL-SCNC: 95 MMOL/L — LOW (ref 98–110)
CHLORIDE SERPL-SCNC: 95 MMOL/L — LOW (ref 98–110)
CHLORIDE SERPL-SCNC: 96 MMOL/L — LOW (ref 98–110)
CHLORIDE SERPL-SCNC: 97 MMOL/L — LOW (ref 98–110)
CHLORIDE SERPL-SCNC: 98 MMOL/L — SIGNIFICANT CHANGE UP (ref 98–110)
CHLORIDE SERPL-SCNC: 99 MMOL/L — SIGNIFICANT CHANGE UP (ref 98–110)
CHLORIDE SERPL-SCNC: 99 MMOL/L — SIGNIFICANT CHANGE UP (ref 98–110)
CHLORIDE UR-SCNC: <20 — SIGNIFICANT CHANGE UP
CHOLEST SERPL-MCNC: 118 MG/DL — SIGNIFICANT CHANGE UP
CHROM ANALY OVERALL INTERP SPEC-IMP: SIGNIFICANT CHANGE UP
CO2 SERPL-SCNC: 13 MMOL/L — LOW (ref 17–32)
CO2 SERPL-SCNC: 18 MMOL/L — SIGNIFICANT CHANGE UP (ref 17–32)
CO2 SERPL-SCNC: 19 MMOL/L — SIGNIFICANT CHANGE UP (ref 17–32)
CO2 SERPL-SCNC: 21 MMOL/L — SIGNIFICANT CHANGE UP (ref 17–32)
CO2 SERPL-SCNC: 21 MMOL/L — SIGNIFICANT CHANGE UP (ref 17–32)
CO2 SERPL-SCNC: 22 MMOL/L — SIGNIFICANT CHANGE UP (ref 17–32)
CO2 SERPL-SCNC: 23 MMOL/L — SIGNIFICANT CHANGE UP (ref 17–32)
CO2 SERPL-SCNC: 24 MMOL/L — SIGNIFICANT CHANGE UP (ref 17–32)
CO2 SERPL-SCNC: 25 MMOL/L — SIGNIFICANT CHANGE UP (ref 17–32)
CO2 SERPL-SCNC: 26 MMOL/L — SIGNIFICANT CHANGE UP (ref 17–32)
CO2 SERPL-SCNC: 27 MMOL/L — SIGNIFICANT CHANGE UP (ref 17–32)
CO2 SERPL-SCNC: 28 MMOL/L — SIGNIFICANT CHANGE UP (ref 17–32)
CO2 SERPL-SCNC: 29 MMOL/L — SIGNIFICANT CHANGE UP (ref 17–32)
CO2 SERPL-SCNC: 29 MMOL/L — SIGNIFICANT CHANGE UP (ref 17–32)
CO2 SERPL-SCNC: 30 MMOL/L — SIGNIFICANT CHANGE UP (ref 17–32)
COLOR SPEC: SIGNIFICANT CHANGE UP
CREAT ?TM UR-MCNC: 35 MG/DL — SIGNIFICANT CHANGE UP
CREAT SERPL-MCNC: 0.7 MG/DL — SIGNIFICANT CHANGE UP (ref 0.7–1.5)
CREAT SERPL-MCNC: 0.8 MG/DL — SIGNIFICANT CHANGE UP (ref 0.7–1.5)
CREAT SERPL-MCNC: 0.9 MG/DL — SIGNIFICANT CHANGE UP (ref 0.7–1.5)
CREAT SERPL-MCNC: 1 MG/DL — SIGNIFICANT CHANGE UP (ref 0.7–1.5)
CREAT SERPL-MCNC: 1.1 MG/DL — SIGNIFICANT CHANGE UP (ref 0.7–1.5)
CREAT SERPL-MCNC: 1.3 MG/DL — SIGNIFICANT CHANGE UP (ref 0.7–1.5)
CREAT SERPL-MCNC: 1.3 MG/DL — SIGNIFICANT CHANGE UP (ref 0.7–1.5)
CREAT SERPL-MCNC: 1.5 MG/DL — SIGNIFICANT CHANGE UP (ref 0.7–1.5)
CREAT SERPL-MCNC: 1.6 MG/DL — HIGH (ref 0.7–1.5)
CREAT SERPL-MCNC: 1.6 MG/DL — HIGH (ref 0.7–1.5)
CREAT SERPL-MCNC: 1.7 MG/DL — HIGH (ref 0.7–1.5)
CREAT SERPL-MCNC: 1.8 MG/DL — HIGH (ref 0.7–1.5)
CREAT SERPL-MCNC: 1.9 MG/DL — HIGH (ref 0.7–1.5)
CREAT SERPL-MCNC: 2.2 MG/DL — HIGH (ref 0.7–1.5)
D DIMER BLD IA.RAPID-MCNC: 3415 NG/ML DDU — HIGH (ref 0–230)
D DIMER BLD IA.RAPID-MCNC: 706 NG/ML DDU — HIGH (ref 0–230)
DACRYOCYTES BLD QL SMEAR: SLIGHT — SIGNIFICANT CHANGE UP
DIFF PNL FLD: NEGATIVE — SIGNIFICANT CHANGE UP
EGFR: 31 ML/MIN/1.73M2 — LOW
EGFR: 37 ML/MIN/1.73M2 — LOW
EGFR: 40 ML/MIN/1.73M2 — LOW
EGFR: 43 ML/MIN/1.73M2 — LOW
EGFR: 46 ML/MIN/1.73M2 — LOW
EGFR: 46 ML/MIN/1.73M2 — LOW
EGFR: 50 ML/MIN/1.73M2 — LOW
EGFR: 59 ML/MIN/1.73M2 — LOW
EGFR: 59 ML/MIN/1.73M2 — LOW
EGFR: 72 ML/MIN/1.73M2 — SIGNIFICANT CHANGE UP
EGFR: 81 ML/MIN/1.73M2 — SIGNIFICANT CHANGE UP
EGFR: 92 ML/MIN/1.73M2 — SIGNIFICANT CHANGE UP
EGFR: 95 ML/MIN/1.73M2 — SIGNIFICANT CHANGE UP
EGFR: 96 ML/MIN/1.73M2 — SIGNIFICANT CHANGE UP
EGFR: 99 ML/MIN/1.73M2 — SIGNIFICANT CHANGE UP
EOSINOPHIL # BLD AUTO: 0 K/UL — SIGNIFICANT CHANGE UP (ref 0–0.7)
EOSINOPHIL # BLD AUTO: 0 K/UL — SIGNIFICANT CHANGE UP (ref 0–0.7)
EOSINOPHIL # BLD AUTO: 0.01 K/UL — SIGNIFICANT CHANGE UP (ref 0–0.7)
EOSINOPHIL # BLD AUTO: 0.01 K/UL — SIGNIFICANT CHANGE UP (ref 0–0.7)
EOSINOPHIL # BLD AUTO: 0.03 K/UL — SIGNIFICANT CHANGE UP (ref 0–0.7)
EOSINOPHIL # BLD AUTO: 0.04 K/UL — SIGNIFICANT CHANGE UP (ref 0–0.7)
EOSINOPHIL # BLD AUTO: 0.04 K/UL — SIGNIFICANT CHANGE UP (ref 0–0.7)
EOSINOPHIL # BLD AUTO: 0.06 K/UL — SIGNIFICANT CHANGE UP (ref 0–0.7)
EOSINOPHIL # BLD AUTO: 0.08 K/UL — SIGNIFICANT CHANGE UP (ref 0–0.7)
EOSINOPHIL # BLD AUTO: 0.08 K/UL — SIGNIFICANT CHANGE UP (ref 0–0.7)
EOSINOPHIL # BLD AUTO: 0.09 K/UL — SIGNIFICANT CHANGE UP (ref 0–0.7)
EOSINOPHIL # BLD AUTO: 0.09 K/UL — SIGNIFICANT CHANGE UP (ref 0–0.7)
EOSINOPHIL # BLD AUTO: 0.15 K/UL — SIGNIFICANT CHANGE UP (ref 0–0.7)
EOSINOPHIL # BLD AUTO: 0.16 K/UL — SIGNIFICANT CHANGE UP (ref 0–0.7)
EOSINOPHIL NFR BLD AUTO: 0 % — SIGNIFICANT CHANGE UP (ref 0–8)
EOSINOPHIL NFR BLD AUTO: 0 % — SIGNIFICANT CHANGE UP (ref 0–8)
EOSINOPHIL NFR BLD AUTO: 0.3 % — SIGNIFICANT CHANGE UP (ref 0–8)
EOSINOPHIL NFR BLD AUTO: 0.9 % — SIGNIFICANT CHANGE UP (ref 0–8)
EOSINOPHIL NFR BLD AUTO: 1.8 % — SIGNIFICANT CHANGE UP (ref 0–8)
EOSINOPHIL NFR BLD AUTO: 2.6 % — SIGNIFICANT CHANGE UP (ref 0–8)
EOSINOPHIL NFR BLD AUTO: 2.8 % — SIGNIFICANT CHANGE UP (ref 0–8)
EOSINOPHIL NFR BLD AUTO: 3.5 % — SIGNIFICANT CHANGE UP (ref 0–8)
EOSINOPHIL NFR BLD AUTO: 3.7 % — SIGNIFICANT CHANGE UP (ref 0–8)
EOSINOPHIL NFR BLD AUTO: 3.8 % — SIGNIFICANT CHANGE UP (ref 0–8)
EOSINOPHIL NFR BLD AUTO: 4.1 % — SIGNIFICANT CHANGE UP (ref 0–8)
EOSINOPHIL NFR BLD AUTO: 4.5 % — SIGNIFICANT CHANGE UP (ref 0–8)
EOSINOPHIL NFR BLD AUTO: 4.8 % — SIGNIFICANT CHANGE UP (ref 0–8)
EOSINOPHIL NFR BLD AUTO: 4.9 % — SIGNIFICANT CHANGE UP (ref 0–8)
EOSINOPHIL NFR BLD AUTO: 5.4 % — SIGNIFICANT CHANGE UP (ref 0–8)
EOSINOPHIL NFR BLD AUTO: 7.1 % — SIGNIFICANT CHANGE UP (ref 0–8)
EOSINOPHIL NFR BLD AUTO: 7.5 % — SIGNIFICANT CHANGE UP (ref 0–8)
EPI CELLS # UR: 6 /HPF — HIGH (ref 0–5)
ESTIMATED AVERAGE GLUCOSE: 105 MG/DL — SIGNIFICANT CHANGE UP (ref 68–114)
ESTIMATED AVERAGE GLUCOSE: 88 MG/DL — SIGNIFICANT CHANGE UP (ref 68–114)
FERRITIN SERPL-MCNC: 66 NG/ML — SIGNIFICANT CHANGE UP (ref 30–400)
FERRITIN SERPL-MCNC: 78 NG/ML — SIGNIFICANT CHANGE UP (ref 30–400)
FIBRINOGEN PPP-MCNC: 464 MG/DL — SIGNIFICANT CHANGE UP (ref 204.4–570.6)
FOLATE SERPL-MCNC: 11.8 NG/ML — SIGNIFICANT CHANGE UP
FOLATE SERPL-MCNC: 4 NG/ML — LOW
FOLATE SERPL-MCNC: 4.8 NG/ML — SIGNIFICANT CHANGE UP
GAS PNL BLDV: 108 MMOL/L — CRITICAL LOW (ref 136–145)
GAS PNL BLDV: 112 MMOL/L — CRITICAL LOW (ref 136–145)
GAS PNL BLDV: 128 MMOL/L — LOW (ref 136–145)
GAS PNL BLDV: SIGNIFICANT CHANGE UP
GIANT PLATELETS BLD QL SMEAR: PRESENT — SIGNIFICANT CHANGE UP
GLUCOSE BLDC GLUCOMTR-MCNC: 100 MG/DL — HIGH (ref 70–99)
GLUCOSE BLDC GLUCOMTR-MCNC: 102 MG/DL — HIGH (ref 70–99)
GLUCOSE BLDC GLUCOMTR-MCNC: 104 MG/DL — HIGH (ref 70–99)
GLUCOSE BLDC GLUCOMTR-MCNC: 105 MG/DL — HIGH (ref 70–99)
GLUCOSE BLDC GLUCOMTR-MCNC: 107 MG/DL — HIGH (ref 70–99)
GLUCOSE BLDC GLUCOMTR-MCNC: 108 MG/DL — HIGH (ref 70–99)
GLUCOSE BLDC GLUCOMTR-MCNC: 109 MG/DL — HIGH (ref 70–99)
GLUCOSE BLDC GLUCOMTR-MCNC: 111 MG/DL — HIGH (ref 70–99)
GLUCOSE BLDC GLUCOMTR-MCNC: 112 MG/DL — HIGH (ref 70–99)
GLUCOSE BLDC GLUCOMTR-MCNC: 113 MG/DL — HIGH (ref 70–99)
GLUCOSE BLDC GLUCOMTR-MCNC: 114 MG/DL — HIGH (ref 70–99)
GLUCOSE BLDC GLUCOMTR-MCNC: 115 MG/DL — HIGH (ref 70–99)
GLUCOSE BLDC GLUCOMTR-MCNC: 116 MG/DL — HIGH (ref 70–99)
GLUCOSE BLDC GLUCOMTR-MCNC: 118 MG/DL — HIGH (ref 70–99)
GLUCOSE BLDC GLUCOMTR-MCNC: 119 MG/DL — HIGH (ref 70–99)
GLUCOSE BLDC GLUCOMTR-MCNC: 120 MG/DL — HIGH (ref 70–99)
GLUCOSE BLDC GLUCOMTR-MCNC: 121 MG/DL — HIGH (ref 70–99)
GLUCOSE BLDC GLUCOMTR-MCNC: 122 MG/DL — HIGH (ref 70–99)
GLUCOSE BLDC GLUCOMTR-MCNC: 122 MG/DL — HIGH (ref 70–99)
GLUCOSE BLDC GLUCOMTR-MCNC: 123 MG/DL — HIGH (ref 70–99)
GLUCOSE BLDC GLUCOMTR-MCNC: 124 MG/DL — HIGH (ref 70–99)
GLUCOSE BLDC GLUCOMTR-MCNC: 125 MG/DL — HIGH (ref 70–99)
GLUCOSE BLDC GLUCOMTR-MCNC: 126 MG/DL — HIGH (ref 70–99)
GLUCOSE BLDC GLUCOMTR-MCNC: 128 MG/DL — HIGH (ref 70–99)
GLUCOSE BLDC GLUCOMTR-MCNC: 130 MG/DL — HIGH (ref 70–99)
GLUCOSE BLDC GLUCOMTR-MCNC: 132 MG/DL — HIGH (ref 70–99)
GLUCOSE BLDC GLUCOMTR-MCNC: 134 MG/DL — HIGH (ref 70–99)
GLUCOSE BLDC GLUCOMTR-MCNC: 136 MG/DL — HIGH (ref 70–99)
GLUCOSE BLDC GLUCOMTR-MCNC: 136 MG/DL — HIGH (ref 70–99)
GLUCOSE BLDC GLUCOMTR-MCNC: 137 MG/DL — HIGH (ref 70–99)
GLUCOSE BLDC GLUCOMTR-MCNC: 137 MG/DL — HIGH (ref 70–99)
GLUCOSE BLDC GLUCOMTR-MCNC: 138 MG/DL — HIGH (ref 70–99)
GLUCOSE BLDC GLUCOMTR-MCNC: 141 MG/DL — HIGH (ref 70–99)
GLUCOSE BLDC GLUCOMTR-MCNC: 142 MG/DL — HIGH (ref 70–99)
GLUCOSE BLDC GLUCOMTR-MCNC: 145 MG/DL — HIGH (ref 70–99)
GLUCOSE BLDC GLUCOMTR-MCNC: 146 MG/DL — HIGH (ref 70–99)
GLUCOSE BLDC GLUCOMTR-MCNC: 149 MG/DL — HIGH (ref 70–99)
GLUCOSE BLDC GLUCOMTR-MCNC: 149 MG/DL — HIGH (ref 70–99)
GLUCOSE BLDC GLUCOMTR-MCNC: 150 MG/DL — HIGH (ref 70–99)
GLUCOSE BLDC GLUCOMTR-MCNC: 160 MG/DL — HIGH (ref 70–99)
GLUCOSE BLDC GLUCOMTR-MCNC: 162 MG/DL — HIGH (ref 70–99)
GLUCOSE BLDC GLUCOMTR-MCNC: 188 MG/DL — HIGH (ref 70–99)
GLUCOSE BLDC GLUCOMTR-MCNC: 252 MG/DL — HIGH (ref 70–99)
GLUCOSE BLDC GLUCOMTR-MCNC: 261 MG/DL — HIGH (ref 70–99)
GLUCOSE BLDC GLUCOMTR-MCNC: 354 MG/DL — HIGH (ref 70–99)
GLUCOSE BLDC GLUCOMTR-MCNC: 85 MG/DL — SIGNIFICANT CHANGE UP (ref 70–99)
GLUCOSE BLDC GLUCOMTR-MCNC: 89 MG/DL — SIGNIFICANT CHANGE UP (ref 70–99)
GLUCOSE BLDC GLUCOMTR-MCNC: 93 MG/DL — SIGNIFICANT CHANGE UP (ref 70–99)
GLUCOSE BLDC GLUCOMTR-MCNC: 99 MG/DL — SIGNIFICANT CHANGE UP (ref 70–99)
GLUCOSE BLDC GLUCOMTR-MCNC: 99 MG/DL — SIGNIFICANT CHANGE UP (ref 70–99)
GLUCOSE SERPL-MCNC: 101 MG/DL — HIGH (ref 70–99)
GLUCOSE SERPL-MCNC: 102 MG/DL — HIGH (ref 70–99)
GLUCOSE SERPL-MCNC: 102 MG/DL — HIGH (ref 70–99)
GLUCOSE SERPL-MCNC: 103 MG/DL — HIGH (ref 70–99)
GLUCOSE SERPL-MCNC: 106 MG/DL — HIGH (ref 70–99)
GLUCOSE SERPL-MCNC: 107 MG/DL — HIGH (ref 70–99)
GLUCOSE SERPL-MCNC: 108 MG/DL — HIGH (ref 70–99)
GLUCOSE SERPL-MCNC: 109 MG/DL — HIGH (ref 70–99)
GLUCOSE SERPL-MCNC: 110 MG/DL — HIGH (ref 70–99)
GLUCOSE SERPL-MCNC: 110 MG/DL — HIGH (ref 70–99)
GLUCOSE SERPL-MCNC: 114 MG/DL — HIGH (ref 70–99)
GLUCOSE SERPL-MCNC: 116 MG/DL — HIGH (ref 70–99)
GLUCOSE SERPL-MCNC: 123 MG/DL — HIGH (ref 70–99)
GLUCOSE SERPL-MCNC: 124 MG/DL — HIGH (ref 70–99)
GLUCOSE SERPL-MCNC: 128 MG/DL — HIGH (ref 70–99)
GLUCOSE SERPL-MCNC: 146 MG/DL — HIGH (ref 70–99)
GLUCOSE SERPL-MCNC: 170 MG/DL — HIGH (ref 70–99)
GLUCOSE SERPL-MCNC: 177 MG/DL — HIGH (ref 70–99)
GLUCOSE SERPL-MCNC: 56 MG/DL — LOW (ref 70–99)
GLUCOSE SERPL-MCNC: 87 MG/DL — SIGNIFICANT CHANGE UP (ref 70–99)
GLUCOSE SERPL-MCNC: 91 MG/DL — SIGNIFICANT CHANGE UP (ref 70–99)
GLUCOSE SERPL-MCNC: 92 MG/DL — SIGNIFICANT CHANGE UP (ref 70–99)
GLUCOSE SERPL-MCNC: 93 MG/DL — SIGNIFICANT CHANGE UP (ref 70–99)
GLUCOSE SERPL-MCNC: 94 MG/DL — SIGNIFICANT CHANGE UP (ref 70–99)
GLUCOSE SERPL-MCNC: 95 MG/DL — SIGNIFICANT CHANGE UP (ref 70–99)
GLUCOSE SERPL-MCNC: 96 MG/DL — SIGNIFICANT CHANGE UP (ref 70–99)
GLUCOSE SERPL-MCNC: 97 MG/DL — SIGNIFICANT CHANGE UP (ref 70–99)
GLUCOSE SERPL-MCNC: 97 MG/DL — SIGNIFICANT CHANGE UP (ref 70–99)
GLUCOSE SERPL-MCNC: 98 MG/DL — SIGNIFICANT CHANGE UP (ref 70–99)
GLUCOSE SERPL-MCNC: 99 MG/DL — SIGNIFICANT CHANGE UP (ref 70–99)
GLUCOSE UR QL: NEGATIVE — SIGNIFICANT CHANGE UP
HAPTOGLOB SERPL-MCNC: 55 MG/DL — SIGNIFICANT CHANGE UP (ref 34–200)
HBV SURFACE AB SER-ACNC: ABNORMAL
HBV SURFACE AG SER-ACNC: SIGNIFICANT CHANGE UP
HCO3 BLDV-SCNC: 16 MMOL/L — LOW (ref 22–29)
HCO3 BLDV-SCNC: 24 MMOL/L — SIGNIFICANT CHANGE UP (ref 22–29)
HCO3 BLDV-SCNC: 32 MMOL/L — HIGH (ref 22–29)
HCT VFR BLD CALC: 19.9 % — LOW (ref 42–52)
HCT VFR BLD CALC: 23.3 % — LOW (ref 42–52)
HCT VFR BLD CALC: 23.7 % — LOW (ref 42–52)
HCT VFR BLD CALC: 24.2 % — LOW (ref 42–52)
HCT VFR BLD CALC: 24.7 % — LOW (ref 42–52)
HCT VFR BLD CALC: 24.9 % — LOW (ref 42–52)
HCT VFR BLD CALC: 24.9 % — LOW (ref 42–52)
HCT VFR BLD CALC: 25 % — LOW (ref 42–52)
HCT VFR BLD CALC: 25.2 % — LOW (ref 42–52)
HCT VFR BLD CALC: 25.4 % — LOW (ref 42–52)
HCT VFR BLD CALC: 25.5 % — LOW (ref 42–52)
HCT VFR BLD CALC: 25.8 % — LOW (ref 42–52)
HCT VFR BLD CALC: 26 % — LOW (ref 42–52)
HCT VFR BLD CALC: 26 % — LOW (ref 42–52)
HCT VFR BLD CALC: 26.3 % — LOW (ref 42–52)
HCT VFR BLD CALC: 26.3 % — LOW (ref 42–52)
HCT VFR BLD CALC: 26.5 % — LOW (ref 42–52)
HCT VFR BLD CALC: 27.6 % — LOW (ref 42–52)
HCT VFR BLD CALC: 27.7 % — LOW (ref 42–52)
HCT VFR BLD CALC: 28.5 % — LOW (ref 42–52)
HCT VFR BLD CALC: 29 % — LOW (ref 42–52)
HCT VFR BLD CALC: 29.8 % — LOW (ref 42–52)
HCT VFR BLD CALC: 30.9 % — LOW (ref 42–52)
HCT VFR BLD CALC: 31.6 % — LOW (ref 42–52)
HCT VFR BLDA CALC: 21 % — CRITICAL LOW (ref 39–51)
HCT VFR BLDA CALC: 28 % — LOW (ref 39–51)
HCT VFR BLDA CALC: 33 % — LOW (ref 39–51)
HCV AB S/CO SERPL IA: 47.96 COI — HIGH
HCV AB SERPL-IMP: REACTIVE
HDLC SERPL-MCNC: 51 MG/DL — SIGNIFICANT CHANGE UP
HGB BLD CALC-MCNC: 10.9 G/DL — LOW (ref 12.6–17.4)
HGB BLD CALC-MCNC: 7 G/DL — CRITICAL LOW (ref 12.6–17.4)
HGB BLD CALC-MCNC: 9.2 G/DL — LOW (ref 12.6–17.4)
HGB BLD-MCNC: 10 G/DL — LOW (ref 14–18)
HGB BLD-MCNC: 10.3 G/DL — LOW (ref 14–18)
HGB BLD-MCNC: 6.6 G/DL — CRITICAL LOW (ref 14–18)
HGB BLD-MCNC: 7.8 G/DL — LOW (ref 14–18)
HGB BLD-MCNC: 7.9 G/DL — LOW (ref 14–18)
HGB BLD-MCNC: 8 G/DL — LOW (ref 14–18)
HGB BLD-MCNC: 8 G/DL — LOW (ref 14–18)
HGB BLD-MCNC: 8.1 G/DL — LOW (ref 14–18)
HGB BLD-MCNC: 8.2 G/DL — LOW (ref 14–18)
HGB BLD-MCNC: 8.3 G/DL — LOW (ref 14–18)
HGB BLD-MCNC: 8.3 G/DL — LOW (ref 14–18)
HGB BLD-MCNC: 8.5 G/DL — LOW (ref 14–18)
HGB BLD-MCNC: 8.6 G/DL — LOW (ref 14–18)
HGB BLD-MCNC: 8.7 G/DL — LOW (ref 14–18)
HGB BLD-MCNC: 9.3 G/DL — LOW (ref 14–18)
HGB BLD-MCNC: 9.4 G/DL — LOW (ref 14–18)
HGB BLD-MCNC: 9.5 G/DL — LOW (ref 14–18)
HGB BLD-MCNC: 9.6 G/DL — LOW (ref 14–18)
HGB BLD-MCNC: 9.9 G/DL — LOW (ref 14–18)
HIV 1+2 AB+HIV1 P24 AG SERPL QL IA: SIGNIFICANT CHANGE UP
HYALINE CASTS # UR AUTO: 39 /LPF — HIGH (ref 0–7)
HYPOCHROMIA BLD QL: SIGNIFICANT CHANGE UP
IMM GRANULOCYTES NFR BLD AUTO: 0 % — LOW (ref 0.1–0.3)
IMM GRANULOCYTES NFR BLD AUTO: 0.3 % — SIGNIFICANT CHANGE UP (ref 0.1–0.3)
IMM GRANULOCYTES NFR BLD AUTO: 0.4 % — HIGH (ref 0.1–0.3)
IMM GRANULOCYTES NFR BLD AUTO: 0.4 % — HIGH (ref 0.1–0.3)
IMM GRANULOCYTES NFR BLD AUTO: 0.5 % — HIGH (ref 0.1–0.3)
IMM GRANULOCYTES NFR BLD AUTO: 0.5 % — HIGH (ref 0.1–0.3)
IMM GRANULOCYTES NFR BLD AUTO: 0.6 % — HIGH (ref 0.1–0.3)
IMM GRANULOCYTES NFR BLD AUTO: 0.7 % — HIGH (ref 0.1–0.3)
IMM GRANULOCYTES NFR BLD AUTO: 0.8 % — HIGH (ref 0.1–0.3)
IMM GRANULOCYTES NFR BLD AUTO: 1.8 % — HIGH (ref 0.1–0.3)
INR BLD: 1.18 RATIO — SIGNIFICANT CHANGE UP (ref 0.65–1.3)
INR BLD: 1.21 RATIO — SIGNIFICANT CHANGE UP (ref 0.65–1.3)
INR BLD: 1.28 RATIO — SIGNIFICANT CHANGE UP (ref 0.65–1.3)
IRON SATN MFR SERPL: 13 % — LOW (ref 15–50)
IRON SATN MFR SERPL: 50 UG/DL — SIGNIFICANT CHANGE UP (ref 35–150)
KETONES UR-MCNC: NEGATIVE — SIGNIFICANT CHANGE UP
LACTATE BLDV-MCNC: 1.1 MMOL/L — SIGNIFICANT CHANGE UP (ref 0.5–2)
LACTATE BLDV-MCNC: 1.9 MMOL/L — SIGNIFICANT CHANGE UP (ref 0.5–2)
LACTATE BLDV-MCNC: 2.4 MMOL/L — HIGH (ref 0.5–2)
LACTATE SERPL-SCNC: 1.4 MMOL/L — SIGNIFICANT CHANGE UP (ref 0.7–2)
LDH SERPL L TO P-CCNC: 166 U/L — SIGNIFICANT CHANGE UP (ref 50–242)
LEGIONELLA AG UR QL: NEGATIVE — SIGNIFICANT CHANGE UP
LEUKOCYTE ESTERASE UR-ACNC: ABNORMAL
LIPID PNL WITH DIRECT LDL SERPL: 55 MG/DL — SIGNIFICANT CHANGE UP
LYMPHOCYTES # BLD AUTO: 0.21 K/UL — LOW (ref 1.2–3.4)
LYMPHOCYTES # BLD AUTO: 0.26 K/UL — LOW (ref 1.2–3.4)
LYMPHOCYTES # BLD AUTO: 0.31 K/UL — LOW (ref 1.2–3.4)
LYMPHOCYTES # BLD AUTO: 0.34 K/UL — LOW (ref 1.2–3.4)
LYMPHOCYTES # BLD AUTO: 0.35 K/UL — LOW (ref 1.2–3.4)
LYMPHOCYTES # BLD AUTO: 0.36 K/UL — LOW (ref 1.2–3.4)
LYMPHOCYTES # BLD AUTO: 0.38 K/UL — LOW (ref 1.2–3.4)
LYMPHOCYTES # BLD AUTO: 0.43 K/UL — LOW (ref 1.2–3.4)
LYMPHOCYTES # BLD AUTO: 0.44 K/UL — LOW (ref 1.2–3.4)
LYMPHOCYTES # BLD AUTO: 0.45 K/UL — LOW (ref 1.2–3.4)
LYMPHOCYTES # BLD AUTO: 0.45 K/UL — LOW (ref 1.2–3.4)
LYMPHOCYTES # BLD AUTO: 0.46 K/UL — LOW (ref 1.2–3.4)
LYMPHOCYTES # BLD AUTO: 0.64 K/UL — LOW (ref 1.2–3.4)
LYMPHOCYTES # BLD AUTO: 0.71 K/UL — LOW (ref 1.2–3.4)
LYMPHOCYTES # BLD AUTO: 0.83 K/UL — LOW (ref 1.2–3.4)
LYMPHOCYTES # BLD AUTO: 12.9 % — LOW (ref 20.5–51.1)
LYMPHOCYTES # BLD AUTO: 13 % — LOW (ref 20.5–51.1)
LYMPHOCYTES # BLD AUTO: 17.5 % — LOW (ref 20.5–51.1)
LYMPHOCYTES # BLD AUTO: 18.2 % — LOW (ref 20.5–51.1)
LYMPHOCYTES # BLD AUTO: 23.4 % — SIGNIFICANT CHANGE UP (ref 20.5–51.1)
LYMPHOCYTES # BLD AUTO: 23.9 % — SIGNIFICANT CHANGE UP (ref 20.5–51.1)
LYMPHOCYTES # BLD AUTO: 24.1 % — SIGNIFICANT CHANGE UP (ref 20.5–51.1)
LYMPHOCYTES # BLD AUTO: 25.7 % — SIGNIFICANT CHANGE UP (ref 20.5–51.1)
LYMPHOCYTES # BLD AUTO: 27.7 % — SIGNIFICANT CHANGE UP (ref 20.5–51.1)
LYMPHOCYTES # BLD AUTO: 29.2 % — SIGNIFICANT CHANGE UP (ref 20.5–51.1)
LYMPHOCYTES # BLD AUTO: 29.5 % — SIGNIFICANT CHANGE UP (ref 20.5–51.1)
LYMPHOCYTES # BLD AUTO: 29.9 % — SIGNIFICANT CHANGE UP (ref 20.5–51.1)
LYMPHOCYTES # BLD AUTO: 30.2 % — SIGNIFICANT CHANGE UP (ref 20.5–51.1)
LYMPHOCYTES # BLD AUTO: 32.6 % — SIGNIFICANT CHANGE UP (ref 20.5–51.1)
LYMPHOCYTES # BLD AUTO: 34.4 % — SIGNIFICANT CHANGE UP (ref 20.5–51.1)
LYMPHOCYTES # BLD AUTO: 37.7 % — SIGNIFICANT CHANGE UP (ref 20.5–51.1)
LYMPHOCYTES # BLD AUTO: 9.7 % — LOW (ref 20.5–51.1)
MAGNESIUM SERPL-MCNC: 1.6 MG/DL — LOW (ref 1.8–2.4)
MAGNESIUM SERPL-MCNC: 1.7 MG/DL — LOW (ref 1.8–2.4)
MAGNESIUM SERPL-MCNC: 1.8 MG/DL — SIGNIFICANT CHANGE UP (ref 1.8–2.4)
MAGNESIUM SERPL-MCNC: 1.8 MG/DL — SIGNIFICANT CHANGE UP (ref 1.8–2.4)
MAGNESIUM SERPL-MCNC: 1.9 MG/DL — SIGNIFICANT CHANGE UP (ref 1.8–2.4)
MAGNESIUM SERPL-MCNC: 2 MG/DL — SIGNIFICANT CHANGE UP (ref 1.8–2.4)
MAGNESIUM SERPL-MCNC: 2.1 MG/DL — SIGNIFICANT CHANGE UP (ref 1.8–2.4)
MAGNESIUM SERPL-MCNC: 2.2 MG/DL — SIGNIFICANT CHANGE UP (ref 1.8–2.4)
MAGNESIUM SERPL-MCNC: 2.3 MG/DL — SIGNIFICANT CHANGE UP (ref 1.8–2.4)
MAGNESIUM SERPL-MCNC: 2.4 MG/DL — SIGNIFICANT CHANGE UP (ref 1.8–2.4)
MAGNESIUM SERPL-MCNC: 2.8 MG/DL — HIGH (ref 1.8–2.4)
MANUAL SMEAR VERIFICATION: SIGNIFICANT CHANGE UP
MCHC RBC-ENTMCNC: 25 PG — LOW (ref 27–31)
MCHC RBC-ENTMCNC: 26 PG — LOW (ref 27–31)
MCHC RBC-ENTMCNC: 26.1 PG — LOW (ref 27–31)
MCHC RBC-ENTMCNC: 26.1 PG — LOW (ref 27–31)
MCHC RBC-ENTMCNC: 26.3 PG — LOW (ref 27–31)
MCHC RBC-ENTMCNC: 26.3 PG — LOW (ref 27–31)
MCHC RBC-ENTMCNC: 26.4 PG — LOW (ref 27–31)
MCHC RBC-ENTMCNC: 26.5 PG — LOW (ref 27–31)
MCHC RBC-ENTMCNC: 26.6 PG — LOW (ref 27–31)
MCHC RBC-ENTMCNC: 26.7 PG — LOW (ref 27–31)
MCHC RBC-ENTMCNC: 26.7 PG — LOW (ref 27–31)
MCHC RBC-ENTMCNC: 30 PG — SIGNIFICANT CHANGE UP (ref 27–31)
MCHC RBC-ENTMCNC: 30 PG — SIGNIFICANT CHANGE UP (ref 27–31)
MCHC RBC-ENTMCNC: 30.2 PG — SIGNIFICANT CHANGE UP (ref 27–31)
MCHC RBC-ENTMCNC: 30.2 PG — SIGNIFICANT CHANGE UP (ref 27–31)
MCHC RBC-ENTMCNC: 30.4 PG — SIGNIFICANT CHANGE UP (ref 27–31)
MCHC RBC-ENTMCNC: 30.5 PG — SIGNIFICANT CHANGE UP (ref 27–31)
MCHC RBC-ENTMCNC: 30.7 PG — SIGNIFICANT CHANGE UP (ref 27–31)
MCHC RBC-ENTMCNC: 30.8 PG — SIGNIFICANT CHANGE UP (ref 27–31)
MCHC RBC-ENTMCNC: 31.2 G/DL — LOW (ref 32–37)
MCHC RBC-ENTMCNC: 31.2 PG — HIGH (ref 27–31)
MCHC RBC-ENTMCNC: 32.3 G/DL — SIGNIFICANT CHANGE UP (ref 32–37)
MCHC RBC-ENTMCNC: 32.3 G/DL — SIGNIFICANT CHANGE UP (ref 32–37)
MCHC RBC-ENTMCNC: 32.4 G/DL — SIGNIFICANT CHANGE UP (ref 32–37)
MCHC RBC-ENTMCNC: 32.5 G/DL — SIGNIFICANT CHANGE UP (ref 32–37)
MCHC RBC-ENTMCNC: 32.6 G/DL — SIGNIFICANT CHANGE UP (ref 32–37)
MCHC RBC-ENTMCNC: 32.6 G/DL — SIGNIFICANT CHANGE UP (ref 32–37)
MCHC RBC-ENTMCNC: 32.7 G/DL — SIGNIFICANT CHANGE UP (ref 32–37)
MCHC RBC-ENTMCNC: 32.7 G/DL — SIGNIFICANT CHANGE UP (ref 32–37)
MCHC RBC-ENTMCNC: 32.8 G/DL — SIGNIFICANT CHANGE UP (ref 32–37)
MCHC RBC-ENTMCNC: 32.9 G/DL — SIGNIFICANT CHANGE UP (ref 32–37)
MCHC RBC-ENTMCNC: 32.9 G/DL — SIGNIFICANT CHANGE UP (ref 32–37)
MCHC RBC-ENTMCNC: 33.2 G/DL — SIGNIFICANT CHANGE UP (ref 32–37)
MCHC RBC-ENTMCNC: 33.2 G/DL — SIGNIFICANT CHANGE UP (ref 32–37)
MCHC RBC-ENTMCNC: 33.5 G/DL — SIGNIFICANT CHANGE UP (ref 32–37)
MCHC RBC-ENTMCNC: 33.7 G/DL — SIGNIFICANT CHANGE UP (ref 32–37)
MCHC RBC-ENTMCNC: 33.7 G/DL — SIGNIFICANT CHANGE UP (ref 32–37)
MCHC RBC-ENTMCNC: 33.8 G/DL — SIGNIFICANT CHANGE UP (ref 32–37)
MCHC RBC-ENTMCNC: 33.9 G/DL — SIGNIFICANT CHANGE UP (ref 32–37)
MCHC RBC-ENTMCNC: 34.5 G/DL — SIGNIFICANT CHANGE UP (ref 32–37)
MCV RBC AUTO: 75.4 FL — LOW (ref 80–94)
MCV RBC AUTO: 77.2 FL — LOW (ref 80–94)
MCV RBC AUTO: 78.7 FL — LOW (ref 80–94)
MCV RBC AUTO: 80.3 FL — SIGNIFICANT CHANGE UP (ref 80–94)
MCV RBC AUTO: 80.5 FL — SIGNIFICANT CHANGE UP (ref 80–94)
MCV RBC AUTO: 80.7 FL — SIGNIFICANT CHANGE UP (ref 80–94)
MCV RBC AUTO: 81 FL — SIGNIFICANT CHANGE UP (ref 80–94)
MCV RBC AUTO: 81.2 FL — SIGNIFICANT CHANGE UP (ref 80–94)
MCV RBC AUTO: 81.5 FL — SIGNIFICANT CHANGE UP (ref 80–94)
MCV RBC AUTO: 81.6 FL — SIGNIFICANT CHANGE UP (ref 80–94)
MCV RBC AUTO: 81.7 FL — SIGNIFICANT CHANGE UP (ref 80–94)
MCV RBC AUTO: 81.7 FL — SIGNIFICANT CHANGE UP (ref 80–94)
MCV RBC AUTO: 81.8 FL — SIGNIFICANT CHANGE UP (ref 80–94)
MCV RBC AUTO: 82.3 FL — SIGNIFICANT CHANGE UP (ref 80–94)
MCV RBC AUTO: 83.3 FL — SIGNIFICANT CHANGE UP (ref 80–94)
MCV RBC AUTO: 90.2 FL — SIGNIFICANT CHANGE UP (ref 80–94)
MCV RBC AUTO: 90.3 FL — SIGNIFICANT CHANGE UP (ref 80–94)
MCV RBC AUTO: 90.5 FL — SIGNIFICANT CHANGE UP (ref 80–94)
MCV RBC AUTO: 90.9 FL — SIGNIFICANT CHANGE UP (ref 80–94)
MCV RBC AUTO: 91.2 FL — SIGNIFICANT CHANGE UP (ref 80–94)
MCV RBC AUTO: 91.4 FL — SIGNIFICANT CHANGE UP (ref 80–94)
MCV RBC AUTO: 92.7 FL — SIGNIFICANT CHANGE UP (ref 80–94)
MCV RBC AUTO: 92.8 FL — SIGNIFICANT CHANGE UP (ref 80–94)
MCV RBC AUTO: 93.2 FL — SIGNIFICANT CHANGE UP (ref 80–94)
METAMYELOCYTES # FLD: 0.9 % — HIGH (ref 0–0)
METHYLMALONATE SERPL-SCNC: 313 NMOL/L — SIGNIFICANT CHANGE UP (ref 0–378)
MICROCYTES BLD QL: SLIGHT — SIGNIFICANT CHANGE UP
MICROCYTES BLD QL: SLIGHT — SIGNIFICANT CHANGE UP
MONOCYTES # BLD AUTO: 0.04 K/UL — LOW (ref 0.1–0.6)
MONOCYTES # BLD AUTO: 0.14 K/UL — SIGNIFICANT CHANGE UP (ref 0.1–0.6)
MONOCYTES # BLD AUTO: 0.15 K/UL — SIGNIFICANT CHANGE UP (ref 0.1–0.6)
MONOCYTES # BLD AUTO: 0.17 K/UL — SIGNIFICANT CHANGE UP (ref 0.1–0.6)
MONOCYTES # BLD AUTO: 0.19 K/UL — SIGNIFICANT CHANGE UP (ref 0.1–0.6)
MONOCYTES # BLD AUTO: 0.2 K/UL — SIGNIFICANT CHANGE UP (ref 0.1–0.6)
MONOCYTES # BLD AUTO: 0.21 K/UL — SIGNIFICANT CHANGE UP (ref 0.1–0.6)
MONOCYTES # BLD AUTO: 0.23 K/UL — SIGNIFICANT CHANGE UP (ref 0.1–0.6)
MONOCYTES # BLD AUTO: 0.24 K/UL — SIGNIFICANT CHANGE UP (ref 0.1–0.6)
MONOCYTES # BLD AUTO: 0.25 K/UL — SIGNIFICANT CHANGE UP (ref 0.1–0.6)
MONOCYTES # BLD AUTO: 0.25 K/UL — SIGNIFICANT CHANGE UP (ref 0.1–0.6)
MONOCYTES # BLD AUTO: 0.29 K/UL — SIGNIFICANT CHANGE UP (ref 0.1–0.6)
MONOCYTES # BLD AUTO: 0.29 K/UL — SIGNIFICANT CHANGE UP (ref 0.1–0.6)
MONOCYTES # BLD AUTO: 0.35 K/UL — SIGNIFICANT CHANGE UP (ref 0.1–0.6)
MONOCYTES # BLD AUTO: 0.38 K/UL — SIGNIFICANT CHANGE UP (ref 0.1–0.6)
MONOCYTES # BLD AUTO: 0.39 K/UL — SIGNIFICANT CHANGE UP (ref 0.1–0.6)
MONOCYTES # BLD AUTO: 0.41 K/UL — SIGNIFICANT CHANGE UP (ref 0.1–0.6)
MONOCYTES NFR BLD AUTO: 11.1 % — HIGH (ref 1.7–9.3)
MONOCYTES NFR BLD AUTO: 12 % — HIGH (ref 1.7–9.3)
MONOCYTES NFR BLD AUTO: 13 % — HIGH (ref 1.7–9.3)
MONOCYTES NFR BLD AUTO: 13.9 % — HIGH (ref 1.7–9.3)
MONOCYTES NFR BLD AUTO: 14.9 % — HIGH (ref 1.7–9.3)
MONOCYTES NFR BLD AUTO: 15.9 % — HIGH (ref 1.7–9.3)
MONOCYTES NFR BLD AUTO: 16 % — HIGH (ref 1.7–9.3)
MONOCYTES NFR BLD AUTO: 17.8 % — HIGH (ref 1.7–9.3)
MONOCYTES NFR BLD AUTO: 17.8 % — HIGH (ref 1.7–9.3)
MONOCYTES NFR BLD AUTO: 19.8 % — HIGH (ref 1.7–9.3)
MONOCYTES NFR BLD AUTO: 2.5 % — SIGNIFICANT CHANGE UP (ref 1.7–9.3)
MONOCYTES NFR BLD AUTO: 21.4 % — HIGH (ref 1.7–9.3)
MONOCYTES NFR BLD AUTO: 6.1 % — SIGNIFICANT CHANGE UP (ref 1.7–9.3)
MONOCYTES NFR BLD AUTO: 6.9 % — SIGNIFICANT CHANGE UP (ref 1.7–9.3)
MONOCYTES NFR BLD AUTO: 9.7 % — HIGH (ref 1.7–9.3)
MYELOCYTES NFR BLD: 0.9 % — HIGH (ref 0–0)
NEUTROPHILS # BLD AUTO: 0.46 K/UL — LOW (ref 1.4–6.5)
NEUTROPHILS # BLD AUTO: 0.53 K/UL — LOW (ref 1.4–6.5)
NEUTROPHILS # BLD AUTO: 0.55 K/UL — LOW (ref 1.4–6.5)
NEUTROPHILS # BLD AUTO: 0.61 K/UL — LOW (ref 1.4–6.5)
NEUTROPHILS # BLD AUTO: 0.78 K/UL — LOW (ref 1.4–6.5)
NEUTROPHILS # BLD AUTO: 0.82 K/UL — LOW (ref 1.4–6.5)
NEUTROPHILS # BLD AUTO: 0.83 K/UL — LOW (ref 1.4–6.5)
NEUTROPHILS # BLD AUTO: 0.93 K/UL — LOW (ref 1.4–6.5)
NEUTROPHILS # BLD AUTO: 1.02 K/UL — LOW (ref 1.4–6.5)
NEUTROPHILS # BLD AUTO: 1.02 K/UL — LOW (ref 1.4–6.5)
NEUTROPHILS # BLD AUTO: 1.06 K/UL — LOW (ref 1.4–6.5)
NEUTROPHILS # BLD AUTO: 1.17 K/UL — LOW (ref 1.4–6.5)
NEUTROPHILS # BLD AUTO: 1.38 K/UL — LOW (ref 1.4–6.5)
NEUTROPHILS # BLD AUTO: 1.45 K/UL — SIGNIFICANT CHANGE UP (ref 1.4–6.5)
NEUTROPHILS # BLD AUTO: 1.73 K/UL — SIGNIFICANT CHANGE UP (ref 1.4–6.5)
NEUTROPHILS # BLD AUTO: 2.06 K/UL — SIGNIFICANT CHANGE UP (ref 1.4–6.5)
NEUTROPHILS # BLD AUTO: 2.76 K/UL — SIGNIFICANT CHANGE UP (ref 1.4–6.5)
NEUTROPHILS NFR BLD AUTO: 41.1 % — LOW (ref 42.2–75.2)
NEUTROPHILS NFR BLD AUTO: 43.4 % — SIGNIFICANT CHANGE UP (ref 42.2–75.2)
NEUTROPHILS NFR BLD AUTO: 43.5 % — SIGNIFICANT CHANGE UP (ref 42.2–75.2)
NEUTROPHILS NFR BLD AUTO: 43.6 % — SIGNIFICANT CHANGE UP (ref 42.2–75.2)
NEUTROPHILS NFR BLD AUTO: 46.2 % — SIGNIFICANT CHANGE UP (ref 42.2–75.2)
NEUTROPHILS NFR BLD AUTO: 48.7 % — SIGNIFICANT CHANGE UP (ref 42.2–75.2)
NEUTROPHILS NFR BLD AUTO: 50.1 % — SIGNIFICANT CHANGE UP (ref 42.2–75.2)
NEUTROPHILS NFR BLD AUTO: 52.5 % — SIGNIFICANT CHANGE UP (ref 42.2–75.2)
NEUTROPHILS NFR BLD AUTO: 53.7 % — SIGNIFICANT CHANGE UP (ref 42.2–75.2)
NEUTROPHILS NFR BLD AUTO: 54 % — SIGNIFICANT CHANGE UP (ref 42.2–75.2)
NEUTROPHILS NFR BLD AUTO: 54.9 % — SIGNIFICANT CHANGE UP (ref 42.2–75.2)
NEUTROPHILS NFR BLD AUTO: 56.5 % — SIGNIFICANT CHANGE UP (ref 42.2–75.2)
NEUTROPHILS NFR BLD AUTO: 69.3 % — SIGNIFICANT CHANGE UP (ref 42.2–75.2)
NEUTROPHILS NFR BLD AUTO: 69.6 % — SIGNIFICANT CHANGE UP (ref 42.2–75.2)
NEUTROPHILS NFR BLD AUTO: 74.8 % — SIGNIFICANT CHANGE UP (ref 42.2–75.2)
NEUTROPHILS NFR BLD AUTO: 78.3 % — HIGH (ref 42.2–75.2)
NEUTROPHILS NFR BLD AUTO: 84.6 % — HIGH (ref 42.2–75.2)
NEUTS BAND # BLD: 0.9 % — SIGNIFICANT CHANGE UP (ref 0–6)
NITRITE UR-MCNC: NEGATIVE — SIGNIFICANT CHANGE UP
NON HDL CHOLESTEROL: 67 MG/DL — SIGNIFICANT CHANGE UP
NRBC # BLD: 0 /100 WBCS — SIGNIFICANT CHANGE UP (ref 0–0)
NRBC # BLD: 1 /100 WBCS — HIGH (ref 0–0)
NRBC # BLD: 2 /100 — HIGH (ref 0–0)
NRBC # BLD: SIGNIFICANT CHANGE UP /100 WBCS (ref 0–0)
NT-PROBNP SERPL-SCNC: 251 PG/ML — SIGNIFICANT CHANGE UP (ref 0–300)
NT-PROBNP SERPL-SCNC: 322 PG/ML — HIGH (ref 0–300)
NT-PROBNP SERPL-SCNC: 7783 PG/ML — HIGH (ref 0–300)
OSMOLALITY SERPL: 292 MOS/KG — SIGNIFICANT CHANGE UP (ref 280–301)
OSMOLALITY UR: 291 MOS/KG — SIGNIFICANT CHANGE UP (ref 50–1200)
OSMOLALITY UR: 312 MOS/KG — SIGNIFICANT CHANGE UP (ref 50–1200)
OVALOCYTES BLD QL SMEAR: SLIGHT — SIGNIFICANT CHANGE UP
PCO2 BLDV: 37 MMHG — LOW (ref 42–55)
PCO2 BLDV: 37 MMHG — LOW (ref 42–55)
PCO2 BLDV: 52 MMHG — SIGNIFICANT CHANGE UP (ref 42–55)
PH BLDV: 7.26 — LOW (ref 7.32–7.43)
PH BLDV: 7.4 — SIGNIFICANT CHANGE UP (ref 7.32–7.43)
PH BLDV: 7.42 — SIGNIFICANT CHANGE UP (ref 7.32–7.43)
PH UR: 6 — SIGNIFICANT CHANGE UP (ref 5–8)
PHOSPHATE SERPL-MCNC: 3 MG/DL — SIGNIFICANT CHANGE UP (ref 2.1–4.9)
PHOSPHATE SERPL-MCNC: 3.2 MG/DL — SIGNIFICANT CHANGE UP (ref 2.1–4.9)
PHOSPHATE SERPL-MCNC: 3.6 MG/DL — SIGNIFICANT CHANGE UP (ref 2.1–4.9)
PLAT MORPH BLD: ABNORMAL
PLAT MORPH BLD: NORMAL — SIGNIFICANT CHANGE UP
PLATELET # BLD AUTO: 32 K/UL — LOW (ref 130–400)
PLATELET # BLD AUTO: 37 K/UL — LOW (ref 130–400)
PLATELET # BLD AUTO: 39 K/UL — LOW (ref 130–400)
PLATELET # BLD AUTO: 41 K/UL — LOW (ref 130–400)
PLATELET # BLD AUTO: 44 K/UL — LOW (ref 130–400)
PLATELET # BLD AUTO: 44 K/UL — LOW (ref 130–400)
PLATELET # BLD AUTO: 46 K/UL — LOW (ref 130–400)
PLATELET # BLD AUTO: 48 K/UL — LOW (ref 130–400)
PLATELET # BLD AUTO: 50 K/UL — LOW (ref 130–400)
PLATELET # BLD AUTO: 51 K/UL — LOW (ref 130–400)
PLATELET # BLD AUTO: 52 K/UL — LOW (ref 130–400)
PLATELET # BLD AUTO: 58 K/UL — LOW (ref 130–400)
PLATELET # BLD AUTO: 59 K/UL — LOW (ref 130–400)
PLATELET # BLD AUTO: 61 K/UL — LOW (ref 130–400)
PLATELET # BLD AUTO: 63 K/UL — LOW (ref 130–400)
PLATELET # BLD AUTO: 63 K/UL — LOW (ref 130–400)
PLATELET # BLD AUTO: 66 K/UL — LOW (ref 130–400)
PLATELET # BLD AUTO: 68 K/UL — LOW (ref 130–400)
PLATELET # BLD AUTO: 70 K/UL — LOW (ref 130–400)
PLATELET # BLD AUTO: 75 K/UL — LOW (ref 130–400)
PLATELET # BLD AUTO: 78 K/UL — LOW (ref 130–400)
PLATELET # BLD AUTO: 81 K/UL — LOW (ref 130–400)
PLATELET # BLD AUTO: 90 K/UL — LOW (ref 130–400)
PLATELET # BLD AUTO: 96 K/UL — LOW (ref 130–400)
PO2 BLDV: 21 MMHG — SIGNIFICANT CHANGE UP
PO2 BLDV: 27 MMHG — SIGNIFICANT CHANGE UP
PO2 BLDV: 30 MMHG — SIGNIFICANT CHANGE UP
POIKILOCYTOSIS BLD QL AUTO: SIGNIFICANT CHANGE UP
POIKILOCYTOSIS BLD QL AUTO: SIGNIFICANT CHANGE UP
POIKILOCYTOSIS BLD QL AUTO: SLIGHT — SIGNIFICANT CHANGE UP
POLYCHROMASIA BLD QL SMEAR: SLIGHT — SIGNIFICANT CHANGE UP
POTASSIUM BLDV-SCNC: 5 MMOL/L — SIGNIFICANT CHANGE UP (ref 3.5–5.1)
POTASSIUM BLDV-SCNC: 6.2 MMOL/L — CRITICAL HIGH (ref 3.5–5.1)
POTASSIUM BLDV-SCNC: 8.2 MMOL/L — CRITICAL HIGH (ref 3.5–5.1)
POTASSIUM SERPL-MCNC: 3.8 MMOL/L — SIGNIFICANT CHANGE UP (ref 3.5–5)
POTASSIUM SERPL-MCNC: 4 MMOL/L — SIGNIFICANT CHANGE UP (ref 3.5–5)
POTASSIUM SERPL-MCNC: 4 MMOL/L — SIGNIFICANT CHANGE UP (ref 3.5–5)
POTASSIUM SERPL-MCNC: 4.1 MMOL/L — SIGNIFICANT CHANGE UP (ref 3.5–5)
POTASSIUM SERPL-MCNC: 4.2 MMOL/L — SIGNIFICANT CHANGE UP (ref 3.5–5)
POTASSIUM SERPL-MCNC: 4.2 MMOL/L — SIGNIFICANT CHANGE UP (ref 3.5–5)
POTASSIUM SERPL-MCNC: 4.3 MMOL/L — SIGNIFICANT CHANGE UP (ref 3.5–5)
POTASSIUM SERPL-MCNC: 4.4 MMOL/L — SIGNIFICANT CHANGE UP (ref 3.5–5)
POTASSIUM SERPL-MCNC: 4.4 MMOL/L — SIGNIFICANT CHANGE UP (ref 3.5–5)
POTASSIUM SERPL-MCNC: 4.5 MMOL/L — SIGNIFICANT CHANGE UP (ref 3.5–5)
POTASSIUM SERPL-MCNC: 4.5 MMOL/L — SIGNIFICANT CHANGE UP (ref 3.5–5)
POTASSIUM SERPL-MCNC: 4.6 MMOL/L — SIGNIFICANT CHANGE UP (ref 3.5–5)
POTASSIUM SERPL-MCNC: 4.7 MMOL/L — SIGNIFICANT CHANGE UP (ref 3.5–5)
POTASSIUM SERPL-MCNC: 4.8 MMOL/L — SIGNIFICANT CHANGE UP (ref 3.5–5)
POTASSIUM SERPL-MCNC: 5.2 MMOL/L — HIGH (ref 3.5–5)
POTASSIUM SERPL-MCNC: 5.3 MMOL/L — HIGH (ref 3.5–5)
POTASSIUM SERPL-MCNC: 5.6 MMOL/L — HIGH (ref 3.5–5)
POTASSIUM SERPL-MCNC: 5.8 MMOL/L — HIGH (ref 3.5–5)
POTASSIUM SERPL-MCNC: 5.9 MMOL/L — HIGH (ref 3.5–5)
POTASSIUM SERPL-MCNC: 6.1 MMOL/L — CRITICAL HIGH (ref 3.5–5)
POTASSIUM SERPL-MCNC: 6.2 MMOL/L — CRITICAL HIGH (ref 3.5–5)
POTASSIUM SERPL-MCNC: 6.3 MMOL/L — CRITICAL HIGH (ref 3.5–5)
POTASSIUM SERPL-MCNC: 7.7 MMOL/L — CRITICAL HIGH (ref 3.5–5)
POTASSIUM SERPL-SCNC: 3.8 MMOL/L — SIGNIFICANT CHANGE UP (ref 3.5–5)
POTASSIUM SERPL-SCNC: 4 MMOL/L — SIGNIFICANT CHANGE UP (ref 3.5–5)
POTASSIUM SERPL-SCNC: 4 MMOL/L — SIGNIFICANT CHANGE UP (ref 3.5–5)
POTASSIUM SERPL-SCNC: 4.1 MMOL/L — SIGNIFICANT CHANGE UP (ref 3.5–5)
POTASSIUM SERPL-SCNC: 4.2 MMOL/L — SIGNIFICANT CHANGE UP (ref 3.5–5)
POTASSIUM SERPL-SCNC: 4.2 MMOL/L — SIGNIFICANT CHANGE UP (ref 3.5–5)
POTASSIUM SERPL-SCNC: 4.3 MMOL/L — SIGNIFICANT CHANGE UP (ref 3.5–5)
POTASSIUM SERPL-SCNC: 4.4 MMOL/L — SIGNIFICANT CHANGE UP (ref 3.5–5)
POTASSIUM SERPL-SCNC: 4.4 MMOL/L — SIGNIFICANT CHANGE UP (ref 3.5–5)
POTASSIUM SERPL-SCNC: 4.5 MMOL/L — SIGNIFICANT CHANGE UP (ref 3.5–5)
POTASSIUM SERPL-SCNC: 4.5 MMOL/L — SIGNIFICANT CHANGE UP (ref 3.5–5)
POTASSIUM SERPL-SCNC: 4.6 MMOL/L — SIGNIFICANT CHANGE UP (ref 3.5–5)
POTASSIUM SERPL-SCNC: 4.7 MMOL/L — SIGNIFICANT CHANGE UP (ref 3.5–5)
POTASSIUM SERPL-SCNC: 4.8 MMOL/L — SIGNIFICANT CHANGE UP (ref 3.5–5)
POTASSIUM SERPL-SCNC: 5.2 MMOL/L — HIGH (ref 3.5–5)
POTASSIUM SERPL-SCNC: 5.3 MMOL/L — HIGH (ref 3.5–5)
POTASSIUM SERPL-SCNC: 5.6 MMOL/L — HIGH (ref 3.5–5)
POTASSIUM SERPL-SCNC: 5.8 MMOL/L — HIGH (ref 3.5–5)
POTASSIUM SERPL-SCNC: 5.9 MMOL/L — HIGH (ref 3.5–5)
POTASSIUM SERPL-SCNC: 6.1 MMOL/L — CRITICAL HIGH (ref 3.5–5)
POTASSIUM SERPL-SCNC: 6.2 MMOL/L — CRITICAL HIGH (ref 3.5–5)
POTASSIUM SERPL-SCNC: 6.3 MMOL/L — CRITICAL HIGH (ref 3.5–5)
POTASSIUM SERPL-SCNC: 7.7 MMOL/L — CRITICAL HIGH (ref 3.5–5)
POTASSIUM UR-SCNC: 14 MMOL/L — SIGNIFICANT CHANGE UP
PROCALCITONIN SERPL-MCNC: 0.04 NG/ML — SIGNIFICANT CHANGE UP (ref 0.02–0.1)
PROCALCITONIN SERPL-MCNC: 0.08 NG/ML — SIGNIFICANT CHANGE UP (ref 0.02–0.1)
PROT ?TM UR-MCNC: 6 MG/DLG/24H — SIGNIFICANT CHANGE UP
PROT SERPL-MCNC: 6.1 G/DL — SIGNIFICANT CHANGE UP (ref 6–8)
PROT SERPL-MCNC: 6.1 G/DL — SIGNIFICANT CHANGE UP (ref 6–8)
PROT SERPL-MCNC: 6.4 G/DL — SIGNIFICANT CHANGE UP (ref 6–8)
PROT SERPL-MCNC: 6.5 G/DL — SIGNIFICANT CHANGE UP (ref 6–8)
PROT SERPL-MCNC: 6.6 G/DL — SIGNIFICANT CHANGE UP (ref 6–8)
PROT SERPL-MCNC: 6.7 G/DL — SIGNIFICANT CHANGE UP (ref 6–8)
PROT SERPL-MCNC: 7.1 G/DL — SIGNIFICANT CHANGE UP (ref 6–8)
PROT SERPL-MCNC: 7.2 G/DL — SIGNIFICANT CHANGE UP (ref 6–8)
PROT SERPL-MCNC: 7.3 G/DL — SIGNIFICANT CHANGE UP (ref 6–8)
PROT SERPL-MCNC: 7.3 G/DL — SIGNIFICANT CHANGE UP (ref 6–8)
PROT SERPL-MCNC: 7.7 G/DL — SIGNIFICANT CHANGE UP (ref 6–8)
PROT SERPL-MCNC: 8 G/DL — SIGNIFICANT CHANGE UP (ref 6–8)
PROT SERPL-MCNC: 8 G/DL — SIGNIFICANT CHANGE UP (ref 6–8)
PROT UR-MCNC: SIGNIFICANT CHANGE UP
PROT/CREAT UR-RTO: 0.2 RATIO — SIGNIFICANT CHANGE UP (ref 0–0.2)
PROTHROM AB SERPL-ACNC: 13.5 SEC — HIGH (ref 9.95–12.87)
PROTHROM AB SERPL-ACNC: 13.9 SEC — HIGH (ref 9.95–12.87)
PROTHROM AB SERPL-ACNC: 14.7 SEC — HIGH (ref 9.95–12.87)
RAPID RVP RESULT: SIGNIFICANT CHANGE UP
RBC # BLD: 2.64 M/UL — LOW (ref 4.7–6.1)
RBC # BLD: 2.79 M/UL — LOW (ref 4.7–6.1)
RBC # BLD: 2.83 M/UL — LOW (ref 4.7–6.1)
RBC # BLD: 2.96 M/UL — LOW (ref 4.7–6.1)
RBC # BLD: 2.96 M/UL — LOW (ref 4.7–6.1)
RBC # BLD: 3.02 M/UL — LOW (ref 4.7–6.1)
RBC # BLD: 3.03 M/UL — LOW (ref 4.7–6.1)
RBC # BLD: 3.05 M/UL — LOW (ref 4.7–6.1)
RBC # BLD: 3.06 M/UL — LOW (ref 4.7–6.1)
RBC # BLD: 3.07 M/UL — LOW (ref 4.7–6.1)
RBC # BLD: 3.1 M/UL — LOW (ref 4.7–6.1)
RBC # BLD: 3.11 M/UL — LOW (ref 4.7–6.1)
RBC # BLD: 3.12 M/UL — LOW (ref 4.7–6.1)
RBC # BLD: 3.13 M/UL — LOW (ref 4.7–6.1)
RBC # BLD: 3.15 M/UL — LOW (ref 4.7–6.1)
RBC # BLD: 3.16 M/UL — LOW (ref 4.7–6.1)
RBC # BLD: 3.22 M/UL — LOW (ref 4.7–6.1)
RBC # BLD: 3.22 M/UL — LOW (ref 4.7–6.1)
RBC # BLD: 3.23 M/UL — LOW (ref 4.7–6.1)
RBC # BLD: 3.26 M/UL — LOW (ref 4.7–6.1)
RBC # BLD: 3.28 M/UL — LOW (ref 4.7–6.1)
RBC # BLD: 3.33 M/UL — LOW (ref 4.7–6.1)
RBC # BLD: 3.41 M/UL — LOW (ref 4.7–6.1)
RBC # FLD: 14.5 % — SIGNIFICANT CHANGE UP (ref 11.5–14.5)
RBC # FLD: 14.6 % — HIGH (ref 11.5–14.5)
RBC # FLD: 14.6 % — HIGH (ref 11.5–14.5)
RBC # FLD: 14.7 % — HIGH (ref 11.5–14.5)
RBC # FLD: 14.7 % — HIGH (ref 11.5–14.5)
RBC # FLD: 14.8 % — HIGH (ref 11.5–14.5)
RBC # FLD: 15 % — HIGH (ref 11.5–14.5)
RBC # FLD: 16 % — HIGH (ref 11.5–14.5)
RBC # FLD: 16.3 % — HIGH (ref 11.5–14.5)
RBC # FLD: 16.5 % — HIGH (ref 11.5–14.5)
RBC # FLD: 16.6 % — HIGH (ref 11.5–14.5)
RBC # FLD: 16.7 % — HIGH (ref 11.5–14.5)
RBC # FLD: 17.1 % — HIGH (ref 11.5–14.5)
RBC # FLD: 17.2 % — HIGH (ref 11.5–14.5)
RBC # FLD: 17.4 % — HIGH (ref 11.5–14.5)
RBC # FLD: 17.8 % — HIGH (ref 11.5–14.5)
RBC # FLD: 18.1 % — HIGH (ref 11.5–14.5)
RBC # FLD: 18.4 % — HIGH (ref 11.5–14.5)
RBC # FLD: 18.6 % — HIGH (ref 11.5–14.5)
RBC # FLD: 18.8 % — HIGH (ref 11.5–14.5)
RBC # FLD: 19.8 % — HIGH (ref 11.5–14.5)
RBC # FLD: 20.1 % — HIGH (ref 11.5–14.5)
RBC BLD AUTO: ABNORMAL
RBC CASTS # UR COMP ASSIST: 0 /HPF — SIGNIFICANT CHANGE UP (ref 0–4)
RETICS #: 62.4 K/UL — SIGNIFICANT CHANGE UP (ref 25–125)
RETICS/RBC NFR: 2 % — HIGH (ref 0.5–1.5)
S PNEUM AG UR QL: NEGATIVE — SIGNIFICANT CHANGE UP
SAO2 % BLDV: 29.3 % — SIGNIFICANT CHANGE UP
SAO2 % BLDV: 49.6 % — SIGNIFICANT CHANGE UP
SAO2 % BLDV: 51.2 % — SIGNIFICANT CHANGE UP
SARS-COV-2 RNA SPEC QL NAA+PROBE: SIGNIFICANT CHANGE UP
SCHISTOCYTES BLD QL AUTO: SLIGHT — SIGNIFICANT CHANGE UP
SCHISTOCYTES BLD QL AUTO: SLIGHT — SIGNIFICANT CHANGE UP
SODIUM SERPL-SCNC: 109 MMOL/L — CRITICAL LOW (ref 135–146)
SODIUM SERPL-SCNC: 115 MMOL/L — CRITICAL LOW (ref 135–146)
SODIUM SERPL-SCNC: 117 MMOL/L — CRITICAL LOW (ref 135–146)
SODIUM SERPL-SCNC: 119 MMOL/L — CRITICAL LOW (ref 135–146)
SODIUM SERPL-SCNC: 122 MMOL/L — LOW (ref 135–146)
SODIUM SERPL-SCNC: 122 MMOL/L — LOW (ref 135–146)
SODIUM SERPL-SCNC: 123 MMOL/L — LOW (ref 135–146)
SODIUM SERPL-SCNC: 124 MMOL/L — LOW (ref 135–146)
SODIUM SERPL-SCNC: 125 MMOL/L — LOW (ref 135–146)
SODIUM SERPL-SCNC: 126 MMOL/L — LOW (ref 135–146)
SODIUM SERPL-SCNC: 127 MMOL/L — LOW (ref 135–146)
SODIUM SERPL-SCNC: 128 MMOL/L — LOW (ref 135–146)
SODIUM SERPL-SCNC: 129 MMOL/L — LOW (ref 135–146)
SODIUM SERPL-SCNC: 129 MMOL/L — LOW (ref 135–146)
SODIUM SERPL-SCNC: 130 MMOL/L — LOW (ref 135–146)
SODIUM SERPL-SCNC: 131 MMOL/L — LOW (ref 135–146)
SODIUM SERPL-SCNC: 132 MMOL/L — LOW (ref 135–146)
SODIUM SERPL-SCNC: 133 MMOL/L — LOW (ref 135–146)
SODIUM SERPL-SCNC: 134 MMOL/L — LOW (ref 135–146)
SODIUM SERPL-SCNC: 135 MMOL/L — SIGNIFICANT CHANGE UP (ref 135–146)
SODIUM UR-SCNC: 120 MMOL/L — SIGNIFICANT CHANGE UP
SODIUM UR-SCNC: 23 MMOL/L — SIGNIFICANT CHANGE UP
SP GR SPEC: 1.01 — SIGNIFICANT CHANGE UP (ref 1.01–1.03)
T3 SERPL-MCNC: 119 NG/DL — SIGNIFICANT CHANGE UP
T4 AB SER-ACNC: 5.2 UG/DL — SIGNIFICANT CHANGE UP (ref 4.6–12)
T4 FREE SERPL-MCNC: 1.1 NG/DL — SIGNIFICANT CHANGE UP (ref 0.9–1.8)
TIBC SERPL-MCNC: 378 UG/DL — SIGNIFICANT CHANGE UP (ref 220–430)
TM INTERPRETATION: SIGNIFICANT CHANGE UP
TRANSFERRIN SERPL-MCNC: 321 MG/DL — SIGNIFICANT CHANGE UP (ref 200–360)
TRIGL SERPL-MCNC: 70 MG/DL — SIGNIFICANT CHANGE UP
TROPONIN T SERPL-MCNC: 0.02 NG/ML — HIGH
TROPONIN T SERPL-MCNC: 0.04 NG/ML — CRITICAL HIGH
TROPONIN T SERPL-MCNC: <0.01 NG/ML — SIGNIFICANT CHANGE UP
TSH SERPL-MCNC: 3.22 UIU/ML — SIGNIFICANT CHANGE UP (ref 0.27–4.2)
TSH SERPL-MCNC: 3.71 UIU/ML — SIGNIFICANT CHANGE UP (ref 0.27–4.2)
TSH SERPL-MCNC: 8.12 UIU/ML — HIGH (ref 0.27–4.2)
UIBC SERPL-MCNC: 328 UG/DL — SIGNIFICANT CHANGE UP (ref 110–370)
URATE SERPL-MCNC: 9.2 MG/DL — HIGH (ref 3.4–8.8)
UROBILINOGEN FLD QL: SIGNIFICANT CHANGE UP
UUN UR-MCNC: 543 MG/DL — SIGNIFICANT CHANGE UP
VARIANT LYMPHS # BLD: 3.5 % — SIGNIFICANT CHANGE UP (ref 0–5)
VIT B12 SERPL-MCNC: 1123 PG/ML — SIGNIFICANT CHANGE UP (ref 232–1245)
VIT B12 SERPL-MCNC: 476 PG/ML — SIGNIFICANT CHANGE UP (ref 232–1245)
VIT B12 SERPL-MCNC: 551 PG/ML — SIGNIFICANT CHANGE UP (ref 232–1245)
WBC # BLD: 0.88 K/UL — CRITICAL LOW (ref 4.8–10.8)
WBC # BLD: 1.12 K/UL — LOW (ref 4.8–10.8)
WBC # BLD: 1.22 K/UL — LOW (ref 4.8–10.8)
WBC # BLD: 1.26 K/UL — LOW (ref 4.8–10.8)
WBC # BLD: 1.32 K/UL — LOW (ref 4.8–10.8)
WBC # BLD: 1.45 K/UL — LOW (ref 4.8–10.8)
WBC # BLD: 1.47 K/UL — LOW (ref 4.8–10.8)
WBC # BLD: 1.54 K/UL — LOW (ref 4.8–10.8)
WBC # BLD: 1.55 K/UL — LOW (ref 4.8–10.8)
WBC # BLD: 1.56 K/UL — LOW (ref 4.8–10.8)
WBC # BLD: 1.63 K/UL — LOW (ref 4.8–10.8)
WBC # BLD: 1.73 K/UL — LOW (ref 4.8–10.8)
WBC # BLD: 1.77 K/UL — LOW (ref 4.8–10.8)
WBC # BLD: 1.83 K/UL — LOW (ref 4.8–10.8)
WBC # BLD: 1.86 K/UL — LOW (ref 4.8–10.8)
WBC # BLD: 1.97 K/UL — LOW (ref 4.8–10.8)
WBC # BLD: 2.14 K/UL — LOW (ref 4.8–10.8)
WBC # BLD: 2.27 K/UL — LOW (ref 4.8–10.8)
WBC # BLD: 2.41 K/UL — LOW (ref 4.8–10.8)
WBC # BLD: 2.48 K/UL — LOW (ref 4.8–10.8)
WBC # BLD: 2.76 K/UL — LOW (ref 4.8–10.8)
WBC # BLD: 2.76 K/UL — LOW (ref 4.8–10.8)
WBC # BLD: 2.93 K/UL — LOW (ref 4.8–10.8)
WBC # BLD: 3.52 K/UL — LOW (ref 4.8–10.8)
WBC # FLD AUTO: 0.88 K/UL — CRITICAL LOW (ref 4.8–10.8)
WBC # FLD AUTO: 1.12 K/UL — LOW (ref 4.8–10.8)
WBC # FLD AUTO: 1.22 K/UL — LOW (ref 4.8–10.8)
WBC # FLD AUTO: 1.26 K/UL — LOW (ref 4.8–10.8)
WBC # FLD AUTO: 1.32 K/UL — LOW (ref 4.8–10.8)
WBC # FLD AUTO: 1.45 K/UL — LOW (ref 4.8–10.8)
WBC # FLD AUTO: 1.47 K/UL — LOW (ref 4.8–10.8)
WBC # FLD AUTO: 1.54 K/UL — LOW (ref 4.8–10.8)
WBC # FLD AUTO: 1.55 K/UL — LOW (ref 4.8–10.8)
WBC # FLD AUTO: 1.56 K/UL — LOW (ref 4.8–10.8)
WBC # FLD AUTO: 1.63 K/UL — LOW (ref 4.8–10.8)
WBC # FLD AUTO: 1.73 K/UL — LOW (ref 4.8–10.8)
WBC # FLD AUTO: 1.77 K/UL — LOW (ref 4.8–10.8)
WBC # FLD AUTO: 1.83 K/UL — LOW (ref 4.8–10.8)
WBC # FLD AUTO: 1.86 K/UL — LOW (ref 4.8–10.8)
WBC # FLD AUTO: 1.97 K/UL — LOW (ref 4.8–10.8)
WBC # FLD AUTO: 2.14 K/UL — LOW (ref 4.8–10.8)
WBC # FLD AUTO: 2.27 K/UL — LOW (ref 4.8–10.8)
WBC # FLD AUTO: 2.41 K/UL — LOW (ref 4.8–10.8)
WBC # FLD AUTO: 2.48 K/UL — LOW (ref 4.8–10.8)
WBC # FLD AUTO: 2.76 K/UL — LOW (ref 4.8–10.8)
WBC # FLD AUTO: 2.76 K/UL — LOW (ref 4.8–10.8)
WBC # FLD AUTO: 2.93 K/UL — LOW (ref 4.8–10.8)
WBC # FLD AUTO: 3.52 K/UL — LOW (ref 4.8–10.8)
WBC UR QL: 4 /HPF — SIGNIFICANT CHANGE UP (ref 0–5)

## 2022-01-01 PROCEDURE — ZZZZZ: CPT

## 2022-01-01 PROCEDURE — 93010 ELECTROCARDIOGRAM REPORT: CPT | Mod: 76

## 2022-01-01 PROCEDURE — 99233 SBSQ HOSP IP/OBS HIGH 50: CPT

## 2022-01-01 PROCEDURE — 99285 EMERGENCY DEPT VISIT HI MDM: CPT

## 2022-01-01 PROCEDURE — 93010 ELECTROCARDIOGRAM REPORT: CPT

## 2022-01-01 PROCEDURE — 76705 ECHO EXAM OF ABDOMEN: CPT | Mod: 26

## 2022-01-01 PROCEDURE — 99255 IP/OBS CONSLTJ NEW/EST HI 80: CPT

## 2022-01-01 PROCEDURE — 99291 CRITICAL CARE FIRST HOUR: CPT

## 2022-01-01 PROCEDURE — 71045 X-RAY EXAM CHEST 1 VIEW: CPT | Mod: 26

## 2022-01-01 PROCEDURE — 99232 SBSQ HOSP IP/OBS MODERATE 35: CPT | Mod: GC

## 2022-01-01 PROCEDURE — 93976 VASCULAR STUDY: CPT | Mod: 26

## 2022-01-01 PROCEDURE — 93306 TTE W/DOPPLER COMPLETE: CPT | Mod: 26

## 2022-01-01 PROCEDURE — 78452 HT MUSCLE IMAGE SPECT MULT: CPT | Mod: 26

## 2022-01-01 PROCEDURE — 93018 CV STRESS TEST I&R ONLY: CPT

## 2022-01-01 PROCEDURE — 99285 EMERGENCY DEPT VISIT HI MDM: CPT | Mod: 25

## 2022-01-01 PROCEDURE — 93971 EXTREMITY STUDY: CPT | Mod: 26,LT

## 2022-01-01 PROCEDURE — 93970 EXTREMITY STUDY: CPT | Mod: 26

## 2022-01-01 PROCEDURE — 92950 HEART/LUNG RESUSCITATION CPR: CPT

## 2022-01-01 PROCEDURE — 71275 CT ANGIOGRAPHY CHEST: CPT | Mod: 26,MA

## 2022-01-01 PROCEDURE — 88189 FLOWCYTOMETRY/READ 16 & >: CPT

## 2022-01-01 PROCEDURE — 99223 1ST HOSP IP/OBS HIGH 75: CPT

## 2022-01-01 PROCEDURE — 33208 INSRT HEART PM ATRIAL & VENT: CPT | Mod: KX

## 2022-01-01 PROCEDURE — 76604 US EXAM CHEST: CPT | Mod: 26

## 2022-01-01 PROCEDURE — 93280 PM DEVICE PROGR EVAL DUAL: CPT | Mod: 26

## 2022-01-01 PROCEDURE — 99232 SBSQ HOSP IP/OBS MODERATE 35: CPT

## 2022-01-01 PROCEDURE — 70450 CT HEAD/BRAIN W/O DYE: CPT | Mod: 26,MA

## 2022-01-01 PROCEDURE — 74160 CT ABDOMEN W/CONTRAST: CPT | Mod: 26

## 2022-01-01 PROCEDURE — 99222 1ST HOSP IP/OBS MODERATE 55: CPT

## 2022-01-01 PROCEDURE — 93010 ELECTROCARDIOGRAM REPORT: CPT | Mod: 77

## 2022-01-01 PROCEDURE — 93016 CV STRESS TEST SUPVJ ONLY: CPT

## 2022-01-01 PROCEDURE — 99239 HOSP IP/OBS DSCHRG MGMT >30: CPT

## 2022-01-01 PROCEDURE — 99238 HOSP IP/OBS DSCHRG MGMT 30/<: CPT

## 2022-01-01 PROCEDURE — 99223 1ST HOSP IP/OBS HIGH 75: CPT | Mod: GC

## 2022-01-01 PROCEDURE — 99253 IP/OBS CNSLTJ NEW/EST LOW 45: CPT | Mod: GC

## 2022-01-01 PROCEDURE — 76705 ECHO EXAM OF ABDOMEN: CPT | Mod: 26,59

## 2022-01-01 PROCEDURE — 71046 X-RAY EXAM CHEST 2 VIEWS: CPT | Mod: 26

## 2022-01-01 RX ORDER — LEVOTHYROXINE SODIUM 125 MCG
50 TABLET ORAL DAILY
Refills: 0 | Status: DISCONTINUED | OUTPATIENT
Start: 2022-01-01 | End: 2022-01-01

## 2022-01-01 RX ORDER — FOLIC ACID 0.8 MG
1 TABLET ORAL
Qty: 0 | Refills: 0 | DISCHARGE
Start: 2022-01-01

## 2022-01-01 RX ORDER — IPRATROPIUM/ALBUTEROL SULFATE 18-103MCG
3 AEROSOL WITH ADAPTER (GRAM) INHALATION ONCE
Refills: 0 | Status: COMPLETED | OUTPATIENT
Start: 2022-01-01 | End: 2022-01-01

## 2022-01-01 RX ORDER — BUPRENORPHINE AND NALOXONE 2; .5 MG/1; MG/1
2 TABLET SUBLINGUAL
Qty: 0 | Refills: 0 | DISCHARGE

## 2022-01-01 RX ORDER — HEPARIN SODIUM 5000 [USP'U]/ML
5000 INJECTION INTRAVENOUS; SUBCUTANEOUS EVERY 8 HOURS
Refills: 0 | Status: DISCONTINUED | OUTPATIENT
Start: 2022-01-01 | End: 2022-01-01

## 2022-01-01 RX ORDER — HYDROXYZINE HCL 10 MG
25 TABLET ORAL AT BEDTIME
Refills: 0 | Status: DISCONTINUED | OUTPATIENT
Start: 2022-01-01 | End: 2022-01-01

## 2022-01-01 RX ORDER — GABAPENTIN 400 MG/1
100 CAPSULE ORAL THREE TIMES A DAY
Refills: 0 | Status: DISCONTINUED | OUTPATIENT
Start: 2022-01-01 | End: 2022-01-01

## 2022-01-01 RX ORDER — REGADENOSON 0.08 MG/ML
0.4 INJECTION, SOLUTION INTRAVENOUS ONCE
Refills: 0 | Status: DISCONTINUED | OUTPATIENT
Start: 2022-01-01 | End: 2022-01-01

## 2022-01-01 RX ORDER — ATORVASTATIN CALCIUM 80 MG/1
40 TABLET, FILM COATED ORAL AT BEDTIME
Refills: 0 | Status: DISCONTINUED | OUTPATIENT
Start: 2022-01-01 | End: 2022-01-01

## 2022-01-01 RX ORDER — TICAGRELOR 90 MG/1
1 TABLET ORAL
Qty: 60 | Refills: 2
Start: 2022-01-01 | End: 2022-01-01

## 2022-01-01 RX ORDER — SODIUM CHLORIDE 9 MG/ML
1000 INJECTION INTRAMUSCULAR; INTRAVENOUS; SUBCUTANEOUS
Refills: 0 | Status: DISCONTINUED | OUTPATIENT
Start: 2022-01-01 | End: 2022-01-01

## 2022-01-01 RX ORDER — ASPIRIN/CALCIUM CARB/MAGNESIUM 324 MG
1 TABLET ORAL
Qty: 0 | Refills: 0 | DISCHARGE

## 2022-01-01 RX ORDER — MAGNESIUM SULFATE 500 MG/ML
2 VIAL (ML) INJECTION
Refills: 0 | Status: COMPLETED | OUTPATIENT
Start: 2022-01-01 | End: 2022-01-01

## 2022-01-01 RX ORDER — METOPROLOL TARTRATE 50 MG
1 TABLET ORAL
Qty: 30 | Refills: 0
Start: 2022-01-01 | End: 2022-01-01

## 2022-01-01 RX ORDER — BUPRENORPHINE AND NALOXONE 2; .5 MG/1; MG/1
0.5 TABLET SUBLINGUAL
Refills: 0 | Status: DISCONTINUED | OUTPATIENT
Start: 2022-01-01 | End: 2022-01-01

## 2022-01-01 RX ORDER — NICOTINE POLACRILEX 2 MG
1 GUM BUCCAL DAILY
Refills: 0 | Status: DISCONTINUED | OUTPATIENT
Start: 2022-01-01 | End: 2022-01-01

## 2022-01-01 RX ORDER — PANTOPRAZOLE SODIUM 20 MG/1
40 TABLET, DELAYED RELEASE ORAL AT BEDTIME
Refills: 0 | Status: DISCONTINUED | OUTPATIENT
Start: 2022-01-01 | End: 2022-01-01

## 2022-01-01 RX ORDER — SODIUM BICARBONATE 1 MEQ/ML
50 SYRINGE (ML) INTRAVENOUS ONCE
Refills: 0 | Status: COMPLETED | OUTPATIENT
Start: 2022-01-01 | End: 2022-01-01

## 2022-01-01 RX ORDER — GEMFIBROZIL 600 MG
1 TABLET ORAL
Qty: 0 | Refills: 0 | DISCHARGE

## 2022-01-01 RX ORDER — GABAPENTIN 400 MG/1
1 CAPSULE ORAL
Qty: 90 | Refills: 0
Start: 2022-01-01 | End: 2022-01-01

## 2022-01-01 RX ORDER — INSULIN LISPRO 100/ML
VIAL (ML) SUBCUTANEOUS
Refills: 0 | Status: DISCONTINUED | OUTPATIENT
Start: 2022-01-01 | End: 2022-01-01

## 2022-01-01 RX ORDER — SPIRONOLACTONE 25 MG/1
100 TABLET, FILM COATED ORAL DAILY
Refills: 0 | Status: DISCONTINUED | OUTPATIENT
Start: 2022-01-01 | End: 2022-01-01

## 2022-01-01 RX ORDER — DEXTROSE 50 % IN WATER 50 %
15 SYRINGE (ML) INTRAVENOUS ONCE
Refills: 0 | Status: DISCONTINUED | OUTPATIENT
Start: 2022-01-01 | End: 2022-01-01

## 2022-01-01 RX ORDER — ASPIRIN/CALCIUM CARB/MAGNESIUM 324 MG
81 TABLET ORAL DAILY
Refills: 0 | Status: DISCONTINUED | OUTPATIENT
Start: 2022-01-01 | End: 2022-01-01

## 2022-01-01 RX ORDER — ENOXAPARIN SODIUM 100 MG/ML
100 INJECTION SUBCUTANEOUS EVERY 12 HOURS
Refills: 0 | Status: DISCONTINUED | OUTPATIENT
Start: 2022-01-01 | End: 2022-01-01

## 2022-01-01 RX ORDER — SPIRONOLACTONE 25 MG/1
1 TABLET, FILM COATED ORAL
Qty: 0 | Refills: 0 | DISCHARGE

## 2022-01-01 RX ORDER — LACTULOSE 10 G/15ML
10 SOLUTION ORAL DAILY
Refills: 0 | Status: DISCONTINUED | OUTPATIENT
Start: 2022-01-01 | End: 2022-01-01

## 2022-01-01 RX ORDER — CEFTRIAXONE 500 MG/1
1000 INJECTION, POWDER, FOR SOLUTION INTRAMUSCULAR; INTRAVENOUS EVERY 24 HOURS
Refills: 0 | Status: DISCONTINUED | OUTPATIENT
Start: 2022-01-01 | End: 2022-01-01

## 2022-01-01 RX ORDER — TORSEMIDE 20 MG/1
20 TABLET ORAL
Refills: 0 | Status: ACTIVE | COMMUNITY

## 2022-01-01 RX ORDER — ACETAMINOPHEN 500 MG
975 TABLET ORAL ONCE
Refills: 0 | Status: COMPLETED | OUTPATIENT
Start: 2022-01-01 | End: 2022-01-01

## 2022-01-01 RX ORDER — SODIUM BICARBONATE 1 MEQ/ML
650 SYRINGE (ML) INTRAVENOUS THREE TIMES A DAY
Refills: 0 | Status: DISCONTINUED | OUTPATIENT
Start: 2022-01-01 | End: 2022-01-01

## 2022-01-01 RX ORDER — SPIRONOLACTONE 25 MG/1
50 TABLET, FILM COATED ORAL DAILY
Refills: 0 | Status: DISCONTINUED | OUTPATIENT
Start: 2022-01-01 | End: 2022-01-01

## 2022-01-01 RX ORDER — FUROSEMIDE 40 MG
20 TABLET ORAL DAILY
Refills: 0 | Status: DISCONTINUED | OUTPATIENT
Start: 2022-01-01 | End: 2022-01-01

## 2022-01-01 RX ORDER — FOLIC ACID 0.8 MG
1 TABLET ORAL DAILY
Refills: 0 | Status: DISCONTINUED | OUTPATIENT
Start: 2022-01-01 | End: 2022-01-01

## 2022-01-01 RX ORDER — ALBUTEROL 90 UG/1
2.5 AEROSOL, METERED ORAL ONCE
Refills: 0 | Status: COMPLETED | OUTPATIENT
Start: 2022-01-01 | End: 2022-01-01

## 2022-01-01 RX ORDER — PANTOPRAZOLE SODIUM 20 MG/1
40 TABLET, DELAYED RELEASE ORAL
Refills: 0 | Status: DISCONTINUED | OUTPATIENT
Start: 2022-01-01 | End: 2022-01-01

## 2022-01-01 RX ORDER — ACETAMINOPHEN 500 MG
650 TABLET ORAL ONCE
Refills: 0 | Status: COMPLETED | OUTPATIENT
Start: 2022-01-01 | End: 2022-01-01

## 2022-01-01 RX ORDER — INSULIN HUMAN 100 [IU]/ML
10 INJECTION, SOLUTION SUBCUTANEOUS ONCE
Refills: 0 | Status: COMPLETED | OUTPATIENT
Start: 2022-01-01 | End: 2022-01-01

## 2022-01-01 RX ORDER — METOPROLOL TARTRATE 50 MG
25 TABLET ORAL
Qty: 0 | Refills: 0 | DISCHARGE

## 2022-01-01 RX ORDER — METFORMIN HYDROCHLORIDE 850 MG/1
1 TABLET ORAL
Qty: 0 | Refills: 0 | DISCHARGE

## 2022-01-01 RX ORDER — OCTREOTIDE ACETATE 200 UG/ML
50 INJECTION, SOLUTION INTRAVENOUS; SUBCUTANEOUS
Qty: 500 | Refills: 0 | Status: DISCONTINUED | OUTPATIENT
Start: 2022-01-01 | End: 2022-01-01

## 2022-01-01 RX ORDER — INFLUENZA VIRUS VACCINE 15; 15; 15; 15 UG/.5ML; UG/.5ML; UG/.5ML; UG/.5ML
0.7 SUSPENSION INTRAMUSCULAR ONCE
Refills: 0 | Status: DISCONTINUED | OUTPATIENT
Start: 2022-01-01 | End: 2022-01-01

## 2022-01-01 RX ORDER — ACETAMINOPHEN 500 MG
650 TABLET ORAL EVERY 6 HOURS
Refills: 0 | Status: DISCONTINUED | OUTPATIENT
Start: 2022-01-01 | End: 2022-01-01

## 2022-01-01 RX ORDER — METOPROLOL TARTRATE 50 MG
12.5 TABLET ORAL
Refills: 0 | Status: DISCONTINUED | OUTPATIENT
Start: 2022-01-01 | End: 2022-01-01

## 2022-01-01 RX ORDER — SODIUM ZIRCONIUM CYCLOSILICATE 10 G/10G
10 POWDER, FOR SUSPENSION ORAL THREE TIMES A DAY
Refills: 0 | Status: DISCONTINUED | OUTPATIENT
Start: 2022-01-01 | End: 2022-01-01

## 2022-01-01 RX ORDER — LEVOTHYROXINE SODIUM 125 MCG
1 TABLET ORAL
Qty: 0 | Refills: 0 | DISCHARGE

## 2022-01-01 RX ORDER — DEXTROSE 50 % IN WATER 50 %
50 SYRINGE (ML) INTRAVENOUS ONCE
Refills: 0 | Status: COMPLETED | OUTPATIENT
Start: 2022-01-01 | End: 2022-01-01

## 2022-01-01 RX ORDER — ADENOSINE 3 MG/ML
60 INJECTION INTRAVENOUS ONCE
Refills: 0 | Status: COMPLETED | OUTPATIENT
Start: 2022-01-01 | End: 2022-01-01

## 2022-01-01 RX ORDER — FOLIC ACID 1 MG/1
1 TABLET ORAL
Refills: 0 | Status: ACTIVE | COMMUNITY

## 2022-01-01 RX ORDER — LEVOTHYROXINE SODIUM 0.05 MG/1
50 TABLET ORAL
Refills: 0 | Status: ACTIVE | COMMUNITY

## 2022-01-01 RX ORDER — FUROSEMIDE 40 MG
40 TABLET ORAL DAILY
Refills: 0 | Status: DISCONTINUED | OUTPATIENT
Start: 2022-01-01 | End: 2022-01-01

## 2022-01-01 RX ORDER — SODIUM CHLORIDE 9 MG/ML
1000 INJECTION, SOLUTION INTRAVENOUS
Refills: 0 | Status: DISCONTINUED | OUTPATIENT
Start: 2022-01-01 | End: 2022-01-01

## 2022-01-01 RX ORDER — INSULIN HUMAN 100 [IU]/ML
5 INJECTION, SOLUTION SUBCUTANEOUS ONCE
Refills: 0 | Status: COMPLETED | OUTPATIENT
Start: 2022-01-01 | End: 2022-01-01

## 2022-01-01 RX ORDER — ENOXAPARIN SODIUM 100 MG/ML
40 INJECTION SUBCUTANEOUS EVERY 24 HOURS
Refills: 0 | Status: DISCONTINUED | OUTPATIENT
Start: 2022-01-01 | End: 2022-01-01

## 2022-01-01 RX ORDER — IPRATROPIUM/ALBUTEROL SULFATE 18-103MCG
3 AEROSOL WITH ADAPTER (GRAM) INHALATION EVERY 6 HOURS
Refills: 0 | Status: DISCONTINUED | OUTPATIENT
Start: 2022-01-01 | End: 2022-01-01

## 2022-01-01 RX ORDER — ALBUTEROL SULFATE 2.5 MG/3ML
(2.5 MG/3ML) SOLUTION RESPIRATORY (INHALATION)
Refills: 0 | Status: ACTIVE | COMMUNITY

## 2022-01-01 RX ORDER — FUROSEMIDE 40 MG
40 TABLET ORAL EVERY 12 HOURS
Refills: 0 | Status: DISCONTINUED | OUTPATIENT
Start: 2022-01-01 | End: 2022-01-01

## 2022-01-01 RX ORDER — GABAPENTIN 100 MG/1
CAPSULE ORAL
Refills: 0 | Status: ACTIVE | COMMUNITY

## 2022-01-01 RX ORDER — CEFTRIAXONE 500 MG/1
1000 INJECTION, POWDER, FOR SOLUTION INTRAMUSCULAR; INTRAVENOUS ONCE
Refills: 0 | Status: COMPLETED | OUTPATIENT
Start: 2022-01-01 | End: 2022-01-01

## 2022-01-01 RX ORDER — CALCIUM GLUCONATE 100 MG/ML
1 VIAL (ML) INTRAVENOUS ONCE
Refills: 0 | Status: COMPLETED | OUTPATIENT
Start: 2022-01-01 | End: 2022-01-01

## 2022-01-01 RX ORDER — ALBUTEROL 90 UG/1
2 AEROSOL, METERED ORAL EVERY 6 HOURS
Refills: 0 | Status: DISCONTINUED | OUTPATIENT
Start: 2022-01-01 | End: 2022-01-01

## 2022-01-01 RX ORDER — IRON SUCROSE 20 MG/ML
200 INJECTION, SOLUTION INTRAVENOUS
Refills: 0 | Status: DISCONTINUED | OUTPATIENT
Start: 2022-01-01 | End: 2022-01-01

## 2022-01-01 RX ORDER — POLYETHYLENE GLYCOL 3350 17 G/17G
17 POWDER, FOR SOLUTION ORAL DAILY
Refills: 0 | Status: DISCONTINUED | OUTPATIENT
Start: 2022-01-01 | End: 2022-01-01

## 2022-01-01 RX ORDER — SODIUM BICARBONATE 1 MEQ/ML
0.15 SYRINGE (ML) INTRAVENOUS
Qty: 150 | Refills: 0 | Status: DISCONTINUED | OUTPATIENT
Start: 2022-01-01 | End: 2022-01-01

## 2022-01-01 RX ORDER — BUPRENORPHINE AND NALOXONE 2; .5 MG/1; MG/1
0.5 TABLET SUBLINGUAL
Qty: 0 | Refills: 0 | DISCHARGE

## 2022-01-01 RX ORDER — FUROSEMIDE 40 MG
1 TABLET ORAL
Qty: 0 | Refills: 0 | DISCHARGE
Start: 2022-01-01

## 2022-01-01 RX ORDER — BUPRENORPHINE AND NALOXONE 12; 3 MG/1; MG/1
FILM BUCCAL; SUBLINGUAL
Refills: 0 | Status: ACTIVE | COMMUNITY

## 2022-01-01 RX ORDER — HYDROMORPHONE HYDROCHLORIDE 2 MG/ML
1 INJECTION INTRAMUSCULAR; INTRAVENOUS; SUBCUTANEOUS
Refills: 0 | Status: DISCONTINUED | OUTPATIENT
Start: 2022-01-01 | End: 2022-01-01

## 2022-01-01 RX ORDER — DEXTROSE 50 % IN WATER 50 %
25 SYRINGE (ML) INTRAVENOUS ONCE
Refills: 0 | Status: COMPLETED | OUTPATIENT
Start: 2022-01-01 | End: 2022-01-01

## 2022-01-01 RX ORDER — LEVOTHYROXINE SODIUM 125 MCG
1 TABLET ORAL
Qty: 30 | Refills: 0
Start: 2022-01-01 | End: 2022-01-01

## 2022-01-01 RX ORDER — THIAMINE MONONITRATE (VIT B1) 100 MG
1 TABLET ORAL
Qty: 0 | Refills: 0 | DISCHARGE
Start: 2022-01-01

## 2022-01-01 RX ORDER — FUROSEMIDE 40 MG
40 TABLET ORAL ONCE
Refills: 0 | Status: COMPLETED | OUTPATIENT
Start: 2022-01-01 | End: 2022-01-01

## 2022-01-01 RX ORDER — DEXTROSE 50 % IN WATER 50 %
25 SYRINGE (ML) INTRAVENOUS ONCE
Refills: 0 | Status: DISCONTINUED | OUTPATIENT
Start: 2022-01-01 | End: 2022-01-01

## 2022-01-01 RX ORDER — CEFTRIAXONE 500 MG/1
INJECTION, POWDER, FOR SOLUTION INTRAMUSCULAR; INTRAVENOUS
Refills: 0 | Status: DISCONTINUED | OUTPATIENT
Start: 2022-01-01 | End: 2022-01-01

## 2022-01-01 RX ORDER — SODIUM CHLORIDE 9 MG/ML
1000 INJECTION, SOLUTION INTRAVENOUS ONCE
Refills: 0 | Status: COMPLETED | OUTPATIENT
Start: 2022-01-01 | End: 2022-01-01

## 2022-01-01 RX ORDER — GLUCAGON INJECTION, SOLUTION 0.5 MG/.1ML
1 INJECTION, SOLUTION SUBCUTANEOUS ONCE
Refills: 0 | Status: DISCONTINUED | OUTPATIENT
Start: 2022-01-01 | End: 2022-01-01

## 2022-01-01 RX ORDER — HYDROMORPHONE HYDROCHLORIDE 2 MG/ML
0.5 INJECTION INTRAMUSCULAR; INTRAVENOUS; SUBCUTANEOUS
Refills: 0 | Status: DISCONTINUED | OUTPATIENT
Start: 2022-01-01 | End: 2022-01-01

## 2022-01-01 RX ORDER — THIAMINE MONONITRATE (VIT B1) 100 MG
100 TABLET ORAL DAILY
Refills: 0 | Status: DISCONTINUED | OUTPATIENT
Start: 2022-01-01 | End: 2022-01-01

## 2022-01-01 RX ORDER — ALBUTEROL 90 UG/1
2 AEROSOL, METERED ORAL
Qty: 0 | Refills: 0 | DISCHARGE

## 2022-01-01 RX ORDER — ATORVASTATIN CALCIUM 80 MG/1
1 TABLET, FILM COATED ORAL
Qty: 30 | Refills: 0
Start: 2022-01-01 | End: 2022-01-01

## 2022-01-01 RX ORDER — DEXTROSE 50 % IN WATER 50 %
12.5 SYRINGE (ML) INTRAVENOUS ONCE
Refills: 0 | Status: DISCONTINUED | OUTPATIENT
Start: 2022-01-01 | End: 2022-01-01

## 2022-01-01 RX ORDER — TICAGRELOR 90 MG/1
90 TABLET ORAL EVERY 12 HOURS
Refills: 0 | Status: DISCONTINUED | OUTPATIENT
Start: 2022-01-01 | End: 2022-01-01

## 2022-01-01 RX ORDER — OCTREOTIDE ACETATE 200 UG/ML
50 INJECTION, SOLUTION INTRAVENOUS; SUBCUTANEOUS ONCE
Refills: 0 | Status: COMPLETED | OUTPATIENT
Start: 2022-01-01 | End: 2022-01-01

## 2022-01-01 RX ORDER — PANTOPRAZOLE SODIUM 20 MG/1
40 TABLET, DELAYED RELEASE ORAL DAILY
Refills: 0 | Status: DISCONTINUED | OUTPATIENT
Start: 2022-01-01 | End: 2022-01-01

## 2022-01-01 RX ORDER — CEFAZOLIN SODIUM 1 G
1000 VIAL (EA) INJECTION EVERY 8 HOURS
Refills: 0 | Status: COMPLETED | OUTPATIENT
Start: 2022-01-01 | End: 2022-01-01

## 2022-01-01 RX ORDER — SODIUM ZIRCONIUM CYCLOSILICATE 10 G/10G
10 POWDER, FOR SUSPENSION ORAL ONCE
Refills: 0 | Status: COMPLETED | OUTPATIENT
Start: 2022-01-01 | End: 2022-01-01

## 2022-01-01 RX ORDER — SENNA PLUS 8.6 MG/1
2 TABLET ORAL AT BEDTIME
Refills: 0 | Status: DISCONTINUED | OUTPATIENT
Start: 2022-01-01 | End: 2022-01-01

## 2022-01-01 RX ORDER — BUPRENORPHINE AND NALOXONE 2; .5 MG/1; MG/1
0.5 TABLET SUBLINGUAL ONCE
Refills: 0 | Status: DISCONTINUED | OUTPATIENT
Start: 2022-01-01 | End: 2022-01-01

## 2022-01-01 RX ADMIN — GABAPENTIN 100 MILLIGRAM(S): 400 CAPSULE ORAL at 22:37

## 2022-01-01 RX ADMIN — PANTOPRAZOLE SODIUM 40 MILLIGRAM(S): 20 TABLET, DELAYED RELEASE ORAL at 05:58

## 2022-01-01 RX ADMIN — Medication 1 MILLIGRAM(S): at 11:12

## 2022-01-01 RX ADMIN — Medication 1 PATCH: at 11:13

## 2022-01-01 RX ADMIN — Medication 50 MICROGRAM(S): at 06:35

## 2022-01-01 RX ADMIN — Medication 1 PATCH: at 14:28

## 2022-01-01 RX ADMIN — Medication 650 MILLIGRAM(S): at 05:42

## 2022-01-01 RX ADMIN — Medication 1 PATCH: at 23:14

## 2022-01-01 RX ADMIN — ATORVASTATIN CALCIUM 40 MILLIGRAM(S): 80 TABLET, FILM COATED ORAL at 23:03

## 2022-01-01 RX ADMIN — BUPRENORPHINE AND NALOXONE 0.5 TABLET(S): 2; .5 TABLET SUBLINGUAL at 10:23

## 2022-01-01 RX ADMIN — HEPARIN SODIUM 5000 UNIT(S): 5000 INJECTION INTRAVENOUS; SUBCUTANEOUS at 22:36

## 2022-01-01 RX ADMIN — PANTOPRAZOLE SODIUM 40 MILLIGRAM(S): 20 TABLET, DELAYED RELEASE ORAL at 06:35

## 2022-01-01 RX ADMIN — Medication 50 MICROGRAM(S): at 06:36

## 2022-01-01 RX ADMIN — Medication 20 MILLIGRAM(S): at 06:36

## 2022-01-01 RX ADMIN — GABAPENTIN 100 MILLIGRAM(S): 400 CAPSULE ORAL at 06:04

## 2022-01-01 RX ADMIN — Medication 1 PATCH: at 12:03

## 2022-01-01 RX ADMIN — Medication 100 MILLIGRAM(S): at 12:30

## 2022-01-01 RX ADMIN — Medication 1 MILLIGRAM(S): at 11:16

## 2022-01-01 RX ADMIN — SPIRONOLACTONE 50 MILLIGRAM(S): 25 TABLET, FILM COATED ORAL at 05:57

## 2022-01-01 RX ADMIN — Medication 650 MILLIGRAM(S): at 13:29

## 2022-01-01 RX ADMIN — Medication 50 MICROGRAM(S): at 06:38

## 2022-01-01 RX ADMIN — PANTOPRAZOLE SODIUM 40 MILLIGRAM(S): 20 TABLET, DELAYED RELEASE ORAL at 06:01

## 2022-01-01 RX ADMIN — Medication 25 GRAM(S): at 12:51

## 2022-01-01 RX ADMIN — OCTREOTIDE ACETATE 10 MICROGRAM(S)/HR: 200 INJECTION, SOLUTION INTRAVENOUS; SUBCUTANEOUS at 16:33

## 2022-01-01 RX ADMIN — HEPARIN SODIUM 5000 UNIT(S): 5000 INJECTION INTRAVENOUS; SUBCUTANEOUS at 23:26

## 2022-01-01 RX ADMIN — GABAPENTIN 100 MILLIGRAM(S): 400 CAPSULE ORAL at 14:58

## 2022-01-01 RX ADMIN — BUPRENORPHINE AND NALOXONE 0.5 TABLET(S): 2; .5 TABLET SUBLINGUAL at 20:44

## 2022-01-01 RX ADMIN — BUPRENORPHINE AND NALOXONE 0.5 TABLET(S): 2; .5 TABLET SUBLINGUAL at 09:49

## 2022-01-01 RX ADMIN — Medication 1 PATCH: at 10:23

## 2022-01-01 RX ADMIN — Medication 40 MILLIGRAM(S): at 06:14

## 2022-01-01 RX ADMIN — Medication 1 PATCH: at 08:03

## 2022-01-01 RX ADMIN — GABAPENTIN 100 MILLIGRAM(S): 400 CAPSULE ORAL at 15:50

## 2022-01-01 RX ADMIN — Medication 3 MILLILITER(S): at 13:49

## 2022-01-01 RX ADMIN — Medication 50 MICROGRAM(S): at 05:45

## 2022-01-01 RX ADMIN — GABAPENTIN 100 MILLIGRAM(S): 400 CAPSULE ORAL at 22:38

## 2022-01-01 RX ADMIN — Medication 81 MILLIGRAM(S): at 12:28

## 2022-01-01 RX ADMIN — ATORVASTATIN CALCIUM 40 MILLIGRAM(S): 80 TABLET, FILM COATED ORAL at 21:19

## 2022-01-01 RX ADMIN — OCTREOTIDE ACETATE 10 MICROGRAM(S)/HR: 200 INJECTION, SOLUTION INTRAVENOUS; SUBCUTANEOUS at 11:52

## 2022-01-01 RX ADMIN — SPIRONOLACTONE 50 MILLIGRAM(S): 25 TABLET, FILM COATED ORAL at 06:01

## 2022-01-01 RX ADMIN — Medication 50 MICROGRAM(S): at 05:16

## 2022-01-01 RX ADMIN — SODIUM CHLORIDE 50 MILLILITER(S): 9 INJECTION, SOLUTION INTRAVENOUS at 14:43

## 2022-01-01 RX ADMIN — GABAPENTIN 100 MILLIGRAM(S): 400 CAPSULE ORAL at 13:18

## 2022-01-01 RX ADMIN — SPIRONOLACTONE 100 MILLIGRAM(S): 25 TABLET, FILM COATED ORAL at 05:46

## 2022-01-01 RX ADMIN — GABAPENTIN 100 MILLIGRAM(S): 400 CAPSULE ORAL at 21:34

## 2022-01-01 RX ADMIN — Medication 975 MILLIGRAM(S): at 21:55

## 2022-01-01 RX ADMIN — HEPARIN SODIUM 5000 UNIT(S): 5000 INJECTION INTRAVENOUS; SUBCUTANEOUS at 05:42

## 2022-01-01 RX ADMIN — Medication 40 MILLIGRAM(S): at 14:13

## 2022-01-01 RX ADMIN — GABAPENTIN 100 MILLIGRAM(S): 400 CAPSULE ORAL at 22:14

## 2022-01-01 RX ADMIN — Medication 100 GRAM(S): at 12:26

## 2022-01-01 RX ADMIN — BUPRENORPHINE AND NALOXONE 0.5 TABLET(S): 2; .5 TABLET SUBLINGUAL at 22:51

## 2022-01-01 RX ADMIN — HEPARIN SODIUM 5000 UNIT(S): 5000 INJECTION INTRAVENOUS; SUBCUTANEOUS at 05:17

## 2022-01-01 RX ADMIN — BUPRENORPHINE AND NALOXONE 0.5 TABLET(S): 2; .5 TABLET SUBLINGUAL at 20:43

## 2022-01-01 RX ADMIN — Medication 50 MICROGRAM(S): at 06:04

## 2022-01-01 RX ADMIN — SODIUM ZIRCONIUM CYCLOSILICATE 10 GRAM(S): 10 POWDER, FOR SUSPENSION ORAL at 13:29

## 2022-01-01 RX ADMIN — Medication 1 PATCH: at 13:46

## 2022-01-01 RX ADMIN — ATORVASTATIN CALCIUM 40 MILLIGRAM(S): 80 TABLET, FILM COATED ORAL at 22:52

## 2022-01-01 RX ADMIN — BUPRENORPHINE AND NALOXONE 0.5 TABLET(S): 2; .5 TABLET SUBLINGUAL at 08:08

## 2022-01-01 RX ADMIN — GABAPENTIN 100 MILLIGRAM(S): 400 CAPSULE ORAL at 22:52

## 2022-01-01 RX ADMIN — BUPRENORPHINE AND NALOXONE 0.5 TABLET(S): 2; .5 TABLET SUBLINGUAL at 22:11

## 2022-01-01 RX ADMIN — ALBUTEROL 2.5 MILLIGRAM(S): 90 AEROSOL, METERED ORAL at 16:30

## 2022-01-01 RX ADMIN — SENNA PLUS 2 TABLET(S): 8.6 TABLET ORAL at 22:14

## 2022-01-01 RX ADMIN — Medication 1 PATCH: at 16:53

## 2022-01-01 RX ADMIN — HEPARIN SODIUM 5000 UNIT(S): 5000 INJECTION INTRAVENOUS; SUBCUTANEOUS at 22:37

## 2022-01-01 RX ADMIN — BUPRENORPHINE AND NALOXONE 0.5 TABLET(S): 2; .5 TABLET SUBLINGUAL at 20:27

## 2022-01-01 RX ADMIN — Medication 650 MILLIGRAM(S): at 14:59

## 2022-01-01 RX ADMIN — Medication 1 PATCH: at 09:20

## 2022-01-01 RX ADMIN — PANTOPRAZOLE SODIUM 40 MILLIGRAM(S): 20 TABLET, DELAYED RELEASE ORAL at 05:41

## 2022-01-01 RX ADMIN — POLYETHYLENE GLYCOL 3350 17 GRAM(S): 17 POWDER, FOR SOLUTION ORAL at 11:02

## 2022-01-01 RX ADMIN — LACTULOSE 10 GRAM(S): 10 SOLUTION ORAL at 12:31

## 2022-01-01 RX ADMIN — HEPARIN SODIUM 5000 UNIT(S): 5000 INJECTION INTRAVENOUS; SUBCUTANEOUS at 05:35

## 2022-01-01 RX ADMIN — Medication 81 MILLIGRAM(S): at 11:52

## 2022-01-01 RX ADMIN — BUPRENORPHINE AND NALOXONE 0.5 TABLET(S): 2; .5 TABLET SUBLINGUAL at 08:34

## 2022-01-01 RX ADMIN — Medication 1 PATCH: at 13:01

## 2022-01-01 RX ADMIN — Medication 100 MILLIGRAM(S): at 15:19

## 2022-01-01 RX ADMIN — TICAGRELOR 90 MILLIGRAM(S): 90 TABLET ORAL at 17:00

## 2022-01-01 RX ADMIN — BUPRENORPHINE AND NALOXONE 0.5 TABLET(S): 2; .5 TABLET SUBLINGUAL at 08:20

## 2022-01-01 RX ADMIN — Medication 40 MILLIGRAM(S): at 17:52

## 2022-01-01 RX ADMIN — BUPRENORPHINE AND NALOXONE 0.5 TABLET(S): 2; .5 TABLET SUBLINGUAL at 09:15

## 2022-01-01 RX ADMIN — Medication 20 MILLIGRAM(S): at 06:01

## 2022-01-01 RX ADMIN — SPIRONOLACTONE 50 MILLIGRAM(S): 25 TABLET, FILM COATED ORAL at 06:18

## 2022-01-01 RX ADMIN — HEPARIN SODIUM 5000 UNIT(S): 5000 INJECTION INTRAVENOUS; SUBCUTANEOUS at 06:04

## 2022-01-01 RX ADMIN — Medication 3 MILLILITER(S): at 07:43

## 2022-01-01 RX ADMIN — GABAPENTIN 100 MILLIGRAM(S): 400 CAPSULE ORAL at 15:49

## 2022-01-01 RX ADMIN — GABAPENTIN 100 MILLIGRAM(S): 400 CAPSULE ORAL at 06:18

## 2022-01-01 RX ADMIN — SENNA PLUS 2 TABLET(S): 8.6 TABLET ORAL at 22:31

## 2022-01-01 RX ADMIN — Medication 25 MILLIGRAM(S): at 22:02

## 2022-01-01 RX ADMIN — Medication 3 MILLILITER(S): at 07:47

## 2022-01-01 RX ADMIN — ATORVASTATIN CALCIUM 40 MILLIGRAM(S): 80 TABLET, FILM COATED ORAL at 21:28

## 2022-01-01 RX ADMIN — Medication 1 PATCH: at 11:17

## 2022-01-01 RX ADMIN — Medication 81 MILLIGRAM(S): at 12:30

## 2022-01-01 RX ADMIN — Medication 650 MILLIGRAM(S): at 06:14

## 2022-01-01 RX ADMIN — GABAPENTIN 100 MILLIGRAM(S): 400 CAPSULE ORAL at 23:25

## 2022-01-01 RX ADMIN — HEPARIN SODIUM 5000 UNIT(S): 5000 INJECTION INTRAVENOUS; SUBCUTANEOUS at 13:18

## 2022-01-01 RX ADMIN — Medication 1 PATCH: at 12:32

## 2022-01-01 RX ADMIN — ADENOSINE 600 MILLIGRAM(S): 3 INJECTION INTRAVENOUS at 11:58

## 2022-01-01 RX ADMIN — Medication 3 MILLILITER(S): at 13:28

## 2022-01-01 RX ADMIN — HEPARIN SODIUM 5000 UNIT(S): 5000 INJECTION INTRAVENOUS; SUBCUTANEOUS at 15:24

## 2022-01-01 RX ADMIN — Medication 50 MICROGRAM(S): at 05:53

## 2022-01-01 RX ADMIN — ATORVASTATIN CALCIUM 40 MILLIGRAM(S): 80 TABLET, FILM COATED ORAL at 21:25

## 2022-01-01 RX ADMIN — Medication 25 MILLIGRAM(S): at 22:14

## 2022-01-01 RX ADMIN — POLYETHYLENE GLYCOL 3350 17 GRAM(S): 17 POWDER, FOR SOLUTION ORAL at 11:55

## 2022-01-01 RX ADMIN — Medication 3 MILLILITER(S): at 14:22

## 2022-01-01 RX ADMIN — Medication 81 MILLIGRAM(S): at 15:19

## 2022-01-01 RX ADMIN — Medication 40 MILLIGRAM(S): at 16:37

## 2022-01-01 RX ADMIN — HEPARIN SODIUM 5000 UNIT(S): 5000 INJECTION INTRAVENOUS; SUBCUTANEOUS at 06:17

## 2022-01-01 RX ADMIN — BUPRENORPHINE AND NALOXONE 0.5 TABLET(S): 2; .5 TABLET SUBLINGUAL at 09:39

## 2022-01-01 RX ADMIN — Medication 3 MILLILITER(S): at 21:55

## 2022-01-01 RX ADMIN — LACTULOSE 10 GRAM(S): 10 SOLUTION ORAL at 11:55

## 2022-01-01 RX ADMIN — Medication 1 PATCH: at 08:23

## 2022-01-01 RX ADMIN — BUPRENORPHINE AND NALOXONE 0.5 TABLET(S): 2; .5 TABLET SUBLINGUAL at 20:14

## 2022-01-01 RX ADMIN — GABAPENTIN 100 MILLIGRAM(S): 400 CAPSULE ORAL at 06:37

## 2022-01-01 RX ADMIN — Medication 100 MILLIGRAM(S): at 06:17

## 2022-01-01 RX ADMIN — Medication 1 MILLIGRAM(S): at 15:19

## 2022-01-01 RX ADMIN — PANTOPRAZOLE SODIUM 40 MILLIGRAM(S): 20 TABLET, DELAYED RELEASE ORAL at 05:35

## 2022-01-01 RX ADMIN — PANTOPRAZOLE SODIUM 40 MILLIGRAM(S): 20 TABLET, DELAYED RELEASE ORAL at 21:06

## 2022-01-01 RX ADMIN — HEPARIN SODIUM 5000 UNIT(S): 5000 INJECTION INTRAVENOUS; SUBCUTANEOUS at 21:34

## 2022-01-01 RX ADMIN — Medication 1 PATCH: at 08:21

## 2022-01-01 RX ADMIN — GABAPENTIN 100 MILLIGRAM(S): 400 CAPSULE ORAL at 05:16

## 2022-01-01 RX ADMIN — Medication 3 MILLILITER(S): at 20:56

## 2022-01-01 RX ADMIN — Medication 81 MILLIGRAM(S): at 12:36

## 2022-01-01 RX ADMIN — GABAPENTIN 100 MILLIGRAM(S): 400 CAPSULE ORAL at 15:19

## 2022-01-01 RX ADMIN — BUPRENORPHINE AND NALOXONE 0.5 TABLET(S): 2; .5 TABLET SUBLINGUAL at 22:02

## 2022-01-01 RX ADMIN — Medication 650 MILLIGRAM(S): at 22:37

## 2022-01-01 RX ADMIN — Medication 30 MILLILITER(S): at 15:31

## 2022-01-01 RX ADMIN — ATORVASTATIN CALCIUM 40 MILLIGRAM(S): 80 TABLET, FILM COATED ORAL at 22:00

## 2022-01-01 RX ADMIN — BUPRENORPHINE AND NALOXONE 0.5 TABLET(S): 2; .5 TABLET SUBLINGUAL at 21:03

## 2022-01-01 RX ADMIN — BUPRENORPHINE AND NALOXONE 0.5 TABLET(S): 2; .5 TABLET SUBLINGUAL at 08:55

## 2022-01-01 RX ADMIN — Medication 1 PATCH: at 06:35

## 2022-01-01 RX ADMIN — PANTOPRAZOLE SODIUM 40 MILLIGRAM(S): 20 TABLET, DELAYED RELEASE ORAL at 21:57

## 2022-01-01 RX ADMIN — BUPRENORPHINE AND NALOXONE 0.5 TABLET(S): 2; .5 TABLET SUBLINGUAL at 21:57

## 2022-01-01 RX ADMIN — PANTOPRAZOLE SODIUM 40 MILLIGRAM(S): 20 TABLET, DELAYED RELEASE ORAL at 06:34

## 2022-01-01 RX ADMIN — BUPRENORPHINE AND NALOXONE 0.5 TABLET(S): 2; .5 TABLET SUBLINGUAL at 08:49

## 2022-01-01 RX ADMIN — BUPRENORPHINE AND NALOXONE 0.5 TABLET(S): 2; .5 TABLET SUBLINGUAL at 20:25

## 2022-01-01 RX ADMIN — Medication 1 PATCH: at 12:00

## 2022-01-01 RX ADMIN — Medication 81 MILLIGRAM(S): at 11:32

## 2022-01-01 RX ADMIN — Medication 1 PATCH: at 19:52

## 2022-01-01 RX ADMIN — Medication 100 MILLIGRAM(S): at 11:16

## 2022-01-01 RX ADMIN — CEFTRIAXONE 100 MILLIGRAM(S): 500 INJECTION, POWDER, FOR SOLUTION INTRAMUSCULAR; INTRAVENOUS at 16:32

## 2022-01-01 RX ADMIN — Medication 1 PATCH: at 20:00

## 2022-01-01 RX ADMIN — Medication 1 PATCH: at 11:55

## 2022-01-01 RX ADMIN — SPIRONOLACTONE 100 MILLIGRAM(S): 25 TABLET, FILM COATED ORAL at 06:38

## 2022-01-01 RX ADMIN — Medication 40 MILLIGRAM(S): at 06:38

## 2022-01-01 RX ADMIN — Medication 1 PATCH: at 11:19

## 2022-01-01 RX ADMIN — HEPARIN SODIUM 5000 UNIT(S): 5000 INJECTION INTRAVENOUS; SUBCUTANEOUS at 22:55

## 2022-01-01 RX ADMIN — Medication 50 MICROGRAM(S): at 06:30

## 2022-01-01 RX ADMIN — Medication 1 MILLIGRAM(S): at 18:19

## 2022-01-01 RX ADMIN — Medication 1 PATCH: at 19:00

## 2022-01-01 RX ADMIN — Medication 81 MILLIGRAM(S): at 11:12

## 2022-01-01 RX ADMIN — Medication 81 MILLIGRAM(S): at 11:22

## 2022-01-01 RX ADMIN — Medication 40 MILLIGRAM(S): at 17:31

## 2022-01-01 RX ADMIN — SODIUM CHLORIDE 50 MILLILITER(S): 9 INJECTION INTRAMUSCULAR; INTRAVENOUS; SUBCUTANEOUS at 05:38

## 2022-01-01 RX ADMIN — Medication 1 PATCH: at 20:17

## 2022-01-01 RX ADMIN — GABAPENTIN 100 MILLIGRAM(S): 400 CAPSULE ORAL at 06:01

## 2022-01-01 RX ADMIN — LACTULOSE 10 GRAM(S): 10 SOLUTION ORAL at 12:37

## 2022-01-01 RX ADMIN — Medication 1 MILLIGRAM(S): at 13:54

## 2022-01-01 RX ADMIN — SENNA PLUS 2 TABLET(S): 8.6 TABLET ORAL at 23:02

## 2022-01-01 RX ADMIN — HEPARIN SODIUM 5000 UNIT(S): 5000 INJECTION INTRAVENOUS; SUBCUTANEOUS at 05:58

## 2022-01-01 RX ADMIN — BUPRENORPHINE AND NALOXONE 0.5 TABLET(S): 2; .5 TABLET SUBLINGUAL at 08:45

## 2022-01-01 RX ADMIN — PANTOPRAZOLE SODIUM 40 MILLIGRAM(S): 20 TABLET, DELAYED RELEASE ORAL at 06:19

## 2022-01-01 RX ADMIN — SPIRONOLACTONE 100 MILLIGRAM(S): 25 TABLET, FILM COATED ORAL at 05:08

## 2022-01-01 RX ADMIN — CEFTRIAXONE 100 MILLIGRAM(S): 500 INJECTION, POWDER, FOR SOLUTION INTRAMUSCULAR; INTRAVENOUS at 15:13

## 2022-01-01 RX ADMIN — SODIUM ZIRCONIUM CYCLOSILICATE 10 GRAM(S): 10 POWDER, FOR SUSPENSION ORAL at 06:11

## 2022-01-01 RX ADMIN — HEPARIN SODIUM 5000 UNIT(S): 5000 INJECTION INTRAVENOUS; SUBCUTANEOUS at 22:51

## 2022-01-01 RX ADMIN — SODIUM ZIRCONIUM CYCLOSILICATE 10 GRAM(S): 10 POWDER, FOR SUSPENSION ORAL at 08:30

## 2022-01-01 RX ADMIN — LACTULOSE 10 GRAM(S): 10 SOLUTION ORAL at 11:17

## 2022-01-01 RX ADMIN — Medication 20 MILLIGRAM(S): at 06:18

## 2022-01-01 RX ADMIN — Medication 50 MILLIEQUIVALENT(S): at 12:24

## 2022-01-01 RX ADMIN — GABAPENTIN 100 MILLIGRAM(S): 400 CAPSULE ORAL at 13:02

## 2022-01-01 RX ADMIN — BUPRENORPHINE AND NALOXONE 0.5 TABLET(S): 2; .5 TABLET SUBLINGUAL at 14:53

## 2022-01-01 RX ADMIN — PANTOPRAZOLE SODIUM 40 MILLIGRAM(S): 20 TABLET, DELAYED RELEASE ORAL at 05:19

## 2022-01-01 RX ADMIN — ATORVASTATIN CALCIUM 40 MILLIGRAM(S): 80 TABLET, FILM COATED ORAL at 22:28

## 2022-01-01 RX ADMIN — Medication 40 MILLIGRAM(S): at 05:54

## 2022-01-01 RX ADMIN — Medication 50 MICROGRAM(S): at 05:34

## 2022-01-01 RX ADMIN — GABAPENTIN 100 MILLIGRAM(S): 400 CAPSULE ORAL at 14:59

## 2022-01-01 RX ADMIN — BUPRENORPHINE AND NALOXONE 0.5 TABLET(S): 2; .5 TABLET SUBLINGUAL at 19:37

## 2022-01-01 RX ADMIN — PANTOPRAZOLE SODIUM 40 MILLIGRAM(S): 20 TABLET, DELAYED RELEASE ORAL at 06:14

## 2022-01-01 RX ADMIN — Medication 50 MICROGRAM(S): at 05:57

## 2022-01-01 RX ADMIN — GABAPENTIN 100 MILLIGRAM(S): 400 CAPSULE ORAL at 05:34

## 2022-01-01 RX ADMIN — Medication 100 MILLIGRAM(S): at 11:22

## 2022-01-01 RX ADMIN — BUPRENORPHINE AND NALOXONE 0.5 TABLET(S): 2; .5 TABLET SUBLINGUAL at 08:21

## 2022-01-01 RX ADMIN — HEPARIN SODIUM 5000 UNIT(S): 5000 INJECTION INTRAVENOUS; SUBCUTANEOUS at 13:45

## 2022-01-01 RX ADMIN — SPIRONOLACTONE 100 MILLIGRAM(S): 25 TABLET, FILM COATED ORAL at 06:14

## 2022-01-01 RX ADMIN — Medication 50 MICROGRAM(S): at 06:15

## 2022-01-01 RX ADMIN — BUPRENORPHINE AND NALOXONE 0.5 TABLET(S): 2; .5 TABLET SUBLINGUAL at 08:30

## 2022-01-01 RX ADMIN — Medication 20 MILLIGRAM(S): at 05:16

## 2022-01-01 RX ADMIN — Medication 1 PATCH: at 12:31

## 2022-01-01 RX ADMIN — Medication 650 MILLIGRAM(S): at 06:30

## 2022-01-01 RX ADMIN — GABAPENTIN 100 MILLIGRAM(S): 400 CAPSULE ORAL at 06:30

## 2022-01-01 RX ADMIN — Medication 1 PATCH: at 11:30

## 2022-01-01 RX ADMIN — SENNA PLUS 2 TABLET(S): 8.6 TABLET ORAL at 22:02

## 2022-01-01 RX ADMIN — HEPARIN SODIUM 5000 UNIT(S): 5000 INJECTION INTRAVENOUS; SUBCUTANEOUS at 14:23

## 2022-01-01 RX ADMIN — GABAPENTIN 100 MILLIGRAM(S): 400 CAPSULE ORAL at 22:55

## 2022-01-01 RX ADMIN — POLYETHYLENE GLYCOL 3350 17 GRAM(S): 17 POWDER, FOR SOLUTION ORAL at 12:35

## 2022-01-01 RX ADMIN — OCTREOTIDE ACETATE 10 MICROGRAM(S)/HR: 200 INJECTION, SOLUTION INTRAVENOUS; SUBCUTANEOUS at 21:22

## 2022-01-01 RX ADMIN — ENOXAPARIN SODIUM 40 MILLIGRAM(S): 100 INJECTION SUBCUTANEOUS at 11:20

## 2022-01-01 RX ADMIN — Medication 81 MILLIGRAM(S): at 11:46

## 2022-01-01 RX ADMIN — Medication 1 PATCH: at 06:47

## 2022-01-01 RX ADMIN — ATORVASTATIN CALCIUM 40 MILLIGRAM(S): 80 TABLET, FILM COATED ORAL at 22:30

## 2022-01-01 RX ADMIN — Medication 1 PATCH: at 06:38

## 2022-01-01 RX ADMIN — PANTOPRAZOLE SODIUM 40 MILLIGRAM(S): 20 TABLET, DELAYED RELEASE ORAL at 18:31

## 2022-01-01 RX ADMIN — Medication 1 PATCH: at 15:20

## 2022-01-01 RX ADMIN — Medication 81 MILLIGRAM(S): at 11:43

## 2022-01-01 RX ADMIN — ATORVASTATIN CALCIUM 40 MILLIGRAM(S): 80 TABLET, FILM COATED ORAL at 21:06

## 2022-01-01 RX ADMIN — Medication 20 MILLIGRAM(S): at 06:35

## 2022-01-01 RX ADMIN — Medication 100 MILLIGRAM(S): at 12:28

## 2022-01-01 RX ADMIN — GABAPENTIN 100 MILLIGRAM(S): 400 CAPSULE ORAL at 21:28

## 2022-01-01 RX ADMIN — BUPRENORPHINE AND NALOXONE 0.5 TABLET(S): 2; .5 TABLET SUBLINGUAL at 08:57

## 2022-01-01 RX ADMIN — Medication 1 MILLIGRAM(S): at 11:32

## 2022-01-01 RX ADMIN — SODIUM ZIRCONIUM CYCLOSILICATE 10 GRAM(S): 10 POWDER, FOR SUSPENSION ORAL at 05:42

## 2022-01-01 RX ADMIN — Medication 50 MICROGRAM(S): at 05:41

## 2022-01-01 RX ADMIN — Medication 81 MILLIGRAM(S): at 11:20

## 2022-01-01 RX ADMIN — ENOXAPARIN SODIUM 40 MILLIGRAM(S): 100 INJECTION SUBCUTANEOUS at 11:19

## 2022-01-01 RX ADMIN — Medication 25 MILLIGRAM(S): at 22:31

## 2022-01-01 RX ADMIN — Medication 40 MILLIGRAM(S): at 18:35

## 2022-01-01 RX ADMIN — Medication 3 MILLILITER(S): at 08:13

## 2022-01-01 RX ADMIN — Medication 40 MILLIGRAM(S): at 17:34

## 2022-01-01 RX ADMIN — OCTREOTIDE ACETATE 50 MICROGRAM(S): 200 INJECTION, SOLUTION INTRAVENOUS; SUBCUTANEOUS at 16:33

## 2022-01-01 RX ADMIN — SENNA PLUS 2 TABLET(S): 8.6 TABLET ORAL at 22:37

## 2022-01-01 RX ADMIN — Medication 1 PATCH: at 19:30

## 2022-01-01 RX ADMIN — Medication 650 MILLIGRAM(S): at 13:02

## 2022-01-01 RX ADMIN — INSULIN HUMAN 5 UNIT(S): 100 INJECTION, SOLUTION SUBCUTANEOUS at 12:25

## 2022-01-01 RX ADMIN — OCTREOTIDE ACETATE 10 MICROGRAM(S)/HR: 200 INJECTION, SOLUTION INTRAVENOUS; SUBCUTANEOUS at 01:58

## 2022-01-01 RX ADMIN — PANTOPRAZOLE SODIUM 40 MILLIGRAM(S): 20 TABLET, DELAYED RELEASE ORAL at 06:05

## 2022-01-01 RX ADMIN — Medication 81 MILLIGRAM(S): at 11:19

## 2022-01-01 RX ADMIN — PANTOPRAZOLE SODIUM 40 MILLIGRAM(S): 20 TABLET, DELAYED RELEASE ORAL at 18:19

## 2022-01-01 RX ADMIN — LACTULOSE 10 GRAM(S): 10 SOLUTION ORAL at 11:44

## 2022-01-01 RX ADMIN — BUPRENORPHINE AND NALOXONE 0.5 TABLET(S): 2; .5 TABLET SUBLINGUAL at 12:36

## 2022-01-01 RX ADMIN — Medication 100 MILLIGRAM(S): at 12:36

## 2022-01-01 RX ADMIN — Medication 100 MILLIGRAM(S): at 11:33

## 2022-01-01 RX ADMIN — SODIUM CHLORIDE 1000 MILLILITER(S): 9 INJECTION, SOLUTION INTRAVENOUS at 12:24

## 2022-01-01 RX ADMIN — BUPRENORPHINE AND NALOXONE 0.5 TABLET(S): 2; .5 TABLET SUBLINGUAL at 09:33

## 2022-01-01 RX ADMIN — ATORVASTATIN CALCIUM 40 MILLIGRAM(S): 80 TABLET, FILM COATED ORAL at 21:57

## 2022-01-01 RX ADMIN — GABAPENTIN 100 MILLIGRAM(S): 400 CAPSULE ORAL at 03:14

## 2022-01-01 RX ADMIN — SPIRONOLACTONE 50 MILLIGRAM(S): 25 TABLET, FILM COATED ORAL at 05:16

## 2022-01-01 RX ADMIN — GABAPENTIN 100 MILLIGRAM(S): 400 CAPSULE ORAL at 05:41

## 2022-01-01 RX ADMIN — ENOXAPARIN SODIUM 40 MILLIGRAM(S): 100 INJECTION SUBCUTANEOUS at 14:27

## 2022-01-01 RX ADMIN — ATORVASTATIN CALCIUM 40 MILLIGRAM(S): 80 TABLET, FILM COATED ORAL at 23:26

## 2022-01-01 RX ADMIN — SPIRONOLACTONE 50 MILLIGRAM(S): 25 TABLET, FILM COATED ORAL at 06:35

## 2022-01-01 RX ADMIN — GABAPENTIN 100 MILLIGRAM(S): 400 CAPSULE ORAL at 13:29

## 2022-01-01 RX ADMIN — Medication 1 MILLIGRAM(S): at 11:22

## 2022-01-01 RX ADMIN — Medication 50 MICROGRAM(S): at 05:09

## 2022-01-01 RX ADMIN — Medication 50 MICROGRAM(S): at 06:18

## 2022-01-01 RX ADMIN — BUPRENORPHINE AND NALOXONE 0.5 TABLET(S): 2; .5 TABLET SUBLINGUAL at 22:34

## 2022-01-01 RX ADMIN — Medication 81 MILLIGRAM(S): at 14:28

## 2022-01-01 RX ADMIN — SODIUM ZIRCONIUM CYCLOSILICATE 10 GRAM(S): 10 POWDER, FOR SUSPENSION ORAL at 13:03

## 2022-01-01 RX ADMIN — GABAPENTIN 100 MILLIGRAM(S): 400 CAPSULE ORAL at 13:32

## 2022-01-01 RX ADMIN — Medication 650 MILLIGRAM(S): at 05:34

## 2022-01-01 RX ADMIN — SPIRONOLACTONE 100 MILLIGRAM(S): 25 TABLET, FILM COATED ORAL at 05:53

## 2022-01-01 RX ADMIN — GABAPENTIN 100 MILLIGRAM(S): 400 CAPSULE ORAL at 22:01

## 2022-01-01 RX ADMIN — BUPRENORPHINE AND NALOXONE 0.5 TABLET(S): 2; .5 TABLET SUBLINGUAL at 09:14

## 2022-01-01 RX ADMIN — Medication 20 MILLIGRAM(S): at 06:05

## 2022-01-01 RX ADMIN — Medication 1 PATCH: at 19:54

## 2022-01-01 RX ADMIN — HEPARIN SODIUM 5000 UNIT(S): 5000 INJECTION INTRAVENOUS; SUBCUTANEOUS at 21:28

## 2022-01-01 RX ADMIN — IRON SUCROSE 110 MILLIGRAM(S): 20 INJECTION, SOLUTION INTRAVENOUS at 06:37

## 2022-01-01 RX ADMIN — Medication 100 MILLIGRAM(S): at 11:44

## 2022-01-01 RX ADMIN — GABAPENTIN 100 MILLIGRAM(S): 400 CAPSULE ORAL at 06:35

## 2022-01-01 RX ADMIN — Medication 1 MILLIGRAM(S): at 11:00

## 2022-01-01 RX ADMIN — Medication 81 MILLIGRAM(S): at 11:01

## 2022-01-01 RX ADMIN — Medication 650 MILLIGRAM(S): at 15:21

## 2022-01-01 RX ADMIN — POLYETHYLENE GLYCOL 3350 17 GRAM(S): 17 POWDER, FOR SOLUTION ORAL at 11:12

## 2022-01-01 RX ADMIN — PANTOPRAZOLE SODIUM 40 MILLIGRAM(S): 20 TABLET, DELAYED RELEASE ORAL at 17:14

## 2022-01-01 RX ADMIN — PANTOPRAZOLE SODIUM 40 MILLIGRAM(S): 20 TABLET, DELAYED RELEASE ORAL at 21:25

## 2022-01-01 RX ADMIN — ATORVASTATIN CALCIUM 40 MILLIGRAM(S): 80 TABLET, FILM COATED ORAL at 22:38

## 2022-01-01 RX ADMIN — Medication 40 MILLIGRAM(S): at 05:09

## 2022-01-01 RX ADMIN — POLYETHYLENE GLYCOL 3350 17 GRAM(S): 17 POWDER, FOR SOLUTION ORAL at 11:33

## 2022-01-01 RX ADMIN — Medication 650 MILLIGRAM(S): at 14:57

## 2022-01-01 RX ADMIN — LACTULOSE 10 GRAM(S): 10 SOLUTION ORAL at 11:53

## 2022-01-01 RX ADMIN — ATORVASTATIN CALCIUM 40 MILLIGRAM(S): 80 TABLET, FILM COATED ORAL at 22:56

## 2022-01-01 RX ADMIN — Medication 1 PATCH: at 21:02

## 2022-01-01 RX ADMIN — SODIUM ZIRCONIUM CYCLOSILICATE 10 GRAM(S): 10 POWDER, FOR SUSPENSION ORAL at 23:25

## 2022-01-01 RX ADMIN — ATORVASTATIN CALCIUM 40 MILLIGRAM(S): 80 TABLET, FILM COATED ORAL at 21:34

## 2022-01-01 RX ADMIN — ATORVASTATIN CALCIUM 40 MILLIGRAM(S): 80 TABLET, FILM COATED ORAL at 22:35

## 2022-01-01 RX ADMIN — GABAPENTIN 100 MILLIGRAM(S): 400 CAPSULE ORAL at 00:05

## 2022-01-01 RX ADMIN — Medication 100 MILLIGRAM(S): at 22:31

## 2022-01-01 RX ADMIN — SODIUM ZIRCONIUM CYCLOSILICATE 10 GRAM(S): 10 POWDER, FOR SUSPENSION ORAL at 13:22

## 2022-01-01 RX ADMIN — Medication 1 PATCH: at 17:37

## 2022-01-01 RX ADMIN — BUPRENORPHINE AND NALOXONE 0.5 TABLET(S): 2; .5 TABLET SUBLINGUAL at 08:32

## 2022-01-01 RX ADMIN — Medication 1 MILLIGRAM(S): at 12:28

## 2022-01-01 RX ADMIN — SODIUM ZIRCONIUM CYCLOSILICATE 10 GRAM(S): 10 POWDER, FOR SUSPENSION ORAL at 22:25

## 2022-01-01 RX ADMIN — ATORVASTATIN CALCIUM 40 MILLIGRAM(S): 80 TABLET, FILM COATED ORAL at 22:14

## 2022-01-01 RX ADMIN — Medication 6: at 11:47

## 2022-01-01 RX ADMIN — HEPARIN SODIUM 5000 UNIT(S): 5000 INJECTION INTRAVENOUS; SUBCUTANEOUS at 06:11

## 2022-01-01 RX ADMIN — BUPRENORPHINE AND NALOXONE 0.5 TABLET(S): 2; .5 TABLET SUBLINGUAL at 23:24

## 2022-01-01 RX ADMIN — Medication 1 PATCH: at 11:01

## 2022-01-01 RX ADMIN — Medication 100 MILLIGRAM(S): at 14:26

## 2022-01-01 RX ADMIN — BUPRENORPHINE AND NALOXONE 0.5 TABLET(S): 2; .5 TABLET SUBLINGUAL at 22:27

## 2022-01-01 RX ADMIN — Medication 650 MILLIGRAM(S): at 23:25

## 2022-01-01 RX ADMIN — HEPARIN SODIUM 5000 UNIT(S): 5000 INJECTION INTRAVENOUS; SUBCUTANEOUS at 06:36

## 2022-01-01 RX ADMIN — LACTULOSE 10 GRAM(S): 10 SOLUTION ORAL at 11:01

## 2022-01-01 RX ADMIN — BUPRENORPHINE AND NALOXONE 0.5 TABLET(S): 2; .5 TABLET SUBLINGUAL at 06:32

## 2022-01-01 RX ADMIN — Medication 650 MILLIGRAM(S): at 21:28

## 2022-01-01 RX ADMIN — GABAPENTIN 100 MILLIGRAM(S): 400 CAPSULE ORAL at 13:41

## 2022-01-01 RX ADMIN — HEPARIN SODIUM 5000 UNIT(S): 5000 INJECTION INTRAVENOUS; SUBCUTANEOUS at 22:01

## 2022-01-01 RX ADMIN — GABAPENTIN 100 MILLIGRAM(S): 400 CAPSULE ORAL at 23:09

## 2022-01-01 RX ADMIN — Medication 1 PATCH: at 06:18

## 2022-01-01 RX ADMIN — OCTREOTIDE ACETATE 10 MICROGRAM(S)/HR: 200 INJECTION, SOLUTION INTRAVENOUS; SUBCUTANEOUS at 05:35

## 2022-01-01 RX ADMIN — ENOXAPARIN SODIUM 40 MILLIGRAM(S): 100 INJECTION SUBCUTANEOUS at 11:16

## 2022-01-01 RX ADMIN — PANTOPRAZOLE SODIUM 40 MILLIGRAM(S): 20 TABLET, DELAYED RELEASE ORAL at 21:11

## 2022-01-01 RX ADMIN — Medication 1 PATCH: at 11:44

## 2022-01-01 RX ADMIN — GABAPENTIN 100 MILLIGRAM(S): 400 CAPSULE ORAL at 13:45

## 2022-01-01 RX ADMIN — Medication 3 MILLILITER(S): at 13:53

## 2022-01-01 RX ADMIN — SODIUM ZIRCONIUM CYCLOSILICATE 10 GRAM(S): 10 POWDER, FOR SUSPENSION ORAL at 21:04

## 2022-01-01 RX ADMIN — GABAPENTIN 100 MILLIGRAM(S): 400 CAPSULE ORAL at 16:32

## 2022-01-01 RX ADMIN — Medication 81 MILLIGRAM(S): at 11:14

## 2022-01-01 RX ADMIN — Medication 20 MILLIGRAM(S): at 05:42

## 2022-01-01 RX ADMIN — Medication 100 MILLIGRAM(S): at 11:45

## 2022-01-01 RX ADMIN — Medication 81 MILLIGRAM(S): at 11:16

## 2022-01-01 RX ADMIN — Medication 1 PATCH: at 21:41

## 2022-01-01 RX ADMIN — Medication 81 MILLIGRAM(S): at 11:28

## 2022-01-01 RX ADMIN — Medication 650 MILLIGRAM(S): at 22:38

## 2022-01-01 RX ADMIN — PANTOPRAZOLE SODIUM 40 MILLIGRAM(S): 20 TABLET, DELAYED RELEASE ORAL at 21:24

## 2022-01-01 RX ADMIN — Medication 125 MILLIGRAM(S): at 00:04

## 2022-01-01 RX ADMIN — GABAPENTIN 100 MILLIGRAM(S): 400 CAPSULE ORAL at 05:57

## 2022-01-01 RX ADMIN — SPIRONOLACTONE 50 MILLIGRAM(S): 25 TABLET, FILM COATED ORAL at 06:36

## 2022-01-01 RX ADMIN — Medication 1 PATCH: at 12:14

## 2022-01-01 RX ADMIN — Medication 1 PATCH: at 11:16

## 2022-01-01 RX ADMIN — SODIUM ZIRCONIUM CYCLOSILICATE 10 GRAM(S): 10 POWDER, FOR SUSPENSION ORAL at 05:40

## 2022-01-01 RX ADMIN — Medication 1 PATCH: at 12:39

## 2022-01-01 RX ADMIN — Medication 1 MILLIGRAM(S): at 11:43

## 2022-01-01 RX ADMIN — OCTREOTIDE ACETATE 10 MICROGRAM(S)/HR: 200 INJECTION, SOLUTION INTRAVENOUS; SUBCUTANEOUS at 11:43

## 2022-01-01 RX ADMIN — Medication 650 MILLIGRAM(S): at 05:41

## 2022-01-01 RX ADMIN — BUPRENORPHINE AND NALOXONE 0.5 TABLET(S): 2; .5 TABLET SUBLINGUAL at 20:08

## 2022-01-01 RX ADMIN — Medication 1 MILLIGRAM(S): at 12:50

## 2022-01-01 RX ADMIN — Medication 650 MILLIGRAM(S): at 23:03

## 2022-01-01 RX ADMIN — Medication 12.5 MILLIGRAM(S): at 17:00

## 2022-01-01 RX ADMIN — SODIUM ZIRCONIUM CYCLOSILICATE 10 GRAM(S): 10 POWDER, FOR SUSPENSION ORAL at 22:38

## 2022-01-01 RX ADMIN — Medication 1 PATCH: at 14:27

## 2022-01-01 RX ADMIN — Medication 25 MILLIGRAM(S): at 22:37

## 2022-01-01 RX ADMIN — Medication 1 PATCH: at 20:03

## 2022-01-01 RX ADMIN — Medication 1 PATCH: at 09:06

## 2022-01-01 RX ADMIN — Medication 50 MILLILITER(S): at 22:36

## 2022-01-01 RX ADMIN — GABAPENTIN 100 MILLIGRAM(S): 400 CAPSULE ORAL at 15:24

## 2022-01-01 RX ADMIN — Medication 25 MILLIGRAM(S): at 12:30

## 2022-01-01 RX ADMIN — Medication 100 MILLIGRAM(S): at 11:12

## 2022-01-01 RX ADMIN — Medication 25 GRAM(S): at 15:40

## 2022-01-01 RX ADMIN — GABAPENTIN 100 MILLIGRAM(S): 400 CAPSULE ORAL at 06:16

## 2022-01-01 RX ADMIN — Medication 25 GRAM(S): at 12:25

## 2022-01-01 RX ADMIN — HEPARIN SODIUM 5000 UNIT(S): 5000 INJECTION INTRAVENOUS; SUBCUTANEOUS at 23:04

## 2022-01-01 RX ADMIN — CEFTRIAXONE 100 MILLIGRAM(S): 500 INJECTION, POWDER, FOR SOLUTION INTRAMUSCULAR; INTRAVENOUS at 14:40

## 2022-01-01 RX ADMIN — Medication 1 MILLIGRAM(S): at 11:46

## 2022-01-01 RX ADMIN — HEPARIN SODIUM 5000 UNIT(S): 5000 INJECTION INTRAVENOUS; SUBCUTANEOUS at 23:36

## 2022-01-01 RX ADMIN — HEPARIN SODIUM 5000 UNIT(S): 5000 INJECTION INTRAVENOUS; SUBCUTANEOUS at 06:01

## 2022-01-01 RX ADMIN — SODIUM ZIRCONIUM CYCLOSILICATE 10 GRAM(S): 10 POWDER, FOR SUSPENSION ORAL at 05:43

## 2022-01-01 RX ADMIN — HEPARIN SODIUM 5000 UNIT(S): 5000 INJECTION INTRAVENOUS; SUBCUTANEOUS at 15:04

## 2022-01-01 RX ADMIN — Medication 3 MILLILITER(S): at 07:53

## 2022-01-01 RX ADMIN — SENNA PLUS 2 TABLET(S): 8.6 TABLET ORAL at 22:55

## 2022-01-01 RX ADMIN — BUPRENORPHINE AND NALOXONE 0.5 TABLET(S): 2; .5 TABLET SUBLINGUAL at 10:46

## 2022-01-01 RX ADMIN — Medication 100 MILLIGRAM(S): at 12:49

## 2022-01-01 RX ADMIN — SODIUM ZIRCONIUM CYCLOSILICATE 10 GRAM(S): 10 POWDER, FOR SUSPENSION ORAL at 15:01

## 2022-01-01 RX ADMIN — LACTULOSE 10 GRAM(S): 10 SOLUTION ORAL at 11:33

## 2022-01-01 RX ADMIN — BUPRENORPHINE AND NALOXONE 0.5 TABLET(S): 2; .5 TABLET SUBLINGUAL at 21:28

## 2022-01-01 RX ADMIN — Medication 1 MILLIGRAM(S): at 12:30

## 2022-01-01 RX ADMIN — HEPARIN SODIUM 5000 UNIT(S): 5000 INJECTION INTRAVENOUS; SUBCUTANEOUS at 13:02

## 2022-01-01 RX ADMIN — LACTULOSE 10 GRAM(S): 10 SOLUTION ORAL at 11:11

## 2022-01-01 RX ADMIN — POLYETHYLENE GLYCOL 3350 17 GRAM(S): 17 POWDER, FOR SOLUTION ORAL at 12:31

## 2022-01-01 RX ADMIN — Medication 1 PATCH: at 11:12

## 2022-01-01 RX ADMIN — HEPARIN SODIUM 5000 UNIT(S): 5000 INJECTION INTRAVENOUS; SUBCUTANEOUS at 22:30

## 2022-01-01 RX ADMIN — SODIUM ZIRCONIUM CYCLOSILICATE 10 GRAM(S): 10 POWDER, FOR SUSPENSION ORAL at 14:58

## 2022-01-01 RX ADMIN — ALBUTEROL 2.5 MILLIGRAM(S): 90 AEROSOL, METERED ORAL at 00:04

## 2022-01-01 RX ADMIN — Medication 650 MILLIGRAM(S): at 16:39

## 2022-01-01 RX ADMIN — HEPARIN SODIUM 5000 UNIT(S): 5000 INJECTION INTRAVENOUS; SUBCUTANEOUS at 06:37

## 2022-01-01 RX ADMIN — Medication 50 MICROGRAM(S): at 06:14

## 2022-01-01 RX ADMIN — Medication 50 MICROGRAM(S): at 06:01

## 2022-01-01 RX ADMIN — Medication 100 MEQ/KG/HR: at 15:01

## 2022-01-01 RX ADMIN — BUPRENORPHINE AND NALOXONE 0.5 TABLET(S): 2; .5 TABLET SUBLINGUAL at 21:33

## 2022-01-01 RX ADMIN — Medication 1 PATCH: at 11:18

## 2022-01-01 RX ADMIN — Medication 1 PATCH: at 12:42

## 2022-01-01 RX ADMIN — Medication 40 MILLIGRAM(S): at 05:46

## 2022-01-01 RX ADMIN — HEPARIN SODIUM 5000 UNIT(S): 5000 INJECTION INTRAVENOUS; SUBCUTANEOUS at 13:41

## 2022-01-01 RX ADMIN — GABAPENTIN 100 MILLIGRAM(S): 400 CAPSULE ORAL at 14:23

## 2022-01-01 RX ADMIN — Medication 100 MILLIGRAM(S): at 11:00

## 2022-01-01 RX ADMIN — Medication 81 MILLIGRAM(S): at 12:50

## 2022-01-01 RX ADMIN — GABAPENTIN 100 MILLIGRAM(S): 400 CAPSULE ORAL at 22:30

## 2022-01-01 RX ADMIN — ATORVASTATIN CALCIUM 40 MILLIGRAM(S): 80 TABLET, FILM COATED ORAL at 21:11

## 2022-01-01 RX ADMIN — BUPRENORPHINE AND NALOXONE 0.5 TABLET(S): 2; .5 TABLET SUBLINGUAL at 20:19

## 2022-01-01 RX ADMIN — CEFTRIAXONE 100 MILLIGRAM(S): 500 INJECTION, POWDER, FOR SOLUTION INTRAMUSCULAR; INTRAVENOUS at 15:04

## 2022-01-01 RX ADMIN — TICAGRELOR 90 MILLIGRAM(S): 90 TABLET ORAL at 05:45

## 2022-01-01 RX ADMIN — POLYETHYLENE GLYCOL 3350 17 GRAM(S): 17 POWDER, FOR SOLUTION ORAL at 15:20

## 2022-01-01 RX ADMIN — IRON SUCROSE 110 MILLIGRAM(S): 20 INJECTION, SOLUTION INTRAVENOUS at 06:11

## 2022-01-01 RX ADMIN — HEPARIN SODIUM 5000 UNIT(S): 5000 INJECTION INTRAVENOUS; SUBCUTANEOUS at 13:30

## 2022-01-01 RX ADMIN — CEFTRIAXONE 100 MILLIGRAM(S): 500 INJECTION, POWDER, FOR SOLUTION INTRAMUSCULAR; INTRAVENOUS at 00:04

## 2022-01-01 RX ADMIN — HEPARIN SODIUM 5000 UNIT(S): 5000 INJECTION INTRAVENOUS; SUBCUTANEOUS at 22:38

## 2022-01-01 RX ADMIN — Medication 1 PATCH: at 11:21

## 2022-01-01 RX ADMIN — LACTULOSE 10 GRAM(S): 10 SOLUTION ORAL at 18:06

## 2022-01-01 RX ADMIN — PANTOPRAZOLE SODIUM 40 MILLIGRAM(S): 20 TABLET, DELAYED RELEASE ORAL at 06:36

## 2022-01-01 RX ADMIN — Medication 25 MILLIGRAM(S): at 22:56

## 2022-01-01 RX ADMIN — Medication 20 MILLIGRAM(S): at 06:12

## 2022-01-01 RX ADMIN — Medication 20 MILLIGRAM(S): at 05:57

## 2022-01-01 RX ADMIN — HEPARIN SODIUM 5000 UNIT(S): 5000 INJECTION INTRAVENOUS; SUBCUTANEOUS at 14:58

## 2022-01-01 RX ADMIN — Medication 81 MILLIGRAM(S): at 12:49

## 2022-01-01 RX ADMIN — SODIUM ZIRCONIUM CYCLOSILICATE 10 GRAM(S): 10 POWDER, FOR SUSPENSION ORAL at 23:04

## 2022-01-01 RX ADMIN — Medication 650 MILLIGRAM(S): at 13:19

## 2022-01-01 RX ADMIN — PANTOPRAZOLE SODIUM 40 MILLIGRAM(S): 20 TABLET, DELAYED RELEASE ORAL at 22:28

## 2022-01-01 RX ADMIN — Medication 1 PATCH: at 12:49

## 2022-01-01 RX ADMIN — Medication 1 PATCH: at 12:51

## 2022-01-01 RX ADMIN — GABAPENTIN 100 MILLIGRAM(S): 400 CAPSULE ORAL at 21:20

## 2022-01-01 RX ADMIN — INSULIN HUMAN 10 UNIT(S): 100 INJECTION, SOLUTION SUBCUTANEOUS at 22:35

## 2022-01-01 RX ADMIN — HEPARIN SODIUM 5000 UNIT(S): 5000 INJECTION INTRAVENOUS; SUBCUTANEOUS at 15:40

## 2022-02-16 NOTE — ED ADULT TRIAGE NOTE - CHIEF COMPLAINT QUOTE
L sided  chest  pain  today Patient verbalized understanding. He will call in about 5-6 days with his BP readings on the lisinopril 20 mg and then see if ok to increase metoprolol   L sided  chest  pain  todayI  also  have  a  heroin  addiction  and  want detox

## 2022-03-15 NOTE — H&P ADULT - HISTORY OF PRESENT ILLNESS
HPI: 72 YO M w/PMHx significant for HTN, NIDDM, HCV s/p tx, former IVDU on suboxone, active smoker, CAD s/p PCI x 3. Pt presented to the ED w/complaints of bilateral LE swelling and SOB x last several months which has apparently worsened in last several days. His wife compelled him to seek emergency medical care. Additionally pt experienced a fall 2 days ago where his knees buckled beneath him while he was ambulating to the restroom with a cane.     ED COURSE: VSS, HD stable, sat ok on RA, POCUS Lungs revealed diffuse B-lines, ECG sinus osvaldo w/1 deg AVB and LVH criteria met, PA/LAT CXR showing no acute CP disease, CTH neg for acute IC pathology. Pt given 40 of lasix. Labs remarkable for neutropenia, normocytic anemia, thrombocytopenia of which per HIE review all are acute EXCEPT for the anemia, a CBC in Aug 2021 showed a hgb 12.4.

## 2022-03-15 NOTE — ED PROVIDER NOTE - CLINICAL SUMMARY MEDICAL DECISION MAKING FREE TEXT BOX
72 yo M, smoker, h/o HTN, NIDDM on metformin, HCV s/p treatment, former IVDU on suboxone, CAD s/p stents, Here with progressive lower extremity symmetric swelling for the past couple of weeks to months.  Also associated with worsening shortness of breath with exertion and orthopnea.  Denies any chest pain, fever or severe cough.  Also couple days ago states that his knee gave out and fell and hit his head.  No LOC.  Denies any dizziness to me prior to this.  Denies any significant knee pain or headache.  On exam nontoxic, vital signs stable, heart rate faint crackles in the bases, end expiratory wheezes faintly, heart regular with systolic murmur, abdomen benign, 3+ symmetric lower extremity pitting edema.  No signs of infection.  EKG as above, chest x-ray clear but has B-lines on bedside ultrasound, concern for fluid overload and possible acute decompensated heart failure.  Will admit to medicine and give diuretics.

## 2022-03-15 NOTE — ED ADULT NURSE NOTE - NS ED NURSE DISCH DISPOSITION
"   11/13/19 1100   Quick Adds   Type of Visit Initial PT Evaluation   Living Environment   Lives With alone   Living Arrangements condominium   Home Accessibility stairs to enter home;stairs within home   Number of Stairs, Main Entrance other (see comments)  (about 20 stairs from garage to main level)   Stair Railings, Main Entrance railings on both sides of stairs   Number of Stairs, Within Home, Primary other (see comments)  (about 20 stairs from main level to bedrooms)   Stair Railings, Within Home, Primary railings on both sides of stairs   Transportation Anticipated car, drives self;public transportation  (hard to get family/friends to provide transporation, train)   Living Environment Comment Pt lives alone in condo with 20+ stairs between levels. He was not previously working   Self-Care   Usual Activity Tolerance moderate   Current Activity Tolerance fair   Regular Exercise No   Activity/Exercise/Self-Care Comment Prior to admission pt reports he had been working out and moving less, having increased weakness/decreased strength   Functional Level Prior   Ambulation 0-->independent   Transferring 0-->independent   Toileting 0-->independent   Bathing 0-->independent   Communication 0-->understands/communicates without difficulty   Swallowing 0-->swallows foods/liquids without difficulty   Cognition 0 - no cognition issues reported   Fall history within last six months yes   Number of times patient has fallen within last six months 1   Which of the above functional risks had a recent onset or change? ambulation;transferring;toileting;bathing;dressing;fall history   Prior Functional Level Comment Pt IND with ADLs prior to admission, gets help with cleaning, mostly eats \"TV dinners\". Enjoys watching sports and getting together with his friend in [place] every 3 months.   General Information   Onset of Illness/Injury or Date of Surgery - Date 11/11/19   Referring Physician  Gonzalo Rodriguez MD    Patient/Family Goals " Statement None specifically stated by pt   Pertinent History of Current Problem (include personal factors and/or comorbidities that impact the POC) 57 year old male with a past medical history of cirrhosis secondary to ESTRADA diagnosed in 2013, HCC 2018, HTN, HLD, GERD, BPH, and DM2 who underwent a DBD liver transplant on 11/11/19 with Dr. Ramos.  He returned to OR POD1 for ex lap, hematoma evacuation, and washout.    Precautions/Limitations fall precautions;abdominal precautions   Heart Disease Risk Factors High blood pressure;Overweight;Dislipidemia;Diabetes;Gender;Age   Cognitive Status Examination   Orientation orientation to person, place and time   Level of Consciousness alert   Follows Commands and Answers Questions 100% of the time   Personal Safety and Judgment intact   Pain Assessment   Patient Currently in Pain Yes, see Vital Sign flowsheet   Posture    Posture Forward head position;Protracted shoulders   Posture Comments forward flexed protective posture   Range of Motion (ROM)   ROM Comment BUE and BLE grossly WFL   Strength   Strength Comments Demonstrate at least 3/5 with functional transfers   Bed Mobility   Bed Mobility Comments Supine > sit with min A   Transfer Skills   Transfer Comments Sit <> stand with CGA and FWW   Gait   Gait Comments Ambulated 10' with FWW and CGA-min A   Balance   Balance Comments Good static sitting balance, unsteady with gait and standing requiring walker   General Therapy Interventions   Planned Therapy Interventions balance training;bed mobility training;gait training;neuromuscular re-education;ROM;strengthening;stretching;transfer training;risk factor education;home program guidelines;progressive activity/exercise   Clinical Impression   Criteria for Skilled Therapeutic Intervention yes, treatment indicated   PT Diagnosis Impaired functional mobility   Influenced by the following impairments strength, activity tolerance, pain, fatigue, balance   Functional  "limitations due to impairments gait, stairs, transfers, bed mobility, functional endurance   Clinical Presentation Stable/Uncomplicated   Clinical Presentation Rationale S/p liver transplant with resolving complications, clinical reasoning   Clinical Decision Making (Complexity) Low complexity   Therapy Frequency 5x/week   Predicted Duration of Therapy Intervention (days/wks) 2 weeks   Anticipated Discharge Disposition Acute Rehabilitation Facility   Risk & Benefits of therapy have been explained Yes   Patient, Family & other staff in agreement with plan of care Yes   [facility] TM \"6 Clicks\"   2016, Trustees of [facility], under license to [employer].  All rights reserved.   6 Clicks Short Forms Basic Mobility Inpatient Short Form   [facility] AM-PAC  \"6 Clicks\" V.2 Basic Mobility Inpatient Short Form   1. Turning from your back to your side while in a flat bed without using bedrails? 2 - A Lot   2. Moving from lying on your back to sitting on the side of a flat bed without using bedrails? 2 - A Lot   3. Moving to and from a bed to a chair (including a wheelchair)? 3 - A Little   4. Standing up from a chair using your arms (e.g., wheelchair, or bedside chair)? 3 - A Little   5. To walk in hospital room? 3 - A Little   6. Climbing 3-5 steps with a railing? 2 - A Lot   Basic Mobility Raw Score (Score out of 24.Lower scores equate to lower levels of function) 15   Total Evaluation Time   Total Evaluation Time (Minutes) 10     " Admitted

## 2022-03-15 NOTE — H&P ADULT - ATTENDING COMMENTS
71/M with HTN, NIDDM, HCV s/p tx, former IVDU on suboxone, active smoker, CAD s/p PCI x 3. Pt presented to the ED w/complaints of bilateral LE swelling and SOB x last several months which has apparently worsened in last several days. His wife compelled him to seek emergency medical care. Additionally pt experienced a fall 2 days ago where his knees buckled beneath him while he was ambulating to the restroom with a cane.     PLAN    # Dyspnea/LE Swelling (appears chronic)  # Acute Anemia  # Chronic Pancytopenia  # h/o CAD s/p PCI  - improved after dose of lasix     # h/o IVDU  - c/w Suboxone 1/2 Film QD pts own meds (doesnt like the pills as they make him vomit)    # DM  - hold home metformin  - sliding scale     # h/o HCV  - c/w Spironolactone QD  - c/w Lasix QD    # DLD  # HTN  - c/w Spironolactone and lasix     # Hypothyroidism  - c/w Synthroid  50mcg QD    # Need for Prophylaxis measures - improve score of 1 - no need for chemical proph - patient ambulating

## 2022-03-15 NOTE — ED ADULT NURSE NOTE - NSICDXPASTMEDICALHX_GEN_ALL_CORE_FT
PAST MEDICAL HISTORY:  Arthritis     Diabetes     Hepatitis Untreated Hepatitis C    HTN (hypertension)

## 2022-03-15 NOTE — ED ADULT NURSE NOTE - HIV OFFER
echo 10/17/2018: EF 60-65%, nl lv fx, no significant valvular disease     a/p  78 year old female with  htn, hyperlipidemia, thyroid ca, MAT, Diverticulitis, Hiatial hernia, Gerd, Osteoarthritis, anxiety presenting with dysphagia     1. Htn   bp stable,   continue with  current anti-htn meds     2. MAT  cv stable no cp, sob or palps   resume low dose ASA  Coreg, Cardizem as ordered    3. Dysphagia  ct chest noted   s/p barium swallow per GI evidence of large hiatal hernia. No stricture or retained food bolus seen. Diet advanced     4. Hyponatremia  renal f/u   remains off Spironolactone, euvolemic on exam  renal f/u,  on IVF , NA improving     dvt ppx    dc planning per primary team Opt out

## 2022-03-15 NOTE — H&P ADULT - NSHPPHYSICALEXAM_GEN_ALL_CORE
GEN: NAD, Well Appearing, Well nourished  HEENT: NCAT, PERRL, EOMI, No Icterus, No Pallor, No nystagmus, Vision Intact, No JVD/TD  CV/CHEST: RRR, +S1/S2, no murmurs  PULM: CTAB, Good Air Entry Bilaterally  ABD: SNTTP, ND x 4 Q's  EXT: Warm, Well Perfused x 4. Non pitting LE Edema  SKIN: No Rash  NEURO: AxOx3, + Normal Affect

## 2022-03-15 NOTE — ED ADULT NURSE NOTE - OBJECTIVE STATEMENT
Pt present to ER c/o b/lat legs swelling, muscle crumbs (arms and legs). Comparing he had a fall 2 days ago, hit his head. Pt is on aspirin, Lasix, Spironalocton, Suboxone. Pt does not keep recommended no salt diet. Pt is a/o/4, ambulating with cane. Pupils reactive, symmetrical. Lungs clear, diminished b/lat LL. No SOB, NAD, no CP. Abdomen soft to touch, no pain complain, no n/v/d, no constipation. No UTI symptoms. Safety precautions maintained, bed in lower position, side rails up.

## 2022-03-15 NOTE — ED PROVIDER NOTE - PHYSICAL EXAMINATION
Vital signs reviewed; ABCs intact  GENERAL: Well nourished, elderly  SKIN: Warm, dry  HEAD & NECK: NCAT, supple neck  EYES: EOMI, PER B/L, no scleral icterus, no conjunctival injection, no conjunctival pallor  ENT: Mildly dry mucous membranes  CARD: RRR, S1, S2; systolic murmur  RESP: Normal respiratory effort, no tachypnea, +expiratory wheezes, +rhonchi  ABD: Soft, ND, NT, no rebound, no guarding, +BS; no CVAT  EXT: +B/L pitting edema up to knees; no calf tenderness; symmetrical calf circumferences  NEUROMSK: Grossly intact  PSYCH: AAOx3, cooperative, appropriate

## 2022-03-15 NOTE — ED PROVIDER NOTE - OBJECTIVE STATEMENT
Pt is a 70 yo M, smoker, h/o HTN, NIDDM on metformin, HCV s/p treatment, former IVDU on suboxone, CAD s/p stents x3. Presents for B/L LE swelling, exertional SOB x months, and fall 2d ago.   Pt states that he has chronic LE edema and is on Lasix 40mg bid and Aldactone 100 mg, and is compliant with his medications. Unclear as to what made him come in specifically today, other than his "wife made [me] come in." Presently no CP, SOB (ambulated to restroom with cane), palpitations, light-headedness.   When fell, he describes that his knees wobbled because he was dizzy, causing him to buckle forward, then falling, and striking the left side of his forehead. No AC use (on ASA though), LOC, emesis, FND, seizures, epistaxis.  No fever/chills, nasal discharge/sore throat,  abd pain, NVD, dysuria, hematuria, new joint pain, FND, rashes.

## 2022-03-15 NOTE — ED ADULT NURSE NOTE - NSIMPLEMENTINTERV_GEN_ALL_ED
Implemented All Fall Risk Interventions:  [place] to call system. Call bell, personal items and telephone within reach. Instruct patient to call for assistance. Room bathroom lighting operational. Non-slip footwear when patient is off stretcher. Physically safe environment: no spills, clutter or unnecessary equipment. Stretcher in lowest position, wheels locked, appropriate side rails in place. Provide visual cue, wrist band, yellow gown, etc. Monitor gait and stability. Monitor for mental status changes and reorient to person, place, and time. Review medications for side effects contributing to fall risk. Reinforce activity limits and safety measures with patient and family.

## 2022-03-15 NOTE — ED PROVIDER NOTE - CARE PLAN
1 Principal Discharge DX:	Fluid overload  Secondary Diagnosis:	Hyponatremia   Principal Discharge DX:	Fluid overload  Secondary Diagnosis:	Hyponatremia  Secondary Diagnosis:	Syncope

## 2022-03-15 NOTE — H&P ADULT - ASSESSMENT
IMPRESSION  # Dyspnea/LE Swelling   # Acute Anemia  # h/o CAD s/p PCI  # h/o IVDU  # h/o DM  # h/o HCV  # h/o DLD  # h/o HTN    PLAN  # Dyspnea/LE Swelling   # Acute Anemia  # h/o CAD s/p PCI  - Telemetry Admit, Strict I/O, Daily Weight  - Check 2D Echo, Trend Trops x 3, AM ECG  - Check Iron w/TIBC + Ferritin  - Lasix 40mg QD for now   - ASA    # h/o IVDU  - c/w Suboxone     # h/o DM  - hold home metformin, BB?SS PRN  - Check A1C    # h/o HCV  - c/w Spironolactone QD    # h/o DLD  - c/w Gemfibrozil    # h/o HTN IMPRESSION  # Dyspnea/LE Swelling   # Acute Anemia  # h/o CAD s/p PCI  # h/o IVDU  # h/o DM  # h/o HCV  # h/o DLD  # h/o HTN  # h/o Hypothyroidism    PLAN  # Dyspnea/LE Swelling (appears chronic)  # Acute Anemia  # Chronic Pancytopenia  # h/o CAD s/p PCI  - Telemetry Admit, Strict I/O, Daily Weight  - Check 2D Echo, Trend Trops x 3, AM ECG  - Check Iron w/TIBC + Ferritin, B12/Folate, Retic, Fecal Occult Immuno  - Lasix 40mg QD for now   - c/w ASA      # h/o IVDU  - c/w Suboxone 1/2 Film QD pts own meds (doesnt like the pills as they make him vomit)    # h/o DM  - hold home metformin, BB?SS PRN  - Check A1C    # h/o HCV  - c/w Spironolactone QD  - c/w Lasix QD    # h/o DLD  # h/o HTN  - c/w Spironolactone     # h/o Hypothyroidism  - c/w Synthroid  50mcg QD  - Check TSH    DISPO: Tele

## 2022-03-16 NOTE — DISCHARGE NOTE PROVIDER - PROVIDER TOKENS
PROVIDER:[TOKEN:[[id number]],FOLLOWUP:[1 week]],PROVIDER:[TOKEN:[[id number]],FOLLOWUP:[2 weeks]]

## 2022-03-16 NOTE — DISCHARGE NOTE PROVIDER - CARE PROVIDER_API CALL
[place] [address]  Phone: [phone]  Fax: [phone]  Follow Up Time: 1 week    Joaquim Beck)  Cardiovascular Disease; Interventional Cardiology  [address] [place] [address]  Phone: [phone]  Fax: [phone]  Follow Up Time: 2 weeks

## 2022-03-16 NOTE — DISCHARGE NOTE NURSING/CASE MANAGEMENT/SOCIAL WORK - NSDCPEWEB_GEN_ALL_CORE
[facility] for Tobacco Control website --- [email or web address] website --- [email or web address]

## 2022-03-16 NOTE — DISCHARGE NOTE PROVIDER - HOSPITAL COURSE
72 YO M w/PMHx significant for HTN, NIDDM, HCV s/p tx, former IVDU on suboxone, active smoker, CAD s/p PCI x 3. Pt presented to the ED w/complaints of bilateral LE swelling and SOB x last several months which has apparently worsened in last several days. His wife compelled him to seek emergency medical care. Additionally pt experienced a fall 2 days ago where his knees buckled beneath him while he was ambulating to the restroom with a cane.   ED COURSE: VSS, HD stable, sat ok on RA, POCUS Lungs revealed diffuse B-lines, ECG sinus osvaldo w/1 deg AVB and LVH criteria met, PA/LAT CXR showing no acute CP disease, CTH neg for acute IC pathology. Pt given 40 of lasix. Labs remarkable for neutropenia, normocytic anemia, thrombocytopenia of which per HIE review all are acute EXCEPT for the anemia, a CBC in Aug 2021 showed a hgb 12.4.   Pt remains on PO Lasix with significant improvement of respiratory status and LE swelling. Advised to follow up with PMD to obtain echo and manage diuretics appropriately.

## 2022-03-16 NOTE — DISCHARGE NOTE NURSING/CASE MANAGEMENT/SOCIAL WORK - NSDCPEFALRISK_GEN_ALL_CORE
For information on Fall & Injury Prevention, visit: [email or web address] OR  [email or web address] OR  https://www.cdc.gov/steadi/patient.html

## 2022-03-16 NOTE — DISCHARGE NOTE NURSING/CASE MANAGEMENT/SOCIAL WORK - NSDCVIVACCINE_GEN_ALL_CORE_FT
influenza, injectable, quadrivalent, preservative free; 13-Nov-2018 17:05; Marva Negron (RN); [employer]; [id number] (Exp. Date: 30-Jun-2019); IntraMuscular; Deltoid Left.; 0.5 milliLiter(s); VIS (VIS Published: 07-Aug-2015, VIS Presented: 13-Nov-2018);

## 2022-03-16 NOTE — DISCHARGE NOTE NURSING/CASE MANAGEMENT/SOCIAL WORK - NSDCPEEMAIL_GEN_ALL_CORE
[facility] for Tobacco Control email [email or web address]

## 2022-03-16 NOTE — CHART NOTE - NSCHARTNOTEFT_GEN_A_CORE
<<<RESIDENT DISCHARGE NOTE>>>     DINO LANCE  MRN-[id number]    VITAL SIGNS:  T(F): 97.1 (03-16-22 @ 13:08), Max: 98.1 (03-15-22 @ 20:16)  HR: 55 (03-16-22 @ 13:08)  BP: 118/56 (03-16-22 @ 13:08)  SpO2: 97% (03-15-22 @ 19:24)      PHYSICAL EXAMINATION:  General: NAD, ambulating on RA  Head & Neck:AT/NC  Pulmonary: Good air entry bilaterally  Cardiovascular: RRR, S1, S2  Gastrointestinal/Abdomen & Pelvis: Nontender, nondistended  Neurologic/Motor: Pitting edema in bilateral LE, although improved    TEST RESULTS:                        9.4    1.86  )-----------( 66       ( 16 Mar 2022 06:20 )             27.7       03-16    132<L>  |  95<L>  |  14  ----------------------------<  87  4.0   |  28  |  0.8    Ca    8.4<L>      16 Mar 2022 06:20  Mg     1.9     03-16    TPro  7.1  /  Alb  3.9  /  TBili  0.8  /  DBili  x   /  AST  33  /  ALT  12  /  AlkPhos  100  03-15      FINAL DISCHARGE INTERVIEW:  Resident(s) Present: Samantha Stratton    DISCHARGE MEDICATION RECONCILIATION  reviewed with Attending: Dr. Nance    DISPOSITION:   [ x ] Home,    [  ] Home with Visiting Nursing Services,   [    ]  SNF/ NH,    [   ] Acute Rehab (4A),   [   ] Other (Specify:_________)

## 2022-03-16 NOTE — DISCHARGE NOTE PROVIDER - NSDCMRMEDTOKEN_GEN_ALL_CORE_FT
aspirin 81 mg oral tablet: 1 tab(s) orally once a day  buprenorphine-naloxone 8 mg-2 mg sublingual film: 0.5 film(s) sublingual 2 times a day  Lasix 20 mg oral tablet: 1 tab(s) orally once a day   metFORMIN 500 mg oral tablet: 1 tab(s) orally 2 times a day  spironolactone 100 mg oral tablet: 1 tab(s) orally once a day

## 2022-03-16 NOTE — DISCHARGE NOTE NURSING/CASE MANAGEMENT/SOCIAL WORK - PATIENT PORTAL LINK FT
You can access the FollowMyHealth Patient Portal offered by [facility] by registering at the following website: [email or web address]. By joining [facility]’s FollowMyHealth portal, you will also be able to view your health information using other applications (apps) compatible with our system.

## 2022-03-16 NOTE — DISCHARGE NOTE PROVIDER - NSDCCPCAREPLAN_GEN_ALL_CORE_FT
PRINCIPAL DISCHARGE DIAGNOSIS  Diagnosis: Fluid overload  Assessment and Plan of Treatment: You developed shortness of breath due to increased volume in your body, requiring IV medications to help you get excess fluid out of your body. Please continue to take your Lasix as prescribed. Be sure to follow up with your primary doctor to obtain an echocardiogram of your heart.

## 2022-03-25 NOTE — ED ADULT TRIAGE NOTE - CHIEF COMPLAINT QUOTE
pt biba, history of CHF, c/o worsening lower extremity swelling and SOB when on extertion or laying down which is relived by sitting up or sitting still

## 2022-03-26 NOTE — ED PROVIDER NOTE - OBJECTIVE STATEMENT
72 y m, pmh of dm, htn, hepatitis c,  bilateral LE swelling and SOB x last several months  worsened . Endorses  LEVY, and b/l LE edema R>L since his discharge. Denies cp, n/v/d, abd pain, dysuria.

## 2022-03-26 NOTE — PATIENT PROFILE ADULT - FALL HARM RISK - UNIVERSAL INTERVENTIONS
Bed in lowest position, wheels locked, appropriate side rails in place/Call bell, personal items and telephone in reach/Instruct patient to call for assistance before getting out of bed or chair/Non-slip footwear when patient is out of bed/[place] to call system/Physically safe environment - no spills, clutter or unnecessary equipment/Purposeful Proactive Rounding/Room/bathroom lighting operational, light cord in reach

## 2022-03-26 NOTE — H&P ADULT - NSHPPHYSICALEXAM_GEN_ALL_CORE
CONSTITUTIONAL: No acute distress, well-developed, well-groomed, AAOx3  HEAD: Atraumatic, normocephalic  EYES: EOM intact, PERRLA, conjunctiva and sclera clear  ENT: Supple, no masses, no thyromegaly, no bruits, no JVD; moist mucous membranes  PULMONARY: Clear to auscultation bilaterally; no wheezes, rales, or rhonchi  CARDIOVASCULAR: Regular rate and rhythm; no murmurs, rubs, or gallops  GASTROINTESTINAL: Soft, non-tender, non-distended; bowel sounds present  MUSCULOSKELETAL: 2+ peripheral pulses; no clubbing, no cyanosis, no edema  NEUROLOGY: non-focal  SKIN: No rashes or lesions; warm and dry CONSTITUTIONAL: No acute distress, well-developed, well-groomed, AAOx3  HEAD: Atraumatic, normocephalic  EYES: EOM intact, PERRLA, conjunctiva and sclera clear  ENT: Supple, no masses, no thyromegaly, no bruits, no JVD; moist mucous membranes  PULMONARY: Clear to auscultation bilaterally; +wheezes, +rales, or rhonchi  CARDIOVASCULAR: Regular rate and rhythm; no murmurs, rubs, or gallops  GASTROINTESTINAL: Soft, non-tender, non-distended; bowel sounds present  MUSCULOSKELETAL: 2+ peripheral pulses; no clubbing, no cyanosis, b/l LE edema R>L  NEUROLOGY: non-focal  SKIN: No rashes or lesions; warm and dry

## 2022-03-26 NOTE — ED PROVIDER NOTE - PHYSICAL EXAMINATION
CONSTITUTIONAL: NAD  SKIN: Warm dry  HEAD: NCAT  EYES: NL inspection  ENT: MMM  NECK: Supple; non tender.  CARD: RRR  RESP: CTAB  ABD: S/NT no R/G  EXT: +swelling b/l, +1 pulse. slightly tender.   NEURO: Grossly unremarkable  PSYCH: Cooperative, appropriate.

## 2022-03-26 NOTE — H&P ADULT - HISTORY OF PRESENT ILLNESS
70 YO M w/PMHx significant for HTN, NIDDM, HCV s/p tx, former IVDU on suboxone, active smoker, CAD s/p PCI x 3. Pt presented to the ED w/complaints of bilateral LE swelling and SOB x last several months which has apparently worsened in last several days. His wife compelled him to seek emergency medical care. Additionally pt experienced a fall 2 days ago where his knees buckled beneath him while he was ambulating to the restroom with a cane.   ED COURSE: VSS, HD stable, sat ok on RA, POCUS Lungs revealed diffuse B-lines, ECG sinus osvaldo w/1 deg AVB and LVH criteria met, PA/LAT CXR showing no acute CP disease, CTH neg for acute IC pathology. Pt given 40 of lasix. Labs remarkable for neutropenia, normocytic anemia, thrombocytopenia of which per HIE review all are acute EXCEPT for the anemia, a CBC in Aug 2021 showed a hgb 12.4.   Pt was admitted on 3/15/2022 for SOB and CP x several months. He was given low dose lasix during hospital stay and discharge after 1 day 3/16/2022. CHF w/u was incomplete, no TTE done. Pt reports feeling worse after discharhe, continues to have SOB and LE edema which is now worse.   Pt remains on PO Lasix with significant improvement of respiratory status and LE swelling. Advised to follow up with PMD to obtain echo and manage diuretics appropriately.     70 YO M w/PMHx significant for HTN, NIDDM, HCV s/p tx, former IVDU on suboxone, active smoker, CAD s/p PCI x 3. Pt presented to the ED w/complaints of bilateral LE swelling and SOB x last several months which has apparently worsened in last past month. He was here recently on 3/15/2022 c/o similar issues, he was discharged on 3/16/2022. He noticed worsening LEVY, and b/l LE edema R>L since his discharge. He was instructed to follow up with PCP and go for TTE (not done).   ED COURSE:   T(F): 98 (26 Mar 2022 05:30), Max: 98 (25 Mar 2022 19:35)  HR: 71 (26 Mar 2022 05:30) (62 - 71)  BP: 103/50 (26 Mar 2022 05:30) (97/55 - 152/62)  BP(mean): --  RR: 15 (26 Mar 2022 05:30) (15 - 20)  SpO2: 99% (26 Mar 2022 05:30) (94% - 99%)VSS, HD stable, sat ok on RA, POCUS Lungs revealed diffuse B-lines, ECG sinus osvaldo w/1 deg AVB and LVH criteria met, PA/LAT CXR showing no acute CP disease, CTH neg for acute IC pathology. Pt given 40 of lasix. Labs remarkable for neutropenia, normocytic anemia, thrombocytopenia of which per HIE review all are acute EXCEPT for the anemia, a CBC in Aug 2021 showed a hgb 12.4.   Pt was admitted on 3/15/2022 for SOB and CP x several months. He was given low dose lasix during hospital stay and discharge after 1 day 3/16/2022. CHF w/u was incomplete, no TTE done. Pt reports feeling worse after discharhe, continues to have SOB and LE edema which is now worse.   Pt remains on PO Lasix with significant improvement of respiratory status and LE swelling. Advised to follow up with PMD to obtain echo and manage diuretics appropriately.     72 YO M w/PMHx significant for HTN, NIDDM, HCV s/p tx, former IVDU on suboxone, active smoker, CAD s/p PCI x 3. Pt presented to the ED w/complaints of bilateral LE swelling and SOB x last several months which has apparently worsened in last past month. He was here recently on 3/15/2022 c/o similar issues, he was discharged on 3/16/2022. He noticed worsening LEVY, and b/l LE edema R>L since his discharge. He was instructed to follow up with PCP and go for TTE (not done).   On admission: T(F): 98, HR: 71,BP: 103/50, RR: 15, SpO2: 99% on RA, labs sig for pancytopenia (chronic), trop negative, pro-, Na 126, dimer 706, CXR: no acute pathology, CTA: negative for PE. was unable to find EKG on file (last time EKG + for bradycardia/avb)

## 2022-03-26 NOTE — H&P ADULT - ATTENDING COMMENTS
patient states he had leg edema chronically; complaint with medications  states has exertional chest pain- explained as elephant sitting on chest while walking the dog; relieves with rest     # exertional chest pain- rule out stable angina episode  Trop-X2- neg; trend trop  cardiology consulted  patient with h/o CAD s/p 3 stents- 12 years before  stress test from 9/20: no evidence of ischemia    # leg edema and hypervolemic status- r/o acute CHF exacerbation vs related to cirrhosis  # hyponatremia- hypervolemic  CT angio- showing- cirrhotic liver, and varices  check echo  continue lasix IV BID- monitor I/o, wt, fluid restriction, salt restriction  continue spironolactone    # chronic pancytopenia- monitor  follow iron studies, b12, folate, RC  hem onc consulted    # h/o drug abuse in the past; has not used any recreational drugs in 5 years  on suboxone maintenance- continue    # DM. HLP, HTN    # h/o HCV- s/p treatment    # hypothyroidism      pending: cardio consult,. heme onc consult, echo, trend trop

## 2022-03-26 NOTE — H&P ADULT - ASSESSMENT
70 YO M w/ PMHx significant for HTN, NIDDM, HCV s/p tx, former IVDU on suboxone, active smoker, CAD s/p PCI x 3. Pt presented to the ED w/complaints of bilateral LE swelling and SOB x CP. Pt was discharged on 3/16/2022 for SOB and CP. He was re-admitted for similar issues.         #SOB and CP likely due to  72 YO M w/ PMHx significant for HTN, NIDDM, HCV s/p tx, former IVDU on suboxone, active smoker, CAD s/p PCI x 3. Pt presented to the ED w/complaints of bilateral LE swelling and SOB x CP. Pt was discharged on 3/16/2022 for SOB and CP. He was re-admitted for similar issues.       #SOB/CP likely due to HF vs COPD exacerbation  #Hyponatremia likely d/t volume overload   #R>L LE edema   - On admission: pt was HD stable saturating well on RA   - trop negative, pro-, Na 126, dimer 706,   - follow up STAT ekg, no ekg done (pt here for CP)   - CXR: no acute pathology,   - CTA: negative for PE  - f/u TTE (last time no TTE was done, please make sure TTE is done prior to dc)  - started on lasix IV 40mg   - Strict I/O, Daily Weight  - f/u trops and EKGs  - f/u BMP q12   - cardio c/s in necessary   - Telemetry Admit     #h/o CAD s/p PCI  #h/o DLD  #h/o HTN  - c/w ASA and start statin (h/o CAD s/p pci not on statin)  - not on any BP meds at home, can add lisinopril     # Chronic Pancytopenia  - please follow Iron w/TIBC + Ferritin, B12/Folate, Retic, Fecal Occult Immuno (not done last time)   - heme/onc c/s    # h/o DM  - FS (on metformin at home, hold metformin)   - SS  - f/u a1c     # h/o HCV s/p treatment   #former IVDU  - c/w Spironolactone QD  - c/w Lasix QD  - c/w buprenorphine-naloxone       #h/o Hypothyroidism  - c/w Synthroid  50mcg QD  - f/u TSH    #Misc  - DVT Prophylaxis: heparin subq tid   - Diet: regular   - GI Prophylaxis: PPI  - Activity: AAT  - IV Fluids: n/a  - Code Status: full code    Dispo: tele    70 YO M w/ PMHx significant for HTN, NIDDM, HCV s/p tx, former IVDU on suboxone, active smoker, CAD s/p PCI x 3. Pt presented to the ED w/complaints of bilateral LE swelling and SOB x CP. Pt was discharged on 3/16/2022 for SOB and CP. He was re-admitted for similar issues.       #SOB/CP likely due to HF vs COPD exacerbation  #Hyponatremia likely d/t volume overload   #R>L LE edema   - On admission: pt was HD stable saturating well on RA   - trop negative, pro-, Na 126, dimer 706,   - follow up STAT ekg, no ekg done (pt here for CP)   - CXR: no acute pathology,   - CTA: negative for PE  - f/u TTE (last time no TTE was done, please make sure TTE is done prior to dc)  - started on lasix IV 40mg   - Strict I/O, Daily Weight  - f/u trops and EKGs  - f/u BMP q12   - cardio c/s in necessary   - Telemetry Admit     #h/o CAD s/p PCI  #h/o DLD  #h/o HTN  - c/w ASA and start statin (h/o CAD s/p pci not on statin)  - not on any BP meds at home, can add lisinopril     # Chronic Pancytopenia  - please follow Iron w/TIBC + Ferritin, B12/Folate, Retic, Fecal Occult Immuno (not done last time)   - heme/onc c/s  - supposed to go for EGD/colonoscopy this week     # h/o DM  - FS (on metformin at home, hold metformin)   - SS  - f/u a1c     # h/o HCV s/p treatment   #former IVDU  - c/w Spironolactone QD  - c/w Lasix QD  - c/w buprenorphine-naloxone       #h/o Hypothyroidism  - c/w Synthroid  50mcg QD  - f/u TSH    #Misc  - DVT Prophylaxis: heparin subq tid   - Diet: regular   - GI Prophylaxis: PPI  - Activity: AAT  - IV Fluids: n/a  - Code Status: full code    Dispo: tele    72 YO M w/ PMHx significant for HTN, NIDDM, HCV s/p tx, former IVDU on suboxone, active smoker, CAD s/p PCI x 3. Pt presented to the ED w/complaints of bilateral LE swelling and SOB x CP. Pt was discharged on 3/16/2022 for SOB and CP. He was re-admitted for similar issues.       #SOB/CP likely due to HF vs COPD exacerbation  #Hyponatremia likely d/t volume overload   #R>L LE edema   - On admission: pt was HD stable saturating well on RA   - trop negative, pro-, Na 126, dimer 706,   - follow up STAT ekg, no ekg done (pt here for CP)   - CXR: no acute pathology,   - CTA: negative for PE  - f/u TTE (last time no TTE was done, please make sure TTE is done prior to dc)  - started on lasix IV 40mg   - Strict I/O, Daily Weight  - f/u trops and EKGs  - f/u BMP q12   - cardio c/s w/ Dr blakely (pt requested seeing him here)   - Telemetry Admit     #h/o CAD s/p PCI  #h/o DLD  #h/o HTN  - c/w ASA and start statin (h/o CAD s/p pci not on statin)  - not on any BP meds at home, can add lisinopril     # Chronic Pancytopenia  - please follow Iron w/TIBC + Ferritin, B12/Folate, Retic, Fecal Occult Immuno (not done last time)   - heme/onc c/s  - supposed to go for EGD/colonoscopy this week     # h/o DM  - FS (on metformin at home, hold metformin)   - SS  - f/u a1c     # h/o HCV s/p treatment   #former IVDU  - c/w Spironolactone QD  - c/w Lasix QD  - c/w buprenorphine-naloxone       #h/o Hypothyroidism  - c/w Synthroid  50mcg QD  - f/u TSH    #Misc  - DVT Prophylaxis: lovenox   - Diet: regular   - GI Prophylaxis: PPI  - Activity: AAT  - IV Fluids: n/a  - Code Status: full code    Dispo: tele

## 2022-03-26 NOTE — H&P ADULT - NSHPLABSRESULTS_GEN_ALL_CORE
VITAL SIGNS: Last 24 Hours  T(C): 36.7 (26 Mar 2022 05:30), Max: 36.7 (25 Mar 2022 19:35)  T(F): 98 (26 Mar 2022 05:30), Max: 98 (25 Mar 2022 19:35)  HR: 71 (26 Mar 2022 05:30) (62 - 71)  BP: 103/50 (26 Mar 2022 05:30) (97/55 - 152/62)  BP(mean): --  RR: 15 (26 Mar 2022 05:30) (15 - 20)  SpO2: 99% (26 Mar 2022 05:30) (94% - 99%)    LABS:                        8.7    1.63  )-----------( 58       ( 26 Mar 2022 06:01 )             25.2     03-26    130<L>  |  97<L>  |  17  ----------------------------<  146<H>  4.2   |  23  |  0.7    Ca    8.4<L>      26 Mar 2022 06:01  Phos  3.0     03-26  Mg     2.1     03-26    TPro  6.6  /  Alb  3.3<L>  /  TBili  1.0  /  DBili  x   /  AST  25  /  ALT  10  /  AlkPhos  77  03-26          Troponin T, Serum: <0.01 ng/mL (03-26-22 @ 06:01)  Troponin T, Serum: <0.01 ng/mL (03-25-22 @ 22:31)      CARDIAC MARKERS ( 26 Mar 2022 06:01 )  x     / <0.01 ng/mL / x     / x     / x      CARDIAC MARKERS ( 25 Mar 2022 22:31 )  x     / <0.01 ng/mL / x     / x     / x

## 2022-03-26 NOTE — ED PROVIDER NOTE - ATTENDING CONTRIBUTION TO CARE
Patient presents to ED c/o sob on exertion with worsening lower ext swelling. Denies f/c/n/v/abd pain.   Vitals reviewed.   Patient is awake, alert, answering questions appropriately, appears comfortable and not in any distress.  Lungs: B/L decreased air entry,   abd: +BS, NT, ND, soft,   Ext: B/L edema, B/L lower ext erythema, warm, no calf tenderness, distally NVI.   A/P: Dyspnea on exertion with lower ext edema,   r/o ACS,   Cellulitis,   R/o Acute on chronic heart failure,   r/o PE/DVT,   labs, EKG, imaging,   reevaluation.

## 2022-03-26 NOTE — ED PROVIDER NOTE - CARE PLAN
Principal Discharge DX:	Acute chest pain  Secondary Diagnosis:	Cellulitis  Secondary Diagnosis:	Dyspnea  Secondary Diagnosis:	Leg edema  Secondary Diagnosis:	Hyponatremia   1

## 2022-03-26 NOTE — H&P ADULT - REASON FOR ADMISSION
Re-admission for CHF exacerbation (discharged on 3/16/2022 for CHF exacerbation) CHF exacerbation (discharged on 3/16/2022 for CHF exacerbation)

## 2022-03-27 NOTE — PROGRESS NOTE ADULT - SUBJECTIVE AND OBJECTIVE BOX
SUBJECTIVE:    Patient is a 72y old Male who presents with a chief complaint of CHF exacerbation (discharged on 3/16/2022 for CHF exacerbation) (27 Mar 2022 07:48)    Currently admitted to medicine with the primary diagnosis of Acute chest pain       Today is hospital day 1d. This morning he is resting comfortably in bed and reports no new issues or overnight events.     PAST MEDICAL & SURGICAL HISTORY  Diabetes    HTN (hypertension)    Hepatitis  Untreated Hepatitis C    Arthritis    H/O heart artery stent    H/O knee surgery      SOCIAL HISTORY:  Negative for smoking/alcohol/drug use.     ALLERGIES:  No Known Allergies    MEDICATIONS:  STANDING MEDICATIONS  albuterol/ipratropium for Nebulization 3 milliLiter(s) Nebulizer every 6 hours  aspirin  chewable 81 milliGRAM(s) Oral daily  atorvastatin 40 milliGRAM(s) Oral at bedtime  buprenorphine 8 mG/naloxone 2 mG SL  Tablet 0.5 Tablet(s) SubLingual <User Schedule>  enoxaparin Injectable 40 milliGRAM(s) SubCutaneous every 24 hours  furosemide   Injectable 40 milliGRAM(s) IV Push every 12 hours  glucagon  Injectable 1 milliGRAM(s) IntraMuscular once  influenza  Vaccine (HIGH DOSE) 0.7 milliLiter(s) IntraMuscular once  insulin lispro (ADMELOG) corrective regimen sliding scale   SubCutaneous three times a day before meals  levothyroxine 50 MICROGram(s) Oral daily  nicotine -  14 mG/24Hr(s) Patch 1 patch Transdermal daily  pantoprazole  Injectable 40 milliGRAM(s) IV Push at bedtime  regadenoson Injectable 0.4 milliGRAM(s) IV Push once  spironolactone 100 milliGRAM(s) Oral daily    PRN MEDICATIONS    VITALS:   T(F): 96.6  HR: 55  BP: 111/55  RR: 18  SpO2: --    LABS:                        8.5    2.93  )-----------( 63       ( 27 Mar 2022 04:30 )             25.8     03-27    132<L>  |  97<L>  |  25<H>  ----------------------------<  110<H>  4.6   |  25  |  0.9    Ca    8.5      27 Mar 2022 04:30  Phos  3.0     03-26  Mg     2.4     03-27    TPro  6.7  /  Alb  3.4<L>  /  TBili  0.6  /  DBili  x   /  AST  24  /  ALT  10  /  AlkPhos  73  03-27          Troponin T, Serum: <0.01 ng/mL (03-27-22 @ 04:30)      CARDIAC MARKERS ( 27 Mar 2022 04:30 )  x     / <0.01 ng/mL / x     / x     / x      CARDIAC MARKERS ( 26 Mar 2022 06:01 )  x     / <0.01 ng/mL / x     / x     / x      CARDIAC MARKERS ( 25 Mar 2022 22:31 )  x     / <0.01 ng/mL / x     / x     / x          RADIOLOGY:    PHYSICAL EXAM:  GEN: No acute distress  LUNGS: Clear to auscultation bilaterally   HEART: S1/S2 present. RRR.   ABD: Soft, non-tender, non-distended. Bowel sounds present  EXT: NC/NC/NE/2+PP/CHOI/Skin Intact.   NEURO: AAOX3    Intravenous access:   NG tube:   Pickering Catheter:          SUBJECTIVE:    Patient is a 72y old Male who presents with a chief complaint of CHF exacerbation (discharged on 3/16/2022 for CHF exacerbation) (27 Mar 2022 07:48)    Currently admitted to medicine with the primary diagnosis of Acute chest pain    ROS no cp, no sob      Today is hospital day 1d. This morning he is resting comfortably in bed and reports no new issues or overnight events.     PAST MEDICAL & SURGICAL HISTORY  Diabetes    HTN (hypertension)    Hepatitis  Untreated Hepatitis C    Arthritis    H/O heart artery stent    H/O knee surgery      SOCIAL HISTORY:  Negative for smoking/alcohol/drug use.     ALLERGIES:  No Known Allergies    MEDICATIONS:  STANDING MEDICATIONS  albuterol/ipratropium for Nebulization 3 milliLiter(s) Nebulizer every 6 hours  aspirin  chewable 81 milliGRAM(s) Oral daily  atorvastatin 40 milliGRAM(s) Oral at bedtime  buprenorphine 8 mG/naloxone 2 mG SL  Tablet 0.5 Tablet(s) SubLingual <User Schedule>  enoxaparin Injectable 40 milliGRAM(s) SubCutaneous every 24 hours  furosemide   Injectable 40 milliGRAM(s) IV Push every 12 hours  glucagon  Injectable 1 milliGRAM(s) IntraMuscular once  influenza  Vaccine (HIGH DOSE) 0.7 milliLiter(s) IntraMuscular once  insulin lispro (ADMELOG) corrective regimen sliding scale   SubCutaneous three times a day before meals  levothyroxine 50 MICROGram(s) Oral daily  nicotine -  14 mG/24Hr(s) Patch 1 patch Transdermal daily  pantoprazole  Injectable 40 milliGRAM(s) IV Push at bedtime  regadenoson Injectable 0.4 milliGRAM(s) IV Push once  spironolactone 100 milliGRAM(s) Oral daily    PRN MEDICATIONS    VITALS:   T(F): 96.6  HR: 55  BP: 111/55  RR: 18  SpO2: --    LABS:                        8.5    2.93  )-----------( 63       ( 27 Mar 2022 04:30 )             25.8     03-27    132<L>  |  97<L>  |  25<H>  ----------------------------<  110<H>  4.6   |  25  |  0.9    Ca    8.5      27 Mar 2022 04:30  Phos  3.0     03-26  Mg     2.4     03-27    TPro  6.7  /  Alb  3.4<L>  /  TBili  0.6  /  DBili  x   /  AST  24  /  ALT  10  /  AlkPhos  73  03-27          Troponin T, Serum: <0.01 ng/mL (03-27-22 @ 04:30)      CARDIAC MARKERS ( 27 Mar 2022 04:30 )  x     / <0.01 ng/mL / x     / x     / x      CARDIAC MARKERS ( 26 Mar 2022 06:01 )  x     / <0.01 ng/mL / x     / x     / x      CARDIAC MARKERS ( 25 Mar 2022 22:31 )  x     / <0.01 ng/mL / x     / x     / x          RADIOLOGY:    PHYSICAL EXAM:  GEN: No acute distress  LUNGS: Clear to auscultation bilaterally   HEART: S1/S2 present. RRR.   ABD: Soft, non-tender, non-distended. Bowel sounds present  EXT: NC/NC/NE/2+PP/CHOI/Skin Intact.   NEURO: AAOX3    Intravenous access:   NG tube:   Pickering Catheter:

## 2022-03-27 NOTE — CONSULT NOTE ADULT - SUBJECTIVE AND OBJECTIVE BOX
Patient is a 72y old  Male who presents with a chief complaint of CHF exacerbation (discharged on 3/16/2022 for CHF exacerbation) (27 Mar 2022 10:42)      HPI:  Patient presented with sob, wheezing, leg edema, responded partially to diuretics.  He states loving Pizza and eating salty food and almost daily pizza?  surprisingly chest xray, and chest CT scan was not suggestive of pulmonary congestion at all.  in 2020 had a normal nuclear stress test, EF 70%.    72 YO M w/PMHx significant for HTN, NIDDM, HCV s/p tx, former IVDU on suboxone, active smoker, CAD s/p PCI x 3. Pt presented to the ED w/complaints of bilateral LE swelling and SOB x last several months which has apparently worsened in last past month. He was here recently on 3/15/2022 c/o similar issues, he was discharged on 3/16/2022. He noticed worsening LEVY, and b/l LE edema R>L since his discharge. He was instructed to follow up with PCP and go for TTE (not done).   On admission: T(F): 98, HR: 71,BP: 103/50, RR: 15, SpO2: 99% on RA, labs sig for pancytopenia (chronic), trop negative, pro-, Na 126, dimer 706, CXR: no acute pathology, CTA: negative for PE.     PAST MEDICAL & SURGICAL HISTORY:  Diabetes    HTN (hypertension)    Hepatitis  Untreated Hepatitis C    Arthritis    H/O heart artery stent    H/O knee surgery        PREVIOUS DIAGNOSTIC TESTING:      ECHO  FINDINGS: not done    STRESS TEST  FINDINGS:  < from: NM Nuclear Stress Pharmacologic Multiple (09.28.20 @ 10:22) >  1. IV Lexiscan Dual Isotope Study which was negative with respect to symptoms and EKG changes.  2. Myocardial perfusion imaging reveals no evidence of ischemia, normal SPECT images.  3. Gated imaging reveals normal left ventricular systolic function, ejection fraction calculated at 70%.  Recommendation:  Correlate above findings with the clinical presentation.    < end of copied text >    MEDICATIONS  (STANDING):  albuterol/ipratropium for Nebulization 3 milliLiter(s) Nebulizer every 6 hours  aspirin  chewable 81 milliGRAM(s) Oral daily  atorvastatin 40 milliGRAM(s) Oral at bedtime  buprenorphine 8 mG/naloxone 2 mG SL  Tablet 0.5 Tablet(s) SubLingual <User Schedule>  enoxaparin Injectable 40 milliGRAM(s) SubCutaneous every 24 hours  furosemide   Injectable 40 milliGRAM(s) IV Push every 12 hours  glucagon  Injectable 1 milliGRAM(s) IntraMuscular once  influenza  Vaccine (HIGH DOSE) 0.7 milliLiter(s) IntraMuscular once  insulin lispro (ADMELOG) corrective regimen sliding scale   SubCutaneous three times a day before meals  levothyroxine 50 MICROGram(s) Oral daily  nicotine -  14 mG/24Hr(s) Patch 1 patch Transdermal daily  pantoprazole  Injectable 40 milliGRAM(s) IV Push at bedtime  regadenoson Injectable 0.4 milliGRAM(s) IV Push once  spironolactone 100 milliGRAM(s) Oral daily    MEDICATIONS  (PRN):      FAMILY HISTORY:  Family history of diabetes mellitus (DM) (Father, Mother)  Parents        SOCIAL HISTORY:  CIGARETTES: daily active   ALCOHOL: social  DRUGS: no                      REVIEW OF SYSTEMS:  sob, levy, leg edema  no chest pain, no palpitation  no abdominal pain, no N/V or melena  no dysuria or frequentcy, but since admission has been diuresing well  no joint pain  no skin rash  no psychiatric problem or complaint, recovered ivdu        Vital Signs Last 24 Hrs  T(C): 35.9 (27 Mar 2022 05:32), Max: 36.3 (26 Mar 2022 18:37)  T(F): 96.6 (27 Mar 2022 05:32), Max: 97.4 (26 Mar 2022 18:37)  HR: 55 (27 Mar 2022 05:32) (55 - 81)  BP: 111/55 (27 Mar 2022 05:32) (111/55 - 169/93)  BP(mean): --  RR: 18 (27 Mar 2022 05:32) (18 - 18)  SpO2: --                      PHYSICAL EXAM:  GENERAL: No distress, well developed  HEAD:  Atraumatic, Normocephalic  NECK: Supple, No JVD, No Bruit of either carotid arteries  NERVOUS SYSTEM:  Alert, Awake, Oriented to time, place, person; Normal memory and speech; Normal motor Strength 5/5 B/L upper and lower extremities  CHEST/LUNG: Normal air entry to lung base bilaterally; extensive b/l wheeze and rhonchi, but no crackle  HEART: Regular heart beat, S1, A2, P2, No S3, No gallop, No murmur  ABDOMEN: Soft, Non tender, Non distended; Bowel sounds present  EXTREMITIES: 1+ Peripheral Pulses, 3+ pitting edema, skin discoloration    TELEMETRY: NSR    ECG:< from: 12 Lead ECG (03.15.22 @ 12:28) >  Sinus bradycardia with 1st degree A-V block  Otherwise normal ECG    < end of copied text >      I&O's Detail    26 Mar 2022 07:01  -  27 Mar 2022 07:00  --------------------------------------------------------  IN:    Oral Fluid: 540 mL  Total IN: 540 mL    OUT:    Voided (mL): 2100 mL  Total OUT: 2100 mL    Total NET: -1560 mL      27 Mar 2022 07:01  -  27 Mar 2022 13:01  --------------------------------------------------------  IN:    Oral Fluid: 443 mL  Total IN: 443 mL    OUT:    Voided (mL): 2000 mL  Total OUT: 2000 mL    Total NET: -1557 mL          LABS:                        8.5    2.93  )-----------( 63       ( 27 Mar 2022 04:30 )             25.8     03-27    132<L>  |  97<L>  |  25<H>  ----------------------------<  110<H>  4.6   |  25  |  0.9    Ca    8.5      27 Mar 2022 04:30  Phos  3.0     03-26  Mg     2.4     03-27    TPro  6.7  /  Alb  3.4<L>  /  TBili  0.6  /  DBili  x   /  AST  24  /  ALT  10  /  AlkPhos  73  03-27    CARDIAC MARKERS ( 27 Mar 2022 04:30 )  x     / <0.01 ng/mL / x     / x     / x      CARDIAC MARKERS ( 26 Mar 2022 06:01 )  x     / <0.01 ng/mL / x     / x     / x      CARDIAC MARKERS ( 25 Mar 2022 22:31 )  x     / <0.01 ng/mL / x     / x     / x              I&O's Summary    26 Mar 2022 07:01  -  27 Mar 2022 07:00  --------------------------------------------------------  IN: 540 mL / OUT: 2100 mL / NET: -1560 mL    27 Mar 2022 07:01  -  27 Mar 2022 13:01  --------------------------------------------------------  IN: 443 mL / OUT: 2000 mL / NET: -1557 mL        RADIOLOGY & ADDITIONAL STUDIES: < from: Xray Chest 1 View- PORTABLE-Routine (Xray Chest 1 View- PORTABLE-Routine in AM.) (03.27.22 @ 03:46) >  Support devices: None.    Cardiac/mediastinum/hilum: Unremarkable.    Lung parenchyma/Pleura: No consolidation, effusion or pneumothorax.    Skeleton/soft tissues: Unchanged.    Impression:    No radiographic evidence of acute cardiopulmonary disease.    < end of copied text >  < from: CT Angio Chest PE Protocol w/ IV Cont (03.26.22 @ 00:49) >  1. No evidence of acute intrathoracic pathology or pulmonary embolism.    2. Partially imaged cirrhotic liver, splenomegaly. Gastroesophageal   varices.    < end of copied text >  < from: CT Angio Chest PE Protocol w/ IV Cont (03.26.22 @ 00:49) >  LUNGS, PLEURA, AIRWAYS: No lobar consolidation, mass, effusion, or   pneumothorax. No evidence of central endobronchial obstruction. No   bronchiectasis or honeycombing. No suspicious pulmonary nodule.    THORACIC NODES: No mediastinal, hilar, supraclavicular, or axillary   lymphadenopathy.    MEDIASTINUM/GREAT VESSELS: No pericardial effusion. Heart size is within   normal limits. The aorta and main pulmonary artery are of normal caliber.   Aortic and coronary artery calcifications.    < end of copied text >

## 2022-03-27 NOTE — CONSULT NOTE ADULT - ATTENDING COMMENTS
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

## 2022-03-27 NOTE — CONSULT NOTE ADULT - SUBJECTIVE AND OBJECTIVE BOX
Patient is a 72y old  Male who presents with a chief complaint of CHF exacerbation (discharged on 3/16/2022 for CHF exacerbation) (26 Mar 2022 09:31)      HPI:  70 YO M w/PMHx significant for HTN, NIDDM, HCV s/p tx, former IVDU on suboxone, active smoker, CAD s/p PCI x 3. Pt presented to the ED w/complaints of bilateral LE swelling and SOB x last several months which has apparently worsened in last past month. He was here recently on 3/15/2022 c/o similar issues, he was discharged on 3/16/2022. He noticed worsening LEVY, and b/l LE edema R>L since his discharge. He was instructed to follow up with PCP and go for TTE (not done).   On admission: T(F): 98, HR: 71,BP: 103/50, RR: 15, SpO2: 99% on RA, labs sig for pancytopenia (chronic), trop negative, pro-, Na 126, dimer 706, CXR: no acute pathology, CTA: negative for PE. was unable to find EKG on file (last time EKG + for bradycardia/avb) (26 Mar 2022 09:31)       ROS:  Negative except for:    PAST MEDICAL & SURGICAL HISTORY:  Diabetes    HTN (hypertension)    Hepatitis  Untreated Hepatitis C    Arthritis    H/O heart artery stent    H/O knee surgery        SOCIAL HISTORY:    FAMILY HISTORY:  Family history of diabetes mellitus (DM) (Father, Mother)  Parents        MEDICATIONS  (STANDING):  albuterol/ipratropium for Nebulization 3 milliLiter(s) Nebulizer every 6 hours  aspirin  chewable 81 milliGRAM(s) Oral daily  atorvastatin 40 milliGRAM(s) Oral at bedtime  buprenorphine 8 mG/naloxone 2 mG SL  Tablet 0.5 Tablet(s) SubLingual <User Schedule>  enoxaparin Injectable 40 milliGRAM(s) SubCutaneous every 24 hours  furosemide   Injectable 40 milliGRAM(s) IV Push every 12 hours  glucagon  Injectable 1 milliGRAM(s) IntraMuscular once  influenza  Vaccine (HIGH DOSE) 0.7 milliLiter(s) IntraMuscular once  insulin lispro (ADMELOG) corrective regimen sliding scale   SubCutaneous three times a day before meals  levothyroxine 50 MICROGram(s) Oral daily  nicotine -  14 mG/24Hr(s) Patch 1 patch Transdermal daily  pantoprazole  Injectable 40 milliGRAM(s) IV Push at bedtime  spironolactone 100 milliGRAM(s) Oral daily    MEDICATIONS  (PRN):    Height (cm): 185.4 (03-26-22 @ 18:37)  Weight (kg): 111.9 (03-26-22 @ 18:37)  BMI (kg/m2): 32.6 (03-26-22 @ 18:37)  BSA (m2): 2.35 (03-26-22 @ 18:37)  Allergies    No Known Allergies    Intolerances        Vital Signs Last 24 Hrs  T(C): 35.9 (27 Mar 2022 05:32), Max: 36.3 (26 Mar 2022 18:37)  T(F): 96.6 (27 Mar 2022 05:32), Max: 97.4 (26 Mar 2022 18:37)  HR: 55 (27 Mar 2022 05:32) (55 - 81)  BP: 111/55 (27 Mar 2022 05:32) (111/55 - 169/93)  BP(mean): --  RR: 18 (27 Mar 2022 05:32) (18 - 18)  SpO2: --    PHYSICAL EXAM  General: adult in NAD  HEENT: clear oropharynx, anicteric sclera, pink conjunctiva  Neck: supple  CV: normal S1/S2 with no murmur rubs or gallops  Lungs: positive air movement b/l ant lungs,clear to auscultation, no wheezes, no rales  Abdomen: soft non-tender non-distended, no hepatosplenomegaly  Ext: no clubbing cyanosis or edema  Skin: no rashes and no petechiae  Neuro: alert and oriented X 4, no focal deficits      LABS:                          8.5    2.93  )-----------( 63       ( 27 Mar 2022 04:30 )             25.8         Mean Cell Volume : 91.2 fL  Mean Cell Hemoglobin : 30.0 pg  Mean Cell Hemoglobin Concentration : 32.9 g/dL  Auto Neutrophil # : x  Auto Lymphocyte # : x  Auto Monocyte # : x  Auto Eosinophil # : x  Auto Basophil # : x  Auto Neutrophil % : x  Auto Lymphocyte % : x  Auto Monocyte % : x  Auto Eosinophil % : x  Auto Basophil % : x      Serial CBC's  03-27 @ 04:30  Hct-25.8 / Hgb-8.5 / Plat-63 / RBC-2.83 / WBC-2.93  Serial CBC's  03-26 @ 06:01  Hct-25.2 / Hgb-8.7 / Plat-58 / RBC-2.79 / WBC-1.63  Serial CBC's  03-25 @ 22:31  Hct-28.5 / Hgb-9.6 / Plat-78 / RBC-3.16 / WBC-3.52      03-27    132<L>  |  97<L>  |  25<H>  ----------------------------<  110<H>  4.6   |  25  |  0.9    Ca    8.5      27 Mar 2022 04:30  Phos  3.0     03-26  Mg     2.4     03-27    TPro  6.7  /  Alb  3.4<L>  /  TBili  0.6  /  DBili  x   /  AST  24  /  ALT  10  /  AlkPhos  73  03-27      BLOOD SMEAR INTERPRETATION:       RADIOLOGY & ADDITIONAL STUDIES:    < from: CT Angio Chest PE Protocol w/ IV Cont (03.26.22 @ 00:49) >  IMPRESSION:  1. No evidence of acute intrathoracic pathology or pulmonary embolism.  2. Partially imaged cirrhotic liver, splenomegaly. Gastroesophageal   varices.  --- End of Report ---  < end of copied text >    < from: Xray Chest 1 View- PORTABLE-Urgent (03.25.22 @ 22:43) >  Impression:  No radiographic evidence of acute cardiopulmonary disease.  --- End of Report ---  < end of copied text >    < from: [facility] Duplex Lower Ext Vein Scan, Bilat (03.25.22 @ 22:42) >  Impression  No evidence of deep venous thrombosis or superficial thrombophlebitis in   the bilateral lower extremities.  < end of copied text >   Patient is a 72y old  Male who presents with a chief complaint of CHF exacerbation (discharged on 3/16/2022 for CHF exacerbation) (26 Mar 2022 09:31)      HPI:  72 YO M w/PMHx significant for HTN, NIDDM, HCV s/p tx, former IVDU on suboxone, active smoker, CAD s/p PCI x 3. Pt presented to the ED w/complaints of bilateral LE swelling and SOB x last several months which has apparently worsened in last past month. He was here recently on 3/15/2022 c/o similar issues, he was discharged on 3/16/2022. He noticed worsening LEVY, and b/l LE edema R>L since his discharge. He was instructed to follow up with PCP and go for TTE (not done).   On admission: T(F): 98, HR: 71,BP: 103/50, RR: 15, SpO2: 99% on RA, labs sig for pancytopenia (chronic), trop negative, pro-, Na 126, dimer 706, CXR: no acute pathology, CTA: negative for PE. was unable to find EKG on file (last time EKG + for bradycardia/avb) (26 Mar 2022 09:31)       ROS:  Negative except for: LE swelling and SOB.    PAST MEDICAL & SURGICAL HISTORY:  Diabetes    HTN (hypertension)    Hepatitis  Untreated Hepatitis C    Arthritis    H/O heart artery stent    H/O knee surgery        SOCIAL HISTORY:    FAMILY HISTORY:  Family history of diabetes mellitus (DM) (Father, Mother)  Parents        MEDICATIONS  (STANDING):  albuterol/ipratropium for Nebulization 3 milliLiter(s) Nebulizer every 6 hours  aspirin  chewable 81 milliGRAM(s) Oral daily  atorvastatin 40 milliGRAM(s) Oral at bedtime  buprenorphine 8 mG/naloxone 2 mG SL  Tablet 0.5 Tablet(s) SubLingual <User Schedule>  enoxaparin Injectable 40 milliGRAM(s) SubCutaneous every 24 hours  furosemide   Injectable 40 milliGRAM(s) IV Push every 12 hours  glucagon  Injectable 1 milliGRAM(s) IntraMuscular once  influenza  Vaccine (HIGH DOSE) 0.7 milliLiter(s) IntraMuscular once  insulin lispro (ADMELOG) corrective regimen sliding scale   SubCutaneous three times a day before meals  levothyroxine 50 MICROGram(s) Oral daily  nicotine -  14 mG/24Hr(s) Patch 1 patch Transdermal daily  pantoprazole  Injectable 40 milliGRAM(s) IV Push at bedtime  spironolactone 100 milliGRAM(s) Oral daily    MEDICATIONS  (PRN):    Height (cm): 185.4 (03-26-22 @ 18:37)  Weight (kg): 111.9 (03-26-22 @ 18:37)  BMI (kg/m2): 32.6 (03-26-22 @ 18:37)  BSA (m2): 2.35 (03-26-22 @ 18:37)  Allergies    No Known Allergies    Intolerances        Vital Signs Last 24 Hrs  T(C): 35.9 (27 Mar 2022 05:32), Max: 36.3 (26 Mar 2022 18:37)  T(F): 96.6 (27 Mar 2022 05:32), Max: 97.4 (26 Mar 2022 18:37)  HR: 55 (27 Mar 2022 05:32) (55 - 81)  BP: 111/55 (27 Mar 2022 05:32) (111/55 - 169/93)  BP(mean): --  RR: 18 (27 Mar 2022 05:32) (18 - 18)  SpO2: --    PHYSICAL EXAM  General: adult in NAD  HEENT: clear oropharynx, anicteric sclera, pink conjunctiva  Neck: supple  CV: normal S1/S2 with no murmur rubs or gallops  Lungs: positive air movement b/l ant lungs,clear to auscultation, no wheezes, no rales  Abdomen: soft non-tender non-distended, no hepatosplenomegaly  Ext: no clubbing cyanosis. B/L pitting LE edema +2  Skin: no rashes and no petechiae  Neuro: alert and oriented X 4, no focal deficits      LABS:                          8.5    2.93  )-----------( 63       ( 27 Mar 2022 04:30 )             25.8         Mean Cell Volume : 91.2 fL  Mean Cell Hemoglobin : 30.0 pg  Mean Cell Hemoglobin Concentration : 32.9 g/dL  Auto Neutrophil # : x  Auto Lymphocyte # : x  Auto Monocyte # : x  Auto Eosinophil # : x  Auto Basophil # : x  Auto Neutrophil % : x  Auto Lymphocyte % : x  Auto Monocyte % : x  Auto Eosinophil % : x  Auto Basophil % : x      Serial CBC's  03-27 @ 04:30  Hct-25.8 / Hgb-8.5 / Plat-63 / RBC-2.83 / WBC-2.93  Serial CBC's  03-26 @ 06:01  Hct-25.2 / Hgb-8.7 / Plat-58 / RBC-2.79 / WBC-1.63  Serial CBC's  03-25 @ 22:31  Hct-28.5 / Hgb-9.6 / Plat-78 / RBC-3.16 / WBC-3.52      03-27    132<L>  |  97<L>  |  25<H>  ----------------------------<  110<H>  4.6   |  25  |  0.9    Ca    8.5      27 Mar 2022 04:30  Phos  3.0     03-26  Mg     2.4     03-27    TPro  6.7  /  Alb  3.4<L>  /  TBili  0.6  /  DBili  x   /  AST  24  /  ALT  10  /  AlkPhos  73  03-27      BLOOD SMEAR INTERPRETATION:       RADIOLOGY & ADDITIONAL STUDIES:    < from: CT Angio Chest PE Protocol w/ IV Cont (03.26.22 @ 00:49) >  IMPRESSION:  1. No evidence of acute intrathoracic pathology or pulmonary embolism.  2. Partially imaged cirrhotic liver, splenomegaly. Gastroesophageal   varices.  --- End of Report ---  < end of copied text >    < from: Xray Chest 1 View- PORTABLE-Urgent (03.25.22 @ 22:43) >  Impression:  No radiographic evidence of acute cardiopulmonary disease.  --- End of Report ---  < end of copied text >    < from: VA Duplex Lower Ext Vein Scan, Bilat (03.25.22 @ 22:42) >  Impression  No evidence of deep venous thrombosis or superficial thrombophlebitis in   the bilateral lower extremities.  < end of copied text >

## 2022-03-27 NOTE — CONSULT NOTE ADULT - ATTENDING COMMENTS
Case discussed with fellow. agree with above plan, modified where needed . will be followed this week by Hepatology likely, EGD when COPD flare imprved

## 2022-03-27 NOTE — CONSULT NOTE ADULT - ASSESSMENT
sob-[place] along with extensive b/l wheeze and rhonchi in a patient with smoking habit and prior drug use and clear lung for pulmonary congestion ( in exam, chest xary and chest CT) suggestive of COPD  the significant b/l leg edema responding to diuretics in the lieu of above pulmonary status is suggestive of right heart failure, pulmonary HTN and possibly diastolic dysfunction  possibility of HFpEF can not be ruled out specially admitting eating salty food, daily pizza and smoking and severe anemia also will help to exacerbate    I d/w him about the significance and absolute necessity of adopting a low salt diet, daily walking, f/u with pulmonary  keep on Lasix 40 bid for 1-2 more days will f/u with renal function and electrolytes  consider albuterol inhaler   do echo to assess diastolic function and pulmonary pressure, however by current pulmonary exam likely has a very poor quality window to properly assess the heart     AT THIS POINT IS NOT A PROPER CANDIDATE FOR STRESS TEST, HE HAS AN EXTENSIVE WHEEZE, AND HIS PRESENTING PROBLEM IS NOT ACS.
72 YO M w/PMHx significant for HTN, NIDDM, HCV s/p tx, former IVDU on suboxone, active smoker, CAD s/p PCI x 3. Pt presented to the ED w/complaints of bilateral LE swelling and SOB x last several months which has apparently worsened in last past month. currently admitted to telemetry for COPD exacerbation and diastolic heart failure    *Liver cirrhosis 2/2 hep C  -hep C eradicated, hep C RNA qualitative neg   -unable to calculate MELD-Na / no INR  -denies melena hematemesis or hematochezia  -patient seen in [facility] last year, offered EGD And colonoscopy, refused.   -no abdominal distension   -no HE     *Reported having FIT test that was positive (none on records)     *COPD and heart failure exacerbation    Recommendations  -US RUQ abd   -check INR  -daily INR / CBC and CMP  -low salt diet  -EGD after COPD exacerbation resolves and cardiology workup done, if varices present will need eradication as cannot tolerate BB  -will benefit from EGD and colonoscopy as OP  -needs to follow up with hepatology clinic, Address: [address] [place] [address], [id number], Telephone Nb: [phone]    -for questions text me on [employer], call [phone] on weekdays before 5pm or call GI service at [phone]  after 5 pm and on weekends 
Mr. Bey is a 72 yo M w/PMH significant for HTN, NIDDM, HCV s/p tx, former IVDU on suboxone, active smoker, CAD s/p PCI x 3. Pt presented to the ED w/complaints of bilateral LE swelling and SOB x last several months which has apparently worsened last month. Pt is admitted for fluid overload. Hematology consulted for his pancytopenia.     # Chronic pancytopenia likely secondary to sequestration from hepatosplenomegaly  - CT shows hepatosplenomegaly with evidence of varices as well  - GI consult   - normocytic anemia appears to be stable (baseline hgb 8-9)  - folate WNL, B12 lower limit of normal, obtain MMA  - obtain iron studies with ferritin (last was done in 2018 and pointing towards iron deficiency)  - check HIV and hepatitis B  - platelets stable ~50-60k  - check coag profile including PTT/PT/INR and fibrinogen to r/o low grade DIC  - correct hypothyroidism (TSH 8.1), check T4 and consider endocrine consult  - US spleen to document size of spleen

## 2022-03-27 NOTE — CONSULT NOTE ADULT - REASON FOR ADMISSION
CHF exacerbation (discharged on 3/16/2022 for CHF exacerbation)

## 2022-03-27 NOTE — PROGRESS NOTE ADULT - ASSESSMENT
72 YO M w/ PMHx significant for HTN, NIDDM, HCV s/p tx, former IVDU on suboxone, active smoker, CAD s/p PCI x 3. Pt presented to the ED w/complaints of bilateral LE swelling and SOB x CP. Pt was discharged on 3/16/2022 for SOB and CP. He was re-admitted for similar issues.       #SOB/CP likely due to HF vs COPD exacerbation  #Hyponatremia likely d/t volume overload   #R>L LE edema   - On admission: pt was HD stable saturating well on RA   - trop negative, pro-, Na 126, dimer 706,   - follow up STAT ekg, no ekg done (pt here for CP)   - CXR: no acute pathology,   - CTA: negative for PE  - stress test since the patient was complaining   - started on lasix IV   - Strict I/O, Daily Weight  - cardio c/s w/ Dr blakely (pt requested seeing him here)   - Telemetry Admit     #h/o CAD s/p PCI  #h/o DLD  #h/o HTN  - c/w ASA and start statin (h/o CAD s/p pci not on statin)  - not on any BP meds at home, can add lisinopril     # Chronic Pancytopenia  - please follow Iron w/TIBC + Ferritin, B12/Folate, Retic, Fecal Occult Immuno (not done last time)   - heme/onc c/s  - supposed to go for EGD/colonoscopy this week     # h/o DM  - FS (on metformin at home, hold metformin)   - SS  - f/u a1c     # h/o HCV s/p treatment   #former IVDU  - c/w Spironolactone QD  - c/w Lasix QD  - c/w buprenorphine-naloxone   -gi consult   -hem onc cs    #h/o Hypothyroidism  - c/w Synthroid  50mcg QD  - f/u TSH    #Misc  - DVT Prophylaxis: lovenox   - Diet: regular   - GI Prophylaxis: PPI  - Activity: AAT  - IV Fluids: n/a  - Code Status: full code    Dispo: tele

## 2022-03-28 NOTE — PROGRESS NOTE ADULT - ASSESSMENT
70 YO M w/PMHx significant for HTN, NIDDM, HCV s/p tx, former IVDU on suboxone, active smoker, CAD s/p PCI x 3. Pt presented to the ED w/complaints of bilateral LE swelling and SOB x last several months which has apparently worsened in last past month. currently admitted to telemetry for COPD exacerbation and diastolic heart failure    #Decompensated liver cirrhosis (+Ascites H/O, +G/E varices) secondary to HCV vs LUIS DANIEL  MELD-Na Score - 12  Jose Score - A (6)  ECHO: EF 64% and Mild TVR    # Esophageal varices  - pt will need outpatient EGD for EV screening    #Hx of Ascites  - c/w lasix 40 and aldactone  - please obtain RUQ sono    #No Hx of HE  #SBP: No H/O SBP    #Coagulopathy:  Monitor INR    #+FIT test per patient  - pt will need outpatient colonoscopy     HRS: No H/O HRS    # HCC screening:  Please f/u as outpatient for US abdomen and AFP every 6 months.    # Lifestyle modifications  Calorie intake 25-40 Kcal/kg/day  Protein intake: 1.2-1.5 gm/kg/day  Avoid smoking, alcohol, NSAIDS.    #Vaccinations:  Please f/u in clinic for being uptodate with HAV/HBV/Influenza/Pneumococcal vaccines.         72 YO M w/PMHx significant for HTN, NIDDM, HCV s/p tx, former IVDU on suboxone, active smoker, CAD s/p PCI x 3. Pt presented to the ED w/complaints of bilateral LE swelling and SOB x last several months which has apparently worsened in last past month. currently admitted to telemetry for COPD exacerbation and diastolic heart failure    #Decompensated liver cirrhosis (+Ascites H/O, +G/E varices) secondary to HCV vs LUIS DANIEL  MELD-Na Score - 12  Jose Score - A (6)  ECHO: EF 64% and Mild TVR    # Esophageal varices  - pt will need outpatient EGD for EV screening    #Hx of Ascites  - c/w lasix 40 and aldactone 100  - please obtain RUQ sono    #No Hx of HE  #SBP: No H/O SBP    #Coagulopathy:  Monitor INR    #+FIT test per patient  - pt will need outpatient colonoscopy     HRS: No H/O HRS    # HCC screening:  Please f/u as outpatient for US abdomen and AFP every 6 months.    # Lifestyle modifications  Calorie intake 25-40 Kcal/kg/day  Protein intake: 1.2-1.5 gm/kg/day  Avoid smoking, alcohol, NSAIDS.    #Vaccinations:  Please f/u in clinic for being uptodate with HAV/HBV/Influenza/Pneumococcal vaccines.

## 2022-03-28 NOTE — PROGRESS NOTE ADULT - SUBJECTIVE AND OBJECTIVE BOX
Gastroenterology progress note:     Patient is a 72y old  Male who presents with a chief complaint of CHF exacerbation (discharged on 3/16/2022 for CHF exacerbation) (27 Mar 2022 17:28)       Admitted on: 03-26-22    We are following the patient for: cirrhosis       Interval History:    No acute events overnight.   - Diet - tolerating  - last BM - 1 day ago  - Abdominal pain - none      PAST MEDICAL & SURGICAL HISTORY:  Diabetes    HTN (hypertension)    Hepatitis  Untreated Hepatitis C    Arthritis    H/O heart artery stent    H/O knee surgery        MEDICATIONS  (STANDING):  albuterol/ipratropium for Nebulization 3 milliLiter(s) Nebulizer every 6 hours  aspirin  chewable 81 milliGRAM(s) Oral daily  atorvastatin 40 milliGRAM(s) Oral at bedtime  buprenorphine 8 mG/naloxone 2 mG SL  Tablet 0.5 Tablet(s) SubLingual <User Schedule>  enoxaparin Injectable 40 milliGRAM(s) SubCutaneous every 24 hours  furosemide   Injectable 40 milliGRAM(s) IV Push every 12 hours  glucagon  Injectable 1 milliGRAM(s) IntraMuscular once  influenza  Vaccine (HIGH DOSE) 0.7 milliLiter(s) IntraMuscular once  insulin lispro (ADMELOG) corrective regimen sliding scale   SubCutaneous three times a day before meals  levothyroxine 50 MICROGram(s) Oral daily  nicotine -  14 mG/24Hr(s) Patch 1 patch Transdermal daily  pantoprazole  Injectable 40 milliGRAM(s) IV Push at bedtime  regadenoson Injectable 0.4 milliGRAM(s) IV Push once  spironolactone 100 milliGRAM(s) Oral daily    MEDICATIONS  (PRN):      Allergies  No Known Allergies      Review of Systems:   Cardiovascular:  No Chest Pain, No Palpitations  Respiratory:  No Cough, No Dyspnea  Gastrointestinal:  As described in HPI  Skin:  No Skin Lesions, No Jaundice  Neuro:  No Syncope, No Dizziness    Physical Examination:  T(C): 35.9 (03-28-22 @ 05:36), Max: 35.9 (03-27-22 @ 14:25)  HR: 55 (03-28-22 @ 05:36) (55 - 66)  BP: 109/53 (03-28-22 @ 05:36) (106/58 - 132/63)  RR: 18 (03-28-22 @ 05:36) (16 - 18)  SpO2: 99% (03-27-22 @ 16:39) (99% - 99%)      03-27-22 @ 07:01  -  03-28-22 @ 07:00  --------------------------------------------------------  IN: 1078 mL / OUT: 5200 mL / NET: -4122 mL    03-28-22 @ 07:01  -  03-28-22 @ 08:39  --------------------------------------------------------  IN: 0 mL / OUT: 500 mL / NET: -500 mL        GENERAL: AAOx3, no acute distress.  HEAD:  Atraumatic, Normocephalic  EYES: conjunctiva and sclera clear  NECK: Supple, no JVD or thyromegaly  CHEST/LUNG: Clear to auscultation bilaterally; No wheeze, rhonchi, or rales  HEART: Regular rate and rhythm; normal S1, S2, No murmurs.  ABDOMEN: Soft, nontender, nondistended; Bowel sounds present  NEUROLOGY: No asterixis or tremor.   SKIN: Intact, no jaundice     Data:                        9.5    2.41  )-----------( 81       ( 28 Mar 2022 06:20 )             29.0     Hgb trend:  9.5  03-28-22 @ 06:20  8.5  03-27-22 @ 04:30  8.7  03-26-22 @ 06:01  9.6  03-25-22 @ 22:31        03-28    135  |  97<L>  |  25<H>  ----------------------------<  97  4.6   |  30  |  0.8    Ca    8.7      28 Mar 2022 06:20  Phos  3.6     03-28  Mg     2.1     03-28    TPro  7.3  /  Alb  3.7  /  TBili  0.7  /  DBili  x   /  AST  24  /  ALT  11  /  AlkPhos  81  03-28    Liver panel trend:  TBili 0.7   /   AST 24   /   ALT 11   /   AlkP 81   /   Tptn 7.3   /   Alb 3.7    /   DBili --      03-28  TBili 0.6   /   AST 24   /   ALT 10   /   AlkP 73   /   Tptn 6.7   /   Alb 3.4    /   DBili --      03-27  TBili 1.0   /   AST 25   /   ALT 10   /   AlkP 77   /   Tptn 6.6   /   Alb 3.3    /   DBili -- 03-26  TBili 1.0   /   AST 31   /   ALT 12   /   AlkP 112   /   Tptn 7.7   /   Alb 4.1    /   DBili --      03-25      PT/INR - ( 28 Mar 2022 06:20 )   PT: 13.50 sec;   INR: 1.18 ratio         PTT - ( 28 Mar 2022 06:20 )  PTT:34.9 sec       Radiology:

## 2022-03-28 NOTE — PROGRESS NOTE ADULT - SUBJECTIVE AND OBJECTIVE BOX
pt seen and examined.     HPI:  70 YO M w/PMHx significant for HTN, NIDDM, HCV s/p tx, former IVDU on suboxone, active smoker, CAD s/p PCI x 3. Pt presented to the ED w/complaints of bilateral LE swelling and SOB x last several months which has apparently worsened in last past month. He was here recently on 3/15/2022 c/o similar issues, he was discharged on 3/16/2022. He noticed worsening LEVY, and b/l LE edema R>L since his discharge. He was instructed to follow up with PCP and go for TTE (not done).     ROS: no cp, no sob, no n/v, no fever    PAST MEDICAL & SURGICAL HISTORY:  Diabetes    HTN (hypertension)    Hepatitis  Untreated Hepatitis C    Arthritis    H/O heart artery stent    H/O knee surgery    Vital Signs Last 24 Hrs  T(C): 35.9 (28 Mar 2022 05:36), Max: 35.9 (27 Mar 2022 14:25)  T(F): 96.7 (28 Mar 2022 05:36), Max: 96.7 (27 Mar 2022 14:25)  HR: 55 (28 Mar 2022 05:36) (55 - 66)  BP: 109/53 (28 Mar 2022 05:36) (106/58 - 132/63)  BP(mean): --  RR: 18 (28 Mar 2022 05:36) (16 - 18)  SpO2: 99% (27 Mar 2022 16:39) (99% - 99%)    physical exam  constitutional NAD, AAOX3, Respiratory  lungs CTA, CVS heart RRR, GI: abdomen Soft NT, ND, BS+, skin: intact  neuro exam Motor, sensory and CN normal, no deficit     MEDICATIONS  (STANDING):  albuterol/ipratropium for Nebulization 3 milliLiter(s) Nebulizer every 6 hours  aspirin  chewable 81 milliGRAM(s) Oral daily  atorvastatin 40 milliGRAM(s) Oral at bedtime  buprenorphine 8 mG/naloxone 2 mG SL  Tablet 0.5 Tablet(s) SubLingual <User Schedule>  enoxaparin Injectable 40 milliGRAM(s) SubCutaneous every 24 hours  furosemide   Injectable 40 milliGRAM(s) IV Push every 12 hours  glucagon  Injectable 1 milliGRAM(s) IntraMuscular once  influenza  Vaccine (HIGH DOSE) 0.7 milliLiter(s) IntraMuscular once  insulin lispro (ADMELOG) corrective regimen sliding scale   SubCutaneous three times a day before meals  levothyroxine 50 MICROGram(s) Oral daily  nicotine -  14 mG/24Hr(s) Patch 1 patch Transdermal daily  pantoprazole  Injectable 40 milliGRAM(s) IV Push at bedtime  regadenoson Injectable 0.4 milliGRAM(s) IV Push once  spironolactone 100 milliGRAM(s) Oral daily                            9.5    2.41  )-----------( 81       ( 28 Mar 2022 06:20 )             29.0     03-28    135  |  97<L>  |  25<H>  ----------------------------<  97  4.6   |  30  |  0.8    Ca    8.7      28 Mar 2022 06:20  Phos  3.6     03-28  Mg     2.1     03-28    TPro  7.3  /  Alb  3.7  /  TBili  0.7  /  DBili  x   /  AST  24  /  ALT  11  /  AlkPhos  81  03-28    D-Dimer Assay, Quantitative: 706 ng/mL DDU [0 - 230] (03-25-22 @ 22:31)    COVID-19 PCR: NotDetec (03-25-22 @ 02:12)      CARDIAC MARKERS ( 27 Mar 2022 04:30 )  x     / <0.01 ng/mL / x     / x     / x          PT/INR - ( 28 Mar 2022 06:20 )   PT: 13.50 sec;   INR: 1.18 ratio         PTT - ( 28 Mar 2022 06:20 )  PTT:34.9 sec    < from: TTE Echo Complete w/o Contrast w/ Doppler (03.27.22 @ 16:32) >   1. Technically goodstudy.   2. Normal global left ventricular systolic function.   3. LV Ejection Fraction by Carlin's Method with a biplane EF of 64 %.   4. Normal right atrial size.   5. Mild mitral valve regurgitation.   6. Mild tricuspid regurgitation.   7. Aorticvalve thickening with decreased leaflet opening.   8. Left atrial enlargement.   9. Mild pulmonic valve regurgitation.    < end of copied text >      a/p  # Sob, and edema, due to Decompensated liver cirrhosis  doubt chf exac    recent admission with dx CHF     hepatology consult appreciated   Decompensated liver cirrhosis (+Ascites H/O, +G/E varices) secondary to HCV vs LUIS DANIEL  MELD-Na Score - 12  Jose Score - A (6)  ECHO: EF 64% and Mild TVR    pt is getting a stress test today     diuretics, cont lasix and spirinolactone     if stress test neg, and cleared by cardio , will dc tele     # Diabetes: controlled. avoid hypoglycemia,     A1C with Estimated Average Glucose Result: 4.7: Method: Immunoassay       Reference Range                4.0-5.6%       High risk (prediabetic)        5.7-6.4%       Diabetic, diagnostic             >=6.5%       ADA diabetic treatment goal       <7.0%  The Hemoglobin A1c testing is NGSP-certified.Reference ranges are based  upon the 2010 recommendations of  the American Diabetes Association.  Interpretation may vary for children  and adolescents. % (03.27.22 @ 04:30)    CAPILLARY BLOOD GLUCOSE    POCT Blood Glucose.: 122 mg/dL (28 Mar 2022 07:25)  POCT Blood Glucose.: 160 mg/dL (27 Mar 2022 21:27)  POCT Blood Glucose.: 142 mg/dL (27 Mar 2022 16:35)  POCT Blood Glucose.: 252 mg/dL (27 Mar 2022 11:28)      # HTN controlled    # hx of Hepatitis C, untreated, needs HCC screening as outpt, \  check RUQ us     # Esophageal varices, outpt fu , no active bleeding     #Coagulopathy and pancytopenia due to cirrhoiss  Monitor INR and cbc     #+FIT test per patient, pt will need outpatient colonoscopy     #Progress Note Handoff  Pending (specify):  Consults cardio, tests stress test  Family discussion: dw pt   Disposition: Home when stable

## 2022-03-28 NOTE — CHART NOTE - NSCHARTNOTEFT_GEN_A_CORE
- Lexiscan result was shared with Dr Walter over the phone (mod-severe reversible defect in LV apex)  - Will optimize patient first (lasix, aldactone, and duonebs)  - Will schedule cardiac catheterization later on

## 2022-03-28 NOTE — PROGRESS NOTE ADULT - ASSESSMENT
Assessment and Plan  Case of a 71 year old male patient with history of HTN, NIDDM, HCV s/p tx, former IVDU on suboxone, active smoker, CAD s/p PCI x 3 who presented to the ED on 03/25 for bilateral LE swelling and SOB x several months, currently admitted to telemetry for COPD exacerbation versus diastolic heart failure exacerbation. Currently hemodynamically stable.      Shortness of Breath and Chest Discomfort   Likely Secondary to Acute on Chronic HFEF Exacerbation Versus COPD Exacerbation  * Active Smoker  * TTE 03/27 EF 64%, mild UT, TR, and MR  * Follows with Dr Beck  * Pro  03/15 -> 322 03/25  * Iron with Total Binding Capacity in AM (07.06.18 @ 06:25)    Iron - Total Binding Capacity.: 279 ug/dL    % Saturation, Iron: 23 %    Iron Total, Serum: 63 ug/dL    Unsaturated Iron Binding Capacity: 216 ug/dL  * Ferritin, Serum in AM (07.06.18 @ 06:25)    Ferritin, Serum: 55: Note: New reference ranges effective 04/16/2018. ng/mL  * CXR 03/27 no acute process    - Cardiology Dr Beck consulted  - Follow up Stress test once performed (will be performed 03/28)  - Monitor in/out  - Daily weight (standing)  - Monitor SaO2 and O2 requirements: on RA  - Continue IV Lasix 40mg BID since 03/26  - Continue Aldactone 100mg QD  - Salt restricted diet  - Monitor telemetry   - Trend electrolytes and keep K>4 and Mg>2  - Continue Duonebs 3mL Q6h  - Will not start steroids for now  - Encourage smoking cessation      Chronic Pancytopenia  Likely Secondary to Sequestration from Hepatosplenomegaly Versus Cirrhosis  * Normocytic anemia appears to be stable (baseline hgb 8-9); platelets stable ~50-60k  * Iron with Total Binding Capacity in AM (07.06.18 @ 06:25)    Iron - Total Binding Capacity.: 279 ug/dL    % Saturation, Iron: 23 %    Iron Total, Serum: 63 ug/dL    Unsaturated Iron Binding Capacity: 216 ug/dL  * Ferritin, Serum in AM (07.06.18 @ 06:25)    Ferritin, Serum: 55: Note: New reference ranges effective 04/16/2018. ng/mL  * Folate, Serum in AM (03.16.22 @ 06:20)    Folate, Serum: 4.8 ng/mL  * Vitamin B12, Serum in AM (03.16.22 @ 06:20)    Vitamin B12, Serum: 476 pg/mL  * Lactate Dehydrogenase, Serum in AM (03.16.22 @ 06:20)    Lactate Dehydrogenase, Serum: 166 U/L  * Reticulocyte Count in AM (03.16.22 @ 06:20)    RBC Count: 3.06 M/uL    Reticulocyte Percent: 2.0 %    Absolute Reticulocytes: 62.4 K/uL  * CT shows hepatosplenomegaly with evidence of varices as well    - Hematology (Dr Mccormack) and Hepatology Teams are on board  - Trend Hb, Plt, and WBC  - Active T/S  - Follow up MMA received 03/28. Folate WNL, B12 lower limit of normal  - Repeat iron studies (ferritin 03/28 received)  - FOBT pending collection. For history of +FIT test per patient, will need outpatient colonoscopy   - Check HIV and hepatitis B (received 03/28)  - INR 1.18 03/28 and fibrinogen 466 ordered to r/o low grade DIC  - Continue levothyroxine 50mcg QD for hypothyroidism TSH 8.12 03/14 -> Sent fT4 and Total T3 03/28   - US spleen ordered to document size of spleen: enlarged spleen 17.2 x 16.1 x 6.8 cm      Decompensated Liver cirrhosis (+Ascites H/O, +G/E varices) Secondary to HCV vs LUIS DANIEL  Esophageal Varices  * MELD-Na Score - 12  * Jose Score - A (6)  * ECHO: EF 64% and Mild TVR    - For EV screening, outpatient follow up for EGD   - For HCC screening, outpatient follow up for US abdomen and AFP every 6 months.  - Outpatient follow up in clinic for being uptodate with HAV/HBV/Influenza/Pneumococcal vaccines.  - For History of ascites: continue IV lasix 40 BID and aldactone 100mg QD  - Follow up RUQ US once performed  - Monitor INR  - Avoid smoking, alcohol, NSAIDS.      Hypertension  - Monitor BP  - On diuretics as above      Hypothyroidism  * Thyroid Stimulating Hormone, Serum in AM (03.16.22 @ 06:20)    Thyroid Stimulating Hormone, Serum: 8.12 uIU/mL  - Continue Levothyroxine 50mcg QD  - Follow up fT4 and total T3 recieved 03/28      Dyslipidemia  CAD s/p PCI x3  * Lipid Profile in AM (03.16.22 @ 06:20)    Cholesterol, Serum: 118 mg/dL    Triglycerides, Serum: 70 mg/dL    HDL Cholesterol, Serum: 51 mg/dL    Non HDL Cholesterol: 67    LDL Cholesterol Calculated: 55 mg/dL  * Troponin <0.01 on 03/25, 03/26, and 03/27  * TTE 03/27 as above    - Cardiology Dr Beck consulted  - Follow up Stress test once performed (will be performed 03/28)  - Continue Aspirin 81mg QD  - Continue Atorvastatin 40mg QD  - Monitor telemetry        NIDDM  * A1C with Estimated Average Glucose in AM (03.27.22 @ 04:30)    A1C with Estimated Average Glucose Result: 4.7  - Monitor POCT premeals and at bedtime   - Continue Apidra Sliding Scale B      Former IV Drug User  - Continue Suboxone 8/2 0.5 tablet at 08:00 AM and 20:00 PM      Others  - DVT Prophylaxis: Lovenox 40mg Subcutaneously daily  - GI Prophylaxis: Pantoprazole 40mg IV QD  - Diet: DASH/ TLC  - Code Status: Full      Barriers to learning: NO  Discharge Planning: Patient will be discharged once stable   Plan was communicated with patient and medical team       Angie Jimenez MD  PGY - 2 Internal Medicine   [facility]   Assessment and Plan  Case of a 71 year old male patient with history of HTN, NIDDM, HCV s/p tx, former IVDU on suboxone, active smoker, CAD s/p PCI x 3 who presented to the ED on 03/25 for bilateral LE swelling and SOB x several months, currently admitted to telemetry for COPD exacerbation versus diastolic heart failure exacerbation. Currently hemodynamically stable.      Shortness of Breath and Chest Discomfort   Likely Secondary to Decompensated Cirrhosis (+Ascites H/O, +G/E varices) Secondary to HCV vs LUIS DANIEL  Esophageal Varices  * MELD-Na Score - 12  * Jose Score - A (6)  * ECHO: EF 64% and Mild TVR  * Active Smoker  * TTE 03/27 EF 64%, mild IN, TR, and MR  * Pro  03/15 -> 322 03/25  * CXR 03/27 no acute process    - Hepatology Team on board  - For EV screening, outpatient follow up for EGD   - For HCC screening, outpatient follow up for US abdomen and AFP every 6 months.  - Outpatient follow up in clinic for being uptodate with HAV/HBV/Influenza/Pneumococcal vaccines.  - For History of ascites: continue IV lasix 40 BID and aldactone 100mg QD  - Follow up RUQ US once performed  - Monitor INR  - Avoid smoking, alcohol, NSAIDS.  - Salt restricted diet  - Monitor telemetry   - Trend electrolytes and keep K>4 and Mg>2  - Continue Duonebs 3mL Q6h  - Will not start steroids for now      Chronic Pancytopenia  Likely Secondary to Sequestration from Hepatosplenomegaly Versus Cirrhosis  * Normocytic anemia appears to be stable (baseline hgb 8-9); platelets stable ~50-60k  * Iron with Total Binding Capacity in AM (07.06.18 @ 06:25)    Iron - Total Binding Capacity.: 279 ug/dL    % Saturation, Iron: 23 %    Iron Total, Serum: 63 ug/dL    Unsaturated Iron Binding Capacity: 216 ug/dL  * Ferritin, Serum in AM (07.06.18 @ 06:25)    Ferritin, Serum: 55: Note: New reference ranges effective 04/16/2018. ng/mL  * Folate, Serum in AM (03.16.22 @ 06:20)    Folate, Serum: 4.8 ng/mL  * Vitamin B12, Serum in AM (03.16.22 @ 06:20)    Vitamin B12, Serum: 476 pg/mL  * Lactate Dehydrogenase, Serum in AM (03.16.22 @ 06:20)    Lactate Dehydrogenase, Serum: 166 U/L  * Reticulocyte Count in AM (03.16.22 @ 06:20)    RBC Count: 3.06 M/uL    Reticulocyte Percent: 2.0 %    Absolute Reticulocytes: 62.4 K/uL  * CT shows hepatosplenomegaly with evidence of varices as well    - Hematology (Dr Mccormack) and Hepatology Teams are on board  - Trend Hb, Plt, and WBC  - Active T/S  - Follow up MMA received 03/28. Folate WNL, B12 lower limit of normal  - Repeat iron studies (ferritin 03/28 received)  - FOBT pending collection. For history of +FIT test per patient, will need outpatient colonoscopy   - Check HIV and hepatitis B (received 03/28)  - INR 1.18 03/28 and fibrinogen 466 ordered to r/o low grade DIC  - Continue levothyroxine 50mcg QD for hypothyroidism TSH 8.12 03/14 -> Sent fT4 and Total T3 03/28   - US spleen ordered to document size of spleen: enlarged spleen 17.2 x 16.1 x 6.8 cm      Hypertension  - Monitor BP  - On diuretics as above      Hypothyroidism  * Thyroid Stimulating Hormone, Serum in AM (03.16.22 @ 06:20)    Thyroid Stimulating Hormone, Serum: 8.12 uIU/mL  - Continue Levothyroxine 50mcg QD  - Follow up fT4 and total T3 recieved 03/28      Dyslipidemia  CAD s/p PCI x3  * Lipid Profile in AM (03.16.22 @ 06:20)    Cholesterol, Serum: 118 mg/dL    Triglycerides, Serum: 70 mg/dL    HDL Cholesterol, Serum: 51 mg/dL    Non HDL Cholesterol: 67    LDL Cholesterol Calculated: 55 mg/dL  * Troponin <0.01 on 03/25, 03/26, and 03/27  * TTE 03/27 as above    - Cardiology Dr Beck consulted  - Follow up Stress test once performed (will be performed 03/28)  - Continue Aspirin 81mg QD  - Continue Atorvastatin 40mg QD  - Monitor telemetry        NIDDM  * A1C with Estimated Average Glucose in AM (03.27.22 @ 04:30)    A1C with Estimated Average Glucose Result: 4.7  - Monitor POCT premeals and at bedtime   - Continue Apidra Sliding Scale B      Former IV Drug User  - Continue Suboxone 8/2 0.5 tablet at 08:00 AM and 20:00 PM      Others  - DVT Prophylaxis: Lovenox 40mg Subcutaneously daily  - GI Prophylaxis: Pantoprazole 40mg IV QD  - Diet: DASH/ TLC  - Code Status: Full      Barriers to learning: NO  Discharge Planning: Patient will be discharged once stable   Plan was communicated with patient and medical team       Angie Jimenez MD  PGY - 2 Internal Medicine   [facility]

## 2022-03-28 NOTE — PROGRESS NOTE ADULT - SUBJECTIVE AND OBJECTIVE BOX
Location: [facility] F9 (3COVF) 003 A ([facility] F9 (3COVF))  Patient Name: DINO BEY  Age: 72y  Gender: Male      Progress Note  This morning patient was seen and examined at bedside.    Today is hospital day 2d.  Mr. Bey is doing fine.   He reports he had a good night sleep.   His shortness of breath has improved and he is doing fine on room air.  He is ambulating on room air with no difficulties.  His appetite is adequate, and he denies nausea or vomiting.   He denies any abdominal pain, diarrhea, or constipation.   His last bowel movement was soft and was on 03/28.   He is voiding freely and denies urinary symptoms (dysuria, urgency, frequency, intermittence).      Vital Signs in the last 24 hours   Vitals Summary T(C): 35.9 (03-28-22 @ 05:36), Max: 35.9 (03-27-22 @ 14:25)  HR: 55 (03-28-22 @ 05:36) (55 - 66)  BP: 109/53 (03-28-22 @ 05:36) (106/58 - 132/63)  RR: 18 (03-28-22 @ 05:36) (16 - 18)  SpO2: 99% (03-27-22 @ 16:39) (99% - 99%)  Vent Data   Intake/ Output   03-27-22 @ 07:01  -  03-28-22 @ 07:00  --------------------------------------------------------  IN: 1078 mL / OUT: 5200 mL / NET: -4122 mL    03-28-22 @ 07:01  -  03-28-22 @ 10:34  --------------------------------------------------------  IN: 0 mL / OUT: 500 mL / NET: -500 mL      Physical Exam  * General Appearance: Alert, cooperative, interactive, well-groomed, oriented to time, place, and person, in no acute distress  * Head: Normocephalic, without obvious abnormality, atraumatic  * Neck: Supple, symmetrical, trachea midline, no adenopathy;   * Thyroid:  No enlargement/tenderness/nodules; no carotid bruit or JVD  * Lungs: Respirations unlabored, Good bilateral air entry, audible wheezes and rhonchi  * Heart: Regular Rate and Rhythm, normal S1 and S2, no audible murmur, rub, or gallop  * Abdomen: Symmetric, non-distended, no scar, soft, non-tender, bowel sounds active all four quadrants, no masses, no organomegaly (no hepatosplenomegaly)  * Extremities: Extremities normal, atraumatic, no cyanosis, +2-3 lower extremity pitting edema bilaterally, adequate dorsalis pedis pulses  * Pulses: 2+ and symmetric all extremities  * Skin: Skin color, texture, turgor normal, no rashes or lesions  * Lymph nodes: Cervical, supraclavicular, and axillary nodes normal  * Neurologic: CNII-XII intact, normal strength, sensation and reflexes throughout      Investigations   Laboratory Workup  - CBC:                        9.5    2.41  )-----------( 81       ( 28 Mar 2022 06:20 )             29.0     - Chemistry:  03-28    135  |  97<L>  |  25<H>  ----------------------------<  97  4.6   |  30  |  0.8    Ca    8.7      28 Mar 2022 06:20  Phos  3.6     03-28  Mg     2.1     03-28    TPro  7.3  /  Alb  3.7  /  TBili  0.7  /  DBili  x   /  AST  24  /  ALT  11  /  AlkPhos  81  03-28    - Coagulation Studies:  PT/INR - ( 28 Mar 2022 06:20 )   PT: 13.50 sec;   INR: 1.18 ratio         PTT - ( 28 Mar 2022 06:20 )  PTT:34.9 sec  - ABG:    - Cardiac Markers:  CARDIAC MARKERS ( 27 Mar 2022 04:30 )  x     / <0.01 ng/mL / x     / x     / x          Current Medications  Standing Medications  albuterol/ipratropium for Nebulization 3 milliLiter(s) Nebulizer every 6 hours  aspirin  chewable 81 milliGRAM(s) Oral daily  atorvastatin 40 milliGRAM(s) Oral at bedtime  buprenorphine 8 mG/naloxone 2 mG SL  Tablet 0.5 Tablet(s) SubLingual <User Schedule>  enoxaparin Injectable 40 milliGRAM(s) SubCutaneous every 24 hours  furosemide   Injectable 40 milliGRAM(s) IV Push every 12 hours  glucagon  Injectable 1 milliGRAM(s) IntraMuscular once  influenza  Vaccine (HIGH DOSE) 0.7 milliLiter(s) IntraMuscular once  insulin lispro (ADMELOG) corrective regimen sliding scale   SubCutaneous three times a day before meals  levothyroxine 50 MICROGram(s) Oral daily  nicotine -  14 mG/24Hr(s) Patch 1 patch Transdermal daily  pantoprazole  Injectable 40 milliGRAM(s) IV Push at bedtime  regadenoson Injectable 0.4 milliGRAM(s) IV Push once  spironolactone 100 milliGRAM(s) Oral daily    PRN Medications    Singles Doses Administered  (ADM OVERRIDE) 1 each &lt;see task&gt; GiveOnce  (ADM OVERRIDE) 3 each &lt;see task&gt; GiveOnce  (Floorstock) 1 each &lt;see task&gt; GiveOnce  (Floorstock) 1 each &lt;see task&gt; GiveOnce  acetaminophen     Tablet .. 975 milliGRAM(s) Oral once  ALBUTerol    0.083%. 2.5 milliGRAM(s) Nebulizer once  albuterol/ipratropium for Nebulization 3 milliLiter(s) Nebulizer once  buprenorphine 8 mG/naloxone 2 mG SL  Tablet 0.5 Tablet(s) SubLingual once  cefTRIAXone   IVPB 1000 milliGRAM(s) IV Intermittent once  methylPREDNISolone sodium succinate Injectable 125 milliGRAM(s) IV Push Once

## 2022-03-29 NOTE — PROGRESS NOTE ADULT - SUBJECTIVE AND OBJECTIVE BOX
SUBJECTIVE:    Patient is a 72y old Male who presents with a chief complaint of CHF exacerbation (discharged on 3/16/2022 for CHF exacerbation) (29 Mar 2022 09:57)    Currently admitted to medicine with the primary diagnosis of Acute chest pain       24 hour update:  Today is hospital day 3d. This morning he is resting comfortably in bed and reports no new issues or overnight events.      PAST MEDICAL & SURGICAL HISTORY  Diabetes    HTN (hypertension)    Hepatitis  Untreated Hepatitis C    Arthritis    H/O heart artery stent    H/O knee surgery      SOCIAL HISTORY:    ALLERGIES:  No Known Allergies    MEDICATIONS:  STANDING MEDICATIONS  albuterol/ipratropium for Nebulization 3 milliLiter(s) Nebulizer every 6 hours  aspirin  chewable 81 milliGRAM(s) Oral daily  atorvastatin 40 milliGRAM(s) Oral at bedtime  buprenorphine 8 mG/naloxone 2 mG SL  Tablet 0.5 Tablet(s) SubLingual <User Schedule>  enoxaparin Injectable 40 milliGRAM(s) SubCutaneous every 24 hours  furosemide   Injectable 40 milliGRAM(s) IV Push every 12 hours  glucagon  Injectable 1 milliGRAM(s) IntraMuscular once  influenza  Vaccine (HIGH DOSE) 0.7 milliLiter(s) IntraMuscular once  insulin lispro (ADMELOG) corrective regimen sliding scale   SubCutaneous three times a day before meals  levothyroxine 50 MICROGram(s) Oral daily  nicotine -  14 mG/24Hr(s) Patch 1 patch Transdermal daily  pantoprazole  Injectable 40 milliGRAM(s) IV Push at bedtime  regadenoson Injectable 0.4 milliGRAM(s) IV Push once  spironolactone 100 milliGRAM(s) Oral daily    PRN MEDICATIONS  acetaminophen     Tablet .. 650 milliGRAM(s) Oral every 6 hours PRN  gabapentin 100 milliGRAM(s) Oral three times a day PRN    VITALS:   T(F): 97.3  HR: 64  BP: 111/58  RR: 18  SpO2: 97%    PHYSICAL EXAM:  GENERAL: NAD  NERVOUS SYSTEM: AAOx3, No focal deficits  CHEST/LUNG: +bs b/l  HEART: +s1s2 RRR  ABDOMEN: soft, NT/ND  EXTREMITIES: palpable pulses    LABS:                        10.3   2.76  )-----------( 96       ( 29 Mar 2022 06:36 )             31.6     135  |  92<L>  |  21<H>  ----------------------------<  99  4.2   |  29  |  1.0    Ca    9.1      29 Mar 2022 06:36  Phos  3.6     03-29  Mg     2.0     03-29    TPro  8.0  /  Alb  4.2  /  TBili  0.9  /  DBili  x   /  AST  29  /  ALT  15  /  AlkPhos  95  03-29    PT/INR - ( 28 Mar 2022 06:20 )   PT: 13.50 sec;   INR: 1.18 ratio    PTT - ( 28 Mar 2022 06:20 )  PTT:34.9 sec    Troponin T, Serum: <0.01 ng/mL (03-27-22 @ 04:30)    IMAGING:

## 2022-03-29 NOTE — PROGRESS NOTE ADULT - ASSESSMENT
71 year old male patient with history of HTN, NIDDM, HCV s/p tx, former IVDU on suboxone, active smoker, CAD s/p PCI x 3 who presented to the ED on 03/25 for bilateral LE swelling and SOB x several months, currently admitted to telemetry for COPD exacerbation versus diastolic heart failure exacerbation. Currently hemodynamically stable.    #Shortness of Breath and Chest Discomfort - Likely Secondary to Decompensated Cirrhosis (+Ascites H/O, +G/E varices) Secondary to HCV vs LUIS DANIEL combined w cardiac etiology  * MELD-Na Score - 12  * Jose Score - A (6)  * ECHO: EF 64% and Mild TVR  * Active Smoker  * TTE 03/27 EF 64%, mild MI, TR, and MR  * Pro  03/15 -> 322 03/25  * CXR 03/27 no acute process    - Hepatology Team on board  - For EV screening, outpatient follow up for EGD   - For HCC screening, outpatient follow up for US abdomen and AFP every 6 months.  - Outpatient follow up in clinic for being uptodate with HAV/HBV/Influenza/Pneumococcal vaccines.  - For History of ascites: continue IV lasix 40 BID and aldactone 100mg QD  - Follow up RUQ US once performed  - Monitor INR  - Avoid smoking, alcohol, NSAIDS.  - Salt restricted diet  - Monitor telemetry   - Trend electrolytes and keep K>4 and Mg>2  - Continue Duonebs 3mL Q6h  - Will not start steroids for now      #Chronic Pancytopenia Likely Secondary to Sequestration from Hepatosplenomegaly Versus Cirrhosis  * Normocytic anemia appears to be stable (baseline hgb 8-9); platelets stable ~50-60k  * Iron with Total Binding Capacity in AM (07.06.18 @ 06:25)    Iron - Total Binding Capacity.: 279 ug/dL    % Saturation, Iron: 23 %    Iron Total, Serum: 63 ug/dL    Unsaturated Iron Binding Capacity: 216 ug/dL  * Ferritin, Serum in AM (07.06.18 @ 06:25)    Ferritin, Serum: 55: Note: New reference ranges effective 04/16/2018. ng/mL  * Folate, Serum in AM (03.16.22 @ 06:20)    Folate, Serum: 4.8 ng/mL  * Vitamin B12, Serum in AM (03.16.22 @ 06:20)    Vitamin B12, Serum: 476 pg/mL  * Lactate Dehydrogenase, Serum in AM (03.16.22 @ 06:20)    Lactate Dehydrogenase, Serum: 166 U/L  * Reticulocyte Count in AM (03.16.22 @ 06:20)    RBC Count: 3.06 M/uL    Reticulocyte Percent: 2.0 %    Absolute Reticulocytes: 62.4 K/uL  * CT shows hepatosplenomegaly with evidence of varices as well    - Hematology (Dr Mccormack) and Hepatology Teams are on board  - Trend Hb, Plt, and WBC  - Active T/S  - Follow up MMA received 03/28. Folate WNL, B12 lower limit of normal  - Repeat iron studies (ferritin 03/28 received)  - FOBT pending collection. For history of +FIT test per patient, will need outpatient colonoscopy   - Check HIV and hepatitis B (received 03/28)  - INR 1.18 03/28 and fibrinogen 466 ordered to r/o low grade DIC  - Continue levothyroxine 50mcg QD for hypothyroidism TSH 8.12 03/14 -> Sent fT4 and Total T3 03/28   - US spleen ordered to document size of spleen: enlarged spleen 17.2 x 16.1 x 6.8 cm    #Hypertension  - Monitor BP  - On diuretics as above    #Hypothyroidism  * Thyroid Stimulating Hormone, Serum in AM (03.16.22 @ 06:20)    Thyroid Stimulating Hormone, Serum: 8.12 uIU/mL  - Continue Levothyroxine 50mcg QD  - Follow up fT4 and total T3 recieved 03/28      #CAD s/p PCI x3 / H/o DLD  * Lipid Profile in AM (03.16.22 @ 06:20)    Cholesterol, Serum: 118 mg/dL    Triglycerides, Serum: 70 mg/dL    HDL Cholesterol, Serum: 51 mg/dL    Non HDL Cholesterol: 67    LDL Cholesterol Calculated: 55 mg/dL  * Troponin <0.01 on 03/25, 03/26, and 03/27  * TTE 03/27 as above    - Stress test noted - Cardiology Dr Beck consulted - PLANNING ON TAKING PATIENT FOR CATH TOMORROW  - Continue Aspirin 81mg QD  - Continue Atorvastatin 40mg QD  - Monitor telemetry    #NIDDM  * A1C with Estimated Average Glucose in AM (03.27.22 @ 04:30)    A1C with Estimated Average Glucose Result: 4.7  - Monitor POCT premeals and at bedtime   - Continue Apidra Sliding Scale B    #Former IV Drug User  - Continue Suboxone 8/2 0.5 tablet at 08:00 AM and 20:00 PM    - DVT Prophylaxis: Lovenox 40mg Subcutaneously daily  - GI Prophylaxis: Pantoprazole 40mg IV QD  - Diet: DASH/ TLC  - Code Status: Full

## 2022-03-29 NOTE — PROGRESS NOTE ADULT - SUBJECTIVE AND OBJECTIVE BOX
pt seen and examined.     HPI:  72 YO M w/PMHx significant for HTN, NIDDM, HCV s/p tx, former IVDU on suboxone, active smoker, CAD s/p PCI x 3. Pt presented to the ED w/complaints of bilateral LE swelling and SOB x last several months which has apparently worsened in last past month. He was here recently on 3/15/2022 c/o similar issues, he was discharged on 3/16/2022. He noticed worsening LEVY, and b/l LE edema R>L since his discharge. He was instructed to follow up with PCP and go for TTE (not done).   On admission: T(F): 98, HR: 71,BP: 103/50, RR: 15, SpO2: 99% on RA, labs sig for pancytopenia (chronic), trop negative, pro-, Na 126, dimer 706, CXR: no acute pathology, CTA: negative for PE. was unable to find EKG on file (last time EKG + for bradycardia/avb) (26 Mar 2022 09:31)      ROS: no cp, no sob, no n/v, no fever    PAST MEDICAL & SURGICAL HISTORY:  Diabetes    HTN (hypertension)    Hepatitis  Untreated Hepatitis C    Arthritis    H/O heart artery stent    H/O knee surgery        Vital Signs Last 24 Hrs  T(C): 36.7 (29 Mar 2022 08:00), Max: 37 (28 Mar 2022 19:59)  T(F): 98.1 (29 Mar 2022 08:00), Max: 98.6 (28 Mar 2022 19:59)  HR: 62 (29 Mar 2022 08:00) (60 - 69)  BP: 137/71 (29 Mar 2022 08:00) (114/56 - 137/71)  BP(mean): --  RR: 18 (29 Mar 2022 08:00) (16 - 18)  SpO2: 97% (29 Mar 2022 01:07) (95% - 97%)    physical exam  constitutional NAD, AAOX3, Respiratory  lungs CTA, CVS heart RRR, GI: abdomen Soft NT, ND, BS+, skin: intact  neuro exam Motor, sensory and CN normal, no deficit     MEDICATIONS  (STANDING):  albuterol/ipratropium for Nebulization 3 milliLiter(s) Nebulizer every 6 hours  aspirin  chewable 81 milliGRAM(s) Oral daily  atorvastatin 40 milliGRAM(s) Oral at bedtime  buprenorphine 8 mG/naloxone 2 mG SL  Tablet 0.5 Tablet(s) SubLingual <User Schedule>  enoxaparin Injectable 40 milliGRAM(s) SubCutaneous every 24 hours  furosemide   Injectable 40 milliGRAM(s) IV Push every 12 hours  glucagon  Injectable 1 milliGRAM(s) IntraMuscular once  influenza  Vaccine (HIGH DOSE) 0.7 milliLiter(s) IntraMuscular once  insulin lispro (ADMELOG) corrective regimen sliding scale   SubCutaneous three times a day before meals  levothyroxine 50 MICROGram(s) Oral daily  nicotine -  14 mG/24Hr(s) Patch 1 patch Transdermal daily  pantoprazole  Injectable 40 milliGRAM(s) IV Push at bedtime  regadenoson Injectable 0.4 milliGRAM(s) IV Push once  spironolactone 100 milliGRAM(s) Oral daily    CAPILLARY BLOOD GLUCOSE    POCT Blood Glucose.: 108 mg/dL (29 Mar 2022 07:30)  POCT Blood Glucose.: 100 mg/dL (28 Mar 2022 21:22)  POCT Blood Glucose.: 138 mg/dL (28 Mar 2022 16:47)  POCT Blood Glucose.: 141 mg/dL (28 Mar 2022 13:52)                          10.3   2.76  )-----------( 96       ( 29 Mar 2022 06:36 )             31.6     03-29    135  |  92<L>  |  21<H>  ----------------------------<  99  4.2   |  29  |  1.0    Ca    9.1      29 Mar 2022 06:36  Phos  3.6     03-29  Mg     2.0     03-29    TPro  8.0  /  Alb  4.2  /  TBili  0.9  /  DBili  x   /  AST  29  /  ALT  15  /  AlkPhos  95  03-29    Procalcitonin, Serum: 0.04 ng/mL [0.02 - 0.10] (03-27-22 @ 04:30)  Ferritin, Serum: 66 ng/mL [30 - 400] (03-27-22 @ 04:30)  D-Dimer Assay, Quantitative: 706 ng/mL DDU [0 - 230] (03-25-22 @ 22:31)    COVID-19 PCR: NotDetec (03-25-22 @ 02:12)          PT/INR - ( 28 Mar 2022 06:20 )   PT: 13.50 sec;   INR: 1.18 ratio         PTT - ( 28 Mar 2022 06:20 )  PTT:34.9 sec    < from: NM Nuclear Stress Pharmacologic Multiple (03.28.22 @ 13:01) >  1. MODERATE TO SEVERE REVERSIBLE DEFECT IN THE APEX OF THE LEFT VENTRICLE   CONSISTENT WITH ISCHEMIA.  2. NORMAL RESTING LEFT VENTRICULAR WALL MOTION AND WALL THICKENING.  3. LEFT VENTRICULAR EJECTION FRACTION OF  75 % WHICH IS WITHIN RANGE OF   NORMAL.    < end of copied text >    a/p  # abnormal stress test: dw cardio: plan is cath ( either this evening or in am depending on lab schedule)   cont meds  fu cardio     # edema, cirrhosis, fu gi   a/p    recent admission with dx CHF     hepatology consult appreciated   Decompensated liver cirrhosis (+Ascites H/O, +G/E varices) secondary to HCV vs LUIS DANIEL  MELD-Na Score - 12  Jose Score - A (6)  ECHO: EF 64% and Mild TVR    pt is getting a stress test today     diuretics, cont lasix and spirinolactone     if stress test neg, and cleared by cardio , will dc tele     # Diabetes: controlled. avoid hypoglycemia,     A1C with Estimated Average Glucose Result: 4.7 (03.27.22 @ 04:30)    POCT Blood Glucose.: 108 mg/dL (03-29-22 @ 07:30)  POCT Blood Glucose.: 100 mg/dL (03-28-22 @ 21:22)  POCT Blood Glucose.: 138 mg/dL (03-28-22 @ 16:47)  POCT Blood Glucose.: 141 mg/dL (03-28-22 @ 13:52)      # HTN controlled    # hx of Hepatitis C, untreated, needs HCC screening as outpt, \parcheck RUQ us     # Esophageal varices, outpt fu , no active bleeding     #Coagulopathy and pancytopenia due to cirrhoiss  INR and cbc are stable   INR: 1.18 (03.28.22 @ 06:20)  hgb: 10/3 ( 3.28.22)              #+FIT test per patient, pt will need outpatient colonoscopy     #Progress Note Handoff  Pending (specify):  cardio follow up, plan is cardiac cath ( today or in am )   Family discussion: dw pt and his wife ( on the phone)   Disposition: Home when stable

## 2022-03-30 NOTE — PRE-OP CHECKLIST - WEIGHT IN LBS
Noted in chart review that patient was seen in ED 10/9/17 for possible prescription psychiatric drug interactions. Was advised to contact psychiatry as soon as possible to discuss her medication issues.     Spoke with mother of 14 year old and states she is doing much better/no further dizziness or paresthesia symptoms. Contacted psychiatry yesterday and medication issues have been resolved.   No further follow up with PCP is required at this time.   Routed to PCP for FYI.   
246.6

## 2022-03-30 NOTE — CHART NOTE - NSCHARTNOTEFT_GEN_A_CORE
PRE-OP DIAGNOSIS:    stable angina, abnormal stress test, AUC of 7    PROCEDURE:     [x] Coronary Angiogram     [x] LHC     [] LVG     [] RHC     [x] Intervention (see below)         PHYSICIAN:  Dr. Beck    ASSISTANT:  Dr. Chapman       PROCEDURE DESCRIPTION:     Consent:      [x] Patient     [] Family Member     []  Used        Anesthesia:     [] General     [x] Sedation     [x] Local        Access & Closure:     [x] 6 Fr right Radial Artery (TR band)    [] Fr Femoral Artery     [] Fr Femoral Vein     [] Fr Brachial Vein       IV Contrast: 200 mL        Intervention:   - successful balloon angioplasty, laser atherectomy, and IVUS guided PCI of prox and mid RCA    Implants:   - 3.0 x 30mm Resolute Eric YVETTE x 1 in mid RCA  - 3.5 x 38 mm Resolute Eric YVETTE x 1 in prox RCA    FINDINGS:     Coronary Dominance: right dominant    LM: normal    LAD: mild diffuse disease, patent stent in mid LAD  D1: mild disease    LCX: mild diffuse disease, mild (20%) in-stent restenosis in the stent in prox LCX  OM1: mild disease    RCA: large dominant vessel, 90% heavily calcified stenosis of prox and mid RCA s/p PCI and YVETTE x 2       LVEDP: 10 mmHg     EF: 50%        ESTIMATED BLOOD LOSS: < 10 mL        CONDITION:     [x] Good     [] Fair     [] Critical        SPECIMEN REMOVED: N/A       POST-OP DIAGNOSIS:      [] Normal Coronary Angiogram     [] Mild Coronary Artery Disease (< 50% stenosis)     [x] 1 Vessel Coronary Artery Disease (RCA)       PLAN OF CARE:    [x] Return to In-patient bed     [x] Medications:   - start Brilinta 90mg po q12  - start lopressor 12.5mg po q12  - continue with statin, and ASA 81mg    [x] IV Fluids:  NS@100cc/hr x 6 hours

## 2022-03-30 NOTE — PROGRESS NOTE ADULT - ATTENDING COMMENTS
#  Unstable angina--abnormal stress test:  plan is cath today   fu cardio     # edema, cirrhosis sec to old alcohol use and Hep C  hold lasix IV today-- will check creatinine and start AM  hepatology consult appreciated   Decompensated liver cirrhosis (+Ascites H/O, +G/E varices) secondary to HCV vs LUIS DANIEL  MELD-Na Score - 12  Jose Score - A (6)  diuretics, cont lasix and spirinolactone       # Diabetes: controlled. avoid hypoglycemia,   on oral agents at home      # HTN controlled    # hx of Hepatitis C, untreated, needs HCC screening as outpt, \isac RUQ us       #+FIT test per patient, pt will need outpatient colonoscopy     pending cardiac cath today
a/p  # edema, sob, poss chf vs cirrhosis related.   check echo   cardio eval   cont diuretics    # chest pain, stress test, cardio eval     # pancytopenia, hematolgy notes appreciated, poss due to liver disease   check hiv status, check iron studies , b12, folate     # DM , controlled. cont meds , consider sliding scale insulin if repeat fs is again higher than 180's  A1C with Estimated Average Glucose Result: 5.3:  (03.16.22 @ 06:20)  POCT Blood Glucose.: 252 mg/dL (03-27-22 @ 11:28)  POCT Blood Glucose.: 104 mg/dL (03-27-22 @ 08:17)  POCT Blood Glucose.: 120 mg/dL (03-26-22 @ 21:27)  POCT Blood Glucose.: 145 mg/dL (03-26-22 @ 16:59)    # hx of hep c, sp treatment 1-2 yrs ago     #Progress Note Handoff  Pending (specify):  Consults___cardio, tests: stress test, echo, hiv, b12 , folate, iron studies , retic count   Family discussion: augusto pt and his wife   Disposition: Home__ when stable
72 year old  male with HTN T2DM HFpEF HCV SVR with Harvoni admitted with dyspnea due to COPD exacerbation, heart failure.  Appears comfortable  No JVD  No icterus  Abdomen soft non tender no ascites   No asterixis   Decompensated HCV cirrhosis with hx of ascites   Awaiting US abdomen  Agree with plan as above.

## 2022-03-30 NOTE — CHART NOTE - NSCHARTNOTEFT_GEN_A_CORE
Pre cath note:    indication:  [ ] STEMI                [ ] NSTEMI                 [ ] Acute coronary syndrome                     [ ]Unstable Angina   [ ] high risk  [ ] intermediate risk  [ ] low risk                     [ ] Stable Angina     non-invasive testing:    < from: NM Nuclear Stress Pharmacologic Multiple (03.28.22 @ 13:01) >    Impression:  1. MODERATE TO SEVERE REVERSIBLE DEFECT IN THE APEX OF THE LEFT VENTRICLE   CONSISTENT WITH ISCHEMIA.  2. NORMAL RESTING LEFT VENTRICULAR WALL MOTION AND WALL THICKENING.  3. LEFT VENTRICULAR EJECTION FRACTION OF  75 % WHICH IS WITHIN RANGE OF   NORMAL.    < end of copied text >                          Date:                     result: [ ] high risk  [ ] intermediate risk  [ ] low risk    Anti- Anginal medications:                    [X] not used                       [ ] used                   [ ] not used but strong indication not to use    Ejection Fraction                   [ ] <29            [ ] 30-39%   [ ] 40-49%     [X]>50%    CHF                   [X] active (within last 14 days on meds   [ ] Chronic (on meds but no exacerbation)    COPD                   [ ] mild (on chronic bronchodilators)  [ ] moderate (on chronic steroid therapy)      [ ] severe (indication for home O2 or PACO2 >50)    Other risk factors:                       [ ] Previous MI                     [ ] CVA/ stroke                    [ ] carotid stent/ CEA                    [ ] PVD/PAD- (arterial aneurysm, non-palpable pulses, tortuous vessel with inability to insert catheter, infra-renal dissection, renal or subclavian artery stenosis)                    [X] diabetic                    [ ] previous CABG                    [ ] Renal Failure                           10.3   2.76  )-----------( 96       ( 29 Mar 2022 06:36 )             31.6     03-29    135  |  96<L>  |  21<H>  ----------------------------<  114<H>  4.6   |  29  |  1.0    Ca    9.2      29 Mar 2022 20:00  Phos  3.6     03-29  Mg     2.0     03-29    TPro  8.0  /  Alb  4.2  /  TBili  0.9  /  DBili  x   /  AST  29  /  ALT  15  /  AlkPhos  95  03-29                         -Patient Schedule for LHC/PCI tomorrow at : 2:30pm  -Keep NPO after midnight  -Hold Anticoagulation prior to PCI: Hold Lovenox on 3/30/2022 prior to cardiac cath  -Start IV Fluids NS at : Patient being diuresed with Lasix

## 2022-03-30 NOTE — PROGRESS NOTE ADULT - ASSESSMENT
71 year old male patient with history of HTN, NIDDM, HCV s/p tx, former IVDU on suboxone, active smoker, CAD s/p PCI x 3 who presented to the ED on 03/25 for bilateral LE swelling and SOB x several months, currently admitted to telemetry for COPD exacerbation versus diastolic heart failure exacerbation. Currently hemodynamically stable.    #Shortness of Breath and Chest Discomfort - Likely Secondary to Decompensated Cirrhosis (+Ascites H/O, +G/E varices) Secondary to HCV vs LUIS DANIEL combined w cardiac etiology  * MELD-Na Score - 12  * Jose Score - A (6)  * ECHO: EF 64% and Mild TVR  * Active Smoker  * TTE 03/27 EF 64%, mild MT, TR, and MR  * Pro  03/15 -> 322 03/25  * CXR 03/27 no acute process    - Hepatology Team on board  - For EV screening, outpatient follow up for EGD   - For HCC screening, outpatient follow up for US abdomen and AFP every 6 months.  - Outpatient follow up in clinic for being uptodate with HAV/HBV/Influenza/Pneumococcal vaccines.  - For History of ascites: continue IV lasix 40 BID and aldactone 100mg QD  - Follow up RUQ US once performed  - Monitor INR  - Avoid smoking, alcohol, NSAIDS.  - Salt restricted diet  - Monitor telemetry   - Trend electrolytes and keep K>4 and Mg>2  - Continue Duonebs 3mL Q6h  - Will not start steroids for now      #Chronic Pancytopenia Likely Secondary to Sequestration from Hepatosplenomegaly Versus Cirrhosis  * Normocytic anemia appears to be stable (baseline hgb 8-9); platelets stable ~50-60k  * Iron with Total Binding Capacity in AM (07.06.18 @ 06:25)    Iron - Total Binding Capacity.: 279 ug/dL    % Saturation, Iron: 23 %    Iron Total, Serum: 63 ug/dL    Unsaturated Iron Binding Capacity: 216 ug/dL  * Ferritin, Serum in AM (07.06.18 @ 06:25)    Ferritin, Serum: 55: Note: New reference ranges effective 04/16/2018. ng/mL  * Folate, Serum in AM (03.16.22 @ 06:20)    Folate, Serum: 4.8 ng/mL  * Vitamin B12, Serum in AM (03.16.22 @ 06:20)    Vitamin B12, Serum: 476 pg/mL  * Lactate Dehydrogenase, Serum in AM (03.16.22 @ 06:20)    Lactate Dehydrogenase, Serum: 166 U/L  * Reticulocyte Count in AM (03.16.22 @ 06:20)    RBC Count: 3.06 M/uL    Reticulocyte Percent: 2.0 %    Absolute Reticulocytes: 62.4 K/uL  * CT shows hepatosplenomegaly with evidence of varices as well    - Hematology (Dr Mccormack) and Hepatology Teams are on board  - Trend Hb, Plt, and WBC  - Active T/S  - Follow up MMA received 03/28. Folate WNL, B12 lower limit of normal  - Repeat iron studies (ferritin 03/28 received)  - FOBT pending collection. For history of +FIT test per patient, will need outpatient colonoscopy   - Check HIV and hepatitis B (received 03/28)  - INR 1.18 03/28 and fibrinogen 466 ordered to r/o low grade DIC  - Continue levothyroxine 50mcg QD for hypothyroidism TSH 8.12 03/14 -> Sent fT4 and Total T3 03/28   - US spleen ordered to document size of spleen: enlarged spleen 17.2 x 16.1 x 6.8 cm    #Hypertension  - Monitor BP  - On diuretics as above    #Hypothyroidism  * Thyroid Stimulating Hormone, Serum in AM (03.16.22 @ 06:20)    Thyroid Stimulating Hormone, Serum: 8.12 uIU/mL  - Continue Levothyroxine 50mcg QD  - Follow up fT4 and total T3 recieved 03/28      #CAD s/p PCI x3 / H/o DLD  * Lipid Profile in AM (03.16.22 @ 06:20)    Cholesterol, Serum: 118 mg/dL    Triglycerides, Serum: 70 mg/dL    HDL Cholesterol, Serum: 51 mg/dL    Non HDL Cholesterol: 67    LDL Cholesterol Calculated: 55 mg/dL  * Troponin <0.01 on 03/25, 03/26, and 03/27  * TTE 03/27 as above    - Stress test noted - Cardiology Dr Beck consulted - PLANNING ON TAKING PATIENT FOR CATH TODAY  - Continue Aspirin 81mg QD  - Continue Atorvastatin 40mg QD  - Monitor telemetry    #NIDDM  * A1C with Estimated Average Glucose in AM (03.27.22 @ 04:30)    A1C with Estimated Average Glucose Result: 4.7  - Monitor POCT premeals and at bedtime   - Continue Apidra Sliding Scale B    #Former IV Drug User  - Continue Suboxone 8/2 0.5 tablet at 08:00 AM and 20:00 PM    - DVT Prophylaxis: Lovenox 40mg Subcutaneously daily  - GI Prophylaxis: Pantoprazole 40mg IV QD  - Diet: DASH/ TLC  - Code Status: Full

## 2022-03-30 NOTE — PROGRESS NOTE ADULT - SUBJECTIVE AND OBJECTIVE BOX
SUBJECTIVE:    Patient is a 72y old Male who presents with a chief complaint of CHF exacerbation (discharged on 3/16/2022 for CHF exacerbation) (29 Mar 2022 09:57)    Currently admitted to medicine with the primary diagnosis of Acute chest pain       24 hour update:  Today is hospital day 4d. This morning he is resting comfortably in bed and reports no new issues or overnight events.      PAST MEDICAL & SURGICAL HISTORY  Diabetes    HTN (hypertension)    Hepatitis  Untreated Hepatitis C    Arthritis    H/O heart artery stent    H/O knee surgery      SOCIAL HISTORY:    ALLERGIES:  No Known Allergies    MEDICATIONS:  STANDING MEDICATIONS  albuterol/ipratropium for Nebulization 3 milliLiter(s) Nebulizer every 6 hours  aspirin  chewable 81 milliGRAM(s) Oral daily  atorvastatin 40 milliGRAM(s) Oral at bedtime  buprenorphine 8 mG/naloxone 2 mG SL  Tablet 0.5 Tablet(s) SubLingual <User Schedule>  enoxaparin Injectable 40 milliGRAM(s) SubCutaneous every 24 hours  furosemide   Injectable 40 milliGRAM(s) IV Push every 12 hours  glucagon  Injectable 1 milliGRAM(s) IntraMuscular once  influenza  Vaccine (HIGH DOSE) 0.7 milliLiter(s) IntraMuscular once  insulin lispro (ADMELOG) corrective regimen sliding scale   SubCutaneous three times a day before meals  levothyroxine 50 MICROGram(s) Oral daily  nicotine -  14 mG/24Hr(s) Patch 1 patch Transdermal daily  pantoprazole  Injectable 40 milliGRAM(s) IV Push at bedtime  regadenoson Injectable 0.4 milliGRAM(s) IV Push once  spironolactone 100 milliGRAM(s) Oral daily    PRN MEDICATIONS  acetaminophen     Tablet .. 650 milliGRAM(s) Oral every 6 hours PRN  gabapentin 100 milliGRAM(s) Oral three times a day PRN    VITALS:   T(C): 36.5 (30 Mar 2022 07:41), Max: 36.5 (30 Mar 2022 07:41)  T(F): 97.7 (30 Mar 2022 07:41), Max: 97.7 (30 Mar 2022 07:41)  HR: 57 (30 Mar 2022 07:41) (57 - 64)  BP: 113/55 (30 Mar 2022 07:41) (111/58 - 114/63)  RR: 18 (30 Mar 2022 07:41) (18 - 18)    PHYSICAL EXAM:  GENERAL: NAD  NERVOUS SYSTEM: AAOx3, No focal deficits  CHEST/LUNG: +bs b/l  HEART: +s1s2 RRR  ABDOMEN: soft, NT/ND  EXTREMITIES: palpable pulses    LABS:               10.0   2.14  )-----------( 90       ( 30 Mar 2022 04:30 )             30.9     134<L>  |  93<L>  |  21<H>  ----------------------------<  106<H>  4.3   |  30  |  0.9    Ca    9.3      30 Mar 2022 04:30  Phos  3.6     03-30  Mg     2.1     03-30    TPro  8.0  /  Alb  4.2  /  TBili  1.0  /  DBili  x   /  AST  29  /  ALT  14  /  AlkPhos  97  03-30    Serum Pro-Brain Natriuretic Peptide: 322 pg/mL (03-25-22 @ 21:17)     IMAGING:

## 2022-03-31 NOTE — PROGRESS NOTE ADULT - ASSESSMENT
71 year old male patient with history of HTN, NIDDM, HCV s/p tx, former IVDU on suboxone, active smoker, CAD s/p PCI x 3 who presented to the ED on 03/25 for bilateral LE swelling and SOB x several months, currently admitted to telemetry for COPD exacerbation versus diastolic heart failure exacerbation. Currently hemodynamically stable.    A/P:   CAD s/p PCI  SOB, no chest pain  EKG showed old Q II, III, aVF  Troponin x3 negative.   Echo showed LVEF 64%.   Nuclear stress test showed moderate to severe reversible defect in the apex of LV.   Cardiac cath 3/30/22 LAD mild diffuse disease, patent stent in mid LAD, mild diffuse disease, LCx mild (20%) in-stent restenosis in the stent in prox LCX, RCA: large dominant vessel, 90% heavily calcified stenosis of prox and mid RCA s/p PCI and YVETTE x 2  Continue ASA, Brilinta, Metoprolol and Lipitor.     Chronic Liver Cirrhosis due to HCV.   Pancytopenia, coagulopathy, leg edema.   MELD score 12.   Abdomen US showed cirrhotic liver, no ascites.   history of Esophageal varices.   continue Lasix, aldactone  Hepatology follow up outpatient for HCC screening and Esophageal monitor.     Chronic Pancytopenia Likely Secondary to liver cirrhosis.   Iron studies, B12 and folic acid are normal.    CT shows hepatosplenomegaly with evidence of varices as well  Worsening thrombocytopenia, monitor closely outpatient after starting on Brilinta.     Hypothyroidism: Continue Levothyroxine 50mcg daily.     Hypertension    Dm type 2:   A1C with Estimated Average Glucose in AM (03.27.22 @ 04:30)    Former IV Drug User  Continue Suboxone    Chronic Tobacco abuse:   Counseled for smoking cessation, discharge on Nicotine patch.     - DVT Prophylaxis: Lovenox 40mg Subcutaneously daily  - GI Prophylaxis: Pantoprazole 40mg IV QD    discharge home today.

## 2022-03-31 NOTE — DISCHARGE NOTE NURSING/CASE MANAGEMENT/SOCIAL WORK - PATIENT PORTAL LINK FT
You can access the FollowMyHealth Patient Portal offered by [facility] by registering at the following website: [email or web address]. By joining [facility]’s FollowMyHealth portal, you will also be able to view your health information using other applications (apps) compatible with our system.

## 2022-03-31 NOTE — DISCHARGE NOTE PROVIDER - NSDCCPCAREPLAN_GEN_ALL_CORE_FT
PRINCIPAL DISCHARGE DIAGNOSIS  Diagnosis: CAD (coronary artery disease)  Assessment and Plan of Treatment: You presented to the hospital for shortness of breath and chest discomfort. While in the hospital, you underwent a stress test which showed reversible damage in your heart due to decreased blood flow. You subsequently went to the cath lab during which your were found to have a 90% occlusion of a heart vessel. 2 Stents were placed. You will need to follow up with your cardiologist and primary care provider outpatient. Please take your meds are prescribed  In addition, follow up with your hepatologist, hematologist and gastroenterologist upon discharge.  Should your symptoms return, please come back to the hospital.       PRINCIPAL DISCHARGE DIAGNOSIS  Diagnosis: CAD (coronary artery disease)  Assessment and Plan of Treatment: You presented to the hospital for shortness of breath and chest discomfort. While in the hospital, you underwent a stress test which showed reversible damage in your heart due to decreased blood flow. You subsequently went to the cath lab during which your were found to have a 90% occlusion of a heart vessel. 2 Stents were placed. You will need to follow up with your cardiologist and primary care provider outpatient. Please take your meds are prescribed  In addition, follow up with your hepatologist, hematologist and gastroenterologist upon discharge.  Should your symptoms return, please come back to the hospital.   - Continue medical regimen as prescribed to prevent chest pain    - Continue dual anti-platelet therapy, beta blocker, statin    - If you are diabetic and taking medication containing Metformin, do not take them for 48 hours after the procedure    - Instructed to call 911 if chest pain, shortness of breath or bleeding from access site    - No heavy lifting >10lbs x 1 week    - No driving x 24 hours    - No baths, swimming pools x 1 week, may shower    - Low sodium low fat low cholesterol diet     - Follow-up with Cardiologist in 2 weeks after discharge

## 2022-03-31 NOTE — PROGRESS NOTE ADULT - PROVIDER SPECIALTY LIST ADULT
Hospitalist
Internal Medicine
Intervent Cardiology
Hepatology
Internal Medicine

## 2022-03-31 NOTE — PROGRESS NOTE ADULT - SUBJECTIVE AND OBJECTIVE BOX
AMINAHJEREMY DINO  72y  Male      Patient is a 72y old  Male who presents with a chief complaint of CHF exacerbation (discharged on 3/16/2022 for CHF exacerbation) (31 Mar 2022 10:43)      INTERVAL HPI/OVERNIGHT EVENTS:    Vital Signs Last 24 Hrs  T(C): 36.3 (31 Mar 2022 05:30), Max: 36.3 (30 Mar 2022 21:08)  T(F): 97.3 (31 Mar 2022 05:30), Max: 97.3 (30 Mar 2022 21:08)  HR: 53 (31 Mar 2022 05:30) (53 - 55)  BP: 109/57 (31 Mar 2022 05:30) (109/57 - 154/65)  BP(mean): --  RR: 18 (31 Mar 2022 05:30) (18 - 18)  SpO2: --      03-30-22 @ 07:01  -  03-31-22 @ 07:00  --------------------------------------------------------  IN: 560 mL / OUT: 500 mL / NET: 60 mL    03-31-22 @ 07:01  -  03-31-22 @ 11:40  --------------------------------------------------------  IN: 360 mL / OUT: 0 mL / NET: 360 mL            Consultant(s) Notes Reviewed:  [x ] YES  [ ] NO          MEDICATIONS  (STANDING):  albuterol/ipratropium for Nebulization 3 milliLiter(s) Nebulizer every 6 hours  aspirin  chewable 81 milliGRAM(s) Oral daily  atorvastatin 40 milliGRAM(s) Oral at bedtime  buprenorphine 8 mG/naloxone 2 mG SL  Tablet 0.5 Tablet(s) SubLingual <User Schedule>  enoxaparin Injectable 40 milliGRAM(s) SubCutaneous every 24 hours  glucagon  Injectable 1 milliGRAM(s) IntraMuscular once  influenza  Vaccine (HIGH DOSE) 0.7 milliLiter(s) IntraMuscular once  insulin lispro (ADMELOG) corrective regimen sliding scale   SubCutaneous three times a day before meals  levothyroxine 50 MICROGram(s) Oral daily  metoprolol tartrate 12.5 milliGRAM(s) Oral two times a day  nicotine -  14 mG/24Hr(s) Patch 1 patch Transdermal daily  pantoprazole  Injectable 40 milliGRAM(s) IV Push at bedtime  regadenoson Injectable 0.4 milliGRAM(s) IV Push once  sodium chloride 0.9%. 1000 milliLiter(s) (100 mL/Hr) IV Continuous <Continuous>  spironolactone 100 milliGRAM(s) Oral daily  ticagrelor 90 milliGRAM(s) Oral every 12 hours    MEDICATIONS  (PRN):  acetaminophen     Tablet .. 650 milliGRAM(s) Oral every 6 hours PRN Temp greater or equal to 38C (100.4F), Mild Pain (1 - 3), Moderate Pain (4 - 6)  gabapentin 100 milliGRAM(s) Oral three times a day PRN neuropathy      LABS                          9.3    1.97  )-----------( 70       ( 31 Mar 2022 05:30 )             27.6     03-31    131<L>  |  96<L>  |  23<H>  ----------------------------<  91  4.1   |  25  |  0.9    Ca    8.9      31 Mar 2022 05:30  Phos  3.2     03-31  Mg     2.2     03-31    TPro  7.3  /  Alb  3.6  /  TBili  1.1  /  DBili  x   /  AST  29  /  ALT  12  /  AlkPhos  81  03-31          Lactate Trend        CAPILLARY BLOOD GLUCOSE      POCT Blood Glucose.: 89 mg/dL (31 Mar 2022 11:32)        RADIOLOGY & ADDITIONAL TESTS:    Imaging Personally Reviewed:  [ ] YES  [ ] NO    HEALTH ISSUES - PROBLEM Dx:          PHYSICAL EXAM:  GENERAL: NAD, well-developed.  HEAD:  Atraumatic, Normocephalic.  EYES: EOMI, PERRLA, conjunctiva and sclera clear.  NECK: Supple, No JVD.  CHEST/LUNG: Clear to auscultation bilaterally; No wheeze.  HEART: Regular rate and rhythm; S1 S2.   ABDOMEN: Soft, Nontender, Nondistended; Bowel sounds present.  EXTREMITIES:  2+ Peripheral Pulses, leg edema 2+  PSYCH: AAOx3.  NEUROLOGY: non-focal.  SKIN: No rashes or lesions.

## 2022-03-31 NOTE — DISCHARGE NOTE PROVIDER - CARE PROVIDERS DIRECT ADDRESSES
,DirectAddress_Unknown,DirectAddress_Unknown,DirectAddress_Unknown,[email or web address]

## 2022-03-31 NOTE — PROGRESS NOTE ADULT - SUBJECTIVE AND OBJECTIVE BOX
Cardiology Follow up    DINO LANCE   72y Male  PAST MEDICAL & SURGICAL HISTORY:  Diabetes    HTN (hypertension)    Hepatitis  Untreated Hepatitis C    Arthritis    H/O heart artery stent    H/O knee surgery         HPI:  70 YO M w/PMHx significant for HTN, NIDDM, HCV s/p tx, former IVDU on suboxone, active smoker, CAD s/p PCI x 3. Pt presented to the ED w/complaints of bilateral LE swelling and SOB x last several months which has apparently worsened in last past month. He was here recently on 3/15/2022 c/o similar issues, he was discharged on 3/16/2022. He noticed worsening LEVY, and b/l LE edema R>L since his discharge. He was instructed to follow up with PCP and go for TTE (not done).   On admission: T(F): 98, HR: 71,BP: 103/50, RR: 15, SpO2: 99% on RA, labs sig for pancytopenia (chronic), trop negative, pro-, Na 126, dimer 706, CXR: no acute pathology, CTA: negative for PE. was unable to find EKG on file (last time EKG + for bradycardia/avb) (26 Mar 2022 09:31)    Allergies    No Known Allergies    Intolerances    Patient seen and examined at bedside. No acute events overnight.  Patient without complaints. Pt ambulated without issues/symptoms  Denies CP, SOB, palpitations, or dizziness  No events on telemetry overnight    Vital Signs Last 24 Hrs  T(C): 36.3 (31 Mar 2022 05:30), Max: 36.3 (30 Mar 2022 21:08)  T(F): 97.3 (31 Mar 2022 05:30), Max: 97.3 (30 Mar 2022 21:08)  HR: 53 (31 Mar 2022 05:30) (53 - 55)  BP: 109/57 (31 Mar 2022 05:30) (109/57 - 154/65)  BP(mean): --  RR: 18 (31 Mar 2022 05:30) (18 - 18)  SpO2: --    MEDICATIONS  (STANDING):  albuterol/ipratropium for Nebulization 3 milliLiter(s) Nebulizer every 6 hours  aspirin  chewable 81 milliGRAM(s) Oral daily  atorvastatin 40 milliGRAM(s) Oral at bedtime  buprenorphine 8 mG/naloxone 2 mG SL  Tablet 0.5 Tablet(s) SubLingual <User Schedule>  enoxaparin Injectable 40 milliGRAM(s) SubCutaneous every 24 hours  glucagon  Injectable 1 milliGRAM(s) IntraMuscular once  influenza  Vaccine (HIGH DOSE) 0.7 milliLiter(s) IntraMuscular once  insulin lispro (ADMELOG) corrective regimen sliding scale   SubCutaneous three times a day before meals  levothyroxine 50 MICROGram(s) Oral daily  metoprolol tartrate 12.5 milliGRAM(s) Oral two times a day  nicotine -  14 mG/24Hr(s) Patch 1 patch Transdermal daily  pantoprazole  Injectable 40 milliGRAM(s) IV Push at bedtime  regadenoson Injectable 0.4 milliGRAM(s) IV Push once  sodium chloride 0.9%. 1000 milliLiter(s) (100 mL/Hr) IV Continuous <Continuous>  spironolactone 100 milliGRAM(s) Oral daily  ticagrelor 90 milliGRAM(s) Oral every 12 hours    MEDICATIONS  (PRN):  acetaminophen     Tablet .. 650 milliGRAM(s) Oral every 6 hours PRN Temp greater or equal to 38C (100.4F), Mild Pain (1 - 3), Moderate Pain (4 - 6)  gabapentin 100 milliGRAM(s) Oral three times a day PRN neuropathy      REVIEW OF SYSTEMS:          All negative except as mentioned in HPI    PHYSICAL EXAM:           CONSTITUTIONAL: Well-developed; well-nourished; in no acute distress  	SKIN: warm, dry  	HEAD: Normocephalic; atraumatic  	EYES: PERRL.  	ENT: No nasal discharge, airway clear, mucous membranes moist  	NECK: Supple; non tender.  	CARD: +S1, +S2, no murmurs, gallops, or rubs. Regular rate and rhythm    	RESP: No wheezes, rales or rhonchi. CTA B/L  	ABD: soft ntnd, + BS x 4 quadrants  	EXT: moves all extremities,  no clubbing, cyanosis or edema  	NEURO: Alert and oriented x3, no focal deficits          PSYCH: Cooperative, appropriate          VASCULAR:  + Rad / + PTs / +  DPs          EXTREMITY:             Right Radial: pressure dressing removed, access site soft, no hematoma, no pain, + pulses, no sign of infection, no numbness            ECG:   < from: 12 Lead ECG (03.31.22 @ 07:32) >  Ventricular Rate 60 BPM    Atrial Rate 60 BPM    P-R Interval 214 ms    QRS Duration 108 ms    Q-T Interval 440 ms    QTC Calculation(Bazett) 440 ms    P Axis 52 degrees    R Axis 63 degrees    T Axis 66 degrees    Diagnosis Line Sinus rhythm with 1st degree A-V block  Otherwise normal ECG    Confirmed by Catalino العراقي ([id number]) on 3/31/2022 10:00:42 AM                                                                                            2D ECHO:  < from: TTE Echo Complete w/o Contrast w/ Doppler (03.27.22 @ 16:32) >  Summary:   1. Technically goodstudy.   2. Normal global left ventricular systolic function.   3. LV Ejection Fraction by Carlin's Method with a biplane EF of 64 %.   4. Normal right atrial size.   5. Mild mitral valve regurgitation.   6. Mild tricuspid regurgitation.   7. Aorticvalve thickening with decreased leaflet opening.   8. Left atrial enlargement.   9. Mild pulmonic valve regurgitation.    LABS:                        9.3    1.97  )-----------( 70       ( 31 Mar 2022 05:30 )             27.6     03-31    131<L>  |  96<L>  |  23<H>  ----------------------------<  91  4.1   |  25  |  0.9    Ca    8.9      31 Mar 2022 05:30  Phos  3.2     03-31  Mg     2.2     03-31    TPro  7.3  /  Alb  3.6  /  TBili  1.1  /  DBili  x   /  AST  29  /  ALT  12  /  AlkPhos  81  03-31    Magnesium, Serum: 2.2 mg/dL [1.8 - 2.4] (03-31-22 @ 05:30)  LIVER FUNCTIONS - ( 31 Mar 2022 05:30 )  Alb: 3.6 g/dL / Pro: 7.3 g/dL / ALK PHOS: 81 U/L / ALT: 12 U/L / AST: 29 U/L / GGT: x             A/P:  I discussed the case with Cardiologist Dr. Beck and recommend the following:    S/P PCI:     Intervention:   - successful balloon angioplasty, laser atherectomy, and IVUS guided PCI of prox and mid RCA    Implants:   - 3.0 x 30mm Resolute Eric YVETTE x 1 in mid RCA  - 3.5 x 38 mm Resolute Cameron YVETTE x 1 in prox RCA    FINDINGS:     Coronary Dominance: right dominant    LM: normal    LAD: mild diffuse disease, patent stent in mid LAD  D1: mild disease    LCX: mild diffuse disease, mild (20%) in-stent restenosis in the stent in prox LCX  OM1: mild disease    RCA: large dominant vessel, 90% heavily calcified stenosis of prox and mid RCA s/p PCI and YVETTE x 2       LVEDP: 10 mmHg     EF: 50%                      Monitor Platelets                    Hold Metformin for 48 hr after Cardiac Cath                    No ACEi/ARB due to Soft B/P, will reevaluate as OP                    Continue DAPT ( Aspirin 81 mg PO Daily and Brilinta 90 mg PO q12hr ), B-Blocker, Statin Therapy                   Patient pharmacy called in for Brilinta prescription and it is 4$ per month, patient is agreeing for it, medication is ready for a                       Patient agreeing to take DAPT for at least one year or as directed by cardiologist                    Pt given instructions on importance of taking antiplatelet medication or risk acute stent thrombosis/death                   Post cath instructions, access site care and activity restrictions reviewed with patient                     Discussed with patient to return to hospital if experience chest pain, shortness breath, dizziness and site bleeding                   Aggressive risk factor modification, diet counseling, smoking cessation discussed with patient                       Can discharge patient from cardiac standpoint at --  after ambulating without symptoms and access site wnl, ECG and blood work reviewed                    Benefits of Cardiac Rehab discussed with patient, All documents sent to [facility]. Patient instructed to call and make first                               appointment after first f/u visit with Cardiologist                    Follow up with Cardiology Dr. Beck in two weeks.  Instructed to call and make an appointment                      Discharge instructions as follows, when ready to d/c:                   - Continue medical regimen as prescribed to prevent chest pain                   - Continue dual anti-platelet therapy, beta blocker, statin                   - If you are diabetic and taking medication containing Metformin, do not take them for 48 hours after the procedure                   - Instructed to call 911 if chest pain, shortness of breath or bleeding from access site                   - No heavy lifting >10lbs x 1 week                   - No driving x 24 hours                   - No baths, swimming pools x 1 week, may shower                   - Low sodium low fat low cholesterol diet                   - Follow-up with Cardiologist in 2 weeks after discharge

## 2022-03-31 NOTE — DISCHARGE NOTE NURSING/CASE MANAGEMENT/SOCIAL WORK - NSDCPEFALRISK_GEN_ALL_CORE
For information on Fall & Injury Prevention, visit: [email or web address] OR  [email or web address] OR  https://www.cdc.gov/steadi/patient.html

## 2022-03-31 NOTE — DISCHARGE NOTE PROVIDER - PROVIDER TOKENS
PROVIDER:[TOKEN:[[id number]],FOLLOWUP:[1 week],ESTABLISHEDPATIENT:[T]],PROVIDER:[TOKEN:[[id number]],FOLLOWUP:[1 week],ESTABLISHEDPATIENT:[T]],PROVIDER:[TOKEN:[[id number]],FOLLOWUP:[2 weeks]],PROVIDER:[TOKEN:[[id number]],FOLLOWUP:[2 weeks]]

## 2022-03-31 NOTE — DISCHARGE NOTE NURSING/CASE MANAGEMENT/SOCIAL WORK - NSDCPEEMAIL_GEN_ALL_CORE
[facility] for Tobacco Control email [email or web address]

## 2022-03-31 NOTE — DISCHARGE NOTE PROVIDER - CARE PROVIDER_API CALL
Raciel Dozierrey  MEDICINE  [address] [place] [address]  Phone: [phone]  Fax: [phone]  Established Patient  Follow Up Time: 1 week    Kenya Walter  Diagnostic Radiology  [address] [place] [address]  Phone: [phone]  Fax: [phone]  Established Patient  Follow Up Time: 1 week    Nithya Kingston)  Internal Medicine  [address] [place] [address]  Phone: [phone]  Fax: [phone]  Follow Up Time: 2 weeks    Vamshi Mccormack)  Internal Medicine; Medical Oncology  [address], [place] [address]  Phone: [phone]  Fax: [phone]  Follow Up Time: 2 weeks

## 2022-03-31 NOTE — PROGRESS NOTE ADULT - REASON FOR ADMISSION
CHF exacerbation (discharged on 3/16/2022 for CHF exacerbation)

## 2022-03-31 NOTE — DISCHARGE NOTE PROVIDER - HOSPITAL COURSE
72 YO M w/PMHx significant for HTN, NIDDM, HCV s/p tx, former IVDU on suboxone, active smoker, CAD s/p PCI x 3. Pt presented to the ED w/complaints of bilateral LE swelling and SOB x last several months which has apparently worsened in last past month. He was here recently on 3/15/2022 c/o similar issues, he was discharged on 3/16/2022. He noticed worsening LEVY, and b/l LE edema R>L since his discharge. He was instructed to follow up with PCP and go for TTE (not done).   On admission: T(F): 98, HR: 71,BP: 103/50, RR: 15, SpO2: 99% on RA, labs sig for pancytopenia (chronic), trop negative, pro-, Na 126, dimer 706, CXR: no acute pathology, CTA: negative for PE. was unable to find EKG on file (last time EKG + for bradycardia/avb)    While in the hospital, patient underwent stress test which was positive, then went to cath lab which showed 90% heavily calcified stenosis of prox and mid RCA s/p PCI and YVETTE x 2. Course was complicated by chronic pancytopenia, for which heme/onc saw the patient. Folate, b12 were normal. Abd US showed enlarged spleen, suspicious for sequestration. He was  also seen by hepatology for cirrhosis. Patient will follow up o/p

## 2022-03-31 NOTE — DISCHARGE NOTE NURSING/CASE MANAGEMENT/SOCIAL WORK - NSDCPEWEB_GEN_ALL_CORE
[facility] for Tobacco Control website --- [email or web address] website --- [email or web address]

## 2022-03-31 NOTE — DISCHARGE NOTE NURSING/CASE MANAGEMENT/SOCIAL WORK - NSDCVIVACCINE_GEN_ALL_CORE_FT
influenza, injectable, quadrivalent, preservative free; 13-Nov-2018 17:05; Marva Negron (RN); Divide; T57EA (Exp. Date: 30-Jun-2019); IntraMuscular; Deltoid Left.; 0.5 milliLiter(s); VIS (VIS Published: 07-Aug-2015, VIS Presented: 13-Nov-2018);

## 2022-03-31 NOTE — DISCHARGE NOTE PROVIDER - NSDCMRMEDTOKEN_GEN_ALL_CORE_FT
aspirin 81 mg oral tablet: 1 tab(s) orally once a day  Brilinta (ticagrelor) 90 mg oral tablet: 1 tab(s) orally 2 times a day MDD:2  buprenorphine-naloxone 8 mg-2 mg sublingual film: 0.5 film(s) sublingual 2 times a day  Lasix 20 mg oral tablet: 1 tab(s) orally once a day   metFORMIN 500 mg oral tablet: 1 tab(s) orally 2 times a day  HOLD METFORMIN FOR 48 HR AFTER CARDIAC CATH   spironolactone 100 mg oral tablet: 1 tab(s) orally once a day   aspirin 81 mg oral tablet: 1 tab(s) orally once a day  atorvastatin 40 mg oral tablet: 1 tab(s) orally once a day (at bedtime)  Brilinta (ticagrelor) 90 mg oral tablet: 1 tab(s) orally 2 times a day MDD:2  buprenorphine-naloxone 8 mg-2 mg sublingual film: 0.5 film(s) sublingual 2 times a day  gabapentin 100 mg oral capsule: 1 cap(s) orally 3 times a day, As needed, neuropathy  Lasix 20 mg oral tablet: 1 tab(s) orally once a day   levothyroxine 50 mcg (0.05 mg) oral tablet: 1 tab(s) orally once a day  metFORMIN 500 mg oral tablet: 1 tab(s) orally 2 times a day  HOLD METFORMIN FOR 48 HR AFTER CARDIAC CATH   Metoprolol Succinate ER 25 mg oral tablet, extended release: 1 tab(s) orally once a day. Do not take if systolic blood pressure below 90 and heart rate below 60.   spironolactone 100 mg oral tablet: 1 tab(s) orally once a day

## 2022-06-17 NOTE — CHART NOTE - NSCHARTNOTEFT_GEN_A_CORE
Informed by RN that patient received 900 ml of sodium bicarb in 1 hour. Repeated VBG and BMP shows Na increased from 109 to 115 in 7 hours. Will call nephrology for recommendations. Hold Bicarb. Informed by RN that patient received 900 ml of sodium bicarb in 1 hour. Repeated VBG and BMP shows Na increased from 109 to 115 in 7 hours. Will call nephrology for recommendations. Hold Bicarb. Will check UA, lytes and osmolality. Informed by RN that patient received 900 ml of sodium bicarb in 1 hour. Repeated VBG and BMP shows Na increased from 109 to 115 in 7 hours. Will call nephrology for recommendations. Hold Bicarb. Hold lasix. Will check UA, lytes and osmolality (will not be so reliable now). Albumin 4.5 points more towards intravascular depletion in a patient with cirrhosis. Will repeat BMP at 8. If Na is still uptrending will start D5 at 75ml/hr. Pt may need CT head. Informed by RN that patient received 900 ml of sodium bicarb in 1 hour. Repeated VBG and BMP shows Na increased from 109 to 115 in 7 hours. Will call nephrology for recommendations. Hold Bicarb. Hold lasix. Will check UA, lytes and osmolality (will not be so reliable now). Albumin 4.5 points more towards intravascular depletion in a patient with cirrhosis. Will repeat BMP at 8. If Na is still up trending will start D5 at 75ml/hr. Pt is AAOx4 right now, will hold off CT head for now.  Pt was on torsemide 20 mg BID and aldactone at home.

## 2022-06-17 NOTE — CONSULT NOTE ADULT - SUBJECTIVE AND OBJECTIVE BOX
HPI:  71 y/o male with PMHx of HTN, NIDDM, HCV s/p tx, former IVDU on suboxone, liver cirrhosis, active smoker  and CAD s/p mulitple PCI, lastly in 3/2022 to RCA presented to the ED with Chest pain and SOB for 1 week. patient is a poor historian, states he had intermittent chest pain for 1 week, stabbing in character and not related to exertion. currently chest pain free. This was associated with SOB on minimal exertion. Denies other symptoms including fever, cough, nv/d, bowel and urinary symptoms. Denies bleeding, black stools.   In the ER patient was bradycardic, EKG showed afib with SVR. labs were remarkable for HBG of 6.6. Potassium 7.7, creatinine 2.2 and bicarb of 13.   Cardiology consulted for further care.     PAST MEDICAL & SURGICAL HISTORY  Diabetes    HTN (hypertension)    Hepatitis  Untreated Hepatitis C    Arthritis    H/O heart artery stent    H/O knee surgery        FAMILY HISTORY:  FAMILY HISTORY:  Family history of diabetes mellitus (DM) (Father, Mother)  Parents        SOCIAL HISTORY:  [x]smoker  []Alcohol  [x]hx of Drugs    ALLERGIES:  No Known Allergies      MEDICATIONS:  MEDICATIONS  (STANDING):  ALBUTerol    0.083%. 2.5 milliGRAM(s) Nebulizer once  dextrose 5% 1000 milliLiter(s) (100 mL/Hr) IV Continuous <Continuous>  pantoprazole  Injectable 40 milliGRAM(s) IV Push daily    MEDICATIONS  (PRN):      HOME MEDICATIONS:  Home Medications:  aspirin 81 mg oral tablet: 1 tab(s) orally once a day (31 Mar 2022 09:19)  buprenorphine-naloxone 8 mg-2 mg sublingual film: 0.5 film(s) sublingual 2 times a day (31 Mar 2022 09:19)  metFORMIN 500 mg oral tablet: 1 tab(s) orally 2 times a day  HOLD METFORMIN FOR 48 HR AFTER CARDIAC CATH  (31 Mar 2022 09:19)  spironolactone 100 mg oral tablet: 1 tab(s) orally once a day (31 Mar 2022 09:19)      VITALS:   T(F): 98.1 (06-17 @ 11:43), Max: 98.1 (06-17 @ 11:43)  HR: 59 (06-17 @ 12:46) (41 - 59)  BP: 135/59 (06-17 @ 12:46) (98/45 - 135/59)  BP(mean): --  RR: 20 (06-17 @ 12:46) (19 - 20)  SpO2: 100% (06-17 @ 12:46) (95% - 100%)    I&O's Summary      REVIEW OF SYSTEMS:  CONSTITUTIONAL: No weakness, fevers or chills  EYES: No visual changes  ENT: No vertigo or throat pain   NECK: No pain or stiffness  RESPIRATORY: positive for SOB  CARDIOVASCULAR: Positive for chest pain  GASTROINTESTINAL: No abdominal or epigastric pain. No nausea, vomiting, or hematemesis; No diarrhea or constipation. No melena or hematochezia.  GENITOURINARY: No dysuria, frequency or hematuria  NEUROLOGICAL: No numbness or weakness  SKIN: No itching, no rashes  MSK: No pain    PHYSICAL EXAM:  NEURO: patient is awake , alert and oriented  GEN: Not in acute distress  NECK: no thyroid enlargement, no JVD  LUNGS: decrease /l breath sounds  CARDIOVASCULAR:  Irregular S1/S2 present, , no murmurs or rubs, no carotid bruits,  + PP bilaterally  ABD: Soft, non-tender, non-distended, +BS  EXT: +3 pitting edema of LE  SKIN: Intact    LABS:                        6.6    2.76  )-----------( 75       ( 17 Jun 2022 11:36 )             19.9     06-17    109<LL>  |  82<L>  |  111<HH>  ----------------------------<  124<H>  7.7<HH>   |  13<L>  |  2.2<H>    Ca    9.2      17 Jun 2022 11:36  Mg     2.8     06-17    TPro  7.2  /  Alb  4.5  /  TBili  1.2  /  DBili  x   /  AST  47<H>  /  ALT  23  /  AlkPhos  85  06-17      Troponin T, Serum: 0.04 ng/mL *HH* (06-17-22 @ 11:36)    CARDIAC MARKERS ( 17 Jun 2022 11:36 )  x     / 0.04 ng/mL / x     / x     / x            Troponin trend:    Serum Pro-Brain Natriuretic Peptide: 7783 pg/mL (06-17-22 @ 11:36)          RADIOLOGY:  -CXR:  < from: Xray Chest 1 View- PORTABLE-Urgent (06.17.22 @ 11:55) >  Impression:    Low lung volumes leads to magnification of the pulmonary interstitium.        --- End of Report ---    < end of copied text >      -TTE:  < from: TTE Echo Complete w/o Contrast w/ Doppler (03.27.22 @ 16:32) >    Summary:   1. Technically goodstudy.   2. Normal global left ventricular systolic function.   3. LV Ejection Fraction by Carlin's Method with a biplane EF of 64 %.   4. Normal right atrial size.   5. Mild mitral valve regurgitation.   6. Mild tricuspid regurgitation.   7. Aorticvalve thickening with decreased leaflet opening.   8. Left atrial enlargement.   9. Mild pulmonic valve regurgitation.    < end of copied text >        ECG:  Afib w SVR

## 2022-06-17 NOTE — ED PROVIDER NOTE - CLINICAL SUMMARY MEDICAL DECISION MAKING FREE TEXT BOX
I have fully discussed the medical management and delivery of care with the patient. I have discussed any available labs, imaging and treatment options with the patient.  Pt admitted for further care & management.  ICU report admission to ICU, Cardiology following.

## 2022-06-17 NOTE — H&P ADULT - ASSESSMENT
IMPRESSION:  Bradycardia   Acute anemia (r/o varices)  PROMISE likely 2/2 volume overload   Hyponatremia  Hyperkalemia   hypochloremia   Cirrhosis   pancytopenia   CAD s/p 2 stents         PLAN:    CNS: continue home meds     HEENT: Oral care     PULMONARY: HOB elevated at 45 degrees aspiration precaution. Monitor pulse ox, supplemental O2 as needed. Keep pulse ox >92%     CARDIOVASCULAR: Off pressors, monitor vitals. Hold AVN blocking agents (hold succinate), hold Brilinta. Atropine at bedside w/ pacer pads. serial EKGs and Gil. TTE. Cardiology c/s    GI: PPI q12, GI c/s r/o UGIB/Varices. NPO for now. Can get CT non con to detect blood in esophagus to r/o varices. FOBT     RENAL: Monitor lytes, correct as needed. BMP q8h, trend Na, K and Cr. hold spironolactone. f/u renin and aldosterone. Nephrology c/s     INFECTIOUS DISEASE: off antibiotics, trend WBC. Rocephin is +variceal bleed     HEMATOLOGICAL:  DVT prophylaxis w/ SCDs. Hg noted. Give 2 U PRBC. f/u Hg q12. Keep active T&S, Transfuse as needed, Keep hg>8.    ENDOCRINE:  Follow up FS.  Insulin protocol if needed. f/u A1c. c/w synthroid, f/u TSH     MUSCULOSKELETAL: AAT         Dispo: acute   IMPRESSION:  Bradycardia   Acute anemia (r/o varices)  PROMISE likely 2/2 volume overload   Hyponatremia  Hyperkalemia   hypochloremia   Cirrhosis  Splenomegaly   pancytopenia   CAD s/p 2 stents         PLAN:    CNS: continue home meds     HEENT: Oral care     PULMONARY: HOB elevated at 45 degrees aspiration precaution. Monitor pulse ox, supplemental O2 as needed. Keep pulse ox >92%     CARDIOVASCULAR: Off pressors, monitor vitals. Hold AVN blocking agents (hold succinate), hold Brilinta. lasix 40mg IV qd.  Atropine at bedside w/ pacer pads. serial EKGs and Gil. TTE. Cardiology c/s    GI: PPI q12, lactulose, f/u ammonia, f/u INR. GI c/s r/o UGIB/Varices. NPO for now. Can get CT non con to detect blood in esophagus to r/o varices. FOBT     RENAL: Monitor lytes, correct as needed. BMP q8h, trend Na, K and Cr. sodium bicarbonate at 100 ml/hr.  hold spironolactone. f/u renin and aldosterone. strict I/Os. Nephrology c/s     INFECTIOUS DISEASE: off antibiotics, trend WBC. Rocephin is +variceal bleed     HEMATOLOGICAL:  DVT prophylaxis w/ SCDs. Hg noted. Give 2 U PRBC. f/u Hg q12. Keep active T&S, Transfuse as needed, Keep hg>8.    ENDOCRINE:  Follow up FS.  Insulin protocol if needed. f/u A1c. c/w synthroid, f/u TSH     MUSCULOSKELETAL: AAT         Dispo: acute   IMPRESSION:  Bradycardia   Acute anemia (r/o varices)  PROMISE likely 2/2 volume overload   Hyponatremia  Hyperkalemia   hypochloremia   Cirrhosis  Splenomegaly   pancytopenia   CAD s/p 2 stents         PLAN:    CNS: continue home meds     HEENT: Oral care     PULMONARY: HOB elevated at 45 degrees aspiration precaution. Monitor pulse ox, supplemental O2 as needed. Keep pulse ox >92%     CARDIOVASCULAR: Off pressors, monitor vitals. Hold AVN blocking agents (hold succinate), hold Brilinta. lasix 40mg IV qd.  Atropine at bedside w/ pacer pads. serial EKGs and Gil. TTE. Cardiology c/s    GI: PPI q12, lactulose, octreotide. f/u ammonia, f/u INR. GI c/s r/o UGIB/Varices. NPO for now. Can get CT non con to detect blood in esophagus to r/o varices. FOBT.     RENAL: Monitor lytes, correct as needed. BMP q8h, trend Na, K and Cr. sodium bicarbonate at 100 ml/hr.  hold spironolactone. f/u renin and aldosterone. strict I/Os. Nephrology c/s     INFECTIOUS DISEASE: off antibiotics, trend WBC. Rocephin is +variceal bleed     HEMATOLOGICAL:  DVT prophylaxis w/ SCDs. Hg noted. Give 2 U PRBC. f/u Hg q12. Keep active T&S, Transfuse as needed, Keep hg>8.    ENDOCRINE:  Follow up FS.  Insulin protocol if needed. f/u A1c. c/w synthroid, f/u TSH     MUSCULOSKELETAL: AAT         Dispo: acute

## 2022-06-17 NOTE — ED PROVIDER NOTE - ATTENDING CONTRIBUTION TO CARE
72-year-old male with past medical history of high blood pressure, and IDDM, HCV status post Tx, former IV drug abuser on Suboxone, active smoker, coronary artery disease status post PCI, follows with Dr. Beck, COPD, hypothyroidism, hyperlipidemia, CHF, presents for worsening shortness of breath over the past few weeks associated with chest pain worse with exertion, midsternal, sharp, constant, no alleviating factors, moderate in intensity.  Patient reports symptoms worsened today especially with the shortness of breath so came to the emergency department.  Patient reports symptoms felt similar to when he needed a stent.  Patient was admitted March 26 to March 30 for had a stress test that was positive, went to the Cath Lab which showed 90% heavily calcified stenosis of the proximal and mid RCA status post PCI and YVETTE x2, course was complicated by chronic pancytopenia seen by heme-onc.  pt reports on ASA and Brillianta, states compliant.   No fever, chills, n/v,  palpitations, diaphoresis, cough, ha/lh/dizziness, numbness/tingling, neck pain/ stiffness, abd pain, diarrhea, constipation, melena/brbpr, urinary symptoms, trauma, weakness, sick contacts, recent travel or rash.    Vital Signs: I have reviewed the initial vital signs. Constitutional:  pt sitting on stretcher speaking full sentences. Integumentary: No rash. ENT: MMM NECK: Supple, non-tender, no meningeal signs. Cardiovascular: osvaldo, iregular, radial pulses 2/4 b/l. No JVD. Respiratory: BS present b/l, ctabl, no wheezing or crackles, no accessory muscle use, no stridor. Gastrointestinal: BS present throughout all 4 quadrants, soft, nd, nt, no rebound tenderness or guarding, no cvat. Rectal done with supervision: No melena or brbpr. Musculoskeletal: FROM, 2+ pitting edema, no calf pain/erythema. Neurologic: AAOx3, motor 5/5 and sensation intact throughout upper and lowe ext, CN II-XII intact, No facial droop or slurring of speech. No focal deficits.

## 2022-06-17 NOTE — H&P ADULT - NSHPLABSRESULTS_GEN_ALL_CORE
ICU Vital Signs Last 24 Hrs  T(C): 36.7 (17 Jun 2022 11:43), Max: 36.7 (17 Jun 2022 11:43)  T(F): 98.1 (17 Jun 2022 11:43), Max: 98.1 (17 Jun 2022 11:43)  HR: 59 (17 Jun 2022 12:46) (41 - 59)  BP: 135/59 (17 Jun 2022 12:46) (98/45 - 135/59)  BP(mean): --  ABP: --  ABP(mean): --  RR: 20 (17 Jun 2022 12:46) (19 - 20)  SpO2: 100% (17 Jun 2022 12:46) (95% - 100%)      I&O's Detail        LABS:                        6.6    2.76  )-----------( 75       ( 17 Jun 2022 11:36 )             19.9     17 Jun 2022 11:36    109    |  82     |  111    ----------------------------<  124    7.7     |  13     |  2.2      Ca    9.2        17 Jun 2022 11:36  Mg     2.8       17 Jun 2022 11:36    TPro  7.2    /  Alb  4.5    /  TBili  1.2    /  DBili  x      /  AST  47     /  ALT  23     /  AlkPhos  85     17 Jun 2022 11:36  Amylase x     lipase x          CARDIAC MARKERS ( 17 Jun 2022 11:36 )  x     / 0.04 ng/mL / x     / x     / x          CAPILLARY BLOOD GLUCOSE            Culture        MEDICATIONS  (STANDING):  ALBUTerol    0.083%. 2.5 milliGRAM(s) Nebulizer once  dextrose 5% 1000 milliLiter(s) (100 mL/Hr) IV Continuous <Continuous>  heparin   Injectable 5000 Unit(s) SubCutaneous every 8 hours  pantoprazole  Injectable 40 milliGRAM(s) IV Push daily    MEDICATIONS  (PRN):        RADIOLOGY: ***     CXR:  TLC:  OG:  ET tube:          ECHO:

## 2022-06-17 NOTE — CONSULT NOTE ADULT - SUBJECTIVE AND OBJECTIVE BOX
Patient is a 72y old  Male who presents with a chief complaint of     HPI: 71 Y/O M with PMH of CHF , CAD , liver Cirrhosis , Untreated Hepatitis C, presented to Hospital with SOB and Chest pain which is exertional. Symptoms has been getting worse. Pt has been sleeping in the couch. Pt received stents in march and is DAPT. On presentation to ED pt found to have Hb 6 ( baseline 9). Hyponatremia , hyperkalemia and PROMISE. Received appropriate treatment for K. Pt also found to be in Afib with bradycardia. Cardiology evaluated the pt. MICu called to evaluate.         PAST MEDICAL & SURGICAL HISTORY:  Diabetes      HTN (hypertension)      Hepatitis  Untreated Hepatitis C      Arthritis      H/O heart artery stent      H/O knee surgery          SOCIAL HX:   Smoking                       FAMILY HISTORY:  Family history of diabetes mellitus (DM) (Father, Mother)  Parents    :  No known cardiovacular family hisotry     Review Of Systems:     All ROS are negative except per HPI       Allergies    No Known Allergies    Intolerances          PHYSICAL EXAM    ICU Vital Signs Last 24 Hrs  T(C): 36.7 (17 Jun 2022 11:43), Max: 36.7 (17 Jun 2022 11:43)  T(F): 98.1 (17 Jun 2022 11:43), Max: 98.1 (17 Jun 2022 11:43)  HR: 59 (17 Jun 2022 12:46) (41 - 59)  BP: 135/59 (17 Jun 2022 12:46) (98/45 - 135/59)  RR: 20 (17 Jun 2022 12:46) (19 - 20)  SpO2: 100% (17 Jun 2022 12:46) (95% - 100%)      CONSTITUTIONAL:  Ill appearing    ENT:   Airway patent,   Mouth with normal mucosa.   No thrush      CARDIAC:   LE edema         RESPIRATORY:   Crackles +ve      GASTROINTESTINAL:  Abdomen soft,   Non-tender,   No guarding,   + BS      NEUROLOGICAL:   Alert and oriented   No motor deficits.                 LABS:                          6.6    2.76  )-----------( 75       ( 17 Jun 2022 11:36 )             19.9                                               06-17    109<LL>  |  82<L>  |  111<HH>  ----------------------------<  124<H>  7.7<HH>   |  13<L>  |  2.2<H>    Ca    9.2      17 Jun 2022 11:36  Mg     2.8     06-17    TPro  7.2  /  Alb  4.5  /  TBili  1.2  /  DBili  x   /  AST  47<H>  /  ALT  23  /  AlkPhos  85  06-17                                                 CARDIAC MARKERS ( 17 Jun 2022 11:36 )  x     / 0.04 ng/mL / x     / x     / x                                                LIVER FUNCTIONS - ( 17 Jun 2022 11:36 )  Alb: 4.5 g/dL / Pro: 7.2 g/dL / ALK PHOS: 85 U/L / ALT: 23 U/L / AST: 47 U/L / GGT: x                                                                                                                                       X-Rays reviewed                                                                                  CXR interpreted by me No consolidation     MEDICATIONS  (STANDING):  ALBUTerol    0.083%. 2.5 milliGRAM(s) Nebulizer once  dextrose 5% 1000 milliLiter(s) (100 mL/Hr) IV Continuous <Continuous>  pantoprazole  Injectable 40 milliGRAM(s) IV Push daily    MEDICATIONS  (PRN):         Doubt UTI based on UA result.  Asymptomatic bacteria may be present but not likely an issue in a patient on self catheterization Patient is a 72y old  Male who presents with a chief complaint of     HPI: 73 Y/O M with PMH of CHF , CAD , liver Cirrhosis , Untreated Hepatitis C, presented to Hospital with SOB and Chest pain which is exertional. Symptoms has been getting worse. Pt has been sleeping in the couch. Pt received stents in march and is on dual antiplatelet therapy. On presentation to ED pt found to have Hb 6 ( baseline 9), severe hyponatremia , hyperkalemia and PROMISE. Received appropriate treatment for K. Pt also found to be in Afib with bradycardia. Cardiology evaluated the pt. MICu called to evaluate.         PAST MEDICAL & SURGICAL HISTORY:  Diabetes      HTN (hypertension)      Hepatitis  Untreated Hepatitis C      Arthritis      H/O heart artery stent      H/O knee surgery          SOCIAL HX:   Smoking                       FAMILY HISTORY:  Family history of diabetes mellitus (DM) (Father, Mother)  Parents    :  No known cardiovacular family hisotry     Review Of Systems:     All ROS are negative except per HPI       Allergies    No Known Allergies    Intolerances          PHYSICAL EXAM    ICU Vital Signs Last 24 Hrs  T(C): 36.7 (17 Jun 2022 11:43), Max: 36.7 (17 Jun 2022 11:43)  T(F): 98.1 (17 Jun 2022 11:43), Max: 98.1 (17 Jun 2022 11:43)  HR: 59 (17 Jun 2022 12:46) (41 - 59)  BP: 135/59 (17 Jun 2022 12:46) (98/45 - 135/59)  RR: 20 (17 Jun 2022 12:46) (19 - 20)  SpO2: 100% (17 Jun 2022 12:46) (95% - 100%)      CONSTITUTIONAL:  Ill appearing    ENT:   Airway patent,   Mouth with normal mucosa.   No thrush      CARDIAC:   LE edema: severe pitting         RESPIRATORY:   Crackles +ve       GASTROINTESTINAL:  Abdomen soft,   Non-tender,   No guarding,   + BS      NEUROLOGICAL:   Alert and oriented   No motor deficits.                 LABS:                          6.6    2.76  )-----------( 75       ( 17 Jun 2022 11:36 )             19.9                                               06-17    109<LL>  |  82<L>  |  111<HH>  ----------------------------<  124<H>  7.7<HH>   |  13<L>  |  2.2<H>    Ca    9.2      17 Jun 2022 11:36  Mg     2.8     06-17    TPro  7.2  /  Alb  4.5  /  TBili  1.2  /  DBili  x   /  AST  47<H>  /  ALT  23  /  AlkPhos  85  06-17                                                 CARDIAC MARKERS ( 17 Jun 2022 11:36 )  x     / 0.04 ng/mL / x     / x     / x                                                LIVER FUNCTIONS - ( 17 Jun 2022 11:36 )  Alb: 4.5 g/dL / Pro: 7.2 g/dL / ALK PHOS: 85 U/L / ALT: 23 U/L / AST: 47 U/L / GGT: x                                                                                                                                       X-Rays reviewed                                                                                  CXR interpreted by me No consolidation     MEDICATIONS  (STANDING):  ALBUTerol    0.083%. 2.5 milliGRAM(s) Nebulizer once  dextrose 5% 1000 milliLiter(s) (100 mL/Hr) IV Continuous <Continuous>  pantoprazole  Injectable 40 milliGRAM(s) IV Push daily    MEDICATIONS  (PRN):

## 2022-06-17 NOTE — ED PROVIDER NOTE - SECONDARY DIAGNOSIS.
Hyperkalemia Hyponatremia Anemia due to acute blood loss Shortness of breath Afib PROMISE (acute kidney injury) Chest pain

## 2022-06-17 NOTE — ED PROVIDER NOTE - OBJECTIVE STATEMENT
chest pain sob, reddy, 71 YO M w/ PMHx significant for HTN, NIDDM, cirrhosis, HCV s/p tx, former IVDU on suboxone, active smoker, CAD s/p PCI x 3 s/p 2 stents (done recently). Pt is here w/ mild confusion, bradycardia HR in 40s , severe hyponatremia. Of note he was here recent.ly for bilateral LE swelling and SOB, was found to have bradycardia on admission. While in the hospital, patient underwent stress test which was positive, then went to cath lab which showed 90% heavily calcified stenosis of prox and mid RCA s/p PCI and YVETTE x 2. Course was complicated by chronic pancytopenia, for which heme/onc saw the patient. Folate, b12 were normal. Abd US showed enlarged spleen, suspicious for sequestration. He was  also seen by hepatology for cirrhosis. On this admission: afebrile, BP soft, HR in 40s. SpO2 100% on RA. trop 0.04 on arrival, pro-bnp 7783 VB.26/37/21/16 pancytopenia (Hg 6.6, baseline 9-10 w/ pancytopenia, Na 109, Cl 90, K 7.7, CXR mild congestion.

## 2022-06-17 NOTE — ED PROVIDER NOTE - PROGRESS NOTE DETAILS
ED Attending CHARLETTE Saleh  Patient consented for blood transfusion, hemoglobin 6.6, previous in March 2022 at 9.3, aware of plan for PRBC.  Potassium on VBG 8.2 respiratory reports not hemolyzed, EKG with bradycardia and the heart rates being 40, patient receiving calcium gluconate, albuterol, dextrose with insulin.  On monitor with pacer pads.  Dr. Beck was made aware of patient reports cardiology fellow will come evaluate patient as well. ED Attending CHARLETTE Saleh  Cardiology fellow aware of pt and reports will follow, icu team also made aware of pt. criss negative- SD ED Attending CHARLETTE Saleh  Patient consented for blood transfusion, hemoglobin 6.6, rectal negative for melena or brbpr, previous in March 2022 at 9.3, aware of plan for PRBC.  Potassium on VBG 8.2 respiratory reports not hemolyzed, EKG with bradycardia and the heart rates being 40, patient receiving calcium gluconate, albuterol, dextrose with insulin.  On monitor with pacer pads.  Dr. Beck was made aware of patient reports cardiology fellow will come evaluate patient as well. ED Attending CHARLETTE Saleh  Cardiology and ICU evaluated patient, EKG with bradycardia concerned with A. fib, ICU requesting bicarb drip due to labs including hyperkalemia, cardiology agree with current plan will follow, hyperkalemia treated, PROMISE noted, hyponatremia also noted, glucose 124.  Troponin 0.05 previous was negative, BNP 7783.  Chest x-ray with low lung volumes, pulmonary interstitium magnified secondary to this.  Patient reports feeling better at this time, not on monitor with pacer pads.  ICU report admission to the ICU with cardiology following. ED Attending CHARLETTE Saleh  Cardiology and ICU evaluated patient, EKG with bradycardia concerned with A. fib, ICU requesting bicarb drip due to labs including hyperkalemia and hyponatremia, cardiology agree with current plan will follow, hyperkalemia treated, PROMISE noted, hyponatremia also noted, glucose 124.  Troponin 0.05 previous was negative, BNP 7783.  Chest x-ray with low lung volumes, pulmonary interstitium magnified secondary to this.  Patient reports feeling better at this time, not on monitor with pacer pads.  ICU report admission to the ICU with cardiology following.

## 2022-06-17 NOTE — CONSULT NOTE ADULT - ASSESSMENT
IMPRESSION:  Chest Pain r/o ACS   PROMISE  Severe Hyperkalemia  Afib with bradycardia ( sick sinus?)  Hyponatremia  Anemia on DAPT  HO HepC not treated    PLAN:    CNS:    HEENT: Oral care    PULMONARY:  HOB @ 45 degrees.  Aspiration precautions. Keep Sats > 92 %    CARDIOVASCULAR: Keep I=O. CE x3 . Serial EKG. Echo. Cardiology and EP eval. Bicarb drip. DAPT per Cardio    GI: IV PPI. Octreotide , GI eval    RENAL:  Follow up lytes.  Correct as needed. Pickering. Lokelma. BMP q shift . Kidney bladder US    INFECTIOUS DISEASE: Follow up cultures. Ceftriaxone for now. MRSA nasal , Procal.    HEMATOLOGICAL:  DVT PPX with SCD    ENDOCRINE:  Follow up FS.  Insulin protocol if needed.    MUSCULOSKELETAL: Bed Rest    ICU         IMPRESSION:  Chest Pain r/o ACS   PROMISE  Severe Hyperkalemia  HAGMA  Afib with bradycardia (sick sinus?)  Severe hyponatremia  Anemia on DAPT  HO HepC not treated    PLAN:    CNS: Avoid CNS depressants.     HEENT: Oral care    PULMONARY:  HOB @ 45 degrees.  Aspiration precautions. Keep Sats > 92 %. CXR no acute infiltrates.     CARDIOVASCULAR: Keep I=O. CE x3 . Serial EKG. Echo. Cardiology and EP eval. Bicarb drip. DAPT per Cardio.     GI: IV PPI. Octreotide empirically, GI evaluation.     RENAL:  Follow up lytes.  Correct as needed. Pickering. S/P Albuterol, Calcium gluconate, Bicarb drip. Lokelma. BMP q shift . Kidney bladder US. Nephrology evaluation. If K and acidosis do not improve then he may require RRT.     INFECTIOUS DISEASE: Follow up cultures. Ceftriaxone for now. MRSA nasal. Procal.    HEMATOLOGICAL:  DVT PPX with SCD until bleeding is ruled out. Pancytopenia on CBC: cirrhosis, sepsis, MDS? Get hematology evalution.     ENDOCRINE:  Follow up FS.  Insulin protocol if needed.    MUSCULOSKELETAL: Bed Rest    Dispo: ICU level of care.          IMPRESSION:  Chest Pain r/o ACS   PROMISE  Severe Hyperkalemia  HAGMA  Afib with bradycardia (sick sinus?)  Severe hyponatremia  Anemia on DAPT  Pancytopenia  HO HepC not treated    PLAN:    CNS: Avoid CNS depressants. He has tremor but mental status is at baseline.     HEENT: Oral care.     PULMONARY:  HOB @ 45 degrees.  Aspiration precautions. Keep Sats > 92 %. CXR no acute infiltrates.     CARDIOVASCULAR: Keep I=O. CE x3 . Serial EKG. Echo. Cardiology and EP eval. Bicarb drip. He has bradycardia which is likely due to acidosis/hyperkalemia and has pacer pads on. Cardiology consulted. He has a recent stent placed and is on dual antiplatelet therapy.     GI: IV PPI. Octreotide empirically, GI evaluation.     RENAL:  Follow up lytes.  Correct as needed. Pickering. S/P Albuterol, Calcium gluconate, Bicarb drip. Lokelma. BMP q shift . Kidney bladder US. If K and acidosis do not improve then he may require RRT. Nephrology evaluation.     INFECTIOUS DISEASE: Follow up cultures. Ceftriaxone for now. MRSA nasal. Procal.    HEMATOLOGICAL:  DVT PPX with SCD until bleeding is ruled out. Pancytopenia on CBC: cirrhosis, sepsis, MDS? Get hematology evalution; check peripheral smear. Keep hgn more than 7 and transfuse if needed.     ENDOCRINE:  Follow up FS.  Insulin protocol if needed.    MUSCULOSKELETAL: Bed Rest    Dispo: The patient is critically ill and needs ICU level of care.

## 2022-06-17 NOTE — ED ADULT NURSE REASSESSMENT NOTE - NS ED NURSE REASSESS COMMENT FT1
Pt refusing to change into hospital gown and socks, pt educated on safety and nonskid socks. M/A on call bell in reach

## 2022-06-17 NOTE — CONSULT NOTE ADULT - ATTENDING COMMENTS
IMPRESSION:  Chest Pain r/o ACS   PROMISE  Severe Hyperkalemia  HAGMA  Afib with bradycardia (sick sinus?)  Severe hyponatremia  Anemia on DAPT  HO HepC not treated    Plan as above.

## 2022-06-17 NOTE — ED PROVIDER NOTE - PHYSICAL EXAMINATION
CONSTITUTIONAL: Well-developed; well-nourished; in moderate acute distress.   SKIN: warm, dry  HEAD: Normocephalic; atraumatic.  EYES: PERRL, EOMI, normal sclera and conjunctiva   ENT: No nasal discharge; airway clear.  NECK: Supple; non tender.  CARD:  Regular rate and rhythm.   RESP: NO inc WOB   ABD: soft ntnd  EXT: Normal ROM.    LYMPH: No acute cervical adenopathy.  NEURO: Alert, oriented, grossly unremarkable  PSYCH: Cooperative, appropriate.

## 2022-06-17 NOTE — ED PROVIDER NOTE - CARE PLAN
Principal Discharge DX:	Bradycardia  Secondary Diagnosis:	Hyperkalemia  Secondary Diagnosis:	Hyponatremia  Secondary Diagnosis:	PROMISE (acute kidney injury)  Secondary Diagnosis:	Anemia due to acute blood loss  Secondary Diagnosis:	Shortness of breath  Secondary Diagnosis:	Chest pain   1 Principal Discharge DX:	Bradycardia  Assessment and plan of treatment:	Plan: Monitor, Pacer pads, EKG, CXR, labs, cardiology consult, reassess.  Secondary Diagnosis:	Hyperkalemia  Secondary Diagnosis:	Hyponatremia  Secondary Diagnosis:	PROMISE (acute kidney injury)  Secondary Diagnosis:	Anemia due to acute blood loss  Secondary Diagnosis:	Shortness of breath  Secondary Diagnosis:	Chest pain

## 2022-06-17 NOTE — H&P ADULT - NSHPPHYSICALEXAM_GEN_ALL_CORE
Physical Examination:    General: No acute distress.  Alert, oriented, interactive, nonfocal    HEENT: Pupils equal, reactive to light.  Symmetric.    PULM: Clear to auscultation bilaterally, no significant sputum production    CVS: Regular rate and rhythm, no murmurs, rubs, or gallops    ABD: Soft, nondistended, nontender, normoactive bowel sounds, no masses    EXT: No edema, nontender    SKIN: Warm and well perfused, no rashes noted. Physical Examination:    General: No acute distress.  Alert, oriented, interactive, nonfocal    HEENT: Pupils equal, reactive to light.  Symmetric.    PULM: Clear to auscultation bilaterally, no significant sputum production    CVS: bradycardic, and rhythm, no murmurs, rubs, or gallops    ABD: Soft, nondistended, nontender, normoactive bowel sounds, no masses    EXT: No edema, nontender, + asterixis      SKIN: Warm and well perfused, spider angiomata noted on chest, gynecomastia, +jaundice

## 2022-06-17 NOTE — CONSULT NOTE ADULT - ASSESSMENT
Impression   -Acute on chronic anemia  -pancytopenia   -Acute kidney injury with metabolic acidosis  -Hyperkalemia  -New onset Afib w Slow ventricular rate   -hx of CAD s/p multiple PCIs last in 3/2022  -Liver cirrhosis   -HCV s/p tx, former IVDU on suboxon  -NIDDM   -HTN  --------------------------------------------  EKG afib w SVR  ECHO 2/2022 EF normal, mild TR and mild MR  Cath 3/30/22  1 Vessel Coronary Artery Disease (RCA)      Recommendations   -  -  -  -   Impression   -Acute on chronic anemia  -pancytopenia   -Acute kidney injury with metabolic acidosis  -Hyperkalemia  -New onset Afib w Slow ventricular rate   -hx of CAD s/p multiple PCIs last in 3/2022  -Liver cirrhosis   -HCV s/p tx, former IVDU on suboxon  -NIDDM   -HTN  --------------------------------------------  EKG afib w SVR  ECHO 2/2022 EF normal, mild TR and mild MR  Cath 3/30/22  1 Vessel Coronary Artery Disease (RCA)      Recommendations   -Admit to ICU  -monitor H&H  -trend troponin  -correct electrolytes   -2d ECHO  -Tranfuse 2 units of PRBCs  -can start lasix 40 IV BID  -hold aldactone  -can't given AC for now given pancytopenia   -Renal and hepatology eval    To be discussed with attending    Impression   -Acute on chronic anemia  -pancytopenia   -Acute kidney injury with metabolic acidosis  -Hyperkalemia  -New onset Afib w Slow ventricular rate   -hx of CAD s/p multiple PCIs last in 3/2022  -Liver cirrhosis   -HCV s/p tx, former IVDU on suboxon  -NIDDM   -HTN  --------------------------------------------  EKG afib w SVR  ECHO 2/2022 EF normal, mild TR and mild MR  Cath 3/30/22  1 Vessel Coronary Artery Disease (RCA)      Recommendations   -Admit to ICU  -monitor H&H, PLT count  -trend troponin  -correct electrolytes and acidosis  -2d ECHO  -Tranfuse 2 units of PRBCs  -can start lasix 40 IV BID  -hold aldactone given hyperkalemia  -hold AV node blocking agents  -can't given AC for now given pancytopenia   -can hold ticagrelor for now  -cont with aspirin  -Renal and hepatology eval     Impression   -Acute on chronic anemia  -pancytopenia   -Acute kidney injury with metabolic acidosis  -Hyperkalemia  -New onset Afib w Slow ventricular rate   -hx of CAD s/p multiple PCIs last in 3/2022  -Liver cirrhosis   -HCV s/p tx, former IVDU on suboxon  -NIDDM   -HTN  --------------------------------------------  EKG afib w SVR  ECHO 2/2022 EF normal, mild TR and mild MR  Cath 3/30/22  1 Vessel Coronary Artery Disease (RCA)      Recommendations   -Admit to ICU  -Tranfuse 2 units of PRBCs  -monitor H&H, PLT count  -trend troponin  -correct electrolytes and acidosis  -2d ECHO  -can start lasix 40 IV BID, give extra doses after transfusions  -hold aldactone given hyperkalemia  -hold AV node blocking agents  -can't given AC for now given pancytopenia   -can hold ticagrelor for now- PCI >30 days ago  -cont with aspirin  -Renal and hepatology eval     Impression   -Acute on chronic anemia  -pancytopenia   -Acute kidney injury with metabolic acidosis  -Hyperkalemia  -New onset Afib w Slow ventricular rate   Has had slow VR for years in 50s  -hx of CAD s/p multiple PCIs last in 3/2022  -Liver cirrhosis   -HCV s/p tx, former IVDU on suboxon  -NIDDM   -HTN  --------------------------------------------  EKG afib w SVR  ECHO 2/2022 EF normal, mild TR and mild MR  Cath 3/30/22  1 Vessel Coronary Artery Disease (RCA)      Recommendations   -Admit to ICU  -Tranfuse 2 units of PRBCs  -monitor H&H, PLT count  -trend troponin  -correct electrolytes and acidosis  -2d ECHO  -can start lasix 40 IV BID, give extra doses after transfusions  -hold aldactone given hyperkalemia  -hold AV node blocking agents  -can't given AC for now given pancytopenia   -can hold ticagrelor for now- PCI >30 days ago  -cont with aspirin  -Renal and hepatology eval

## 2022-06-18 NOTE — CONSULT NOTE ADULT - ATTENDING COMMENTS
Patient presents for change in MS, hyperkalemia and hyponatremia  Anemia  No active bleeding      Plan  Continue to monitor electrolytes  Hold diuretics  Monitor CBC  Continue NS  Will follow closely

## 2022-06-18 NOTE — CONSULT NOTE ADULT - ASSESSMENT
71 YO M w/ PMHx significant for HTN, NIDDM, cirrhosis, HCV s/p tx, former IVDU on suboxone, active smoker, CAD s/p PCI x 3 s/p 2 stents (done recently). Pt is here w/ mild confusion,    ·	PROMISE/ hyperkalemia / acidosis / hyponatremia severe in a patient with liver cirrhosis   ·	picture c/w overdiuresis ( on torsemide 20 q12h and aldactone at home)  ·	keep diuretics on hold   ·	sodium noted better check U NA U osm serum osm / TSH /serum uric acid  ·	can resume NS at 50 cc per hour / BMP qshift / target serum Na 125 in AM   ·	lokelma 10 g q8h  ·	keep metformin on hold  ·	sodium bicarb 650 q8h  ·	if no improvement in creat consider renal bladder sono    will follow

## 2022-06-18 NOTE — PROGRESS NOTE ADULT - ASSESSMENT
IMPRESSION:    PROMISE/ Hyperkalemia  HAGMA  Afib with bradycardia   Severe hyponatremia  Anemia on DAPT  pancytopenia  GI bleed  CPOD  Pancytopenia  HO HepC not treated    PLAN:    CNS: Avoid CNS depressants. He has tremor but mental status is at baseline. thiamine, folic acid    HEENT: Oral care.     PULMONARY:  HOB @ 45 degrees.  Aspiration precautions. Keep Sats 88 to 92%, Neb Q 6 H AS needed, repeat CXT    CARDIOVASCULAR: Echo. Cardiology and EP eval. Cardiology consulted. He has a recent stent placed and is on dual antiplatelet therapy.     GI: IV PPI. , GI evaluation.     RENAL:  Follow up lytes.  Correct as needed. Pickering. S/P Albuterol, Calcium gluconate, Bicarb drip. Lokelma. BMP q shift . Kidney bladder US. If K and acidosis do not improve then he may require RRT. Nephrology evaluation.     INFECTIOUS DISEASE: Follow up cultures. Ceftriaxone for now. MRSA nasal. Procal.    HEMATOLOGICAL:  DVT PPX with SCD until bleeding is ruled out. Pancytopenia on CBC: cirrhosis, sepsis, MDS? Get hematology evalution; check peripheral smear. Keep hgn more than 7 and transfuse if needed.     ENDOCRINE:  Follow up FS.  Insulin protocol if needed.    MUSCULOSKELETAL: Bed Rest    Dispo: The patient is critically ill and needs ICU level of care.          IMPRESSION:    PROMISE/ Hyperkalemia  HAGMA  Afib with bradycardia   Severe hyponatremia  Anemia on DAPT  pancytopenia  GI bleed  CPOD  Pancytopenia  HO HepC not treated    PLAN:    CNS: Avoid CNS depressants. He has tremor but mental status is at baseline. thiamine, folic acid    HEENT: Oral care.     PULMONARY:  HOB @ 45 degrees.  Aspiration precautions. Keep Sats 88 to 92%, Neb Q 6 H AS needed, repeat CXT    CARDIOVASCULAR: Echo. Cardiology and EP eval. Cardiology consulted. He has a recent stent placed and is on dual antiplatelet therapy.     GI: IV PPI. , GI evaluation.     RENAL:  Follow up lytes.  Correct as needed. Pickering. S/P Albuterol, Calcium gluconate, Bicarb drip. Lokelma. BMP q shift . Kidney bladder US. If K and acidosis do not improve then he may require RRT. Nephrology evaluation.     INFECTIOUS DISEASE: Follow up cultures. Ceftriaxone for now. MRSA nasal. Procal.    HEMATOLOGICAL:  DVT PPX with SCD until bleeding is ruled out. Pancytopenia on CBC: cirrhosis, sepsis, MDS? Get hematology evalution; check peripheral smear. Keep hgn more than 7 and transfuse if needed. LE doppler    ENDOCRINE:  Follow up FS.  Insulin protocol if needed.    MUSCULOSKELETAL: Bed Rest    Dispo: The patient is critically ill and needs ICU level of care.

## 2022-06-18 NOTE — PROGRESS NOTE ADULT - ASSESSMENT
pt with hx of htn, dm, hliver cirrhosis, hc, smoker, cad, s/p ptci last 3/2022 to rca, new afib, pancytopenia, acute on chronic renal failure.   fluid mgt as per renal  f/u h/h after prbcs  repeat echo and ekg  no anticoagulation at this time with pancytopenia.

## 2022-06-18 NOTE — CONSULT NOTE ADULT - SUBJECTIVE AND OBJECTIVE BOX
NEPHROLOGY CONSULTATION NOTE    THIS CONSULT IS INCOMPLETE / FULL CONSULT TO FOLLOW    Patient is a 72y Male whom presented to the hospital with     PAST MEDICAL & SURGICAL HISTORY:  Diabetes      HTN (hypertension)      Hepatitis  Untreated Hepatitis C      Arthritis      H/O heart artery stent      H/O knee surgery        Allergies:  No Known Allergies    Home Medications Reviewed  Hospital Medications:   MEDICATIONS  (STANDING):  aspirin  chewable 81 milliGRAM(s) Oral daily  atorvastatin 40 milliGRAM(s) Oral at bedtime  buprenorphine 8 mG/naloxone 2 mG SL  Tablet 0.5 Tablet(s) SubLingual <User Schedule>  cefTRIAXone   IVPB      cefTRIAXone   IVPB 1000 milliGRAM(s) IV Intermittent every 24 hours  gabapentin 100 milliGRAM(s) Oral three times a day  lactulose Syrup 10 Gram(s) Oral daily  levothyroxine 50 MICROGram(s) Oral daily  octreotide  Infusion 50 MICROgram(s)/Hr (10 mL/Hr) IV Continuous <Continuous>  pantoprazole  Injectable 40 milliGRAM(s) IV Push two times a day      SOCIAL HISTORY:  Denies ETOH,Smoking,   FAMILY HISTORY:  Family history of diabetes mellitus (DM) (Father, Mother)  Parents          REVIEW OF SYSTEMS:  CONSTITUTIONAL: No weakness, fevers or chills  EYES/ENT: No visual changes;  No vertigo or throat pain   NECK: No pain or stiffness  RESPIRATORY: No cough, wheezing, hemoptysis; No shortness of breath  CARDIOVASCULAR: No chest pain or palpitations.  GASTROINTESTINAL: No abdominal or epigastric pain. No nausea, vomiting, or hematemesis; No diarrhea or constipation. No melena or hematochezia.  GENITOURINARY: No dysuria, frequency, foamy urine, urinary urgency, incontinence or hematuria  NEUROLOGICAL: No numbness or weakness  SKIN: No itching, burning, rashes, or lesions   VASCULAR: No bilateral lower extremity edema.   All other review of systems is negative unless indicated above.    VITALS:  T(F): 98.1 (06-17-22 @ 11:43), Max: 98.1 (06-17-22 @ 11:43)  HR: 46 (06-17-22 @ 21:29)  BP: 110/55 (06-17-22 @ 21:29)  RR: 20 (06-17-22 @ 21:29)  SpO2: 100% (06-17-22 @ 21:29)    Height (cm): 185.4 (06-17 @ 11:14)  Weight (kg): 102.1 (06-17 @ 11:14)  BMI (kg/m2): 29.7 (06-17 @ 11:14)  BSA (m2): 2.26 (06-17 @ 11:14)    I&O's Detail        PHYSICAL EXAM:  Constitutional: NAD  HEENT: anicteric sclera, oropharynx clear, MMM  Neck: No JVD  Respiratory: CTAB, no wheezes, rales or rhonchi  Cardiovascular: S1, S2, RRR  Gastrointestinal: BS+, soft, NT/ND  Extremities: No cyanosis or clubbing. No peripheral edema  Neurological: A/O x 3, no focal deficits  Psychiatric: Normal mood, normal affect  : No CVA tenderness. No yang.   Skin: No rashes  Vascular Access:    LABS:  06-18    117<LL>  |  80<L>  |  93<HH>  ----------------------------<  97  5.9<H>   |  19  |  1.8<H>    Ca    9.2      18 Jun 2022 00:02  Mg     2.8     06-17    TPro  6.7  /  Alb  4.0  /  TBili  3.4<H>  /  DBili  0.8<H>  /  AST  35  /  ALT  20  /  AlkPhos  77  06-18    Creatinine Trend: 1.8 <--, 1.9 <--, 2.2 <--                        8.0    2.27  )-----------( 52       ( 18 Jun 2022 00:02 )             23.7     Urine Studies:          Ferritin 66      [03-27-22 @ 04:30]  HbA1c 5.4      [07-06-18 @ 06:25]  TSH 3.22      [03-27-22 @ 04:30]  Lipid: chol 118, TG 70, HDL 51, LDL --      [03-16-22 @ 06:20]    HBsAb Indeterminate Test repeated.      [03-28-22 @ 06:20]  HBsAg Nonreact      [03-28-22 @ 06:20]  HIV Nonreact      [03-28-22 @ 06:20]        RADIOLOGY & ADDITIONAL STUDIES:                 NEPHROLOGY CONSULTATION NOTE    73 YO M w/ PMHx significant for HTN, NIDDM, cirrhosis, HCV s/p tx, former IVDU on suboxone, active smoker, CAD s/p PCI x 3 s/p 2 stents (done recently). Pt is here w/ mild confusion,  Patient presented to the ED for management found to have severe hyponatremia with hyperkalemia and PROMISE .  started on IVF with improvement , seen today denied abdominal pain  Labs from home reviewed was on torsemide and Aldactone     PAST MEDICAL & SURGICAL HISTORY:  Diabetes  HTN (hypertension  Hepatitis  Untreated Hepatitis C  Arthritis  H/O heart artery stent  H/O knee surgery        Allergies:  No Known Allergies    Home Medications:  aspirin 81 mg oral tablet: 1 tab(s) orally once a day (31 Mar 2022 09:19)  buprenorphine-naloxone 8 mg-2 mg sublingual film: 0.5 film(s) sublingual 2 times a day (31 Mar 2022 09:19)  metFORMIN 500 mg oral tablet: 1 tab(s) orally 2 times a day  HOLD METFORMIN FOR 48 HR AFTER CARDIAC CATH  (31 Mar 2022 09:19)  spironolactone 100 mg oral tablet: 1 tab(s) orally once a day (31 Mar 2022 09:19)    Hospital Medications:   MEDICATIONS  (STANDING):  aspirin  chewable 81 milliGRAM(s) Oral daily  atorvastatin 40 milliGRAM(s) Oral at bedtime  buprenorphine 8 mG/naloxone 2 mG SL  Tablet 0.5 Tablet(s) SubLingual <User Schedule>  cefTRIAXone   IVPB 1000 milliGRAM(s) IV Intermittent every 24 hours  gabapentin 100 milliGRAM(s) Oral three times a day  lactulose Syrup 10 Gram(s) Oral daily  levothyroxine 50 MICROGram(s) Oral daily  octreotide  Infusion 50 MICROgram(s)/Hr (10 mL/Hr) IV Continuous <Continuous>  pantoprazole  Injectable 40 milliGRAM(s) IV Push two times a day      SOCIAL HISTORY:  Denies ETOH,Smoking,   FAMILY HISTORY:  Family history of diabetes mellitus (DM) (Father, Mother)  Parents          REVIEW OF SYSTEMS:   All other review of systems is negative unless indicated above.    VITALS:  T(F): 98.1 (06-17-22 @ 11:43), Max: 98.1 (06-17-22 @ 11:43)  HR: 46 (06-17-22 @ 21:29)  BP: 110/55 (06-17-22 @ 21:29)  RR: 20 (06-17-22 @ 21:29)  SpO2: 100% (06-17-22 @ 21:29)    Height (cm): 185.4 (06-17 @ 11:14)  Weight (kg): 102.1 (06-17 @ 11:14)  BMI (kg/m2): 29.7 (06-17 @ 11:14)  BSA (m2): 2.26 (06-17 @ 11:14)    I&O's Detail        PHYSICAL EXAM:  Constitutional: NAD  HEENT: icteric sclera   Neck: No JVD  Respiratory: CTAB,   Cardiovascular: S1, S2, RRR  Gastrointestinal: BS+, soft, NT/ND  Extremities: No cyanosis or clubbing. trace edema   Psychiatric: Normal mood, normal affect  : No CVA tenderness. No yang.   Skin: No rashes  Vascular Access:    LABS:  06-18    117<LL>  |  80<L>  |  93<HH>  ----------------------------<  97  5.9<H>   |  19  |  1.8<H>    SODIUM TREND:  Sodium 117 [06-18 @ 00:02]  Sodium 115 [06-17 @ 16:17]  Sodium 109 [06-17 @ 11:36]    Potassium Trend: 5.9<--, 5.8<--, 7.7<--    Ca    9.2      18 Jun 2022 00:02  Mg     2.8     06-17    TPro  6.7  /  Alb  4.0  /  TBili  3.4<H>  /  DBili  0.8<H>  /  AST  35  /  ALT  20  /  AlkPhos  77  06-18    Creatinine Trend: 1.8 <--, 1.9 <--, 2.2 <--                        8.0    2.27  )-----------( 52       ( 18 Jun 2022 00:02 )             23.7     Urine Studies:          Ferritin 66      [03-27-22 @ 04:30]  HbA1c 5.4      [07-06-18 @ 06:25]  TSH 3.22      [03-27-22 @ 04:30]  Lipid: chol 118, TG 70, HDL 51, LDL --      [03-16-22 @ 06:20]    HBsAb Indeterminate Test repeated.      [03-28-22 @ 06:20]  HBsAg Nonreact      [03-28-22 @ 06:20]  HIV Nonreact      [03-28-22 @ 06:20]        RADIOLOGY & ADDITIONAL STUDIES:  < from: Xray Chest 1 View- PORTABLE-Urgent (06.17.22 @ 11:55) >  Impression:    Low lung volumes leads to magnification of the pulmonary interstitium.    < end of copied text >

## 2022-06-18 NOTE — CONSULT NOTE ADULT - SUBJECTIVE AND OBJECTIVE BOX
Gastroenterology Consultation:    Patient is a 72y old  Male who presents with a chief complaint of sob (2022 09:44)      Admitted on: 22  HPI:  73 YO M w/ PMHx significant for HTN, NIDDM, cirrhosis, HCV s/p tx, former IVDU on suboxone, active smoker, CAD s/p PCI x 3 s/p 2 stents (done recently). Pt is here w/ mild confusion, bradycardia HR in 40s , severe hyponatremia. Of note he was here recent.ly for bilateral LE swelling and SOB, was found to have bradycardia on admission. While in the hospital, patient underwent stress test which was positive, then went to cath lab which showed 90% heavily calcified stenosis of prox and mid RCA s/p PCI and YVETTE x 2. Course was complicated by chronic pancytopenia, for which heme/onc saw the patient. Folate, b12 were normal. Abd US showed enlarged spleen, suspicious for sequestration. He was  also seen by hepatology for cirrhosis. On this admission: afebrile, BP soft, HR in 40s. SpO2 100% on RA. trop 0.04 on arrival, pro-bnp 7783 VB.26/37/21/16 pancytopenia (Hg 6.6, baseline 9-10 w/ pancytopenia, Na 109, Cl 90, K 7.7, CXR mild congestion.    (2022 13:03)      Prior EGD:  Prior Colonoscopy:      PAST MEDICAL & SURGICAL HISTORY:  Diabetes      HTN (hypertension)      Hepatitis  Untreated Hepatitis C      Arthritis      H/O heart artery stent      H/O knee surgery          FAMILY HISTORY:  Family history of diabetes mellitus (DM) (Father, Mother)  Parents        Social History:  Tobacco:  Alcohol:  Drugs:    Home Medications:  aspirin 81 mg oral tablet: 1 tab(s) orally once a day (31 Mar 2022 09:19)  buprenorphine-naloxone 8 mg-2 mg sublingual film: 0.5 film(s) sublingual 2 times a day (31 Mar 2022 09:19)  metFORMIN 500 mg oral tablet: 1 tab(s) orally 2 times a day  HOLD METFORMIN FOR 48 HR AFTER CARDIAC CATH  (31 Mar 2022 09:19)  spironolactone 100 mg oral tablet: 1 tab(s) orally once a day (31 Mar 2022 09:19)    MEDICATIONS  (STANDING):  aspirin  chewable 81 milliGRAM(s) Oral daily  atorvastatin 40 milliGRAM(s) Oral at bedtime  buprenorphine 8 mG/naloxone 2 mG SL  Tablet 0.5 Tablet(s) SubLingual <User Schedule>  cefTRIAXone   IVPB      cefTRIAXone   IVPB 1000 milliGRAM(s) IV Intermittent every 24 hours  gabapentin 100 milliGRAM(s) Oral three times a day  lactulose Syrup 10 Gram(s) Oral daily  levothyroxine 50 MICROGram(s) Oral daily  octreotide  Infusion 50 MICROgram(s)/Hr (10 mL/Hr) IV Continuous <Continuous>  pantoprazole  Injectable 40 milliGRAM(s) IV Push two times a day  sodium bicarbonate 650 milliGRAM(s) Oral three times a day  sodium zirconium cyclosilicate 10 Gram(s) Oral three times a day    MEDICATIONS  (PRN):      Allergies  No Known Allergies      Review of Systems:   Constitutional:  No Fever, No Chills  ENT/Mouth:  No Hearing Changes,  No Difficulty Swallowing  Eyes:  No Eye Pain, No Vision Changes  Cardiovascular:  No Chest Pain, No Palpitations  Respiratory:  No Cough, No Dyspnea  Gastrointestinal:  As described in HPI  Musculoskeletal:  No Joint Swelling, No Back Pain  Skin:  No Skin Lesions, No Jaundice  Neuro:  No Syncope, No Dizziness  Heme/Lymph:  No Bruising, No Bleeding.          Physical Examination:  T(C): --  HR: 55 (22 @ 14:00) (42 - 55)  BP: 108/51 (22 @ 14:00) (93/46 - 115/56)  RR: 18 (22 @ 14:00) (18 - 20)  SpO2: 97% (22 @ 14:00) (97% - 100%)        Constitutional: No acute distress.  Eyes:. Conjunctivae are clear, Sclera is non-icteric.  Ears Nose and Throat: The external ears are normal appearing,  Oral mucosa is pink and moist.  Respiratory:  No signs of respiratory distress. Lung sounds are clear bilaterally.  Cardiovascular:  S1 S2, Regular rate and rhythm.  GI: Abdomen is soft, symmetric, and non-tender without distention. There are no visible lesions or scars. Bowel sounds are present and normoactive in all four quadrants. No masses, hepatomegaly, or splenomegaly are noted.   Neuro: No Tremor, No involuntary movements  Skin: No rashes, No Jaundice.          Data:                        8.0    2.27  )-----------( 52       ( 2022 00:02 )             23.7     Hgb Trend:  8.0  22 @ 00:02  6.6  22 @ 11:36          119<LL>  |  84<L>  |  89<HH>  ----------------------------<  110<H>  6.3<HH>   |  23  |  1.5    Ca    8.9      2022 11:47  Mg     2.8         TPro  6.7  /  Alb  4.0  /  TBili  3.4<H>  /  DBili  0.8<H>  /  AST  35  /  ALT  20  /  AlkPhos  77      Liver panel trend:  TBili 3.4   /   AST 35   /   ALT 20   /   AlkP 77   /   Tptn 6.7   /   Alb 4.0    /   DBili 0.8        TBili 1.2   /   AST 47   /   ALT 23   /   AlkP 85   /   Tptn 7.2   /   Alb 4.5    /   DBili --                    Radiology:

## 2022-06-18 NOTE — PROGRESS NOTE ADULT - SUBJECTIVE AND OBJECTIVE BOX
Subjective: pt feels slightly better      MEDICATIONS  (STANDING):  aspirin  chewable 81 milliGRAM(s) Oral daily  atorvastatin 40 milliGRAM(s) Oral at bedtime  buprenorphine 8 mG/naloxone 2 mG SL  Tablet 0.5 Tablet(s) SubLingual <User Schedule>  cefTRIAXone   IVPB 1000 milliGRAM(s) IV Intermittent every 24 hours  cefTRIAXone   IVPB      gabapentin 100 milliGRAM(s) Oral three times a day  lactulose Syrup 10 Gram(s) Oral daily  levothyroxine 50 MICROGram(s) Oral daily  octreotide  Infusion 50 MICROgram(s)/Hr (10 mL/Hr) IV Continuous <Continuous>  pantoprazole  Injectable 40 milliGRAM(s) IV Push two times a day    MEDICATIONS  (PRN):            Vital Signs Last 24 Hrs  T(C): 36.7 (17 Jun 2022 11:43), Max: 36.7 (17 Jun 2022 11:43)  T(F): 98.1 (17 Jun 2022 11:43), Max: 98.1 (17 Jun 2022 11:43)  HR: 46 (17 Jun 2022 21:29) (41 - 59)  BP: 110/55 (17 Jun 2022 21:29) (98/45 - 135/59)  BP(mean): --  RR: 20 (17 Jun 2022 21:29) (19 - 20)  SpO2: 100% (17 Jun 2022 21:29) (95% - 100%)             REVIEW OF SYSTEMS:  CONSTITUTIONAL: no fever, no chills, no diaphoresis  CARDIOLOGY: (+) chest pain, (+) SOB, no palpitation, no diaphoresis, no faint   RESPIRATORY: (+) dyspnea, no wheeze, no orthopnea, no PND   NEUROLOGICAL: no dizziness, headache, focal deficits to report.  GI: no abdominal pain, no dyspepsia, no nausea, no vomiting, no diarrhea.    HEENT: no congestion, no nasal bleeding  SKIN: no ecchymosis,             PHYSICAL EXAM:  · CONSTITUTIONAL: Looks stable, in no diress  . NECK: Supple, no JVD, no bruit on either carotid side   · RESPIRATORY: Normal air entry to lung base, no wheeze, no crackle, no wet rales  · CARDIOVASCULAR: Normal S1, A2, P2, no murmur, no click, regular rate,  no rub,  · EXTREMITIES: No cyanosis, no clubbing, (+) edema  · VASCULAR: Pulses are regular, equal, bilateral in upper and lower extremities  	  TELEMETRY:afib svr    ECG:Diagnosis Line Marked sinus bradycardia with 1st degree A-V block  Inferior infarct , age undetermined  Anterolateral infarct , age undetermined  Abnormal ECG      TTE:    LABS:                        8.0    2.27  )-----------( 52       ( 18 Jun 2022 00:02 )             23.7     06-18    117<LL>  |  80<L>  |  93<HH>  ----------------------------<  97  5.9<H>   |  19  |  1.8<H>    Ca    9.2      18 Jun 2022 00:02  Mg     2.8     06-17    TPro  6.7  /  Alb  4.0  /  TBili  3.4<H>  /  DBili  0.8<H>  /  AST  35  /  ALT  20  /  AlkPhos  77  06-18    CARDIAC MARKERS ( 18 Jun 2022 00:02 )  x     / 0.04 ng/mL / x     / x     / x      CARDIAC MARKERS ( 17 Jun 2022 16:17 )  x     / 0.04 ng/mL / x     / x     / x      CARDIAC MARKERS ( 17 Jun 2022 11:36 )  x     / 0.04 ng/mL / x     / x     / x              I&O's Summary    BNPSerum Pro-Brain Natriuretic Peptide: 7783 pg/mL (06-17 @ 11:36)    RADIOLOGY & ADDITIONAL STUDIES:    IMPRESSION AND PLAN:

## 2022-06-18 NOTE — PROGRESS NOTE ADULT - SUBJECTIVE AND OBJECTIVE BOX
Over Night Events: events noted, co leg cramps, cardio reviewed, still bradycardic, afebrile    PHYSICAL EXAM    ICU Vital Signs Last 24 Hrs  T(C): 36.7 (17 Jun 2022 11:43), Max: 36.7 (17 Jun 2022 11:43)  T(F): 98.1 (17 Jun 2022 11:43), Max: 98.1 (17 Jun 2022 11:43)  HR: 45 (18 Jun 2022 09:00) (41 - 59)  BP: 98/55 (18 Jun 2022 09:00) (98/45 - 135/59)  BP(mean): 73 (18 Jun 2022 09:00) (73 - 73)  RR: 20 (18 Jun 2022 09:00) (19 - 20)  SpO2: 100% (18 Jun 2022 09:00) (95% - 100%)      General: ill looking, chronically  HEENT: dry oral mucosa  Lungs: dec bs both bases  Cardiovascular: MIKHAIL 2.6  Abdomen: Soft, Positive BS  Extremities: No clubbing   Skin: Warm  Neurological: Non focal         LABS:                          8.0    2.27  )-----------( 52       ( 18 Jun 2022 00:02 )             23.7                                               06-18    117<LL>  |  80<L>  |  93<HH>  ----------------------------<  97  5.9<H>   |  19  |  1.8<H>    Ca    9.2      18 Jun 2022 00:02  Mg     2.8     06-17    TPro  6.7  /  Alb  4.0  /  TBili  3.4<H>  /  DBili  0.8<H>  /  AST  35  /  ALT  20  /  AlkPhos  77  06-18                                                 CARDIAC MARKERS ( 18 Jun 2022 00:02 )  x     / 0.04 ng/mL / x     / x     / x      CARDIAC MARKERS ( 17 Jun 2022 16:17 )  x     / 0.04 ng/mL / x     / x     / x      CARDIAC MARKERS ( 17 Jun 2022 11:36 )  x     / 0.04 ng/mL / x     / x     / x                                                LIVER FUNCTIONS - ( 18 Jun 2022 00:02 )  Alb: 4.0 g/dL / Pro: 6.7 g/dL / ALK PHOS: 77 U/L / ALT: 20 U/L / AST: 35 U/L / GGT: x                                                                                                                                       MEDICATIONS  (STANDING):  aspirin  chewable 81 milliGRAM(s) Oral daily  atorvastatin 40 milliGRAM(s) Oral at bedtime  buprenorphine 8 mG/naloxone 2 mG SL  Tablet 0.5 Tablet(s) SubLingual <User Schedule>  cefTRIAXone   IVPB      cefTRIAXone   IVPB 1000 milliGRAM(s) IV Intermittent every 24 hours  gabapentin 100 milliGRAM(s) Oral three times a day  lactulose Syrup 10 Gram(s) Oral daily  levothyroxine 50 MICROGram(s) Oral daily  octreotide  Infusion 50 MICROgram(s)/Hr (10 mL/Hr) IV Continuous <Continuous>  pantoprazole  Injectable 40 milliGRAM(s) IV Push two times a day    cxr reviewed

## 2022-06-18 NOTE — CONSULT NOTE ADULT - ASSESSMENT
70 YO M w/PMHx significant for HTN, NIDDM, HCV s/p tx, former IVDU on suboxone, active smoker, CAD s/p PCI x 3. Pt presented to the ED w/complaints of bilateral LE swelling and SOB x last several months which has apparently worsened in last past month. currently admitted to telemetry for COPD exacerbation and diastolic heart failure    #)Decompensated liver cirrhosis (+Ascites H/O, +G/E varices) secondary to HCV vs LUIS DANIEL  No INR to calculate MELD  ECHO: EF 64% and Mild TVR  Acute on chronic anemia no evidence of active bleeding   Hx of Ascites on lasix, aldactone at home   No Hx of HE  No H/O SBP    Recs:   Hb baseline 8-9 admitted with hb of 6.6 s/p 2 units PRBC current Hb 8  pt will need outpatient EGD for EV screening  hold lasix and aldactone due to hyponatremia and hyperkalemia   low sodium diet   check RUQ US and US doppler   tren CBC, keep Hb>7   No evidence of active bleeding    Lifestyle modifications  Calorie intake 25-40 Kcal/kg/day  Protein intake: 1.2-1.5 gm/kg/day  Avoid smoking, alcohol, NSAIDS.

## 2022-06-19 NOTE — ED ADULT NURSE REASSESSMENT NOTE - NS ED NURSE REASSESS COMMENT FT1
at 15:30 pt was c/o left leg discoloration. Our MD immediately made aware. RN performed doppler dorsal pedis check, pulse present via doppler.

## 2022-06-19 NOTE — CHART NOTE - NSCHARTNOTEFT_GEN_A_CORE
I was called by vascular tech about the presence of Left peroneal vein thrombosis. informed ICU on call Dr Ryan Moseley [phone] about the findings

## 2022-06-19 NOTE — PROGRESS NOTE ADULT - ASSESSMENT
71 YO M w/ PMHx significant for HTN, NIDDM, cirrhosis, HCV s/p tx, former IVDU on suboxone, active smoker, CAD s/p PCI x 3 s/p 2 stents (done recently). Pt is here w/ mild confusion,    PROMISE / pre-renal/ATN iso bradycardia/hypotension / low FENa  Severe Hyponatremia iso cirrhosis, spironolactone improving   Hyperkalemia d/t renal failure, spironolactone, improved  Bradycardia  Decompensated liver cirrhosis  Pancytopenia / s/p RBCs    - creatinine level down-trending  - cont holding diuretics  - limit fluid intake to 1 liter daily   - cont lokelma, low K diet   - strict i/o   - cont po sodium bicarb   - obtain renal/bladder ultrasound  - cont holding metformin  - GI f/u  no 71 YO M w/ PMHx significant for HTN, NIDDM, cirrhosis, HCV s/p tx, former IVDU on suboxone, active smoker, CAD s/p PCI x 3 s/p 2 stents (done recently). Pt is here w/ mild confusion,    PROMISE / pre-renal/ATN iso bradycardia/hypotension / low FENa  Severe Hyponatremia iso cirrhosis, spironolactone improving   Hyperkalemia d/t renal failure, spironolactone, improved/stable  Bradycardia  Decompensated liver cirrhosis  Pancytopenia / s/p RBCs    - creatinine level down-trending  - limit fluid intake to 1 liter daily   - cont lokelma, low K diet   - start lasix 20mg iv daily   - strict i/o   - cont po sodium bicarb   - obtain renal/bladder ultrasound  - cont holding metformin  - GI f/u   - cardio f/u  WDL

## 2022-06-19 NOTE — PROGRESS NOTE ADULT - ASSESSMENT
IMPRESSION:    PROMISE/ Hyperkalemia improving  Afib with bradycardia   Severe hyponatremia improving  Anemia on DAPT  pancytopenia  GI bleed stable  COPD  Pancytopenia  hep C/ liver cirrhosis    PLAN:    CNS: Avoid CNS depressants. He has tremor but mental status is at baseline. thiamine, folic acid, lactulose    HEENT: Oral care.     PULMONARY:  HOB @ 45 degrees.  Aspiration precautions. Keep Sats 88 to 92%, Neb Q 6 H AS needed    CARDIOVASCULAR: cardio fup, dc IVF    GI: IV PPI  Q12, Clear liquid diet,     RENAL:  Follow up lytes.  Correct as needed. [place]ering. S/P Albuterol, Calcium gluconate, Bicarb drip. Lokelma. BMP q shift . renal fup    INFECTIOUS DISEASE: procal    HEMATOLOGICAL:  DVT PPX with SCD , INR,, serial CBC  ENDOCRINE:  Follow up FS.  Insulin protocol if needed.    MUSCULOSKELETAL: Bed Rest    SDU         IMPRESSION:    PROMISE/ Hyperkalemia improving  Afib with bradycardia   Severe hyponatremia improving  Anemia on DAPT  pancytopenia  GI bleed stable  COPD  Pancytopenia  hep C/ liver cirrhosis    PLAN:    CNS: Avoid CNS depressants. He has tremor but mental status is at baseline. thiamine, folic acid, lactulose    HEENT: Oral care.     PULMONARY:  HOB @ 45 degrees.  Aspiration precautions. Keep Sats 88 to 92%, Neb Q 6 H AS needed    CARDIOVASCULAR: cardio fup, dc IVF    GI: IV PPI  Q12, Clear liquid diet,     RENAL:  Follow up lytes.  Correct as needed. [place]ering. S/P Albuterol, Calcium gluconate, Bicarb drip. Lokelma. BMP q shift . renal fup    INFECTIOUS DISEASE: procal    HEMATOLOGICAL:  DVT PPX with SCD , INR,, serial CBC  ENDOCRINE:  Follow up FS.  Insulin protocol if needed.    MUSCULOSKELETAL: Bed Rest    MICU

## 2022-06-19 NOTE — ED ADULT NURSE REASSESSMENT NOTE - NS ED NURSE REASSESS COMMENT FT1
DVT to left lower leg seen on US. Spoke with MD at 1700. MD states labs will be repeated at 2000 and further management will be made when 2000 results are available.

## 2022-06-19 NOTE — PROGRESS NOTE ADULT - SUBJECTIVE AND OBJECTIVE BOX
Nephrology Progress Note    DINO LANCE  [id number]  72y  Male    S:  Patient is seen and examined, events over the last 24h noted.    O:  Allergies:  No Known Allergies    Hospital Medications:   MEDICATIONS  (STANDING):  aspirin  chewable 81 milliGRAM(s) Oral daily  atorvastatin 40 milliGRAM(s) Oral at bedtime  buprenorphine 8 mG/naloxone 2 mG SL  Tablet 0.5 Tablet(s) SubLingual <User Schedule>  cefTRIAXone   IVPB 1000 milliGRAM(s) IV Intermittent every 24 hours  folic acid 1 milliGRAM(s) Oral daily  gabapentin 100 milliGRAM(s) Oral three times a day  heparin   Injectable 5000 Unit(s) SubCutaneous every 8 hours  lactulose Syrup 10 Gram(s) Oral daily  levothyroxine 50 MICROGram(s) Oral daily  nicotine -  14 mG/24Hr(s) Patch 1 patch Transdermal daily  octreotide  Infusion 50 MICROgram(s)/Hr (10 mL/Hr) IV Continuous <Continuous>  pantoprazole  Injectable 40 milliGRAM(s) IV Push two times a day  sodium bicarbonate 650 milliGRAM(s) Oral three times a day  sodium zirconium cyclosilicate 10 Gram(s) Oral three times a day  thiamine 100 milliGRAM(s) Oral daily    MEDICATIONS  (PRN):    Home Medications:  aspirin 81 mg oral tablet: 1 tab(s) orally once a day (31 Mar 2022 09:19)  buprenorphine-naloxone 8 mg-2 mg sublingual film: 0.5 film(s) sublingual 2 times a day (31 Mar 2022 09:19)  metFORMIN 500 mg oral tablet: 1 tab(s) orally 2 times a day  HOLD METFORMIN FOR 48 HR AFTER CARDIAC CATH  (31 Mar 2022 09:19)  spironolactone 100 mg oral tablet: 1 tab(s) orally once a day (31 Mar 2022 09:19)      VITALS:  T(F): --  HR: 44 (22 @ 14:00)  BP: 106/53 (22 @ 14:00)  RR: 18 (22 @ 14:00)  SpO2: 97% (22 @ 14:00)  Wt(kg): --  I&O's Detail    I&O's Summary        PHYSICAL EXAM:  Gen: ill appearing, nad  Resp: decreased bs at the bases  Card: S1/S2  Abd: soft  Extremities: +edema      LABS:        127<L>  |  92<L>  |  50<H>  ----------------------------<  170<H>  5.8<H>   |  23  |  1.3    Ca    8.6      2022 11:33    TPro  6.5  /  Alb  3.8  /  TBili  1.5<H>  /  DBili      /  AST  37  /  ALT  20  /  AlkPhos  72      Osmolality, Serum: 292 mos/kg (22 @ 00:02)                          7.8    1.47  )-----------( 50       ( 2022 06:16 )             23.3     Mean Cell Volume: 78.7 fL (22 @ 06:16)    Ferritin, Serum: 66 ng/mL (22 @ 04:30)  Iron Total, Serum: 63 ug/dL (18 @ 06:25)      Urine Studies:  Urinalysis Basic - ( 2022 09:42 )    Color: Light Yellow / Appearance: Clear / S.010 / pH:   Gluc:  / Ketone: Negative  / Bili: Negative / Urobili: <2 mg/dL   Blood:  / Protein: Trace / Nitrite: Negative   Leuk Esterase: Large / RBC: 0 /HPF / WBC 4 /HPF   Sq Epi:  / Non Sq Epi: 6 /HPF / Bacteria: Negative        Urea Nitrogen,  Random Urine: 543 mg/dL (22 @ 09:40)    Osmolality, Random Urine: 291 mos/kg ( @ 09:42)  Creatinine, Random Urine: 35 mg/dL ( @ 09:40)    Creatinine trend:  Creatinine, Serum: 1.3 mg/dL (22 @ 11:33)  Creatinine, Serum: 1.3 mg/dL (22 @ 06:16)  Creatinine, Serum: 1.6 mg/dL (22 @ 00:22)  Creatinine, Serum: 1.6 mg/dL (22 @ 20:54)  Creatinine, Serum: 1.7 mg/dL (22 @ 16:55)  Creatinine, Serum: 1.5 mg/dL (22 @ 11:47)  Creatinine, Serum: 1.8 mg/dL (22 @ 00:02)  Creatinine, Serum: 1.9 mg/dL (22 @ 16:17)  Creatinine, Serum: 2.2 mg/dL (22 @ 11:36)  Creatinine, Serum: 0.9 mg/dL (22 @ 05:30)    Sodium, Serum: 127 mmol/L (22 @ 11:33)  Sodium, Serum: 124 mmol/L (22 @ 06:16)  Sodium, Serum: 125 mmol/L (22 @ 00:22)  Sodium, Serum: 122 mmol/L (22 @ 20:54)  Sodium, Serum: 122 mmol/L (22 @ 16:55)  Sodium, Serum: 119 mmol/L (22 @ 11:47)  Sodium, Serum: 117 mmol/L (22 @ 00:02)  Sodium, Serum: 115 mmol/L (22 @ 16:17)  Sodium, Serum: 109 mmol/L (22 @ 11:36)  Sodium, Serum: 131 mmol/L (22 @ 05:30)  Sodium, Serum: 134 mmol/L (22 @ 04:30)      Potassium, Serum: 5.8 mmol/L (22 @ 11:33)  Potassium, Serum: 5.8 mmol/L (22 @ 06:16)  Potassium, Serum: 5.6 mmol/L (22 @ 00:22)  Potassium, Serum: 6.2 mmol/L (22 @ 20:54)  Potassium, Serum: 6.1 mmol/L (22 @ 16:55)  Potassium, Serum: 6.3 mmol/L (22 @ 11:47)  Potassium, Serum: 5.9 mmol/L (22 @ 00:02)  Potassium, Serum: 5.8 mmol/L (22 @ 16:17)  Potassium, Serum: 7.7 mmol/L (22 @ 11:36)  Potassium, Serum: 4.1 mmol/L (22 @ 05:30)      Osmolality, Random Urine: 291 mos/kg [50 - 1200] (22 @ 09:42)  Sodium, Random Urine: 23.0 mmoL/L (22 @ 09:40)  Osmolality, Serum: 292 mos/kg [280 - 301] (22 @ 00:02)    Thyroid Stimulating Hormone, Serum: 3.22 uIU/mL [0.27 - 4.20] (22 @ 04:30)    < from: TTE Echo Complete w/o Contrast w/ Doppler (22 @ 16:32) >  Summary:   1. Technically goodstudy.   2. Normal global left ventricular systolic function.   3. LV Ejection Fraction by Carlin's Method with a biplane EF of 64 %.   4. Normal right atrial size.   5. Mild mitral valve regurgitation.   6. Mild tricuspid regurgitation.   7. Aorticvalve thickening with decreased leaflet opening.   8. Left atrial enlargement.   9. Mild pulmonic valve regurgitation.    < end of copied text >  < from: Xray Chest 1 View- PORTABLE-Routine (Xray Chest 1 View- PORTABLE-Routine in AM.) (22 @ 04:32) >    ACC: [id number] EXAM:  XR CHEST PORTABLE ROUTINE 1V                          PROCEDURE DATE:  2022          INTERPRETATION:  Clinical History / Reason for exam: pnA    Comparison : Chest radiograph:  2022    Technique/Positioning: Frontal view of the chest    Findings:    Support devices: None.    Cardiac/mediastinum/hilum: No significant change    Lung parenchyma/Pleura: Increased bilateral opacities. No definite   pneumothorax.    Skeleton/soft tissues: No significant change.    Impression:  Increased bilateral opacities. No definite pneumothorax.            --- End of Report ---            GRACE BOLANOS MD; Attending Radiologist  This document has been electronically signed. 2022 12:30PM    < end of copied text >

## 2022-06-19 NOTE — PROGRESS NOTE ADULT - SUBJECTIVE AND OBJECTIVE BOX
Over Night Events: events noted, renal/ cardio/ GI reviewed, on RA, still bradycardic    PHYSICAL EXAM    ICU Vital Signs Last 24 Hrs  T(C): 36.1 (2022 15:00), Max: 36.1 (2022 15:00)  T(F): 96.9 (2022 15:00), Max: 96.9 (2022 15:00)  HR: 48 (2022 06:30) (42 - 56)  BP: 111/56 (2022 06:30) (93/46 - 122/60)  BP(mean): 81 (2022 06:30) (67 - 87)  RR: 18 (2022 06:30) (18 - 20)  SpO2: 97% (2022 06:30) (93% - 100%)      General: ill looking  Lungs: Bilateral wheezing  Cardiovascular: MIKHAIL 2.6  Abdomen: Soft, Positive BS  Extremities: No clubbing   Neurological: Non focal         LABS:                          8.0    2.27  )-----------( 52       ( 2022 00:02 )             23.7                                               06-19    125<L>  |  89<L>  |  70<HH>  ----------------------------<  56<L>  5.6<H>   |  25  |  1.6<H>    Ca    8.7      2022 00:22  Mg     2.8     06-17    TPro  6.7  /  Alb  4.0  /  TBili  3.4<H>  /  DBili  0.8<H>  /  AST  35  /  ALT  20  /  AlkPhos  77  06-18                                             Urinalysis Basic - ( 2022 09:42 )    Color: Light Yellow / Appearance: Clear / S.010 / pH: x  Gluc: x / Ketone: Negative  / Bili: Negative / Urobili: <2 mg/dL   Blood: x / Protein: Trace / Nitrite: Negative   Leuk Esterase: Large / RBC: 0 /HPF / WBC 4 /HPF   Sq Epi: x / Non Sq Epi: 6 /HPF / Bacteria: Negative        CARDIAC MARKERS ( 2022 00:02 )  x     / 0.04 ng/mL / x     / x     / x      CARDIAC MARKERS ( 2022 16:17 )  x     / 0.04 ng/mL / x     / x     / x      CARDIAC MARKERS ( 2022 11:36 )  x     / 0.04 ng/mL / x     / x     / x                                                LIVER FUNCTIONS - ( 2022 00:02 )  Alb: 4.0 g/dL / Pro: 6.7 g/dL / ALK PHOS: 77 U/L / ALT: 20 U/L / AST: 35 U/L / GGT: x                                                                                                                                       MEDICATIONS  (STANDING):  aspirin  chewable 81 milliGRAM(s) Oral daily  atorvastatin 40 milliGRAM(s) Oral at bedtime  buprenorphine 8 mG/naloxone 2 mG SL  Tablet 0.5 Tablet(s) SubLingual <User Schedule>  cefTRIAXone   IVPB      cefTRIAXone   IVPB 1000 milliGRAM(s) IV Intermittent every 24 hours  folic acid 1 milliGRAM(s) Oral daily  gabapentin 100 milliGRAM(s) Oral three times a day  heparin   Injectable 5000 Unit(s) SubCutaneous every 8 hours  lactulose Syrup 10 Gram(s) Oral daily  levothyroxine 50 MICROGram(s) Oral daily  octreotide  Infusion 50 MICROgram(s)/Hr (10 mL/Hr) IV Continuous <Continuous>  pantoprazole  Injectable 40 milliGRAM(s) IV Push two times a day  sodium bicarbonate 650 milliGRAM(s) Oral three times a day  sodium chloride 0.9%. 1000 milliLiter(s) (50 mL/Hr) IV Continuous <Continuous>  sodium zirconium cyclosilicate 10 Gram(s) Oral three times a day  thiamine 100 milliGRAM(s) Oral daily

## 2022-06-20 NOTE — PROGRESS NOTE ADULT - SUBJECTIVE AND OBJECTIVE BOX
Nephrology progress note    THIS IS AN INCOMPLETE NOTE . FULL NOTE TO FOLLOW SHORTLY    Patient is seen and examined, events over the last 24 h noted .    Allergies:  No Known Allergies    Hospital Medications:   MEDICATIONS  (STANDING):  aspirin  chewable 81 milliGRAM(s) Oral daily  atorvastatin 40 milliGRAM(s) Oral at bedtime  buprenorphine 8 mG/naloxone 2 mG SL  Tablet 0.5 Tablet(s) SubLingual <User Schedule>  cefTRIAXone   IVPB      cefTRIAXone   IVPB 1000 milliGRAM(s) IV Intermittent every 24 hours  folic acid 1 milliGRAM(s) Oral daily  gabapentin 100 milliGRAM(s) Oral three times a day  heparin   Injectable 5000 Unit(s) SubCutaneous every 8 hours  lactulose Syrup 10 Gram(s) Oral daily  levothyroxine 50 MICROGram(s) Oral daily  nicotine -  14 mG/24Hr(s) Patch 1 patch Transdermal daily  pantoprazole  Injectable 40 milliGRAM(s) IV Push two times a day  sodium bicarbonate 650 milliGRAM(s) Oral three times a day  sodium zirconium cyclosilicate 10 Gram(s) Oral three times a day  thiamine 100 milliGRAM(s) Oral daily        VITALS:  T(F): 97.7 (22 @ 18:00), Max: 97.8 (22 @ 14:00)  HR: 52 (22 @ 06:00)  BP: 151/56 (22 @ 06:00)  RR: 17 (22 @ 06:00)  SpO2: 98% (22 @ 06:00)  Wt(kg): --     @ 07:01  -   @ 08:59  --------------------------------------------------------  IN: 0 mL / OUT: 500 mL / NET: -500 mL          PHYSICAL EXAM:  Constitutional: NAD  HEENT: anicteric sclera, oropharynx clear, MMM  Neck: No JVD  Respiratory: CTAB, no wheezes, rales or rhonchi  Cardiovascular: S1, S2, RRR  Gastrointestinal: BS+, soft, NT/ND  Extremities: No cyanosis or clubbing. No peripheral edema  :  No yang.   Skin: No rashes    LABS:      123<L>  |  89<L>  |  43<H>  ----------------------------<  109<H>  5.2<H>   |  23  |  1.1    Ca    8.6      2022 20:50    TPro  6.5  /  Alb  3.8  /  TBili  1.5<H>  /  DBili      /  AST  37  /  ALT  20  /  AlkPhos  72                            8.2    1.73  )-----------( 48       ( 2022 20:50 )             24.9       Urine Studies:  Urinalysis Basic - ( 2022 09:42 )    Color: Light Yellow / Appearance: Clear / S.010 / pH:   Gluc:  / Ketone: Negative  / Bili: Negative / Urobili: <2 mg/dL   Blood:  / Protein: Trace / Nitrite: Negative   Leuk Esterase: Large / RBC: 0 /HPF / WBC 4 /HPF   Sq Epi:  / Non Sq Epi: 6 /HPF / Bacteria: Negative      Osmolality, Random Urine: 291 mos/kg ( @ 09:42)  Creatinine, Random Urine: 35 mg/dL ( @ 09:40)  Protein/Creatinine Ratio Calculation: 0.2 Ratio ( @ 09:40)  Chloride, Random Urine: <20 ( @ 09:40)  Sodium, Random Urine: 23.0 mmoL/L ( @ 09:40)  Potassium, Random Urine: 14 mmol/L ( @ 09:40)      Ferritin 66      [22 @ 04:30]  HbA1c 5.4      [18 @ 06:25]  TSH 3.22      [22 @ 04:30]  Lipid: chol 118, TG 70, HDL 51, LDL --      [22 @ 06:20]    HBsAb Indeterminate Test repeated.      [22 @ 06:20]  HBsAg Nonreact      [22 @ 06:20]  HIV Nonreact      [22 @ 06:20]        RADIOLOGY & ADDITIONAL STUDIES:   Nephrology progress note  Patient is seen and examined, events over the last 24 h noted .  Lying in bed comfortable     Allergies:  No Known Allergies    Hospital Medications:   MEDICATIONS  (STANDING):  aspirin  chewable 81 milliGRAM(s) Oral daily  atorvastatin 40 milliGRAM(s) Oral at bedtime  buprenorphine 8 mG/naloxone 2 mG SL  Tablet 0.5 Tablet(s) SubLingual <User Schedule>    cefTRIAXone   IVPB 1000 milliGRAM(s) IV Intermittent every 24 hours  folic acid 1 milliGRAM(s) Oral daily  gabapentin 100 milliGRAM(s) Oral three times a day  heparin   Injectable 5000 Unit(s) SubCutaneous every 8 hours  lactulose Syrup 10 Gram(s) Oral daily  levothyroxine 50 MICROGram(s) Oral daily  nicotine -  14 mG/24Hr(s) Patch 1 patch Transdermal daily  pantoprazole  Injectable 40 milliGRAM(s) IV Push two times a day  sodium bicarbonate 650 milliGRAM(s) Oral three times a day  sodium zirconium cyclosilicate 10 Gram(s) Oral three times a day  thiamine 100 milliGRAM(s) Oral daily        VITALS:  T(F): 97.7 (22 @ 18:00), Max: 97.8 (22 @ 14:00)  HR: 52 (22 @ 06:00)  BP: 151/56 (22 @ 06:00)  RR: 17 (22 @ 06:00)  SpO2: 98% (22 @ 06:00)  Wt(kg): --     @ 07:01  -   @ 08:59  --------------------------------------------------------  IN: 0 mL / OUT: 500 mL / NET: -500 mL          PHYSICAL EXAM:  Constitutional: NAD  Respiratory: CTAB,  Cardiovascular: S1, S2, RRR  Gastrointestinal: BS+, soft, NT/ND  Extremities: No cyanosis or clubbing. No peripheral edema  :  No yang.   Skin: No rashes    LABS:      128<L>  |  93<L>  |  35<H>  ----------------------------<  103<H>  5.3<H>   |  25  |  1.0    Ca    8.7      2022 07:14  Mg     2.3         TPro  6.5  /  Alb  3.8  /  TBili  1.1  /  DBili  x   /  AST  35  /  ALT  18  /  AlkPhos  68      123<L>  |  89<L>  |  43<H>  ----------------------------<  109<H>  5.2<H>   |  23  |  1.1    Ca    8.6      2022 20:50    TPro  6.5  /  Alb  3.8  /  TBili  1.5<H>  /  DBili      /  AST  37  /  ALT  20  /  AlkPhos  72                            8.2    1.73  )-----------( 48       ( 2022 20:50 )             24.9       Urine Studies:  Urinalysis Basic - ( 2022 09:42 )    Color: Light Yellow / Appearance: Clear / S.010 / pH:   Gluc:  / Ketone: Negative  / Bili: Negative / Urobili: <2 mg/dL   Blood:  / Protein: Trace / Nitrite: Negative   Leuk Esterase: Large / RBC: 0 /HPF / WBC 4 /HPF   Sq Epi:  / Non Sq Epi: 6 /HPF / Bacteria: Negative      Osmolality, Random Urine: 291 mos/kg ( @ 09:42)  Creatinine, Random Urine: 35 mg/dL ( @ 09:40)  Protein/Creatinine Ratio Calculation: 0.2 Ratio ( @ 09:40)  Chloride, Random Urine: <20 ( @ 09:40)  Sodium, Random Urine: 23.0 mmoL/L ( @ 09:40)  Potassium, Random Urine: 14 mmol/L ( @ 09:40)      Ferritin 66      [22 @ 04:30]  HbA1c 5.4      [18 @ 06:25]  TSH 3.22      [22 @ 04:30]  Lipid: chol 118, TG 70, HDL 51, LDL --      [22 @ 06:20]    HBsAb Indeterminate Test repeated.      [22 @ 06:20]  HBsAg Nonreact      [22 @ 06:20]  HIV Nonreact      [22 @ 06:20]        RADIOLOGY & ADDITIONAL STUDIES:

## 2022-06-20 NOTE — PROGRESS NOTE ADULT - ASSESSMENT
IMPRESSION:    PROMISE/ Hyperkalemia improving  Afib with bradycardia   Severe hyponatremia improving  Anemia on DAPT  Pancytopenia  GI bleed stable  HO COPD  hep C/ liver cirrhosis    PLAN:    CNS: Avoid CNS depressants. Thiamine, folic acid, lactulose    HEENT: Oral care.     PULMONARY:  HOB @ 45 degrees.  Aspiration precautions. Keep Sats 88 to 92%, Neb Q 6 H AS needed    CARDIOVASCULAR: Cardiology follow up.  FU Echo.      GI: IV PPI  Q12, Feeding.  Finish octreotide course. FU GI , Paracentesis if possible     RENAL:  Follow up lytes.  Correct as needed.     INFECTIOUS DISEASE: procal noted.  Continue Rocephin     HEMATOLOGICAL:  DVT PPX.  FU CBC and Coags     ENDOCRINE:  Follow up FS.  Insulin protocol if needed.    MUSCULOSKELETAL: OOB to chair     SDU

## 2022-06-20 NOTE — PROGRESS NOTE ADULT - SUBJECTIVE AND OBJECTIVE BOX
Gastroenterology progress note:     Patient is a 72y old  Male who presents with a chief complaint of sob (19 Jun 2022 06:39)       Admitted on: 06-17-22    We are following the patient for cirrhosis      Interval History:  Denies nay nausea, vomiting, abdominal pain.        PAST MEDICAL & SURGICAL HISTORY:  Diabetes      HTN (hypertension)      Hepatitis  Untreated Hepatitis C      Arthritis      H/O heart artery stent      H/O knee surgery          MEDICATIONS  (STANDING):  aspirin  chewable 81 milliGRAM(s) Oral daily  atorvastatin 40 milliGRAM(s) Oral at bedtime  buprenorphine 8 mG/naloxone 2 mG SL  Tablet 0.5 Tablet(s) SubLingual <User Schedule>  cefTRIAXone   IVPB      cefTRIAXone   IVPB 1000 milliGRAM(s) IV Intermittent every 24 hours  folic acid 1 milliGRAM(s) Oral daily  gabapentin 100 milliGRAM(s) Oral three times a day  heparin   Injectable 5000 Unit(s) SubCutaneous every 8 hours  lactulose Syrup 10 Gram(s) Oral daily  levothyroxine 50 MICROGram(s) Oral daily  nicotine -  14 mG/24Hr(s) Patch 1 patch Transdermal daily  pantoprazole  Injectable 40 milliGRAM(s) IV Push two times a day  sodium bicarbonate 650 milliGRAM(s) Oral three times a day  sodium zirconium cyclosilicate 10 Gram(s) Oral three times a day  thiamine 100 milliGRAM(s) Oral daily    MEDICATIONS  (PRN):      Allergies  No Known Allergies      Review of Systems:   Cardiovascular:  No Chest Pain, No Palpitations  Respiratory:  No Cough, No Dyspnea  Gastrointestinal:  As described in HPI    Physical Examination:  T(C): 36.5 (06-19-22 @ 18:00), Max: 36.6 (06-19-22 @ 14:00)  HR: 52 (06-20-22 @ 06:00) (42 - 55)  BP: 151/56 (06-20-22 @ 06:00) (91/51 - 151/56)  RR: 17 (06-20-22 @ 06:00) (15 - 18)  SpO2: 98% (06-20-22 @ 06:00) (95% - 99%)      06-20-22 @ 07:01  -  06-20-22 @ 09:09  --------------------------------------------------------  IN: 0 mL / OUT: 500 mL / NET: -500 mL      Constitutional: No acute distress.  Respiratory:  No signs of respiratory distress. Lung sounds are clear bilaterally.  Cardiovascular:  S1 S2, Regular rate and rhythm.  Abdominal: Abdomen is soft, symmetric, and non-tender without distention.    Skin: No rashes, No Jaundice.        Data:                        8.2    1.73  )-----------( 48       ( 19 Jun 2022 20:50 )             24.9     Hgb trend:  8.2  06-19-22 @ 20:50  7.8  06-19-22 @ 06:16  8.0  06-18-22 @ 00:02  6.6  06-17-22 @ 11:36        06-19    123<L>  |  89<L>  |  43<H>  ----------------------------<  109<H>  5.2<H>   |  23  |  1.1    Ca    8.6      19 Jun 2022 20:50    TPro  6.5  /  Alb  3.8  /  TBili  1.5<H>  /  DBili  x   /  AST  37  /  ALT  20  /  AlkPhos  72  06-19    Liver panel trend:  TBili 1.5   /   AST 37   /   ALT 20   /   AlkP 72   /   Tptn 6.5   /   Alb 3.8    /   DBili --      06-19  TBili 3.4   /   AST 35   /   ALT 20   /   AlkP 77   /   Tptn 6.7   /   Alb 4.0    /   DBili 0.8      06-18  TBili 1.2   /   AST 47   /   ALT 23   /   AlkP 85   /   Tptn 7.2   /   Alb 4.5    /   DBili --      06-17      PT/INR - ( 19 Jun 2022 06:16 )   PT: 13.90 sec;   INR: 1.21 ratio         PTT - ( 20 Jun 2022 01:23 )  PTT:31.8 sec       Radiology:    US Abdomen Doppler:   ACC: [id number] EXAM:  US DPLX ABDOMEN                        ACC: [id number] EXAM:  US ABDOMEN RT UPR QUADRANT                          PROCEDURE DATE:  06/19/2022          INTERPRETATION:  CLINICAL INFORMATION: Cirrhosis.    COMPARISON: March 29, 2022    TECHNIQUE: Sonography of the right upper quadrant. Doppler evaluation of   the hepatic and portal veins were also performed.    FINDINGS:  Liver: Cirrhotic appearance of the liver without discrete mass.    Flow is seen within the main portal veinand the right portal vein flow   velocities were difficult to obtain. Patent hepatic veins are not well   seen.  Bile ducts: Normal caliber. Common bile duct measures 5 mm.  Gallbladder: Small gallstone seen within the gallbladder. Sludge is also   noted. Gallbladder wall is not thickened.  Pancreas: Visualized portions are within normal limits.  Right kidney: 9.5 cm. No hydronephrosis.  Ascites: None.  IVC: Visualized portions are within normal limits.    IMPRESSION:  Cirrhotic fibrotic appearing liver. If concerned with veins of the liver,   consider correlation with postcontrast CT scanning.    Cholelithiasis.        --- End of Report ---            DORA ANTON MD; Attending Interventional Radiologist  This document has been electronically signed. Jun 20 2022  8:37AM (06-19-22 @ 10:16)  US Abdomen Upper Quadrant Right:   ACC: [id number] EXAM:  US DPLX ABDOMEN                        ACC: [id number] EXAM:  US ABDOMEN RT UPR QUADRANT                          PROCEDURE DATE:  06/19/2022          INTERPRETATION:  CLINICAL INFORMATION: Cirrhosis.    COMPARISON: March 29, 2022    TECHNIQUE: Sonography of the right upper quadrant. Doppler evaluation of   the hepatic and portal veins were also performed.    FINDINGS:  Liver: Cirrhotic appearance of the liver without discrete mass.    Flow is seen within the main portal veinand the right portal vein flow   velocities were difficult to obtain. Patent hepatic veins are not well   seen.  Bile ducts: Normal caliber. Common bile duct measures 5 mm.  Gallbladder: Small gallstone seen within the gallbladder. Sludge is also   noted. Gallbladder wall is not thickened.  Pancreas: Visualized portions are within normal limits.  Right kidney: 9.5 cm. No hydronephrosis.  Ascites: None.  IVC: Visualized portions are within normal limits.    IMPRESSION:  Cirrhotic fibrotic appearing liver. If concerned with veins of the liver,   consider correlation with postcontrast CT scanning.    Cholelithiasis.        --- End of Report ---            DORA ANTON MD; Attending Interventional Radiologist  This document has been electronically signed. Jun 20 2022  8:37AM (06-19-22 @ 10:12)

## 2022-06-20 NOTE — PROGRESS NOTE ADULT - SUBJECTIVE AND OBJECTIVE BOX
Patient is a 72y old  Male who presents with a chief complaint of sob (2022 06:39)        Over Night Events:  Off pressors.  On RA.  No overt bleeding.          ROS:     All ROS are negative except HPI         PHYSICAL EXAM    ICU Vital Signs Last 24 Hrs  T(C): 36.5 (2022 18:00), Max: 36.6 (2022 14:00)  T(F): 97.7 (2022 18:00), Max: 97.8 (2022 14:00)  HR: 52 (2022 06:00) (42 - 55)  BP: 151/56 (2022 06:00) (91/51 - 151/56)  BP(mean): 72 (2022 18:00) (69 - 72)  ABP: --  ABP(mean): --  RR: 17 (2022 06:00) (15 - 100)  SpO2: 98% (2022 06:00) (95% - 99%)      CONSTITUTIONAL:  Ill appearing in NAD    ENT:   Airway patent,   Mouth with normal mucosa.     EYES:   Pupils equal,   Round and reactive to light.    CARDIAC:   Normal rate,   Regular rhythm.        RESPIRATORY:   No wheezing  Bilateral BS  Normal chest expansion  Not tachypneic,  No use of accessory muscles    GASTROINTESTINAL:  Abdomen soft,   Non-tender,   No guarding,   + BS    MUSCULOSKELETAL:   Range of motion is not limited,  No clubbing, cyanosis    NEUROLOGICAL:   Alert and oriented   No motor  deficits.    SKIN:   Skin normal color for race,   No evidence of rash.    PSYCHIATRIC:   Normal mood and affect.   No apparent risk to self or others.        LABS:                            8.2    1.73  )-----------( 48       ( 2022 20:50 )             24.9                                               06-19    123<L>  |  89<L>  |  43<H>  ----------------------------<  109<H>  5.2<H>   |  23  |  1.1    Ca    8.6      2022 20:50    TPro  6.5  /  Alb  3.8  /  TBili  1.5<H>  /  DBili  x   /  AST  37  /  ALT  20  /  AlkPhos  72  06-19      PT/INR - ( 2022 06:16 )   PT: 13.90 sec;   INR: 1.21 ratio         PTT - ( 2022 01:23 )  PTT:31.8 sec                                       Urinalysis Basic - ( 2022 09:42 )    Color: Light Yellow / Appearance: Clear / S.010 / pH: x  Gluc: x / Ketone: Negative  / Bili: Negative / Urobili: <2 mg/dL   Blood: x / Protein: Trace / Nitrite: Negative   Leuk Esterase: Large / RBC: 0 /HPF / WBC 4 /HPF   Sq Epi: x / Non Sq Epi: 6 /HPF / Bacteria: Negative                                                  LIVER FUNCTIONS - ( 2022 06:16 )  Alb: 3.8 g/dL / Pro: 6.5 g/dL / ALK PHOS: 72 U/L / ALT: 20 U/L / AST: 37 U/L / GGT: x                                                                                                                                       MEDICATIONS  (STANDING):  aspirin  chewable 81 milliGRAM(s) Oral daily  atorvastatin 40 milliGRAM(s) Oral at bedtime  buprenorphine 8 mG/naloxone 2 mG SL  Tablet 0.5 Tablet(s) SubLingual <User Schedule>  cefTRIAXone   IVPB 1000 milliGRAM(s) IV Intermittent every 24 hours  cefTRIAXone   IVPB      folic acid 1 milliGRAM(s) Oral daily  gabapentin 100 milliGRAM(s) Oral three times a day  heparin   Injectable 5000 Unit(s) SubCutaneous every 8 hours  lactulose Syrup 10 Gram(s) Oral daily  levothyroxine 50 MICROGram(s) Oral daily  nicotine -  14 mG/24Hr(s) Patch 1 patch Transdermal daily  octreotide  Infusion 50 MICROgram(s)/Hr (10 mL/Hr) IV Continuous <Continuous>  pantoprazole  Injectable 40 milliGRAM(s) IV Push two times a day  sodium bicarbonate 650 milliGRAM(s) Oral three times a day  sodium zirconium cyclosilicate 10 Gram(s) Oral three times a day  thiamine 100 milliGRAM(s) Oral daily    MEDICATIONS  (PRN):      New X-rays reviewed:                                                                                  ECHO

## 2022-06-20 NOTE — PROGRESS NOTE ADULT - ASSESSMENT
72 YO M w/PMHx significant for HTN, NIDDM, HCV s/p tx, former IVDU on suboxone, active smoker, CAD s/p PCI x 3 in march on apsirin and brilinta last dose 6/17. Pt presented to the ED with sever hyponatremia, hyperkalemia found to be in new onset afib.     #)Decompensated liver cirrhosis sec to Hep C MELD na 22, child henley A 6  #)Acute on chronic anemia  #)Severe hyponatremia and hyperkalemia improving   #)PROMISE improving   ECHO: EF 64% and Mild TVR  Hx of Ascites on lasix, aldactone at home   No H/O SBP  Never had EGD and colonoscopy in the past   mild asterixis on exmination   evidence of coagulopathy low PLT 48  on aspirin and brilinta last dose 6/17  new onset afib off AC now due to anemia   US showed no ascites     Recs:   hold lasix and aldactone due to hyponatremia and hyperkalemia   low sodium diet   tren CBC, keep Hb>7   No evidence of active bleeding   c/w lactulose to titrate to 3-4 BM   correct electrolytes as needed  check AFP   will plan for EGD and colonoscopy likely on wednesday once medically stable (PLT count>[id number] and electrolytes within normal range) 72 YO M w/PMHx significant for HTN, NIDDM, HCV s/p tx, former IVDU on suboxone, active smoker, CAD s/p PCI x 3 in march on apsirin and brilinta last dose 6/17. Pt presented to the ED with sever hyponatremia, hyperkalemia found to be in new onset afib.     #)Decompensated liver cirrhosis sec to Hep C MELD na 22, child henley A 6  #)Acute on chronic anemia  #)Severe hyponatremia and hyperkalemia improving   #)PROMISE improving   ECHO: EF 64% and Mild TVR  Hx of Ascites on lasix, aldactone at home   No H/O SBP  Never had EGD and colonoscopy in the past   mild asterixis on exmination   evidence of coagulopathy low PLT 48  on aspirin and brilinta last dose 6/17  new onset afib off AC now due to anemia   US showed no ascites     Recs:   hold lasix and aldactone due to hyponatremia and hyperkalemia   low sodium diet   tren CBC, keep Hb>7   No evidence of active bleeding   c/w lactulose to titrate to 3-4 BM   correct electrolytes as needed  check AFP   No evidence of active bleeding at this time  Can start anticogulation if benefit>risk given no active bleeding   70 YO M w/PMHx significant for HTN, NIDDM, HCV s/p tx, former IVDU on suboxone, active smoker, CAD s/p PCI x 3 in march on apsirin and brilinta last dose 6/17. Pt presented to the ED with sever hyponatremia, hyperkalemia found to be in new onset afib.     #)Decompensated liver cirrhosis sec to Hep C MELD na 22, child henley A 6  #)Acute on chronic anemia  #)Severe hyponatremia and hyperkalemia improving : electrolytes preclude endoscopy with anaesthesia at present.  #)PROMISE improving   ECHO: EF 64% and Mild TVR  Hx of Ascites on lasix, aldactone at home   No H/O SBP  Never had EGD and colonoscopy in the past   mild asterixis on exmination   evidence of coagulopathy low PLT 48  on aspirin and brilinta last dose 6/17  new onset afib off AC now due to anemia   US showed no ascites     Recs:   hold lasix and aldactone due to hyponatremia and hyperkalemia   tren CBC, keep Hb>7   No evidence of active bleeding   c/w lactulose to titrate to 3-4 BM   correct electrolytes as needed  check AFP   No evidence of active bleeding at this time  Can start anticogulation if benefit>risk given no active bleeding  : unable to perform egd or colon in view of  severe electrolyte abnormalities and risk of anaesthesia.

## 2022-06-20 NOTE — PROGRESS NOTE ADULT - ASSESSMENT
71 YO M w/ PMHx significant for HTN, NIDDM, cirrhosis, HCV s/p tx, former IVDU on suboxone, active smoker, CAD s/p PCI x 3 s/p 2 stents (done recently). Pt is here w/ mild confusion,    ANGELO / pre-renal/ATN iso bradycardia/hypotension / low FENa  Severe Hyponatremia iso cirrhosis, spironolactone improving   Hyperkalemia d/t renal failure, spironolactone, improved/stable  Bradycardia  Decompensated liver cirrhosis  Pancytopenia / s/p RBCs    - Angelo resolved   - limit fluid intake to 1 liter daily   - cont lokelma, low K diet   - can resume  lasix 20mg iv daily   - strict i/o   - cont po sodium bicarb   - can resume metformin   - GI f/u noted     will follow

## 2022-06-21 NOTE — PROGRESS NOTE ADULT - ASSESSMENT
71 YO M w/ PMHx significant for HTN, NIDDM, cirrhosis, HCV s/p tx, former IVDU on suboxone, active smoker, CAD s/p PCI x 3 s/p 2 stents (done recently). Pt is here w/ mild confusion,    ANGELO / pre-renal/ATN iso bradycardia/hypotension / low FENa  Severe Hyponatremia iso cirrhosis, spironolactone improving   Hyperkalemia d/t renal failure, spironolactone, improved/stable  Bradycardia  Decompensated liver cirrhosis  Pancytopenia / s/p RBCs    - Angelo resolved   - limit fluid intake to 1 liter daily   - cont lokelma, low K diet   - can resume  lasix 20mg iv daily   - strict i/o   - can resume metformin   - GI f/u noted     As acute renal issues resolved will sign off please recall w/ any questions/concerns

## 2022-06-21 NOTE — SBIRT NOTE ADULT - NSSBIRTALCPOSREINDET_GEN_A_CORE
Pt indicated no use of alcohol.  Pt was provided with positive reinforcement and psychoeducation about the implications that drinking can have on one's health and overall functioning.

## 2022-06-21 NOTE — PROGRESS NOTE ADULT - SUBJECTIVE AND OBJECTIVE BOX
SUBJECTIVE:    73 YO M w/ PMHx significant for HTN, non-insulin dependent DM, cirrhosis, HCV s/p tx, former IVDU on suboxone, active smoker, CAD s/p PCI x 3 s/p 2 stents (done recently). Patient was recently hospitalized for b/l LE swelling, SOB and was found to have bradycardia on admission. While in the hospital, patient underwent stress test which was positive, then went to cath lab which showed 90% heavily calcified stenosis of prox and mid RCA s/p PCI and YVETTE x 2. Pt is here w/ mild confusion, bradycardia HR in 40s, and is found to be severely hyponatremic.    On this admission: afebrile, BP soft, HR in 40s. SpO2 100% on RA. trop 0.04 on arrival, pro-bnp 7783 VB.26/37//16 pancytopenia (Hg 6.6, baseline 9-10 w/ pancytopenia, Na 109, Cl 90, K 7.7, CXR mild congestion.     Today is hospital day 4d.     Overnight Events:     persistently bradycardic in the low 40s.    PAST MEDICAL & SURGICAL HISTORY  Diabetes    HTN (hypertension)    Hepatitis  Untreated Hepatitis C    Arthritis    H/O heart artery stent    H/O knee surgery    SOCIAL HISTORY:  Smoking history: extensive smoking hx  Illicit Drug Use: IVDU    ALLERGIES:  No Known Allergies    MEDICATIONS:  STANDING MEDICATIONS  aspirin  chewable 81 milliGRAM(s) Oral daily  atorvastatin 40 milliGRAM(s) Oral at bedtime  buprenorphine 8 mG/naloxone 2 mG SL  Tablet 0.5 Tablet(s) SubLingual <User Schedule>  folic acid 1 milliGRAM(s) Oral daily  gabapentin 100 milliGRAM(s) Oral three times a day  heparin   Injectable 5000 Unit(s) SubCutaneous every 8 hours  influenza  Vaccine (HIGH DOSE) 0.7 milliLiter(s) IntraMuscular once  lactulose Syrup 10 Gram(s) Oral daily  levothyroxine 50 MICROGram(s) Oral daily  nicotine -  14 mG/24Hr(s) Patch 1 patch Transdermal daily  pantoprazole  Injectable 40 milliGRAM(s) IV Push two times a day  sodium bicarbonate 650 milliGRAM(s) Oral three times a day  sodium zirconium cyclosilicate 10 Gram(s) Oral three times a day  thiamine 100 milliGRAM(s) Oral daily    PRN MEDICATIONS    VITALS:   ICU Vital Signs Last 24 Hrs  T(C): 36.4 (2022 11:09), Max: 36.4 (2022 21:00)  T(F): 97.6 (2022 11:09), Max: 97.6 (2022 11:09)  HR: 47 (2022 11:09) (44 - 56)  BP: 130/62 (2022 11:09) (96/56 - 130/62)  BP(mean): 85 (2022 21:00) (73 - 85)  ABP: --  ABP(mean): --  RR: 18 (2022 11:09) (16 - 18)  SpO2: 94% (2022 11:09) (94% - 99%)    EKG:        LABS:                        8.1    1.54  )-----------( 39       ( 2022 08:12 )             26.0     06-21    130<L>  |  96<L>  |  20  ----------------------------<  99  4.8   |  27  |  0.8    Ca    8.7      2022 08:12  Mg     2.1     06-21    TPro  6.1  /  Alb  3.8  /  TBili  1.0  /  DBili  x   /  AST  32  /  ALT  16  /  AlkPhos  69  06-21    PT/INR - ( 2022 07:14 )   PT: 14.70 sec;   INR: 1.28 ratio         PTT - ( 2022 01:23 )  PTT:31.7 sec    RADIOLOGY:    < from: Xray Chest 1 View- PORTABLE-Routine (Xray Chest 1 View- PORTABLE-Routine in AM.) (22 @ 04:32) >    ACC: [id number] EXAM:  XR CHEST PORTABLE ROUTINE 1V                          PROCEDURE DATE:  2022          INTERPRETATION:  Clinical History / Reason for exam: pnA    Comparison : Chest radiograph:  2022    Technique/Positioning: Frontal view of the chest    Findings:    Support devices: None.    Cardiac/mediastinum/hilum: No significant change    Lung parenchyma/Pleura: Increased bilateral opacities. No definite   pneumothorax.    Skeleton/soft tissues: No significant change.    Impression:  Increased bilateral opacities. No definite pneumothorax.            --- End of Report ---            GRACE BOLANOS MD; Attending Radiologist  This document has been electronically signed. 2022 12:30PM    < end of copied text >  < from: [facility] Duplex Lower Ext Vein ScanInes (22 @ 09:50) >    ACC: [id number] EXAM:  DUPLEX SCAN EXT VEINS LOWER BI                          PROCEDURE DATE:  2022          INTERPRETATION:  CLINICAL INFORMATION: The patient is a 72-year-old male   with leg swelling. A venous duplex examination was performed to evaluate   the patient for deep venous thrombosis of the lower extremities.    The common femoral, great saphenous, femoral, popliteal and small   saphenous veins were visualized bilaterally with no evidence of deep   venous thrombosis    All veins were fully compressible.  There was presence of spontaneous   flow, augmentation with distal compression and phasicity.    The anterior tibial veins were  patent    The posterior tibial veins were  patent    The left peroneal vein was thrombosed    Impression:  Acute deep vein thrombosis of the left peroneal vein.  No evidence of superficial thrombophlebitis in the bilateral lower   extremities.    ICD-10:M79.89    --- End of Report ---          BERNICE LYNNE MD; Resident Radiologist  This document has been electronically signed.  DAMASO AN MD; Attending Vascular Surgery  This document has been electronically signed. 2022  5:52AM    < end of copied text >      PHYSICAL EXAM:  GEN: A&Ox3, in NAD  LUNGS: clear to ascultation b/l, Sat 92% RA (COPD)  HEART: bradycardic, irregular, afib  ABD: soft, nontender, nondistended  EXT: Left UE is more swollen than the right, Left LE DVT, b/l LE shows discoloration possible 2/2 stasis         SUBJECTIVE:    73 YO M w/ PMHx significant for HTN, non-insulin dependent DM, cirrhosis, HCV s/p tx, former IVDU on suboxone, active smoker, CAD s/p PCI x 3 s/p 2 stents (done recently). Patient was recently hospitalized for b/l LE swelling, SOB and was found to have bradycardia on admission. While in the hospital, patient underwent stress test which was positive, then went to cath lab which showed 90% heavily calcified stenosis of prox and mid RCA s/p PCI and YVETTE x 2. Pt is here w/ mild confusion, bradycardia HR in 40s, and is found to be severely hyponatremic.    On this admission: afebrile, BP soft, HR in 40s. SpO2 100% on RA. trop 0.04 on arrival, pro-bnp 7783 VB.26/37//16 pancytopenia (Hg 6.6, baseline 9-10 w/ pancytopenia, Na 109, Cl 90, K 7.7, CXR mild congestion.     Today is hospital day 4d.     Overnight Events:     persistently bradycardic in the low 40s.    PAST MEDICAL & SURGICAL HISTORY  Diabetes    HTN (hypertension)    Hepatitis  Untreated Hepatitis C    Arthritis    H/O heart artery stent    H/O knee surgery    SOCIAL HISTORY:  Smoking history: extensive smoking hx  Illicit Drug Use: IVDU    ALLERGIES:  No Known Allergies    MEDICATIONS:  STANDING MEDICATIONS  aspirin  chewable 81 milliGRAM(s) Oral daily  atorvastatin 40 milliGRAM(s) Oral at bedtime  buprenorphine 8 mG/naloxone 2 mG SL  Tablet 0.5 Tablet(s) SubLingual <User Schedule>  folic acid 1 milliGRAM(s) Oral daily  gabapentin 100 milliGRAM(s) Oral three times a day  heparin   Injectable 5000 Unit(s) SubCutaneous every 8 hours  influenza  Vaccine (HIGH DOSE) 0.7 milliLiter(s) IntraMuscular once  lactulose Syrup 10 Gram(s) Oral daily  levothyroxine 50 MICROGram(s) Oral daily  nicotine -  14 mG/24Hr(s) Patch 1 patch Transdermal daily  pantoprazole  Injectable 40 milliGRAM(s) IV Push two times a day  sodium bicarbonate 650 milliGRAM(s) Oral three times a day  sodium zirconium cyclosilicate 10 Gram(s) Oral three times a day  thiamine 100 milliGRAM(s) Oral daily    PRN MEDICATIONS    VITALS:   ICU Vital Signs Last 24 Hrs  T(C): 36.4 (2022 11:09), Max: 36.4 (2022 21:00)  T(F): 97.6 (2022 11:09), Max: 97.6 (2022 11:09)  HR: 47 (2022 11:09) (44 - 56)  BP: 130/62 (2022 11:09) (96/56 - 130/62)  BP(mean): 85 (2022 21:00) (73 - 85)  ABP: --  ABP(mean): --  RR: 18 (2022 11:09) (16 - 18)  SpO2: 94% (2022 11:09) (94% - 99%)    EKG:    < from: 12 Lead ECG (22 @ 22:18) >    Ventricular Rate 45 BPM    Atrial Rate 45 BPM    P-R Interval 246 ms    QRS Duration 104 ms    Q-T Interval 456 ms    QTC Calculation(Bazett) 394 ms    P Axis 61 degrees    R Axis 49 degrees    T Axis 25 degrees    Diagnosis Line Sinus bradycardia with 1st degree A-V block  Abnormal ECG    Confirmed by Kaykay Gallego MD ([id number]) on 2022 6:23:28 PM    < end of copied text >      LABS:                        8.1    1.54  )-----------( 39       ( 2022 08:12 )             26.0     06-21    130<L>  |  96<L>  |  20  ----------------------------<  99  4.8   |  27  |  0.8    Ca    8.7      2022 08:12  Mg     2.1     06-    TPro  6.1  /  Alb  3.8  /  TBili  1.0  /  DBili  x   /  AST  32  /  ALT  16  /  AlkPhos  69  06-21    PT/INR - ( 2022 07:14 )   PT: 14.70 sec;   INR: 1.28 ratio         PTT - ( 2022 01:23 )  PTT:31.7 sec    RADIOLOGY:    < from: Xray Chest 1 View- PORTABLE-Routine (Xray Chest 1 View- PORTABLE-Routine in AM.) (22 @ 04:32) >    ACC: [id number] EXAM:  XR CHEST PORTABLE ROUTINE 1V                          PROCEDURE DATE:  2022          INTERPRETATION:  Clinical History / Reason for exam: pnA    Comparison : Chest radiograph:  2022    Technique/Positioning: Frontal view of the chest    Findings:    Support devices: None.    Cardiac/mediastinum/hilum: No significant change    Lung parenchyma/Pleura: Increased bilateral opacities. No definite   pneumothorax.    Skeleton/soft tissues: No significant change.    Impression:  Increased bilateral opacities. No definite pneumothorax.            --- End of Report ---            GRACE BOLANOS MD; Attending Radiologist  This document has been electronically signed. 2022 12:30PM    < end of copied text >  < from: [facility] Duplex Lower Ext Vein Scan, Bilat (22 @ 09:50) >    ACC: [id number] EXAM:  DUPLEX SCAN EXT VEINS LOWER BI                          PROCEDURE DATE:  2022          INTERPRETATION:  CLINICAL INFORMATION: The patient is a 72-year-old male   with leg swelling. A venous duplex examination was performed to evaluate   the patient for deep venous thrombosis of the lower extremities.    The common femoral, great saphenous, femoral, popliteal and small   saphenous veins were visualized bilaterally with no evidence of deep   venous thrombosis    All veins were fully compressible.  There was presence of spontaneous   flow, augmentation with distal compression and phasicity.    The anterior tibial veins were  patent    The posterior tibial veins were  patent    The left peroneal vein was thrombosed    Impression:  Acute deep vein thrombosis of the left peroneal vein.  No evidence of superficial thrombophlebitis in the bilateral lower   extremities.    ICD-10:M79.89    --- End of Report ---          BERNICE LYNNE MD; Resident Radiologist  This document has been electronically signed.  DAMASO AN MD; Attending Vascular Surgery  This document has been electronically signed. 2022  5:52AM    < end of copied text >      PHYSICAL EXAM:  GEN: A&Ox3, in NAD  LUNGS: clear to ascultation b/l, Sat 92% RA (COPD)  HEART: bradycardic, irregular, afib  ABD: soft, nontender, nondistended  EXT: Left UE is more swollen than the right, Left LE DVT, b/l LE shows discoloration possible 2/2 stasis         SUBJECTIVE:    71 YO M w/ PMHx significant for HTN, non-insulin dependent DM, cirrhosis, HCV s/p tx, former IVDU on suboxone, active smoker, CAD s/p PCI x 3 s/p 2 stents (done recently). Patient was recently hospitalized for b/l LE swelling, SOB and was found to have bradycardia on admission. While in the hospital, patient underwent stress test which was positive, then went to cath lab which showed 90% heavily calcified stenosis of prox and mid RCA s/p PCI and YVETTE x 2. Pt is here w/ mild confusion, bradycardia HR in 40s, and is found to be severely hyponatremic.    On this admission: afebrile, BP soft, HR in 40s. SpO2 100% on RA. trop 0.04 on arrival, pro-bnp 7783 VB.26/37//16 pancytopenia (Hg 6.6, baseline 9-10 w/ pancytopenia, Na 109, Cl 90, K 7.7, CXR mild congestion.     Today is hospital day 4d.     Overnight Events:     persistently bradycardic in the low 40s.    PAST MEDICAL & SURGICAL HISTORY  Diabetes    HTN (hypertension)    Hepatitis  Untreated Hepatitis C    Arthritis    H/O heart artery stent    H/O knee surgery    SOCIAL HISTORY:  Smoking history: extensive smoking hx  Illicit Drug Use: IVDU    ALLERGIES:  No Known Allergies    MEDICATIONS:  STANDING MEDICATIONS  aspirin  chewable 81 milliGRAM(s) Oral daily  atorvastatin 40 milliGRAM(s) Oral at bedtime  buprenorphine 8 mG/naloxone 2 mG SL  Tablet 0.5 Tablet(s) SubLingual <User Schedule>  folic acid 1 milliGRAM(s) Oral daily  gabapentin 100 milliGRAM(s) Oral three times a day  heparin   Injectable 5000 Unit(s) SubCutaneous every 8 hours  influenza  Vaccine (HIGH DOSE) 0.7 milliLiter(s) IntraMuscular once  lactulose Syrup 10 Gram(s) Oral daily  levothyroxine 50 MICROGram(s) Oral daily  nicotine -  14 mG/24Hr(s) Patch 1 patch Transdermal daily  pantoprazole  Injectable 40 milliGRAM(s) IV Push two times a day  sodium bicarbonate 650 milliGRAM(s) Oral three times a day  sodium zirconium cyclosilicate 10 Gram(s) Oral three times a day  thiamine 100 milliGRAM(s) Oral daily    PRN MEDICATIONS    VITALS:   ICU Vital Signs Last 24 Hrs  T(C): 36.4 (2022 11:09), Max: 36.4 (2022 21:00)  T(F): 97.6 (2022 11:09), Max: 97.6 (2022 11:09)  HR: 47 (2022 11:09) (44 - 56)  BP: 130/62 (2022 11:09) (96/56 - 130/62)  BP(mean): 85 (2022 21:00) (73 - 85)  ABP: --  ABP(mean): --  RR: 18 (2022 11:09) (16 - 18)  SpO2: 94% (2022 11:09) (94% - 99%)    EKG:    < from: 12 Lead ECG (22 @ 22:18) >    Ventricular Rate 45 BPM    Atrial Rate 45 BPM    P-R Interval 246 ms    QRS Duration 104 ms    Q-T Interval 456 ms    QTC Calculation(Bazett) 394 ms    P Axis 61 degrees    R Axis 49 degrees    T Axis 25 degrees    Diagnosis Line Sinus bradycardia with 1st degree A-V block  Abnormal ECG    Confirmed by Kaykay Gallego MD ([id number]) on 2022 6:23:28 PM    < end of copied text >      LABS:                        8.1    1.54  )-----------( 39       ( 2022 08:12 )             26.0     06-21    130<L>  |  96<L>  |  20  ----------------------------<  99  4.8   |  27  |  0.8    Ca    8.7      2022 08:12  Mg     2.1     06-    TPro  6.1  /  Alb  3.8  /  TBili  1.0  /  DBili  x   /  AST  32  /  ALT  16  /  AlkPhos  69  06-21    PT/INR - ( 2022 07:14 )   PT: 14.70 sec;   INR: 1.28 ratio         PTT - ( 2022 01:23 )  PTT:31.7 sec    RADIOLOGY:    < from: Xray Chest 1 View- PORTABLE-Routine (Xray Chest 1 View- PORTABLE-Routine in AM.) (22 @ 04:32) >    ACC: [id number] EXAM:  XR CHEST PORTABLE ROUTINE 1V                          PROCEDURE DATE:  2022          INTERPRETATION:  Clinical History / Reason for exam: pnA    Comparison : Chest radiograph:  2022    Technique/Positioning: Frontal view of the chest    Findings:    Support devices: None.    Cardiac/mediastinum/hilum: No significant change    Lung parenchyma/Pleura: Increased bilateral opacities. No definite   pneumothorax.    Skeleton/soft tissues: No significant change.    Impression:  Increased bilateral opacities. No definite pneumothorax.            --- End of Report ---            GRACE BOLANOS MD; Attending Radiologist  This document has been electronically signed. 2022 12:30PM    < end of copied text >  < from: [facility] Duplex Lower Ext Vein Scan, Bilat (22 @ 09:50) >    ACC: [id number] EXAM:  DUPLEX SCAN EXT VEINS LOWER BI                          PROCEDURE DATE:  2022          INTERPRETATION:  CLINICAL INFORMATION: The patient is a 72-year-old male   with leg swelling. A venous duplex examination was performed to evaluate   the patient for deep venous thrombosis of the lower extremities.    The common femoral, great saphenous, femoral, popliteal and small   saphenous veins were visualized bilaterally with no evidence of deep   venous thrombosis    All veins were fully compressible.  There was presence of spontaneous   flow, augmentation with distal compression and phasicity.    The anterior tibial veins were  patent    The posterior tibial veins were  patent    The left peroneal vein was thrombosed    Impression:  Acute deep vein thrombosis of the left peroneal vein.  No evidence of superficial thrombophlebitis in the bilateral lower   extremities.    ICD-10:M79.89    --- End of Report ---          BERNICE LYNNE MD; Resident Radiologist  This document has been electronically signed.  DAMASO AN MD; Attending Vascular Surgery  This document has been electronically signed. 2022  5:52AM    < end of copied text >      PHYSICAL EXAM:  GEN: A&Ox3, in NAD  LUNGS: clear to ascultation b/l, Sat 92% RA (COPD)  HEART: bradycardic, irregular, afib  ABD: soft, nontender, nondistended  EXT: Left UE is more swollen than the right, Left LE DVT, b/l LE shows discoloration possible 2/2 stasis  Neuro: tremor present on exam         SUBJECTIVE:    71 y/o male w/ PMHx significant for HTN, non-insulin dependent DM, cirrhosis, HCV s/p tx, former IVDU on suboxone, active smoker, CAD s/p PCI x 3 s/p 2 stents (done recently). Patient was recently hospitalized for b/l LE swelling, SOB and was found to have bradycardia on admission. While in the hospital, patient underwent stress test which was positive, then went to cath lab which showed 90% heavily calcified stenosis of prox and mid RCA s/p PCI and YVETTE x 2. Pt is here w/ mild confusion, bradycardia HR in 40s, and is found to be severely hyponatremic.    On this admission: afebrile, BP soft, HR in 40s. SpO2 100% on RA. trop 0.04 on arrival, pro-bnp 7783 VB.26/37/21/16 pancytopenia (Hg 6.6, baseline 9-10 w/ pancytopenia, Na 109, Cl 90, K 7.7, CXR mild congestion.     Today is hospital day 4d.     Overnight Events:     persistently bradycardic in the low 40s.    PAST MEDICAL & SURGICAL HISTORY  Diabetes    HTN (hypertension)    Hepatitis  Untreated Hepatitis C    Arthritis    H/O heart artery stent    H/O knee surgery    SOCIAL HISTORY:  Smoking history: extensive smoking hx  Illicit Drug Use: IVDU    ALLERGIES:  No Known Allergies    MEDICATIONS:  STANDING MEDICATIONS  aspirin  chewable 81 milliGRAM(s) Oral daily  atorvastatin 40 milliGRAM(s) Oral at bedtime  buprenorphine 8 mG/naloxone 2 mG SL  Tablet 0.5 Tablet(s) SubLingual <User Schedule>  folic acid 1 milliGRAM(s) Oral daily  gabapentin 100 milliGRAM(s) Oral three times a day  heparin   Injectable 5000 Unit(s) SubCutaneous every 8 hours  influenza  Vaccine (HIGH DOSE) 0.7 milliLiter(s) IntraMuscular once  lactulose Syrup 10 Gram(s) Oral daily  levothyroxine 50 MICROGram(s) Oral daily  nicotine -  14 mG/24Hr(s) Patch 1 patch Transdermal daily  pantoprazole  Injectable 40 milliGRAM(s) IV Push two times a day  sodium bicarbonate 650 milliGRAM(s) Oral three times a day  sodium zirconium cyclosilicate 10 Gram(s) Oral three times a day  thiamine 100 milliGRAM(s) Oral daily    PRN MEDICATIONS    VITALS:   ICU Vital Signs Last 24 Hrs  T(C): 36.4 (2022 11:09), Max: 36.4 (2022 21:00)  T(F): 97.6 (2022 11:09), Max: 97.6 (2022 11:09)  HR: 47 (2022 11:09) (44 - 56)  BP: 130/62 (2022 11:09) (96/56 - 130/62)  BP(mean): 85 (2022 21:00) (73 - 85)  ABP: --  ABP(mean): --  RR: 18 (2022 11:09) (16 - 18)  SpO2: 94% (2022 11:09) (94% - 99%)    EKG:    < from: 12 Lead ECG (22 @ 22:18) >    Ventricular Rate 45 BPM    Atrial Rate 45 BPM    P-R Interval 246 ms    QRS Duration 104 ms    Q-T Interval 456 ms    QTC Calculation(Bazett) 394 ms    P Axis 61 degrees    R Axis 49 degrees    T Axis 25 degrees    Diagnosis Line Sinus bradycardia with 1st degree A-V block  Abnormal ECG    Confirmed by Kaykay Gallego MD ([id number]) on 2022 6:23:28 PM    < end of copied text >      LABS:                        8.1    1.54  )-----------( 39       ( 2022 08:12 )             26.0     06-21    130<L>  |  96<L>  |  20  ----------------------------<  99  4.8   |  27  |  0.8    Ca    8.7      2022 08:12  Mg     2.1     06-    TPro  6.1  /  Alb  3.8  /  TBili  1.0  /  DBili  x   /  AST  32  /  ALT  16  /  AlkPhos  69  06-21    PT/INR - ( 2022 07:14 )   PT: 14.70 sec;   INR: 1.28 ratio         PTT - ( 2022 01:23 )  PTT:31.7 sec    RADIOLOGY:    < from: Xray Chest 1 View- PORTABLE-Routine (Xray Chest 1 View- PORTABLE-Routine in AM.) (22 @ 04:32) >    ACC: [id number] EXAM:  XR CHEST PORTABLE ROUTINE 1V                          PROCEDURE DATE:  2022          INTERPRETATION:  Clinical History / Reason for exam: pnA    Comparison : Chest radiograph:  2022    Technique/Positioning: Frontal view of the chest    Findings:    Support devices: None.    Cardiac/mediastinum/hilum: No significant change    Lung parenchyma/Pleura: Increased bilateral opacities. No definite   pneumothorax.    Skeleton/soft tissues: No significant change.    Impression:  Increased bilateral opacities. No definite pneumothorax.            --- End of Report ---            GRACE BOLANOS MD; Attending Radiologist  This document has been electronically signed. 2022 12:30PM    < end of copied text >  < from: [facility] Duplex Lower Ext Vein Scan, Bilat (22 @ 09:50) >    ACC: [id number] EXAM:  DUPLEX SCAN EXT VEINS LOWER BI                          PROCEDURE DATE:  2022          INTERPRETATION:  CLINICAL INFORMATION: The patient is a 72-year-old male   with leg swelling. A venous duplex examination was performed to evaluate   the patient for deep venous thrombosis of the lower extremities.    The common femoral, great saphenous, femoral, popliteal and small   saphenous veins were visualized bilaterally with no evidence of deep   venous thrombosis    All veins were fully compressible.  There was presence of spontaneous   flow, augmentation with distal compression and phasicity.    The anterior tibial veins were  patent    The posterior tibial veins were  patent    The left peroneal vein was thrombosed    Impression:  Acute deep vein thrombosis of the left peroneal vein.  No evidence of superficial thrombophlebitis in the bilateral lower   extremities.    ICD-10:M79.89    --- End of Report ---          BERNICE LYNNE MD; Resident Radiologist  This document has been electronically signed.  DAMASO AN MD; Attending Vascular Surgery  This document has been electronically signed. 2022  5:52AM    < end of copied text >      PHYSICAL EXAM:  GEN: A&Ox3, in NAD  LUNGS: clear to ascultation b/l, Sat 92% RA (COPD)  HEART: bradycardic, irregular, afib  ABD: soft, nontender, nondistended  EXT: Left UE is more swollen than the right, Left LE DVT, b/l LE shows discoloration possible 2/2 stasis  Neuro: tremor present on exam         SUBJECTIVE:    73 y/o male w/ PMHx significant for HTN, non-insulin dependent DM, cirrhosis, HCV s/p tx, former IVDU on suboxone, active smoker, CAD s/p PCI x 3 s/p 2 stents (done recently). Patient was recently hospitalized for b/l LE swelling, SOB and was found to have bradycardia on admission. While in the hospital, patient underwent stress test which was positive, then went to cath lab which showed 90% heavily calcified stenosis of prox and mid RCA s/p PCI and YVETTE x 2. Pt is here w/ mild confusion, bradycardia HR in 40s, and is found to be severely hyponatremic.    On this admission: afebrile, BP soft, HR in 40s. SpO2 100% on RA. trop 0.04 on arrival, pro-bnp 7783 VB.26/37//16 pancytopenia (Hg 6.6, baseline 9-10 w/ pancytopenia, Na 109, Cl 90, K 7.7, CXR mild congestion.     Today is hospital day 4d.     Overnight Events:     persistently bradycardic in the low 40s. patient is otherwise doing well.     PAST MEDICAL & SURGICAL HISTORY  Diabetes    HTN (hypertension)    Hepatitis  Untreated Hepatitis C    Arthritis    H/O heart artery stent    H/O knee surgery    SOCIAL HISTORY:  Smoking history: extensive smoking hx  Illicit Drug Use: IVDU    ALLERGIES:  No Known Allergies    MEDICATIONS:  STANDING MEDICATIONS  aspirin  chewable 81 milliGRAM(s) Oral daily  atorvastatin 40 milliGRAM(s) Oral at bedtime  buprenorphine 8 mG/naloxone 2 mG SL  Tablet 0.5 Tablet(s) SubLingual <User Schedule>  folic acid 1 milliGRAM(s) Oral daily  gabapentin 100 milliGRAM(s) Oral three times a day  heparin   Injectable 5000 Unit(s) SubCutaneous every 8 hours  influenza  Vaccine (HIGH DOSE) 0.7 milliLiter(s) IntraMuscular once  lactulose Syrup 10 Gram(s) Oral daily  levothyroxine 50 MICROGram(s) Oral daily  nicotine -  14 mG/24Hr(s) Patch 1 patch Transdermal daily  pantoprazole  Injectable 40 milliGRAM(s) IV Push two times a day  sodium bicarbonate 650 milliGRAM(s) Oral three times a day  sodium zirconium cyclosilicate 10 Gram(s) Oral three times a day  thiamine 100 milliGRAM(s) Oral daily    PRN MEDICATIONS    VITALS:   ICU Vital Signs Last 24 Hrs  T(C): 36.4 (2022 11:09), Max: 36.4 (2022 21:00)  T(F): 97.6 (2022 11:09), Max: 97.6 (2022 11:09)  HR: 47 (2022 11:09) (44 - 56)  BP: 130/62 (2022 11:09) (96/56 - 130/62)  BP(mean): 85 (2022 21:00) (73 - 85)  RR: 18 (2022 11:09) (16 - 18)  SpO2: 94% (2022 11:09) (94% - 99%)    EKG:    < from: 12 Lead ECG (22 @ 22:18) >    Ventricular Rate 45 BPM    Atrial Rate 45 BPM    P-R Interval 246 ms    QRS Duration 104 ms    Q-T Interval 456 ms    QTC Calculation(Bazett) 394 ms    P Axis 61 degrees    R Axis 49 degrees    T Axis 25 degrees    Diagnosis Line Sinus bradycardia with 1st degree A-V block  Abnormal ECG    Confirmed by Kaykay Gallego MD ([id number]) on 2022 6:23:28 PM    < end of copied text >      LABS:                        8.1    1.54  )-----------( 39       ( 2022 08:12 )             26.0     06-21    130<L>  |  96<L>  |  20  ----------------------------<  99  4.8   |  27  |  0.8    Ca    8.7      2022 08:12  Mg     2.1     06-    TPro  6.1  /  Alb  3.8  /  TBili  1.0  /  DBili  x   /  AST  32  /  ALT  16  /  AlkPhos  69  06-21    PT/INR - ( 2022 07:14 )   PT: 14.70 sec;   INR: 1.28 ratio         PTT - ( 2022 01:23 )  PTT:31.7 sec    RADIOLOGY:    < from: Xray Chest 1 View- PORTABLE-Routine (Xray Chest 1 View- PORTABLE-Routine in AM.) (22 @ 04:32) >    ACC: [id number] EXAM:  XR CHEST PORTABLE ROUTINE 1V                          PROCEDURE DATE:  2022      Impression:  Increased bilateral opacities. No definite pneumothorax.    --- End of Report ---      GRACE BOLANOS MD; Attending Radiologist  This document has been electronically signed. 2022 12:30PM    < end of copied text >  < from: [facility] Duplex Lower Ext Vein Scan, Ines (22 @ 09:50) >    ACC: [id number] EXAM:  DUPLEX SCAN EXT VEINS LOWER BI                          PROCEDURE DATE:  2022          INTERPRETATION:  CLINICAL INFORMATION: The patient is a 72-year-old male   with leg swelling. A venous duplex examination was performed to evaluate   the patient for deep venous thrombosis of the lower extremities.    The common femoral, great saphenous, femoral, popliteal and small   saphenous veins were visualized bilaterally with no evidence of deep   venous thrombosis    All veins were fully compressible.  There was presence of spontaneous   flow, augmentation with distal compression and phasicity.    The anterior tibial veins were  patent    The posterior tibial veins were  patent    The left peroneal vein was thrombosed    Impression:  Acute deep vein thrombosis of the left peroneal vein.  No evidence of superficial thrombophlebitis in the bilateral lower   extremities.    ICD-10:M79.89    --- End of Report ---    BERNICE LYNNE MD; Resident Radiologist  This document has been electronically signed.  DAMASO AN MD; Attending Vascular Surgery  This document has been electronically signed. 2022  5:52AM    < end of copied text >      PHYSICAL EXAM:  GEN: A&Ox3, in NAD  LUNGS: clear to ascultation b/l, Sat 92% RA (COPD)  HEART: bradycardic, irregular, afib.   ABD: soft, nontender, nondistended  EXT: Left UE is more swollen than the right, Left LE more swollen right LE b/l LE shows discoloration possible 2/2 stasis  Neuro: tremor present on exam

## 2022-06-21 NOTE — PROGRESS NOTE ADULT - SUBJECTIVE AND OBJECTIVE BOX
Over Night Events: events noted, renal/ GI reviewed, afebrile, on RA    PHYSICAL EXAM    ICU Vital Signs Last 24 Hrs  T(C): 36.2 (21 Jun 2022 06:38), Max: 36.4 (20 Jun 2022 21:00)  T(F): 97.2 (21 Jun 2022 06:38), Max: 97.5 (20 Jun 2022 21:00)  HR: 45 (21 Jun 2022 06:38) (44 - 56)  BP: 96/56 (21 Jun 2022 06:38) (96/56 - 126/59)  BP(mean): 85 (20 Jun 2022 21:00) (72 - 85)  RR: 18 (21 Jun 2022 06:38) (16 - 18)  SpO2: 96% (21 Jun 2022 06:38) (94% - 99%)      General: Chronically ill looking  Lungs: dec bs both bases  Cardiovascular: MIKHAIL 2.6  Abdomen: Soft, Positive BS  Extremities: No clubbing   Skin: Warm  Neurological: Non focal       06-20-22 @ 07:01  -  06-21-22 @ 06:55  --------------------------------------------------------  IN:  Total IN: 0 mL    OUT:    Voided (mL): 900 mL  Total OUT: 900 mL    Total NET: -900 mL          LABS:                          7.9    1.77  )-----------( 46       ( 20 Jun 2022 07:14 )             24.2                                               06-20    128<L>  |  93<L>  |  35<H>  ----------------------------<  103<H>  5.3<H>   |  25  |  1.0    Ca    8.7      20 Jun 2022 07:14  Mg     2.3     06-20    TPro  6.5  /  Alb  3.8  /  TBili  1.1  /  DBili  x   /  AST  35  /  ALT  18  /  AlkPhos  68  06-20      PT/INR - ( 20 Jun 2022 07:14 )   PT: 14.70 sec;   INR: 1.28 ratio         PTT - ( 21 Jun 2022 01:23 )  PTT:31.7 sec                                                                                     LIVER FUNCTIONS - ( 20 Jun 2022 07:14 )  Alb: 3.8 g/dL / Pro: 6.5 g/dL / ALK PHOS: 68 U/L / ALT: 18 U/L / AST: 35 U/L / GGT: x                                                                                                                                       MEDICATIONS  (STANDING):  aspirin  chewable 81 milliGRAM(s) Oral daily  atorvastatin 40 milliGRAM(s) Oral at bedtime  buprenorphine 8 mG/naloxone 2 mG SL  Tablet 0.5 Tablet(s) SubLingual <User Schedule>  cefTRIAXone   IVPB      cefTRIAXone   IVPB 1000 milliGRAM(s) IV Intermittent every 24 hours  folic acid 1 milliGRAM(s) Oral daily  gabapentin 100 milliGRAM(s) Oral three times a day  heparin   Injectable 5000 Unit(s) SubCutaneous every 8 hours  influenza  Vaccine (HIGH DOSE) 0.7 milliLiter(s) IntraMuscular once  lactulose Syrup 10 Gram(s) Oral daily  levothyroxine 50 MICROGram(s) Oral daily  nicotine -  14 mG/24Hr(s) Patch 1 patch Transdermal daily  pantoprazole  Injectable 40 milliGRAM(s) IV Push two times a day  sodium bicarbonate 650 milliGRAM(s) Oral three times a day  sodium zirconium cyclosilicate 10 Gram(s) Oral three times a day  thiamine 100 milliGRAM(s) Oral daily

## 2022-06-21 NOTE — SBIRT NOTE ADULT - NSSBIRTDRGPOSREINDET_GEN_A_CORE
Pt stated he is on suboxone and take the suboxone as prescribed.  Pt stated his PCP prescribes his suboxone.  Pt stated he does not do any kind of illegal drug.  SW provided with positive reinforcement and psychoeducation about continued abstinence from illegal drug use.

## 2022-06-21 NOTE — CHART NOTE - NSCHARTNOTEFT_GEN_A_CORE
MICU Transfer Note    Transfer from: SDU  Transfer to:  Telemetry       SDU COURSE:    Patient was evaluated by pulm/crit and determined to be critically ill and transferred to SDU. Patient was consistently bradycardic in the 40s overnight and throughout the SDU course. Patient complained of right UE pain. Upon physical exam, RUE looked visibly more swollen and a duplex US was ordered. No other significant events happened throughout SDU course. It was determined that patient should be transferred to telemetry to monitor vitals and consult Cardiology for AFib, Bradycardia, and indication for anticoagulation.      ASSESSMENT & PLAN:     73 y/o Male w/ PMHx for HTN, non-insulin dependent DM, cirrhosis, HCV s/p tx, former IVDU on suboxone, active smoker, CAD s/p PCI x 3 s/p 2 stents (done recently), arrives to the ED w/ mild confusion, bradycardia HR in 40s, and is found to be severely hyponatremic, hyperkalemia and bradycardic and pancytopenia .    #AFib with Bradycardia  - CHADVASC- 4   - HASBLED- 6 - very high risk for major bleed  - consistently in the 40s  - repeat ECHO  - check tsh levels   - hold AV denis blocking agents   - holding anticoagulation at this time due to thrombocytopenia (as per cardio)  - consult EP cardiology for ppm placement   - cardiology following     #PROMISE with Hyperkalemia and metabolic acidosis and Hyponatremia (all improving) - due to overdiuresis   - patient has been in overdiuresis (on torsemide 20 Q12Hrs and Aldactone at home)  - Na- 109-> improved to 130. s/p Normal saline infusion   - Potassium - 7.7 ( slightly hemolyzed)-> 4.8  - continue sodium bicarb po 650 TID  - continue lokelma 10 mg TID   - restart lasix 20 mg IV as per nephro  - recall nephro as needed     #Pancytopenia likely due to liver cirrhosis   # acute on chronic anemia  # thrombocytopenic   - s/p 2 prbc   - Check iron panel   - check folate and b12   - keep active type and screen. Keep hgb >7  - holding brilinta and holding AC in setting of thrombocytopenia   - continue dvt ppx     #left Distal peroneal DVT   - LE US found DVT on left peroneal vein.   - check D-dimer   - patient high risk for anticoagulation     #Decompensated liver cirrhosis sec to Hep C MELD na 22, child henley A 6  #Hx of Ascites on lasix, aldactone at home - no present ascites   - HCV RNA not detected (3/16/2022)  - No H/O SBP  - Never had EGD and colonoscopy in the past   - hold aldactone due to hyponatremia and hyperkalemia   - trend CBC, keep Hb>7   - No evidence of active bleeding   - c/w lactulose to titrate to 3-4 BM   - Fu AFP   - from GI prospective - can start anticoagulation if benefit>risk given no active bleeding  : unable to perform egd or colon in view of  severe electrolyte abnormalities and risk of anaesthesia.    #CAD s/p PCI x 3 s/p 2 stents back in march   - RCA: large dominant vessel, 90% heavily calcified stenosis of prox and mid RCA s/p PCI and YVETTE x 2  - echo -Normal global left ventricular systolic function. LV Ejection Fraction by Carlin's Method with a biplane EF of 64 %.  - at home on aspirin and brilinta   - holding brilinta in setting of thrombocytopenia   - continue atorvastatin 40 mg qd  - cardiology following     #Hypothyroidism   - TSH back in march- 3 2  - continue levothyroxine 50 mcg qd  - check tsh and t4     #Hx IVDU  - continue suboxone   - sbirt consult   - continue folic acid, thiamin,     #COPD/SOB  - maintain O2 Sat at 88-92%    # GI PPx - Protonix   # DVT PPx - Heparin 5000 mg SubQ q8   # Activity -  Increase as Tolerated    # Code Status - FULL     # Dispo: planning to downgrade to tele      Vital Signs Last 24 Hrs  T(C): 36.4 (21 Jun 2022 11:09), Max: 36.4 (20 Jun 2022 21:00)  T(F): 97.6 (21 Jun 2022 11:09), Max: 97.6 (21 Jun 2022 11:09)  HR: 47 (21 Jun 2022 11:09) (45 - 56)  BP: 130/62 (21 Jun 2022 11:09) (96/56 - 130/62)  BP(mean): 85 (20 Jun 2022 21:00) (85 - 85)  RR: 18 (21 Jun 2022 11:09) (16 - 18)  SpO2: 94% (21 Jun 2022 11:09) (94% - 96%)  I&O's Summary    20 Jun 2022 07:01  -  21 Jun 2022 07:00  --------------------------------------------------------  IN: 0 mL / OUT: 1350 mL / NET: -1350 mL    21 Jun 2022 07:01  -  21 Jun 2022 16:02  --------------------------------------------------------  IN: 340 mL / OUT: 550 mL / NET: -210 mL          MEDICATIONS  (STANDING):  aspirin  chewable 81 milliGRAM(s) Oral daily  atorvastatin 40 milliGRAM(s) Oral at bedtime  buprenorphine 8 mG/naloxone 2 mG SL  Tablet 0.5 Tablet(s) SubLingual <User Schedule>  folic acid 1 milliGRAM(s) Oral daily  furosemide   Injectable 20 milliGRAM(s) IV Push daily  gabapentin 100 milliGRAM(s) Oral three times a day  heparin   Injectable 5000 Unit(s) SubCutaneous every 8 hours  influenza  Vaccine (HIGH DOSE) 0.7 milliLiter(s) IntraMuscular once  lactulose Syrup 10 Gram(s) Oral daily  levothyroxine 50 MICROGram(s) Oral daily  nicotine -  14 mG/24Hr(s) Patch 1 patch Transdermal daily  pantoprazole    Tablet 40 milliGRAM(s) Oral before breakfast  sodium bicarbonate 650 milliGRAM(s) Oral three times a day  sodium zirconium cyclosilicate 10 Gram(s) Oral three times a day  thiamine 100 milliGRAM(s) Oral daily    MEDICATIONS  (PRN):        LABS                                            8.1                   Neurophils% (auto):   54.0   (06-21 @ 08:12):    1.54 )-----------(39           Lymphocytes% (auto):  29.2                                          26.0                   Eosinphils% (auto):   2.6      Manual%: Neutrophils x    ; Lymphocytes x    ; Eosinophils x    ; Bands%: x    ; Blasts x                                    130    |  96     |  20                  Calcium: 8.7   / iCa: x      (06-21 @ 08:12)    ----------------------------<  99        Magnesium: 2.1                              4.8     |  27     |  0.8              Phosphorous: x        TPro  6.1    /  Alb  3.8    /  TBili  1.0    /  DBili  x      /  AST  32     /  ALT  16     /  AlkPhos  69     21 Jun 2022 08:12    ( 06-21 @ 01:23 )   PT: x    ;   INR: x      aPTT: 31.7 sec

## 2022-06-21 NOTE — PROGRESS NOTE ADULT - ASSESSMENT
71 y/o Male w/ PMHx for HTN, non-insulin dependent DM, cirrhosis, HCV s/p tx, former IVDU on suboxone, active smoker, CAD s/p PCI x 3 s/p 2 stents (done recently), arrives to the ED w/ mild confusion, bradycardia HR in 40s, and is found to be severely hyponatremic.    #Bradycardia, AFib  - consistently in the 40s  -  73 y/o Male w/ PMHx for HTN, non-insulin dependent DM, cirrhosis, HCV s/p tx, former IVDU on suboxone, active smoker, CAD s/p PCI x 3 s/p 2 stents (done recently), arrives to the ED w/ mild confusion, bradycardia HR in 40s, and is found to be severely hyponatremic.    #Bradycardia, AFib  - consistently in the 40s  - consult EP cardiology    Dispo: planning to downgrade to tele 71 y/o Male w/ PMHx for HTN, non-insulin dependent DM, cirrhosis, HCV s/p tx, former IVDU on suboxone, active smoker, CAD s/p PCI x 3 s/p 2 stents (done recently), arrives to the ED w/ mild confusion, bradycardia HR in 40s, and is found to be severely hyponatremic.    #AFib with Bradycardia  - consistently in the 40s  - consult EP cardiology    #Severe Hyponatremia (improving)  #PROMISE/Hyperkalemia (improving)    Dispo: planning to downgrade to tele 73 y/o Male w/ PMHx for HTN, non-insulin dependent DM, cirrhosis, HCV s/p tx, former IVDU on suboxone, active smoker, CAD s/p PCI x 3 s/p 2 stents (done recently), arrives to the ED w/ mild confusion, bradycardia HR in 40s, and is found to be severely hyponatremic.    #AFib with Bradycardia  - consistently in the 40s  - repeat ECHO  - repeat EKG  - No anticoagulation at this time due to pancytopenia (as per cardio)  - consult EP cardiology    #DVT LLE  - DVT found on left popliteal vein    #PROMISE / Hyperkalemia / Acidosis / Severe Hyponatremia (all improving)  - overdiuresis (on torsemide 20 Q12Hrs and Aldactone at home)  - Hold diuretics as per nephro  - fluid management as per nephro    #Anemia  - f/u H/H after pRBCs    #COPD/SOB  - maintain O2 Sat at 88-92%    #HepC  - s/s treatment  - HCV RNA not detected (3/16/2022)    Dispo: planning to downgrade to [place]   73 y/o Male w/ PMHx for HTN, non-insulin dependent DM, cirrhosis, HCV s/p tx, former IVDU on suboxone, active smoker, CAD s/p PCI x 3 s/p 2 stents (done recently), arrives to the ED w/ mild confusion, bradycardia HR in 40s, and is found to be severely hyponatremic.    #AFib with Bradycardia  - consistently in the 40s  - repeat ECHO  - repeat EKG  - No anticoagulation at this time due to pancytopenia (as per cardio)  - consult EP cardiology    #DVT LLE  - LE US found DVT on left popliteal vein    #PROMISE / Hyperkalemia / Acidosis / Severe Hyponatremia (all improving)  - patient has been in overdiuresis (on torsemide 20 Q12Hrs and Aldactone at home)  - Hold diuretics as per nephro  - fluid management as per nephro    #Anemia  - f/u H/H after pRBCs    #COPD/SOB  - maintain O2 Sat at 88-92%    #HepC  - s/s treatment  - HCV RNA not detected (3/16/2022)    Dispo: planning to downgrade to tele   73 y/o Male w/ PMHx for HTN, non-insulin dependent DM, cirrhosis, HCV s/p tx, former IVDU on suboxone, active smoker, CAD s/p PCI x 3 s/p 2 stents (done recently), arrives to the ED w/ mild confusion, bradycardia HR in 40s, and is found to be severely hyponatremic, hyperkalemia and bradycardic and pancytopenia .    #AFib with Bradycardia  - CHADVASC- 4   - HASBLED- 6 - very high risk for major bleed  - consistently in the 40s  - repeat ECHO  - check tsh levels   - hold AV denis blocking agents   - holding anticoagulation at this time due to thrombocytopenia (as per cardio)  - consult EP cardiology for ppm placement   - cardiology following     #PROMISE with Hyperkalemia and metabolic acidosis and Hyponatremia (all improving) - due to overdiuresis   - patient has been in overdiuresis (on torsemide 20 Q12Hrs and Aldactone at home)  - Na- 109-> improved to 130. s/p Normal saline infusion   - Potassium - 7.7 ( slightly hemolyzed)-> 4.8  - continue sodium bicarb po 650 TID  - continue lokelma 10 mg TID   - restart lasix 20 mg IV as per nephro  - recall nephro as needed     #Pancytopenia likely due to liver cirrhosis   # acute on chronic anemia  # thrombocytopenic   - s/p 2 prbc   - Check iron panel   - check folate and b12   - keep active type and screen. Keep hgb >7  - holding brilinta and holding AC in setting of thrombocytopenia   - continue dvt ppx     #left Distal peroneal DVT   - LE US found DVT on left peroneal vein.   - check D-dimer   - patient high risk for anticoagulation     #Decompensated liver cirrhosis sec to Hep C MELD na 22, child henley A 6  #Hx of Ascites on lasix, aldactone at home - no present ascites   - HCV RNA not detected (3/16/2022)  - No H/O SBP  - Never had EGD and colonoscopy in the past   - hold aldactone due to hyponatremia and hyperkalemia   - trend CBC, keep Hb>7   - No evidence of active bleeding   - c/w lactulose to titrate to 3-4 BM   - Fu AFP   - from GI prospective - can start anticoagulation if benefit>risk given no active bleeding  : unable to perform egd or colon in view of  severe electrolyte abnormalities and risk of anaesthesia.    #CAD s/p PCI x 3 s/p 2 stents back in march   - RCA: large dominant vessel, 90% heavily calcified stenosis of prox and mid RCA s/p PCI and YVETTE x 2  - echo -Normal global left ventricular systolic function. LV Ejection Fraction by Carlin's Method with a biplane EF of 64 %.  - at home on aspirin and brilinta   - holding brilinta in setting of thrombocytopenia   - continue atorvastatin 40 mg qd  - cardiology following     #Hypothyroidism   - TSH back in march- 3 2  - continue levothyroxine 50 mcg qd  - check tsh and t4     #Hx IVDU  - continue suboxone   - sbirt consult   - continue folic acid, thiamin,     #COPD/SOB  - maintain O2 Sat at 88-92%    # GI PPx - Protonix   # DVT PPx - Heparin 5000 mg SubQ q8   # Activity -  Increase as Tolerated    # Code Status - FULL     # Dispo: planning to downgrade to [place]

## 2022-06-21 NOTE — PROGRESS NOTE ADULT - SUBJECTIVE AND OBJECTIVE BOX
Nephrology progress note    Patient was seen and examined, events over the last 24 h noted .  Cr imrpvoed     Allergies:  No Known Allergies    Hospital Medications:   MEDICATIONS  (STANDING):  aspirin  chewable 81 milliGRAM(s) Oral daily  atorvastatin 40 milliGRAM(s) Oral at bedtime  buprenorphine 8 mG/naloxone 2 mG SL  Tablet 0.5 Tablet(s) SubLingual <User Schedule>  folic acid 1 milliGRAM(s) Oral daily  gabapentin 100 milliGRAM(s) Oral three times a day  heparin   Injectable 5000 Unit(s) SubCutaneous every 8 hours  influenza  Vaccine (HIGH DOSE) 0.7 milliLiter(s) IntraMuscular once  lactulose Syrup 10 Gram(s) Oral daily  levothyroxine 50 MICROGram(s) Oral daily  nicotine -  14 mG/24Hr(s) Patch 1 patch Transdermal daily  pantoprazole  Injectable 40 milliGRAM(s) IV Push two times a day  sodium bicarbonate 650 milliGRAM(s) Oral three times a day  sodium zirconium cyclosilicate 10 Gram(s) Oral three times a day  thiamine 100 milliGRAM(s) Oral daily        VITALS:  T(F): 96.3 (22 @ 09:09), Max: 97.5 (22 @ 21:00)  HR: 46 (22 @ 09:09)  BP: 112/59 (22 @ 09:09)  RR: 18 (22 @ 09:09)  SpO2: 94% (22 @ 09:09)  Wt(kg): --     @ 07:01  -   @ 07:00  --------------------------------------------------------  IN: 0 mL / OUT: 1350 mL / NET: -1350 mL          PHYSICAL EXAM:  Constitutional: NAD  HEENT: anicteric sclera, oropharynx clear, MMM  Neck: No JVD  Respiratory: CTAB, no wheezes, rales or rhonchi  Cardiovascular: S1, S2, RRR  Gastrointestinal: BS+, soft, NT/ND  Extremities: No cyanosis or clubbing. No peripheral edema  :  No yang.   Skin: No rashes    LABS:      130<L>  |  96<L>  |  20  ----------------------------<  99  4.8   |  27  |  0.8    Ca    8.7      2022 08:12  Mg     2.1         TPro  6.1  /  Alb  3.8  /  TBili  1.0  /  DBili      /  AST  32  /  ALT  16  /  AlkPhos  69                            8.1    1.54  )-----------( 39       ( 2022 08:12 )             26.0       Urine Studies:  Urinalysis Basic - ( 2022 09:42 )    Color: Light Yellow / Appearance: Clear / S.010 / pH:   Gluc:  / Ketone: Negative  / Bili: Negative / Urobili: <2 mg/dL   Blood:  / Protein: Trace / Nitrite: Negative   Leuk Esterase: Large / RBC: 0 /HPF / WBC 4 /HPF   Sq Epi:  / Non Sq Epi: 6 /HPF / Bacteria: Negative      Osmolality, Random Urine: 291 mos/kg ( @ 09:42)  Creatinine, Random Urine: 35 mg/dL ( @ 09:40)  Protein/Creatinine Ratio Calculation: 0.2 Ratio ( @ 09:40)  Chloride, Random Urine: <20 ( @ 09:40)  Sodium, Random Urine: 23.0 mmoL/L ( @ 09:40)  Potassium, Random Urine: 14 mmol/L ( @ 09:40)    RADIOLOGY & ADDITIONAL STUDIES:

## 2022-06-21 NOTE — PROGRESS NOTE ADULT - ASSESSMENT
IMPRESSION:    PROMISE/ Hyperkalemia improving  Afib with bradycardia   Severe hyponatremia improving  Anemia on DAPT  Pancytopenia  GI bleed stable  HO COPD  hep C/ liver cirrhosis    PLAN:    CNS: Avoid CNS depressants. Thiamine, folic acid, lactulose    HEENT: Oral care.     PULMONARY:  HOB @ 45 degrees.  Aspiration precautions. Keep Sats 88 to 92%, Neb Q 6 H AS needed    CARDIOVASCULAR: eps eval      GI: IV PPI     RENAL:  Follow up lytes.  Correct as needed.     INFECTIOUS DISEASE: procal noted.  dc abx    HEMATOLOGICAL:  DVT PPX.  FU CBC and Coags     ENDOCRINE:  Follow up FS.  Insulin protocol if needed.    MUSCULOSKELETAL: OOB to chair     tele

## 2022-06-22 NOTE — DISCHARGE NOTE NURSING/CASE MANAGEMENT/SOCIAL WORK - NSDCPEFALRISK_GEN_ALL_CORE
For information on Fall & Injury Prevention, visit: [email or web address] OR  [email or web address] OR  https://www.cdc.gov/steadi/patient.html

## 2022-06-22 NOTE — DISCHARGE NOTE NURSING/CASE MANAGEMENT/SOCIAL WORK - NSDCVIVACCINE_GEN_ALL_CORE_FT
influenza, injectable, quadrivalent, preservative free; 13-Nov-2018 17:05; Marva Negron (RN); [employer]; [id number] (Exp. Date: 30-Jun-2019); IntraMuscular; Deltoid Left.; 0.5 milliLiter(s); VIS (VIS Published: 07-Aug-2015, VIS Presented: 13-Nov-2018);

## 2022-06-22 NOTE — PROGRESS NOTE ADULT - SUBJECTIVE AND OBJECTIVE BOX
Hospital Day:  5d    Subjective:    Patient is a 72y old Male who presents with a chief complaint of mild confusion and bradycardia. Patient is seen sitting up in bed in NAD.     Admitted to medicine for a primary diagnosis of bradycardia, anemia, hyperkalemia, hyponatremia, with PROMISE.    Past Medical Hx:   Diabetes    HTN (hypertension)    Hepatitis    Arthritis      Past Sx:  H/O heart artery stent    H/O knee surgery      Allergies:  No Known Allergies    Current Meds:   Standng Meds:  aspirin  chewable 81 milliGRAM(s) Oral daily  atorvastatin 40 milliGRAM(s) Oral at bedtime  buprenorphine 8 mG/naloxone 2 mG SL  Tablet 0.5 Tablet(s) SubLingual <User Schedule>  folic acid 1 milliGRAM(s) Oral daily  furosemide   Injectable 20 milliGRAM(s) IV Push daily  gabapentin 100 milliGRAM(s) Oral three times a day  heparin   Injectable 5000 Unit(s) SubCutaneous every 8 hours  influenza  Vaccine (HIGH DOSE) 0.7 milliLiter(s) IntraMuscular once  lactulose Syrup 10 Gram(s) Oral daily  levothyroxine 50 MICROGram(s) Oral daily  nicotine -  14 mG/24Hr(s) Patch 1 patch Transdermal daily  pantoprazole    Tablet 40 milliGRAM(s) Oral before breakfast  sodium bicarbonate 650 milliGRAM(s) Oral three times a day  sodium zirconium cyclosilicate 10 Gram(s) Oral three times a day  thiamine 100 milliGRAM(s) Oral daily    PRN Meds:    HOME MEDICATIONS:  aspirin 81 mg oral tablet: 1 tab(s) orally once a day  buprenorphine-naloxone 8 mg-2 mg sublingual film: 0.5 film(s) sublingual 2 times a day  metFORMIN 500 mg oral tablet: 1 tab(s) orally 2 times a day  HOLD METFORMIN FOR 48 HR AFTER CARDIAC CATH   spironolactone 100 mg oral tablet: 1 tab(s) orally once a day      Vital Signs:   T(F): 97.1 (22 @ 04:30), Max: 97.8 (22 @ 21:09)  HR: 48 (22 @ 04:30) (46 - 50)  BP: 126/61 (22 @ 04:30) (112/59 - 138/60)  RR: 18 (22 @ 04:30) (18 - 18)  SpO2: 94% (06-21-22 @ 19:55) (94% - 95%)      22 @ 07:01  -  22 @ 07:00  --------------------------------------------------------  IN: 340 mL / OUT: 1500 mL / NET: -1160 mL        Physical Exam:   GENERAL: NAD  HEENT: NCAT  CHEST/LUNG: CTAB  HEART: Regular rate and rhythm; s1 s2 appreciated, No murmurs, rubs, or gallops  ABDOMEN: Soft, Nontender, Nondistended; Bowel sounds present  EXTREMITIES: No LE edema b/l  SKIN: no rashes, no new lesions  NERVOUS SYSTEM:  Alert & Oriented X3  LINES/CATHETERS:        Labs:                         8.3    1.45  )-----------( 44       ( 2022 06:02 )             25.5     Neutophil% 56.5, Lymphocyte% 24.1, Monocyte% 15.9, Bands% 0.7 22 @ 06:02    2022 06:02    129    |  95     |  16     ----------------------------<  108    4.8     |  26     |  0.7      Ca    8.7        2022 06:02  Mg     1.7       2022 06:02    TPro  6.5    /  Alb  3.7    /  TBili  1.2    /  DBili  x      /  AST  29     /  ALT  14     /  AlkPhos  68     2022 06:02            Serum Pro-Brain Natriuretic Peptide: 7783 pg/mL (22 @ 11:36)          Urinalysis Basic - ( 2022 09:42 )    Color: Light Yellow / Appearance: Clear / S.010 / pH: x  Gluc: x / Ketone: Negative  / Bili: Negative / Urobili: <2 mg/dL   Blood: x / Protein: Trace / Nitrite: Negative   Leuk Esterase: Large / RBC: 0 /HPF / WBC 4 /HPF   Sq Epi: x / Non Sq Epi: 6 /HPF / Bacteria: Negative             Hospital Day:  5d    Subjective:    Patient is a 72y old Male who presents with a chief complaint of mild confusion and bradycardia. Patient is seen sitting up in bed in NAD.     Admitted to medicine for a primary diagnosis of bradycardia, anemia, hyperkalemia, hyponatremia, with PROMISE.    Past Medical Hx:   Diabetes    HTN (hypertension)    Hepatitis    Arthritis      Past Sx:  H/O heart artery stent    H/O knee surgery      Allergies:  No Known Allergies    Current Meds:   Standng Meds:  aspirin  chewable 81 milliGRAM(s) Oral daily  atorvastatin 40 milliGRAM(s) Oral at bedtime  buprenorphine 8 mG/naloxone 2 mG SL  Tablet 0.5 Tablet(s) SubLingual <User Schedule>  folic acid 1 milliGRAM(s) Oral daily  furosemide   Injectable 20 milliGRAM(s) IV Push daily  gabapentin 100 milliGRAM(s) Oral three times a day  heparin   Injectable 5000 Unit(s) SubCutaneous every 8 hours  influenza  Vaccine (HIGH DOSE) 0.7 milliLiter(s) IntraMuscular once  lactulose Syrup 10 Gram(s) Oral daily  levothyroxine 50 MICROGram(s) Oral daily  nicotine -  14 mG/24Hr(s) Patch 1 patch Transdermal daily  pantoprazole    Tablet 40 milliGRAM(s) Oral before breakfast  sodium bicarbonate 650 milliGRAM(s) Oral three times a day  sodium zirconium cyclosilicate 10 Gram(s) Oral three times a day  thiamine 100 milliGRAM(s) Oral daily    PRN Meds:    HOME MEDICATIONS:  aspirin 81 mg oral tablet: 1 tab(s) orally once a day  buprenorphine-naloxone 8 mg-2 mg sublingual film: 0.5 film(s) sublingual 2 times a day  metFORMIN 500 mg oral tablet: 1 tab(s) orally 2 times a day  HOLD METFORMIN FOR 48 HR AFTER CARDIAC CATH   spironolactone 100 mg oral tablet: 1 tab(s) orally once a day      Vital Signs:   T(F): 97.1 (22 @ 04:30), Max: 97.8 (22 @ 21:09)  HR: 48 (22 @ 04:30) (46 - 50)  BP: 126/61 (22 @ 04:30) (112/59 - 138/60)  RR: 18 (22 @ 04:30) (18 - 18)  SpO2: 94% (06-21-22 @ 19:55) (94% - 95%)      22 @ 07:01  -  22 @ 07:00  --------------------------------------------------------  IN: 340 mL / OUT: 1500 mL / NET: -1160 mL        Physical Exam:   GENERAL: NAD  HEENT: NCAT  CHEST/LUNG: CTAB  HEART: Regular rate and rhythm; s1 s2 appreciated, No murmurs, rubs, or gallops  ABDOMEN: Soft, Nontender, Nondistended; Bowel sounds present  EXTREMITIES: No LE edema b/l  SKIN: no rashes, no new lesions  NERVOUS SYSTEM:  Alert & Oriented X3          Labs:                         8.3    1.45  )-----------( 44       ( 2022 06:02 )             25.5     Neutophil% 56.5, Lymphocyte% 24.1, Monocyte% 15.9, Bands% 0.7 22 @ 06:02    2022 06:02    129    |  95     |  16     ----------------------------<  108    4.8     |  26     |  0.7      Ca    8.7        2022 06:02  Mg     1.7       2022 06:02    TPro  6.5    /  Alb  3.7    /  TBili  1.2    /  DBili  x      /  AST  29     /  ALT  14     /  AlkPhos  68     2022 06:02            Serum Pro-Brain Natriuretic Peptide: 7783 pg/mL (22 @ 11:36)          Urinalysis Basic - ( 2022 09:42 )    Color: Light Yellow / Appearance: Clear / S.010 / pH: x  Gluc: x / Ketone: Negative  / Bili: Negative / Urobili: <2 mg/dL   Blood: x / Protein: Trace / Nitrite: Negative   Leuk Esterase: Large / RBC: 0 /HPF / WBC 4 /HPF   Sq Epi: x / Non Sq Epi: 6 /HPF / Bacteria: Negative

## 2022-06-22 NOTE — CONSULT NOTE ADULT - ASSESSMENT
Cardiologist: Dr Beck    Assessment: 71 y/o Male w/ PMHx for HTN, non-insulin dependent DM, cirrhosis, HCV s/p tx, former IVDU on suboxone, active smoker, CAD s/p PCI x 3 s/p 2 stents (done recently), arrives to the ED w/ mild confusion, bradycardia HR in 40s, and is found to be severely hyponatremic, hyperkalemia and bradycardic and pancytopenia    Impression:  Slow AF, HR 40s  Hyponatremia  Pancytopenia  CAD sp PCI of prox and mid RCA (3/22)  Hx IVDA on Suboxone  Liver Cirrhosis  DM  HTN Cardiologist: Dr Beck    Assessment: 71 y/o Male w/ PMHx for HTN, non-insulin dependent DM, cirrhosis, HCV s/p tx, former IVDU on suboxone, active smoker, CAD s/p PCI x 3 s/p 2 stents (done recently), arrives to the ED w/ mild confusion, bradycardia HR in 40s, and is found to be severely hyponatremic, hyperkalemia and bradycardic and pancytopenia    Impression:  Slow AF, HR 40s  Hyponatremia  Pancytopenia  CAD sp PCI of prox and mid RCA (3/22)  Hx IVDA on Suboxone  Liver Cirrhosis  DM  HTN    Plan:  - Patient likely candidate for PPM given slow AF, he wants to discuss with his wife  - Prior to PPM implant, patient with hyponatremia which needs to be addressed first  - Also with pancytopenia, please consult heme/onc to evaluate and clear patient for procedure  - Patient not currently on OAC 2/2 anemia, will need OAC for stroke prevention if cleared by GI  -  Cardiologist: Dr Beck    Assessment: 73 y/o Male w/ PMHx for HTN, non-insulin dependent DM, cirrhosis, HCV s/p tx, former IVDU on suboxone, active smoker, CAD s/p PCI x 3 s/p 2 stents (done recently), arrives to the ED w/ mild confusion, bradycardia HR in 40s, and is found to be severely hyponatremic, hyperkalemia and bradycardic and pancytopenia    Impression:  Slow AF, HR 40s  Hyponatremia  Pancytopenia  CAD sp PCI of prox and mid RCA (3/22)  Hx IVDA on Suboxone  Liver Cirrhosis  DM  HTN    Plan:  - Patient likely candidate for PPM given slow AF, he wants to discuss with his wife  - Prior to PPM implant, patient with hyponatremia which needs to be addressed first  - Also with pancytopenia, please consult heme/onc to evaluate and clear patient for procedure  - Patient not currently on OAC 2/2 anemia, will need OAC for stroke prevention if cleared by GI  - Repeat 2D Echo (last from March)  - Avoid AVN blocking agents  - Cont tele monitoring  - Will follow Cardiologist: Dr Beck    Assessment: 71 y/o Male w/ PMHx for HTN, non-insulin dependent DM, cirrhosis, HCV s/p tx, former IVDU on suboxone, active smoker, CAD s/p PCI x 3 s/p 2 stents (done recently), arrives to the ED w/ mild confusion, bradycardia HR in 40s, and is found to be severely hyponatremic, hyperkalemia and bradycardic and pancytopenia    Impression:  Slow AF, HR 40s  Hyponatremia  Pancytopenia  CAD sp PCI of prox and mid RCA (3/22)  Hx IVDA on Suboxone  Liver Cirrhosis  DM  HTN    Plan:  - Prior to PPM implant, patient with hyponatremia which needs to be addressed first  - Also with pancytopenia, please consult heme/onc to evaluate and clear patient for procedure  - Patient not currently on OAC 2/2 anemia, will need OAC for stroke prevention if cleared by GI  - Repeat 2D Echo (last from March)  - Avoid AVN blocking agents  - Cont tele monitoring  - Discussed with patient the need for PPM and he is agreeable, will plan for this when medically cleared prior to discharge  - Will follow

## 2022-06-22 NOTE — DISCHARGE NOTE NURSING/CASE MANAGEMENT/SOCIAL WORK - NSDCPEWEB_GEN_ALL_CORE
[employer] for Tobacco Control website --- [email or web address] website --- [email or web address]

## 2022-06-22 NOTE — PROGRESS NOTE ADULT - ASSESSMENT
73 y/o Male w/ PMHx for HTN, non-insulin dependent DM, cirrhosis, HCV s/p tx, former IVDU on suboxone, active smoker, CAD s/p PCI x 3 s/p 2 stents (done recently), arrives to the ED w/ mild confusion, bradycardia HR in 40s, and is found to be severely hyponatremic, hyperkalemia and bradycardic and pancytopenia .    #AFib with Bradycardia  - CHADVASC- 4   - HASBLED- 6 - very high risk for major bleed  - HR consistently in the 40s  - repeat ECHO  - check tsh levels   - hold AV denis blocking agents     - consult EP cardiology for ppm placement   - cardiology following     #PROMISE with Hyperkalemia and metabolic acidosis and Hyponatremia (all improving) - due to overdiuresis   - patient has been in overdiuresis (on torsemide 20 Q12Hrs and Aldactone at home)  - Na- 129 today.  - Potassium stable at 4.8  - c/w sodium bicarb po 650 TID  - ? lokelma 10 mg TID   - C/w lasix 20 mg IV as per nephro  - recall nephro as needed     #Pancytopenia likely due to liver cirrhosis   # acute on chronic anemia  # thrombocytopenic   - s/p 2 prbc   - Check iron panel   - check folate and b12   - keep active type and screen. Keep hgb >7  - holding brilinta and holding AC in setting of thrombocytopenia   - continue dvt ppx     #left Distal peroneal DVT   - LE US found DVT on left peroneal vein.   - check D-dimer   - patient high risk for anticoagulation     #Decompensated liver cirrhosis sec to Hep C MELD na 22, child henley A 6  #Hx of Ascites on lasix, aldactone at home - no present ascites   - HCV RNA not detected (3/16/2022)  - No H/O SBP  - Never had EGD and colonoscopy in the past   - hold aldactone due to hyponatremia and hyperkalemia   - trend CBC, keep Hb>7   - No evidence of active bleeding   - c/w lactulose to titrate to 3-4 BM   - Fu AFP   - from GI prospective - can start anticoagulation if benefit>risk given no active bleeding  : unable to perform egd or colon in view of  severe electrolyte abnormalities and risk of anaesthesia.    #CAD s/p PCI x 3 s/p 2 stents back in march   - RCA: large dominant vessel, 90% heavily calcified stenosis of prox and mid RCA s/p PCI and YVETTE x 2  - echo -Normal global left ventricular systolic function. LV Ejection Fraction by Carlin's Method with a biplane EF of 64 %.  - at home on aspirin and brilinta   - holding brilinta in setting of thrombocytopenia   - continue atorvastatin 40 mg qd  - cardiology following     #Hypothyroidism   - TSH back in march- 3 2  - continue levothyroxine 50 mcg qd  - check tsh and t4     #Hx IVDU  - continue suboxone   - sbirt consult   - continue folic acid, thiamin,     #COPD/SOB  - maintain O2 Sat at 88-92%          EP Recs:  Plan:  - Prior to PPM implant, patient with hyponatremia which needs to be addressed first  - Also with pancytopenia, please consult heme/onc to evaluate and clear patient for procedure  - Patient not currently on OAC 2/2 anemia, will need OAC for stroke prevention if cleared by GI  - Repeat 2D Echo (last from March)  - Avoid AVN blocking agents  - Cont tele monitoring  - Discussed with patient the need for PPM and he is agreeable, will plan for this when medically cleared prior to discharge  - Will follow   73 y/o Male w/ PMHx for HTN, non-insulin dependent DM, cirrhosis, HCV s/p tx, former IVDU on suboxone, active smoker, CAD s/p PCI x 3 s/p 2 stents (done recently), arrives to the ED w/ mild confusion, bradycardia HR in 40s, and is found to be severely hyponatremic, hyperkalemia and bradycardic and pancytopenia .    #AFib with Bradycardia  - CHADVASC- 4   - HASBLED- 6 - very high risk for major bleed  - HR consistently in the 40s  - repeat ECHO pending  - pending tsh levels   - holding AV denis blocking agents   -As per GI, no active bleed so OAC okay as long as benefits >risks    #PROMISE with Hyperkalemia and metabolic acidosis and Hyponatremia (all improving) - due to overdiuresis   - patient has been in overdiuresis (on torsemide 20 Q12Hrs and Aldactone at home)  - Na- 129 today.  - Potassium stable at 4.8  - c/w sodium bicarb po 650 TID  - c/w lokelma 10 mg TID   - C/w lasix 20 mg IV as per nephro  - recall nephro as needed     #Pancytopenia likely due to liver cirrhosis   # acute on chronic anemia  # thrombocytopenic   - s/p 2 prbc   - Check iron panel   - check folate and b12   - keep active type and screen. Keep hgb >7  - holding brilinta and holding AC in setting of thrombocytopenia   - continue dvt ppx   -As per GI, no active bleed at this time, no intervention at this time    #left Distal peroneal DVT   #Left UE swelling  - L UE duplex shows superficial phlebitis  - LE US found DVT on left peroneal vein.   - Elevated D-dimer  - Repeat LE Duplex in 1 week 06/28/22    #Decompensated liver cirrhosis sec to Hep C MELD na 22, child henley A 6  #Hx of Ascites on lasix, aldactone at home - no present ascites   - HCV RNA not detected (3/16/2022)  - No H/O SBP  - Never had EGD and colonoscopy in the past   - hold aldactone due to hyponatremia and hyperkalemia   - trend CBC, keep Hb>7   - No evidence of active bleeding   - c/w lactulose to titrate to 3-4 BM   - Fu AFP   - GI recs appreciated; - can start anticoagulation if benefit>risk given no active bleeding    #CAD s/p PCI x 3 s/p 2 stents back in march   - RCA: large dominant vessel, 90% heavily calcified stenosis of prox and mid RCA s/p PCI and YVETTE x 2  - echo -Normal global left ventricular systolic function. LV Ejection Fraction by Carlin's Method with a biplane EF of 64 %.  - at home on aspirin and brilinta   - holding brilinta in setting of thrombocytopenia   - continue atorvastatin 40 mg qd  - cardiology following     #Hypothyroidism   - TSH back in march- 3 2  - continue levothyroxine 50 mcg qd  - check tsh and t4     #Hx IVDU  - continue suboxone   - continue folic acid, thiamin,     #COPD/SOB  - maintain O2 Sat at 88-92%

## 2022-06-22 NOTE — DISCHARGE NOTE NURSING/CASE MANAGEMENT/SOCIAL WORK - PATIENT PORTAL LINK FT
You can access the FollowMyHealth Patient Portal offered by [facility] by registering at the following website: [email or web address]. By joining [facility]’s FollowMyHealth portal, you will also be able to view your health information using other applications (apps) compatible with our system.

## 2022-06-22 NOTE — CONSULT NOTE ADULT - SUBJECTIVE AND OBJECTIVE BOX
Patient is a 72y old  Male who presents with a chief complaint of mild confusion and bradycardia (2022 13:35)    HPI: Patient is a 73 y/o Male w/ PMHx for HTN, non-insulin dependent DM, cirrhosis, HCV s/p tx, former IVDU on suboxone, active smoker, CAD s/p PCI x 3 s/p 2 stents (done recently), arrives to the ED w/ mild confusion, bradycardia HR in 40s, and is found to be severely hyponatremic, hyperkalemia and bradycardic and pancytopenia. Patient evaluated by GI and pancytopenia likely 2/2 liver cirrhosis. Also found to have anemia, no active GI bleed and no plan for scope on this admission. Not on OAC 2/2 anemia. Hyperkalemia resolved, still hyponatremic. Patient admits to intermittent chest pain. Denies dizziness, SOB or syncope. Follows with Dr Beck.    PAST MEDICAL & SURGICAL HISTORY:  Diabetes      HTN (hypertension)      Hepatitis  Untreated Hepatitis C      Arthritis      H/O heart artery stent      H/O knee surgery      PREVIOUS DIAGNOSTIC TESTING:      ECHO  FINDINGS:  < from: TTE Echo Complete w/o Contrast w/ Doppler (22 @ 16:32) >  Summary:   1. Technically goodstudy.   2. Normal global left ventricular systolic function.   3. LV Ejection Fraction by Carlin's Method with a biplane EF of 64 %.   4. Normal right atrial size.   5. Mild mitral valve regurgitation.   6. Mild tricuspid regurgitation.   7. Aorticvalve thickening with decreased leaflet opening.   8. Left atrial enlargement.   9. Mild pulmonic valve regurgitation.    < end of copied text >    STRESS  FINDINGS:  < from: NM Nuclear Stress Pharmacologic Multiple (22 @ 13:01) >  Impression:  1. MODERATE TO SEVERE REVERSIBLE DEFECT IN THE APEX OF THE LEFT VENTRICLE   CONSISTENT WITH ISCHEMIA.  2. NORMAL RESTING LEFT VENTRICULAR WALL MOTION AND WALL THICKENING.  3. LEFT VENTRICULAR EJECTION FRACTION OF  75 % WHICH IS WITHIN RANGE OF   NORMAL.    < end of copied text >    CATHETERIZATION  FINDINGS:  Intervention:   - successful balloon angioplasty, laser atherectomy, and IVUS guided PCI of prox and mid RCA    Implants:   - 3.0 x 30mm Resolute Eric YVETTE x 1 in mid RCA  - 3.5 x 38 mm Resolute Eric YVETTE x 1 in prox RCA    FINDINGS:     Coronary Dominance: right dominant    LM: normal    LAD: mild diffuse disease, patent stent in mid LAD  D1: mild disease    LCX: mild diffuse disease, mild (20%) in-stent restenosis in the stent in prox LCX  OM1: mild disease    RCA: large dominant vessel, 90% heavily calcified stenosis of prox and mid RCA s/p PCI and YVETTE x 2       LVEDP: 10 mmHg     EF: 50%     ELECTROPHYSIOLOGY STUDY  FINDINGS:    CAROTID ULTRASOUND:  FINDINGS    VENOUS DUPLEX SCAN:  FINDINGS:    CHEST CT PULMONARY ANGIO with IV Contrast:  FINDINGS:    MEDICATIONS  (STANDING):  aspirin  chewable 81 milliGRAM(s) Oral daily  atorvastatin 40 milliGRAM(s) Oral at bedtime  buprenorphine 8 mG/naloxone 2 mG SL  Tablet 0.5 Tablet(s) SubLingual <User Schedule>  folic acid 1 milliGRAM(s) Oral daily  furosemide   Injectable 20 milliGRAM(s) IV Push daily  gabapentin 100 milliGRAM(s) Oral three times a day  heparin   Injectable 5000 Unit(s) SubCutaneous every 8 hours  influenza  Vaccine (HIGH DOSE) 0.7 milliLiter(s) IntraMuscular once  lactulose Syrup 10 Gram(s) Oral daily  levothyroxine 50 MICROGram(s) Oral daily  nicotine -  14 mG/24Hr(s) Patch 1 patch Transdermal daily  pantoprazole    Tablet 40 milliGRAM(s) Oral before breakfast  polyethylene glycol 3350 17 Gram(s) Oral daily  senna 2 Tablet(s) Oral at bedtime  sodium bicarbonate 650 milliGRAM(s) Oral three times a day  sodium zirconium cyclosilicate 10 Gram(s) Oral three times a day  thiamine 100 milliGRAM(s) Oral daily    MEDICATIONS  (PRN):    FAMILY HISTORY:  Family history of diabetes mellitus (DM) (Father, Mother)  Parents    SOCIAL HISTORY: +Smoker; Hx Drug and ETOH abuse    Past Surgical History:   Knee Surgery    Allergies:  No Known Allergies      REVIEW OF SYSTEMS:  CONSTITUTIONAL: No fever, weight loss, chills, shakes, or fatigue  RESPIRATORY: No cough, wheezing, hemoptysis, or shortness of breath  CARDIOVASCULAR: + chest pain, No dyspnea, palpitations, dizziness, syncope, paroxysmal nocturnal dyspnea, orthopnea, or arm or leg swelling  GASTROINTESTINAL: No abdominal  or epigastric pain, nausea, vomiting, hematemesis, diarrhea, constipation, melena or bright red blood.  NEUROLOGICAL: No headaches, memory loss, slurred speech, limb weakness, loss of strength, numbness, or tremors  MUSCULOSKELETAL: No joint pain or swelling, muscle, back, or extremity pain      Vital Signs Last 24 Hrs  T(C): 36.2 (2022 04:30), Max: 36.6 (2022 21:09)  T(F): 97.1 (2022 04:30), Max: 97.8 (2022 21:09)  HR: 48 (2022 04:30) (46 - 50)  BP: 126/61 (2022 04:30) (124/57 - 138/60)  BP(mean): --  RR: 18 (2022 04:30) (18 - 18)  SpO2: 94% (2022 19:55) (94% - 95%)    PHYSICAL EXAM:  GENERAL: In no apparent distress, well nourished, and hydrated.  NECK: Supple, No JVD   HEART: Irregular rate and rhythm; No murmurs, rubs, or gallops.  PULMONARY: Clear to auscultation and perfusion.  No rales, wheezing, or rhonchi bilaterally.  EXTREMITIES:  2+ Peripheral Pulses, no LE edema BL  NEUROLOGICAL: Grossly nonfocal      INTERPRETATION OF TELEMETRY: AF 40s bpm    ECG:  < from: 12 Lead ECG (22 @ 21:46) >  Ventricular Rate 49 BPM    Atrial Rate 49 BPM    P-R Interval 250 ms    QRS Duration 108 ms    Q-T Interval 448 ms    QTC Calculation(Bazett) 404 ms    P Axis 76 degrees    R Axis 59 degrees    T Axis 52 degrees    Diagnosis Line Sinus bradycardia with 1st degree A-V block  Increased R/S ratio in V1, consider early transition or posterior infarct  Abnormal ECG    Confirmed by PAUL VICTORIA MD ([id number]) on 2022 10:42:51 PM    < end of copied text >      I&O's Detail    2022 07:01  -  2022 07:00  --------------------------------------------------------  IN:    Oral Fluid: 340 mL  Total IN: 340 mL    OUT:    Voided (mL): 1500 mL  Total OUT: 1500 mL    Total NET: -1160 mL          LABS:                        8.3    1.45  )-----------( 44       ( 2022 06:02 )             25.5     06-22    129<L>  |  95<L>  |  16  ----------------------------<  108<H>  4.8   |  26  |  0.7    Ca    8.7      2022 06:02  Mg     1.7     06-    TPro  6.5  /  Alb  3.7  /  TBili  1.2  /  DBili  x   /  AST  29  /  ALT  14  /  AlkPhos  68  06-22        PTT - ( 2022 01:23 )  PTT:31.7 sec    BNP  I&O's Detail    2022 07:01  -  2022 07:00  --------------------------------------------------------  IN:    Oral Fluid: 340 mL  Total IN: 340 mL    OUT:    Voided (mL): 1500 mL  Total OUT: 1500 mL    Total NET: -1160 mL        Daily Height in cm: 185.42 (2022 21:09)    Daily Weight in k.7 (2022 04:30)    RADIOLOGY & ADDITIONAL STUDIES:

## 2022-06-22 NOTE — DISCHARGE NOTE NURSING/CASE MANAGEMENT/SOCIAL WORK - NSDCPEEMAIL_GEN_ALL_CORE
[facility] for Tobacco Control email [email or web address]

## 2022-06-23 NOTE — PROGRESS NOTE ADULT - SUBJECTIVE AND OBJECTIVE BOX
Hospital Day:  6d    Subjective:    Patient is a 72y old  Male who presents with a chief complaint of mild confusion and bradycardia. No overnight events. He sitting up in bed comfortably this morning.      Admitted to medicine for a primary diagnosis of bradycardia with Afib, hyponatremic, hyperkalemia and bradycardic and pancytopenia .      Past Medical Hx:   Diabetes    HTN (hypertension)    Hepatitis    Arthritis      Past Sx:  H/O heart artery stent    H/O knee surgery      Allergies:  No Known Allergies    Current Meds:   Standng Meds:  aspirin  chewable 81 milliGRAM(s) Oral daily  atorvastatin 40 milliGRAM(s) Oral at bedtime  buprenorphine 8 mG/naloxone 2 mG SL  Tablet 0.5 Tablet(s) SubLingual <User Schedule>  folic acid 1 milliGRAM(s) Oral daily  furosemide   Injectable 20 milliGRAM(s) IV Push daily  gabapentin 100 milliGRAM(s) Oral three times a day  heparin   Injectable 5000 Unit(s) SubCutaneous every 8 hours  influenza  Vaccine (HIGH DOSE) 0.7 milliLiter(s) IntraMuscular once  lactulose Syrup 10 Gram(s) Oral daily  levothyroxine 50 MICROGram(s) Oral daily  nicotine -  14 mG/24Hr(s) Patch 1 patch Transdermal daily  pantoprazole    Tablet 40 milliGRAM(s) Oral before breakfast  polyethylene glycol 3350 17 Gram(s) Oral daily  senna 2 Tablet(s) Oral at bedtime  sodium bicarbonate 650 milliGRAM(s) Oral three times a day  sodium zirconium cyclosilicate 10 Gram(s) Oral three times a day  thiamine 100 milliGRAM(s) Oral daily    PRN Meds:    HOME MEDICATIONS:  aspirin 81 mg oral tablet: 1 tab(s) orally once a day  buprenorphine-naloxone 8 mg-2 mg sublingual film: 0.5 film(s) sublingual 2 times a day  metFORMIN 500 mg oral tablet: 1 tab(s) orally 2 times a day  HOLD METFORMIN FOR 48 HR AFTER CARDIAC CATH   spironolactone 100 mg oral tablet: 1 tab(s) orally once a day      Vital Signs:   T(F): 98 (06-23-22 @ 05:04), Max: 98 (06-23-22 @ 05:04)  HR: 47 (06-23-22 @ 05:04) (47 - 60)  BP: 101/56 (06-23-22 @ 05:04) (101/56 - 108/56)  RR: 18 (06-23-22 @ 05:04) (17 - 18)  SpO2: 96% (06-22-22 @ 20:16) (95% - 96%)      06-22-22 @ 07:01  -  06-23-22 @ 07:00  --------------------------------------------------------  IN: 236 mL / OUT: 1300 mL / NET: -1064 mL    06-23-22 @ 07:01  -  06-23-22 @ 07:58  --------------------------------------------------------  IN: 0 mL / OUT: 800 mL / NET: -800 mL        Physical Exam:   GEN: NAD, sitting up comfortably in bed.   HEENT: ATNC  LUNGS: clear to ascultation b/l  HEART: bradycardic, irregular rate and rhythm  ABD: soft, nontender, nondistended  EXT: Left UE is more swollen than the right, Left LE more swollen right LE b/l LE shows discoloration possible 2/2 stasis  Neuro: A&Ox3          Labs:                         8.3    1.45  )-----------( 44       ( 22 Jun 2022 06:02 )             25.5       22 Jun 2022 06:02    129    |  95     |  16     ----------------------------<  108    4.8     |  26     |  0.7      Ca    8.7        22 Jun 2022 06:02  Mg     1.7       22 Jun 2022 06:02    TPro  6.5    /  Alb  3.7    /  TBili  1.2    /  DBili  x      /  AST  29     /  ALT  14     /  AlkPhos  68     22 Jun 2022 06:02            Serum Pro-Brain Natriuretic Peptide: 7783 pg/mL (06-17-22 @ 11:36)      Iron --, TIBC --, %Sat -- Ferritin 78 06-22-22 @ 06:02

## 2022-06-23 NOTE — PHYSICAL THERAPY INITIAL EVALUATION ADULT - PERTINENT HX OF CURRENT PROBLEM, REHAB EVAL
73 YO M w/ PMHx significant for HTN, NIDDM, cirrhosis, HCV s/p tx, former IVDU on suboxone, active smoker, CAD s/p PCI x 3 s/p 2 stents (done recently). Pt is here w/ mild confusion, bradycardia HR in 40s , severe hyponatremia

## 2022-06-23 NOTE — PHYSICAL THERAPY INITIAL EVALUATION ADULT - GENERAL OBSERVATIONS, REHAB EVAL
PT IE [id number]. chart reviewed. Pt encountered semi-reclined in bed. In NAD. + IV lock, + tele. pt c/o weakness of L LE.

## 2022-06-23 NOTE — CONSULT NOTE ADULT - ATTENDING COMMENTS
The patient was seen. Agree with above.  72 yo male has chronic pancytopenia likely due to liver cirrhosis and splenomegaly. The patient was seen. Agree with above.  70 yo male has chronic pancytopenia since 2018, likely due to Hep C, liver cirrhosis and splenomegaly.  Can not exclude primary hematologic disorder. Will order flow cytometry of blood for further evaluation.   May give PLT transfusion before procedure.

## 2022-06-23 NOTE — PROGRESS NOTE ADULT - ASSESSMENT
73 y/o Male w/ PMHx for HTN, non-insulin dependent DM, cirrhosis, HCV s/p tx, former IVDU on suboxone, active smoker, CAD s/p PCI x 3 s/p 2 stents (done recently), arrives to the ED w/ mild confusion, bradycardia HR in 40s, and is found to be severely hyponatremic, hyperkalemia and bradycardic and pancytopenia .    #AFib with Bradycardia  - CHADVASC- 4   - HASBLED- 6 - very high risk for major bleed  - HR consistently in the 40s  - repeat ECHO pending  - TSH 3.71 WNL  - holding AV denis blocking agents   -As per GI, no active bleed so OAC okay as long as benefits >risks  -As pe heme/onc pt cleared for PPM placement as long as benefits >risks    #PROMISE with Hyperkalemia and metabolic acidosis and Hyponatremia (all improving) - due to overdiuresis   - patient has been in overdiuresis (on torsemide 20 Q12Hrs and Aldactone at home)  - Na- 129 today.  - Potassium stable at 4.8  - c/w sodium bicarb po 650 TID  - c/w lokelma 10 mg TID   - C/w lasix 20 mg IV as per nephro  - recall nephro as needed     #Pancytopenia likely due to liver cirrhosis   # acute on chronic anemia  # thrombocytopenic   - s/p 2 prbc   - iron panel pending  - B12 and folate WNL  - keep active type and screen. Keep hgb >7  - holding brilinta and holding AC in setting of thrombocytopenia   - continue dvt ppx   -As per GI, no active bleed at this time, no intervention at this time  -Heme/Onc recs appreciated; check iron studies (last was done in 2018 and pointing towards iron deficiency) and ferritin low (6/2022), would give IV iron infusion if iron studies show iron deficiency.      #left Distal peroneal DVT   #Left UE swelling  - L UE duplex shows superficial phlebitis  - LE US found DVT on left peroneal vein.   - Elevated D-dimer  - Repeat LE Duplex in 1 week on 06/28/22    #Decompensated liver cirrhosis sec to Hep C MELD na 22, child henley A 6  #Hx of Ascites on lasix, aldactone at home - no present ascites   - HCV RNA not detected (3/16/2022)  - No H/O SBP  - Never had EGD and colonoscopy in the past   - hold aldactone due to hyponatremia and hyperkalemia   - trend CBC, keep Hb>7   - No evidence of active bleeding   - c/w lactulose, titrate to 3-4BM/day. Pt states BM are regular now   - Fu AFP   - GI recs appreciated; can start anticoagulation if benefit>risk given no active bleeding    #CAD s/p PCI x 3 s/p 2 stents back in march   - RCA: large dominant vessel, 90% heavily calcified stenosis of prox and mid RCA s/p PCI and YVETTE x 2  - echo -Normal global left ventricular systolic function. LV Ejection Fraction by Carlin's Method with a biplane EF of 64 %.  - at home on aspirin and brilinta, holding brilinta in setting of thrombocytopenia   - c/w atorvastatin 40 mg qd  - cardiology following     #Hypothyroidism   - TSH WNL  - continue levothyroxine 50 mcg qd    #Hx IVDU  - continue suboxone   - continue folic acid, thiamine     #COPD/SOB  - maintain O2 Sat at 88-92%   71 y/o Male w/ PMHx for HTN, non-insulin dependent DM, cirrhosis, HCV s/p tx, former IVDU on suboxone, active smoker, CAD s/p PCI x 3 s/p 2 stents (done recently), arrives to the ED w/ mild confusion, bradycardia HR in 40s, and is found to be severely hyponatremic, hyperkalemia and bradycardic and pancytopenia .    #AFib with Bradycardia  - CHADVASC- 4   - HASBLED- 6 - very high risk for major bleed  - HR consistently in the 40s  - repeat ECHO pending  - TSH 3.71 WNL  - holding AV denis blocking agents   -As per GI, no active bleed so OAC okay as long as benefits >risks  -As pe heme/onc pt cleared for PPM placement as long as benefits >risks  -Awaiting EP to schedule PPM placement    #PROMISE with Hyperkalemia and metabolic acidosis and Hyponatremia (all improving) - due to overdiuresis   - patient has been in overdiuresis (on torsemide 20 Q12Hrs and Aldactone at home)  - Na- 129 today.  - Potassium stable at 4.8  - c/w sodium bicarb po 650 TID  - c/w lokelma 10 mg TID   - C/w lasix 20 mg IV as per nephro  - recall nephro as needed     #Pancytopenia likely due to liver cirrhosis   # acute on chronic anemia  # thrombocytopenic   - s/p 2 prbc   - iron panel pending  - B12 and folate WNL  - keep active type and screen. Keep hgb >7  - holding brilinta and holding AC in setting of thrombocytopenia   - continue dvt ppx   -As per GI, no active bleed at this time, no intervention at this time  -Heme/Onc recs appreciated; check iron studies (last was done in 2018 and pointing towards iron deficiency) and ferritin low (6/2022), would give IV iron infusion if iron studies show iron deficiency.      #left Distal peroneal DVT   #Left UE swelling  - L UE duplex shows superficial phlebitis  - LE US found DVT on left peroneal vein.   - Elevated D-dimer  - Repeat LE Duplex in 1 week on 06/28/22    #Decompensated liver cirrhosis sec to Hep C MELD na 22, child henley A 6  #Hx of Ascites on lasix, aldactone at home - no present ascites   - HCV RNA not detected (3/16/2022)  - No H/O SBP  - Never had EGD and colonoscopy in the past   - hold aldactone due to hyponatremia and hyperkalemia   - trend CBC, keep Hb>7   - No evidence of active bleeding   - c/w lactulose, titrate to 3-4BM/day. Pt states BM are regular now   - Fu AFP   - GI recs appreciated; can start anticoagulation if benefit>risk given no active bleeding    #CAD s/p PCI x 3 s/p 2 stents back in march   - RCA: large dominant vessel, 90% heavily calcified stenosis of prox and mid RCA s/p PCI and YVETTE x 2  - echo -Normal global left ventricular systolic function. LV Ejection Fraction by Carlin's Method with a biplane EF of 64 %.  - at home on aspirin and brilinta, holding brilinta in setting of thrombocytopenia   - c/w atorvastatin 40 mg qd  - cardiology following     #Hypothyroidism   - TSH WNL  - continue levothyroxine 50 mcg qd    #Hx IVDU  - continue suboxone   - continue folic acid, thiamine     #COPD/SOB  - maintain O2 Sat at 88-92%   71 y/o Male w/ PMHx for HTN, non-insulin dependent DM, cirrhosis, HCV s/p tx, former IVDU on suboxone, active smoker, CAD s/p PCI x 3 s/p 2 stents (done recently), arrives to the ED w/ mild confusion, bradycardia HR in 40s, and is found to be severely hyponatremic, hyperkalemia and bradycardic and pancytopenia .    #AFib with Bradycardia  - CHADVASC- 4   - HASBLED- 6 - very high risk for major bleed  - HR consistently in the 40s  - repeat ECHO pending  - TSH 3.71 WNL  - holding AV denis blocking agents   -As per GI, no active bleed so OAC okay as long as benefits >risks  -As pe heme/onc pt cleared for PPM placement as long as benefits >risks  -Awaiting EP to schedule PPM placement    #PROMISE with Hyperkalemia and metabolic acidosis and Hyponatremia (all improving) - due to overdiuresis   - patient has been in overdiuresis (on torsemide 20 Q12Hrs and Aldactone at home)  - Na 131 today.  - Potassium stable at 4.8  - c/w sodium bicarb po 650 TID  - c/w lokelma 10 mg TID   - C/w lasix 20 mg IV as per nephro  - recall nephro as needed     #Pancytopenia likely due to liver cirrhosis   # acute on chronic anemia  # thrombocytopenic   - s/p 2 prbc   - iron panel pending  - B12 and folate WNL  - keep active type and screen. Keep hgb >7  - holding brilinta and holding AC in setting of thrombocytopenia   - continue dvt ppx   -As per GI, no active bleed at this time, no intervention at this time  -Heme/Onc recs appreciated; check iron studies (last was done in 2018 and pointing towards iron deficiency) and ferritin low (6/2022), would give IV iron infusion if iron studies show iron deficiency.      #left Distal peroneal DVT   #Left UE swelling  - L UE duplex shows superficial phlebitis  - LE US found DVT on left peroneal vein.   - Elevated D-dimer  - Repeat LE Duplex in 1 week on 06/28/22    #Decompensated liver cirrhosis sec to Hep C MELD na 22, child henley A 6  #Hx of Ascites on lasix, aldactone at home - no present ascites   - HCV RNA not detected (3/16/2022)  - No H/O SBP  - Never had EGD and colonoscopy in the past   - hold aldactone due to hyponatremia and hyperkalemia   - trend CBC, keep Hb>7   - No evidence of active bleeding   - c/w lactulose, titrate to 3-4BM/day. Pt states BM are regular now   - Fu AFP   - GI recs appreciated; can start anticoagulation if benefit>risk given no active bleeding    #CAD s/p PCI x 3 s/p 2 stents back in march   - RCA: large dominant vessel, 90% heavily calcified stenosis of prox and mid RCA s/p PCI and YVETTE x 2  - echo -Normal global left ventricular systolic function. LV Ejection Fraction by Carlin's Method with a biplane EF of 64 %.  - at home on aspirin and brilinta, holding brilinta in setting of thrombocytopenia   - c/w atorvastatin 40 mg qd  - cardiology following     #Hypothyroidism   - TSH WNL  - continue levothyroxine 50 mcg qd    #Hx IVDU  - continue suboxone   - continue folic acid, thiamine     #COPD/SOB  - maintain O2 Sat at 88-92%

## 2022-06-23 NOTE — PROGRESS NOTE ADULT - ASSESSMENT
71 y/o Male w/ PMHx for HTN, non-insulin dependent DM, cirrhosis, HCV s/p tx, former IVDU on suboxone, active smoker, CAD s/p PCI x 3 s/p 2 stents (done recently), arrives to the ED w/ mild confusion, bradycardia HR in 40s, and is found to be severely hyponatremic, hyperkalemia and bradycardic and pancytopenia .    A/P:   Severe Hyponatremia and Hyperkalemia:   Acute Kidney Injury: likely pre-renal   on admission Na 109, K: 7.7 (hemolyzed, repeated 5.9)  Urine Na 23, FEUrea 25% suggesting pre-renal  Likely from overdiuresis (was on Torsemide and Aldactone).   Na improved to 131, K normalized  Cr improved from 2.2 to 0.8  Lasix 20mg IV, switch to PO.   Discontinue Lokelma and Sodium bicarb.    Paroxysmal Atrial Fibrillation with Slow ventricular response:   Back to sinus rhythm, still with bradycardia around 40-50, patient is asymptomatic.   EP recommended echo, plan for pacemaker placement.   Avoid AV node blocker agents. TSH 3.7 normal.   Anticoagulation on hold due to acute drop in Hb, GI is ok with anticoagulation.   HASBLED- 6 - very high risk for major bleed, possibly candidate for left atrial appendage closure device, will discuss with EP.     Acute on chronic anemia:   Possibly from GI bleeding due to esophageal varices, patient with liver cirrhosis and portal hypertension.   No active bleeding, no melena or hematochezia.   Hb was 6.6 on admission s/p 2 packed RBCs transfusion, Hb improved to 8.5,  Seen by GI, will discuss if EGD will be helpful to be done inpatient before starting Anticoagulation.   Brilinta held.     Decompensated Liver Cirrhosis: due to hepatitis C.   coagulopathy, asterixes on exam, no ascites.  Abdomen US showed cirrhotic liver, no ascites.   CT in march 2022 showed splenomegaly with gastroesophageal varices.   Lasix and Aldactone held due to Acute Kidney Injury and hyponatremia, now on Lasix IV 20mg daily, switch to PO, resume Aldactone 50mg daily.      Pancytopenia:   likely from liver cirrhosis.   Hematology consulted. Iron level normal, B12 and folate normal.     CAD s/p PCI  Last cath in 3/30/22 RCA: large dominant vessel, 90% heavily calcified stenosis of prox and mid RCA s/p PCI and YVETTE x 2  Brilinta discontinued on admission, I discussed with his cardiologist Марина, if he started on Eliquis no need for Brilinta.   Continue ASA, Lipitor, no beta blocker due to bradycardia.     Left Distal peroneal DVT   Elevated D-dimer  No need to treat, can repeat duplex in one week, however he may start on Eliquis for AFIB.     Hypothyroidism   - TSH WNL  - continue levothyroxine 50 mcg qd    Hx IVDU: continue suboxone       #COPD/SOB  - maintain O2 Sat at 88-92%  #Progress Note Handoff:  Pending (specify): pacemaker.   Family discussion:  Disposition: Home.

## 2022-06-23 NOTE — CONSULT NOTE ADULT - ASSESSMENT
70 yo M w/ PMH significant for cirrhosis and splenomegaly, HTN, NIDDM, HCV s/p tx, former IVDU on suboxone, active smoker, CAD s/p PCI x 3 presented to ED with confusion, bradycardia HR in 40s, severely hyponatremic and hyperkalemia.  Hematology consulted for management of pt's pancytopenia.     # Chronic pancytopenia secondary to sequestration from hepatosplenomegaly  # Normocytic anemia likely component of iron deficiency anemia  - CT shows hepatosplenomegaly with evidence of varices as well  - normocytic anemia appears to be stable (baseline hgb 8-9)  - folate WNL, B12 WNL  - obtain iron studies with ferritin   - HIV and hep B WNL   - platelets stable ~50k  - coag profile including PTT/PT/INR WNL   - US spleen shows splenomegaly 17.2 x 16.1 x 6.8 cm     Plan:  - GI consult appreciated, f/u GI for plan for EGD and colonoscopy   - check iron studies (last was done in 2018 and pointing towards iron deficiency) and ferritin low (6/2022), would give IV iron infusion if iron studies show iron deficiency     # Slow AF   - planned for PPM with EP  - can proceed with PPM from hematology standpoint if benefit > risk for procedure    70 yo M w/ PMH significant for cirrhosis and splenomegaly, HTN, NIDDM, HCV s/p tx, former IVDU on suboxone, active smoker, CAD s/p PCI x 3 presented to ED with confusion, bradycardia HR in 40s, severely hyponatremic and hyperkalemia.  Hematology consulted for management of pt's pancytopenia.     # Chronic pancytopenia secondary to sequestration from hepatosplenomegaly  # Normocytic anemia likely component of iron deficiency anemia  - CT shows hepatosplenomegaly with evidence of varices as well  - normocytic anemia appears to be stable (baseline hgb 8-9)  - folate WNL, B12 WNL  - obtain iron studies with ferritin   - HIV and hep B WNL   - platelets stable ~50k  - coag profile including PTT/PT/INR WNL   - US spleen shows splenomegaly 17.2 x 16.1 x 6.8 cm     Plan:  - GI consult appreciated, f/u GI for plan for EGD and colonoscopy   - check iron studies (last was done in 2018 and pointing towards iron deficiency) and ferritin low (6/2022), would give IV iron infusion if iron studies show iron deficiency     # Slow AF   - planned for PPM with EP  - can proceed with PPM from hematology standpoint if benefit > risk for procedure; give 1U of platelets 30 mins prior to procedure

## 2022-06-23 NOTE — CONSULT NOTE ADULT - SUBJECTIVE AND OBJECTIVE BOX
Patient is a 72y old  Male who presents with a chief complaint of mild confusion and bradycardia (2022 13:35)      HPI:  71 YO M w/ PMHx significant for HTN, NIDDM, cirrhosis, HCV s/p tx, former IVDU on suboxone, active smoker, CAD s/p PCI x 3 s/p 2 stents (done recently). Pt is here w/ mild confusion, bradycardia HR in 40s , severe hyponatremia. Of note he was here recently for bilateral LE swelling and SOB, was found to have bradycardia on admission. While in the hospital, patient underwent stress test which was positive, then went to cath lab which showed 90% heavily calcified stenosis of prox and mid RCA s/p PCI and YVETTE x 2. Course was complicated by chronic pancytopenia, for which heme/onc saw the patient. Folate, b12 were normal. Abd US showed enlarged spleen, suspicious for sequestration. He was  also seen by hepatology for cirrhosis. On this admission: afebrile, BP soft, HR in 40s. SpO2 100% on RA. trop 0.04 on arrival, pro-bnp 7783 VB.26/37/21/16 pancytopenia (Hg 6.6, baseline 9-10 w/ pancytopenia, Na 109, Cl 90, K 7.7, CXR mild congestion.    (2022 13:03)       ROS:  Negative except for: above.    PAST MEDICAL & SURGICAL HISTORY:  Diabetes    HTN (hypertension)    Hepatitis  Untreated Hepatitis C    Arthritis    H/O heart artery stent    H/O knee surgery    SOCIAL HISTORY: Admits to tobacco use. Denies alcohol use.    FAMILY HISTORY:  Family history of diabetes mellitus (DM) (Father, Mother)  Parents        MEDICATIONS  (STANDING):  aspirin  chewable 81 milliGRAM(s) Oral daily  atorvastatin 40 milliGRAM(s) Oral at bedtime  buprenorphine 8 mG/naloxone 2 mG SL  Tablet 0.5 Tablet(s) SubLingual <User Schedule>  folic acid 1 milliGRAM(s) Oral daily  furosemide   Injectable 20 milliGRAM(s) IV Push daily  gabapentin 100 milliGRAM(s) Oral three times a day  heparin   Injectable 5000 Unit(s) SubCutaneous every 8 hours  influenza  Vaccine (HIGH DOSE) 0.7 milliLiter(s) IntraMuscular once  lactulose Syrup 10 Gram(s) Oral daily  levothyroxine 50 MICROGram(s) Oral daily  nicotine -  14 mG/24Hr(s) Patch 1 patch Transdermal daily  pantoprazole    Tablet 40 milliGRAM(s) Oral before breakfast  polyethylene glycol 3350 17 Gram(s) Oral daily  senna 2 Tablet(s) Oral at bedtime  sodium bicarbonate 650 milliGRAM(s) Oral three times a day  sodium zirconium cyclosilicate 10 Gram(s) Oral three times a day  thiamine 100 milliGRAM(s) Oral daily    MEDICATIONS  (PRN):      Allergies    No Known Allergies    Intolerances        Vital Signs Last 24 Hrs  T(C): 36.7 (2022 05:04), Max: 36.7 (2022 05:04)  T(F): 98 (2022 05:04), Max: 98 (2022 05:04)  HR: 47 (2022 05:04) (47 - 60)  BP: 101/56 (2022 05:04) (101/56 - 108/56)  BP(mean): --  RR: 18 (2022 05:04) (17 - 18)  SpO2: 96% (2022 20:16) (95% - 96%)    PHYSICAL EXAM  General: adult in NAD  HEENT: clear oropharynx, anicteric sclera, pink conjunctiva  Neck: supple  CV: normal S1/S2 with no murmur rubs or gallops  Lungs: positive air movement b/l ant lungs,clear to auscultation, no wheezes, no rales  Abdomen: soft non-tender non-distended, no hepatosplenomegaly  Ext: no clubbing cyanosis or edema  Skin: no rashes and no petechiae  Neuro: alert and oriented X 4, no focal deficits      LABS:                          8.3    1.45  )-----------( 44       ( 2022 06:02 )             25.5     Mean Cell Volume : 81.0 fL  Mean Cell Hemoglobin : 26.3 pg  Mean Cell Hemoglobin Concentration : 32.5 g/dL  Auto Neutrophil # : 0.82 K/uL  Auto Lymphocyte # : 0.35 K/uL  Auto Monocyte # : 0.23 K/uL  Auto Eosinophil # : 0.04 K/uL  Auto Basophil # : 0.00 K/uL  Auto Neutrophil % : 56.5 %  Auto Lymphocyte % : 24.1 %  Auto Monocyte % : 15.9 %  Auto Eosinophil % : 2.8 %  Auto Basophil % : 0.0 %      Serial CBC's   @ 06:02  Hct-25.5 / Hgb-8.3 / Plat-44 / RBC-3.15 / WBC-1.45  Serial CBC's   @ 08:12  Hct-26.0 / Hgb-8.1 / Plat-39 / RBC-3.12 / WBC-1.54  Serial CBC's   @ 07:14  Hct-24.2 / Hgb-7.9 / Plat-46 / RBC-2.96 / WBC-1.77  Serial CBC's   @ 20:50  Hct-24.9 / Hgb-8.2 / Plat-48 / RBC-3.10 / WBC-1.73          129<L>  |  95<L>  |  16  ----------------------------<  108<H>  4.8   |  26  |  0.7    Ca    8.7      2022 06:02  Mg     1.7         TPro  6.5  /  Alb  3.7  /  TBili  1.2  /  DBili  x   /  AST  29  /  ALT  14  /  AlkPhos  68      Ferritin, Serum: 78 ng/mL ( @ 06:02)  Folate, Serum: 11.8 ng/mL ( @ 06:02)  Vitamin B12, Serum: 1123 pg/mL ( @ 06:02)    BLOOD SMEAR INTERPRETATION:       RADIOLOGY & ADDITIONAL STUDIES:    < from: VA Duplex Upper Ext Vein Scan, Left (22 @ 17:00) >  Impression:  No DVT in left upper extremity.  Superficial phlebitis in left cephalic vein.  --- End of Report ---  < end of copied text >    < from: US Abdomen Doppler (22 @ 10:16) >  IMPRESSION:  Cirrhotic fibrotic appearing liver. If concerned with veins of the liver,   consider correlation with postcontrast CT scanning.  Cholelithiasis.  < end of copied text >    < from: US Spleen (22 @ 09:45) >  The spleen measures 17.2 x 16.1 x 6.8 cm for volume of nearly 1000 cc. It   is enlarged. No discrete mass is seen.  Splenic vasculature is within normal limits.  IMPRESSION:  Splenomegaly.  < end of copied text >

## 2022-06-23 NOTE — PROGRESS NOTE ADULT - SUBJECTIVE AND OBJECTIVE BOX
DINO LANCE  72y  Male      Patient is a 72y old  Male who presents with a chief complaint of mild confusion and bradycardia (21 Jun 2022 13:35)      INTERVAL HPI/OVERNIGHT EVENTS:  No bleeding, no melena or hematochezia, no chest pain or dizziness.   Vital Signs Last 24 Hrs  T(C): 36.6 (23 Jun 2022 13:00), Max: 36.7 (23 Jun 2022 05:04)  T(F): 97.8 (23 Jun 2022 13:00), Max: 98 (23 Jun 2022 05:04)  HR: 51 (23 Jun 2022 13:00) (47 - 54)  BP: 104/53 (23 Jun 2022 13:00) (101/56 - 108/56)  BP(mean): --  RR: 18 (23 Jun 2022 13:00) (18 - 18)  SpO2: 96% (22 Jun 2022 20:16) (96% - 96%)      06-22-22 @ 07:01  -  06-23-22 @ 07:00  --------------------------------------------------------  IN: 236 mL / OUT: 1300 mL / NET: -1064 mL    06-23-22 @ 07:01  -  06-23-22 @ 17:14  --------------------------------------------------------  IN: 0 mL / OUT: 800 mL / NET: -800 mL            Consultant(s) Notes Reviewed:  [x ] YES  [ ] NO          MEDICATIONS  (STANDING):  aspirin  chewable 81 milliGRAM(s) Oral daily  atorvastatin 40 milliGRAM(s) Oral at bedtime  buprenorphine 8 mG/naloxone 2 mG SL  Tablet 0.5 Tablet(s) SubLingual <User Schedule>  folic acid 1 milliGRAM(s) Oral daily  furosemide   Injectable 20 milliGRAM(s) IV Push daily  gabapentin 100 milliGRAM(s) Oral three times a day  heparin   Injectable 5000 Unit(s) SubCutaneous every 8 hours  influenza  Vaccine (HIGH DOSE) 0.7 milliLiter(s) IntraMuscular once  lactulose Syrup 10 Gram(s) Oral daily  levothyroxine 50 MICROGram(s) Oral daily  nicotine -  14 mG/24Hr(s) Patch 1 patch Transdermal daily  pantoprazole    Tablet 40 milliGRAM(s) Oral before breakfast  polyethylene glycol 3350 17 Gram(s) Oral daily  senna 2 Tablet(s) Oral at bedtime  thiamine 100 milliGRAM(s) Oral daily    MEDICATIONS  (PRN):      LABS                          8.5    1.55  )-----------( 37       ( 23 Jun 2022 12:52 )             26.3     06-23    131<L>  |  97<L>  |  16  ----------------------------<  128<H>  4.1   |  26  |  0.8    Ca    8.6      23 Jun 2022 12:52  Mg     1.6     06-23    TPro  6.7  /  Alb  3.9  /  TBili  1.2  /  DBili  x   /  AST  28  /  ALT  14  /  AlkPhos  68  06-23          Lactate Trend        CAPILLARY BLOOD GLUCOSE      POCT Blood Glucose.: 137 mg/dL (23 Jun 2022 11:36)        RADIOLOGY & ADDITIONAL TESTS:    Imaging Personally Reviewed:  [ ] YES  [ ] NO    HEALTH ISSUES - PROBLEM Dx:          PHYSICAL EXAM:  GENERAL: NAD, well-developed.  HEAD:  Atraumatic, Normocephalic.  EYES: EOMI, PERRLA, conjunctiva and sclera clear.  NECK: Supple, No JVD.  CHEST/LUNG: Clear to auscultation bilaterally; No wheeze.  HEART: Regular rate and rhythm; S1 S2. bradycardic.   ABDOMEN: Soft, Nontender, Nondistended; Bowel sounds present.  EXTREMITIES:  2+ Peripheral Pulses, venous stases changes in LE.   PSYCH: AAOx3.  NEUROLOGY: non-focal.  SKIN: No rashes or lesions.

## 2022-06-24 NOTE — PROVIDER CONTACT NOTE (CHANGE IN STATUS NOTIFICATION) - ACTION/TREATMENT ORDERED:
EKG completed. Dr. Saleh aware & assessing patient at bedside.  patient states the pain disappeared after a few minutes.

## 2022-06-24 NOTE — PROGRESS NOTE ADULT - SUBJECTIVE AND OBJECTIVE BOX
DINO LANCE  72y  Male      Patient is a 72y old  Male who presents with a chief complaint of mild confusion and bradycardia (21 Jun 2022 13:35)      INTERVAL HPI/OVERNIGHT EVENTS:  He feels ok, had chest pain this morning, resolved.   Vital Signs Last 24 Hrs  T(C): 36.2 (24 Jun 2022 12:46), Max: 37.9 (24 Jun 2022 05:34)  T(F): 97.2 (24 Jun 2022 12:46), Max: 100.2 (24 Jun 2022 05:34)  HR: 55 (24 Jun 2022 12:46) (52 - 58)  BP: 103/51 (24 Jun 2022 12:46) (101/52 - 104/52)  BP(mean): 74 (24 Jun 2022 11:10) (74 - 74)  RR: 18 (24 Jun 2022 12:46) (18 - 18)  SpO2: 97% (24 Jun 2022 12:46) (97% - 97%)      06-23-22 @ 07:01  -  06-24-22 @ 07:00  --------------------------------------------------------  IN: 360 mL / OUT: 2300 mL / NET: -1940 mL            Consultant(s) Notes Reviewed:  [x ] YES  [ ] NO          MEDICATIONS  (STANDING):  aspirin  chewable 81 milliGRAM(s) Oral daily  atorvastatin 40 milliGRAM(s) Oral at bedtime  buprenorphine 8 mG/naloxone 2 mG SL  Tablet 0.5 Tablet(s) SubLingual <User Schedule>  folic acid 1 milliGRAM(s) Oral daily  furosemide    Tablet 20 milliGRAM(s) Oral daily  gabapentin 100 milliGRAM(s) Oral three times a day  heparin   Injectable 5000 Unit(s) SubCutaneous every 8 hours  hydrOXYzine hydrochloride 25 milliGRAM(s) Oral at bedtime  influenza  Vaccine (HIGH DOSE) 0.7 milliLiter(s) IntraMuscular once  iron sucrose IVPB 200 milliGRAM(s) IV Intermittent <User Schedule>  lactulose Syrup 10 Gram(s) Oral daily  levothyroxine 50 MICROGram(s) Oral daily  nicotine -  14 mG/24Hr(s) Patch 1 patch Transdermal daily  pantoprazole    Tablet 40 milliGRAM(s) Oral before breakfast  polyethylene glycol 3350 17 Gram(s) Oral daily  senna 2 Tablet(s) Oral at bedtime  spironolactone 50 milliGRAM(s) Oral daily  thiamine 100 milliGRAM(s) Oral daily    MEDICATIONS  (PRN):  aluminum hydroxide/magnesium hydroxide/simethicone Suspension 30 milliLiter(s) Oral once PRN Dyspepsia      LABS                          8.5    0.88  )-----------( 32       ( 24 Jun 2022 06:45 )             26.3     06-24    131<L>  |  98  |  16  ----------------------------<  106<H>  4.1   |  22  |  0.9    Ca    8.8      24 Jun 2022 06:45  Mg     1.6     06-24    TPro  6.7  /  Alb  3.8  /  TBili  1.6<H>  /  DBili  x   /  AST  28  /  ALT  13  /  AlkPhos  77  06-24          Lactate Trend        CAPILLARY BLOOD GLUCOSE      POCT Blood Glucose.: 149 mg/dL (24 Jun 2022 11:20)        RADIOLOGY & ADDITIONAL TESTS:    Imaging Personally Reviewed:  [ ] YES  [ ] NO    HEALTH ISSUES - PROBLEM Dx:          PHYSICAL EXAM:  GENERAL: NAD, well-developed.  HEAD:  Atraumatic, Normocephalic.  EYES: EOMI, PERRLA, conjunctiva and sclera clear.  NECK: Supple, No JVD.  CHEST/LUNG: Clear to auscultation bilaterally; No wheeze.  HEART: Regular rate and rhythm; S1 S2. bradycardic.   ABDOMEN: Soft, Nontender, Nondistended; Bowel sounds present.  EXTREMITIES:  2+ Peripheral Pulses, venous stases changes in LE.   PSYCH: AAOx3.  NEUROLOGY: non-focal.  SKIN: No rashes or lesions.

## 2022-06-24 NOTE — CHART NOTE - NSCHARTNOTEFT_GEN_A_CORE
The nurse informed me that the patient was experiencing chest pain. Vitals: /55 HR 54. EKG showed  .    When I evaluated the patient he said he was no longer in pain. He was not in distress, was sitting comfortably. He was not short of breath. He said the pain came on after he had eaten.    Ordered troponin on the patient. Will continue to monitor the patient if symptoms reoccur or worsen. The nurse informed me that the patient was experiencing chest pain. Vitals: /55 HR 54. EKG showed 1st degree block.    When I evaluated the patient he said he was no longer in pain. He was not in distress, was sitting comfortably. He was not short of breath. He said the pain came on after he had eaten.    Ordered troponin on the patient. Will continue to monitor the patient if symptoms reoccur or worsen.

## 2022-06-24 NOTE — PROGRESS NOTE ADULT - ASSESSMENT
73 y/o Male w/ PMHx for HTN, non-insulin dependent DM, cirrhosis, HCV s/p tx, former IVDU on suboxone, active smoker, CAD s/p PCI x 3 s/p 2 stents (done recently), arrives to the ED w/ mild confusion, bradycardia HR in 40s, and is found to be severely hyponatremic, hyperkalemia and bradycardic and pancytopenia .    #Pancytopenia likely due to liver cirrhosis   #Leukopenia with mild fever  # acute on chronic anemia  # thrombocytopenic   - 1 unit of platelets ordered for today as patient has dropped to 33 today   - Starting Venofer today for 5 doses once every other day, as per heme/onc recs  - CT Abdomen w/contrast ordered to evaluate splenomegaly, as per heme/onc recs  - s/p 2 prbc on admission  - iron panel, B12, and folate WNL  - keep active type and screen. Keep hgb >7  - holding brilinta and holding AC in setting of thrombocytopenia   - continue dvt ppx   -As per GI, no active bleed at this time, no intervention at this time  -Heme/Onc recs appreciated; check iron studies (last was done in 2018 and pointing towards iron deficiency) and ferritin low (6/2022), would give IV iron infusion if iron studies show iron deficiency.  - Rapid viral panel pending  - CXR pending  - If fever >100.4 start patient on Cefepime and perform blood cultures    #Decompensated liver cirrhosis sec to Hep C MELD na 22, child henley A 6  #Hx of Ascites on lasix, aldactone at home - no present ascites   - HCV RNA not detected (3/16/2022)  - No H/O SBP  - Never had EGD and colonoscopy in the past   - hold aldactone due to hyponatremia and hyperkalemia   - trend CBC, keep Hb>7   - No evidence of active bleeding   - c/w lactulose, titrate to 3-4BM/day. Pt states BM are regular now   - Fu AFP   - GI recs appreciated; can start anticoagulation if benefit>risk given no active bleeding    #AFib with Bradycardia  - CHADVASC- 4   - HASBLED- 6 - very high risk for major bleed  - HR consistently in the 40s  - repeat ECHO pending  - TSH 3.71 WNL  - holding AV denis blocking agents   -As per GI, no active bleed so OAC okay as long as benefits >risks  -As pe heme/onc pt cleared for PPM placement as long as benefits >risks  -Awaiting EP to schedule PPM placement    #PROMISE with Hyperkalemia and metabolic acidosis and Hyponatremia (all improving) - due to overdiuresis   - patient has been in overdiuresis (on torsemide 20 Q12Hrs and Aldactone at home)  - Na 131 today.  - Potassium stable at 4.8  - D/c sodium bicarb po 650 TID, lokelma 10 mg TID   - D/c sodium restricted diet  - C/w lasix 20 mg IV as per nephro  - recall nephro as needed     #left Distal peroneal DVT   #Left UE swelling  - L UE duplex shows superficial phlebitis  - LE US found DVT on left peroneal vein.   - Elevated D-dimer  - Repeat LE Duplex in 1 week on 06/28/22    #CAD s/p PCI x 3 s/p 2 stents back in march   - RCA: large dominant vessel, 90% heavily calcified stenosis of prox and mid RCA s/p PCI and YVETTE x 2  - echo -Normal global left ventricular systolic function. LV Ejection Fraction by Carlin's Method with a biplane EF of 64 %.  - at home on aspirin and brilinta, holding brilinta in setting of thrombocytopenia   - c/w atorvastatin 40 mg qd  - cardiology following     #Hypothyroidism   - TSH WNL  - continue levothyroxine 50 mcg qd    #Hx IVDU  - continue suboxone   - continue folic acid, thiamine     #COPD/SOB  - maintain O2 Sat at 88-92% 71 y/o Male w/ PMHx for HTN, non-insulin dependent DM, cirrhosis, HCV s/p tx, former IVDU on suboxone, active smoker, CAD s/p PCI x 3 s/p 2 stents (done recently), arrives to the ED w/ mild confusion, bradycardia HR in 40s, and is found to be severely hyponatremic, hyperkalemia and bradycardic and pancytopenia .    #Pancytopenia likely due to liver cirrhosis   #Leukopenia with mild fever  # acute on chronic anemia  # thrombocytopenic   - 1 unit of platelets ordered for today 06/24 as patient has dropped to 33 today   - Starting Venofer today 06/24 for 5 doses once every other day, as per heme/onc recs  - CT Abdomen w/contrast ordered to evaluate splenomegaly, as per heme/onc recs  - s/p 2 prbc on admission  - iron panel, B12, and folate WNL  - keep active type and screen. Keep hgb >7  - holding brilinta and holding AC in setting of thrombocytopenia   -As per GI, no active bleed at this time, no intervention at this time  - Rapid viral panel pending  - CXR pending  - If fever >100.4 start patient on Cefepime and perform blood cultures    #Decompensated liver cirrhosis sec to Hep C MELD na 22, child henley A 6  #Hx of Ascites on lasix, aldactone at home - no present ascites   - HCV RNA not detected (3/16/2022)  - No H/O SBP  - Never had EGD and colonoscopy in the past   - hold aldactone due to hyponatremia and hyperkalemia   - trend CBC, keep Hb>7   - No evidence of active bleeding   - c/w lactulose, titrate to 3-4BM/day. Pt states BM are regular now   - GI recs appreciated; can start anticoagulation if benefit>risk given no active bleeding    #AFib with Bradycardia  - CHADVASC- 4   - HASBLED- 6 - very high risk for major bleed  - HR consistently in the 40s  - repeat ECHO pending  - TSH 3.71 WNL  - holding AV denis blocking agents   -As per GI, no active bleed so OAC okay as long as benefits >risks  -As pe heme/onc pt cleared for PPM placement as long as benefits >risks  -Awaiting EP to schedule PPM placement    #PROMISE with Hyperkalemia and metabolic acidosis and Hyponatremia (all improving) - due to overdiuresis   - patient has been in overdiuresis (on torsemide 20 Q12Hrs and Aldactone at home)  - Na 131 today.  - Potassium stable at 4.8  - D/c sodium bicarb po 650 TID, lokelma 10 mg TID   - D/c sodium restricted diet  - C/w lasix 20 mg IV as per nephro  - recall nephro as needed     #left Distal peroneal DVT   #Left UE swelling  - L UE duplex shows superficial phlebitis  - LE US found DVT on left peroneal vein.   - Elevated D-dimer  - Repeat LE Duplex in 1 week on 06/28/22    #CAD s/p PCI x 3 s/p 2 stents back in march   - RCA: large dominant vessel, 90% heavily calcified stenosis of prox and mid RCA s/p PCI and YVETTE x 2  - echo -Normal global left ventricular systolic function. LV Ejection Fraction by Carlin's Method with a biplane EF of 64 %.  - at home on aspirin and brilinta, holding brilinta in setting of thrombocytopenia   - c/w atorvastatin 40 mg qd  - cardiology following     #Hypothyroidism   - TSH WNL  - continue levothyroxine 50 mcg qd    #Hx IVDU  - continue suboxone   - continue folic acid, thiamine     #COPD/SOB  - maintain O2 Sat at 88-92%

## 2022-06-24 NOTE — PROGRESS NOTE ADULT - SUBJECTIVE AND OBJECTIVE BOX
Hospital Day:  7d    Subjective:    Patient is a 72y old  Male who presents with a chief complaint of mild confusion and bradycardia. He is sitting up comfortably in bed this morning.       Admitted to medicine for a primary diagnosis of Afib with bradycardia, pancytopenia, hyponatremia and hyperkalemia.    Past Medical Hx:   Diabetes    HTN (hypertension)    Hepatitis    Arthritis      Past Sx:  H/O heart artery stent    H/O knee surgery      Allergies:  No Known Allergies    Current Meds:   Standng Meds:  aspirin  chewable 81 milliGRAM(s) Oral daily  atorvastatin 40 milliGRAM(s) Oral at bedtime  buprenorphine 8 mG/naloxone 2 mG SL  Tablet 0.5 Tablet(s) SubLingual <User Schedule>  folic acid 1 milliGRAM(s) Oral daily  furosemide    Tablet 20 milliGRAM(s) Oral daily  gabapentin 100 milliGRAM(s) Oral three times a day  heparin   Injectable 5000 Unit(s) SubCutaneous every 8 hours  hydrOXYzine hydrochloride 25 milliGRAM(s) Oral at bedtime  influenza  Vaccine (HIGH DOSE) 0.7 milliLiter(s) IntraMuscular once  iron sucrose IVPB 200 milliGRAM(s) IV Intermittent <User Schedule>  lactulose Syrup 10 Gram(s) Oral daily  levothyroxine 50 MICROGram(s) Oral daily  nicotine -  14 mG/24Hr(s) Patch 1 patch Transdermal daily  pantoprazole    Tablet 40 milliGRAM(s) Oral before breakfast  polyethylene glycol 3350 17 Gram(s) Oral daily  senna 2 Tablet(s) Oral at bedtime  spironolactone 50 milliGRAM(s) Oral daily  thiamine 100 milliGRAM(s) Oral daily    PRN Meds:  aluminum hydroxide/magnesium hydroxide/simethicone Suspension 30 milliLiter(s) Oral once PRN Dyspepsia    HOME MEDICATIONS:  aspirin 81 mg oral tablet: 1 tab(s) orally once a day  buprenorphine-naloxone 8 mg-2 mg sublingual film: 0.5 film(s) sublingual 2 times a day  metFORMIN 500 mg oral tablet: 1 tab(s) orally 2 times a day  HOLD METFORMIN FOR 48 HR AFTER CARDIAC CATH   spironolactone 100 mg oral tablet: 1 tab(s) orally once a day      Vital Signs:   T(F): 97.2 (06-24-22 @ 12:46), Max: 100.2 (06-24-22 @ 05:34)  HR: 55 (06-24-22 @ 12:46) (52 - 58)  BP: 103/51 (06-24-22 @ 12:46) (101/52 - 104/52)  RR: 18 (06-24-22 @ 12:46) (18 - 18)  SpO2: 97% (06-24-22 @ 12:46) (97% - 97%)      06-23-22 @ 07:01  -  06-24-22 @ 07:00  --------------------------------------------------------  IN: 360 mL / OUT: 2300 mL / NET: -1940 mL        Physical Exam:   GENERAL: NAD, well-developed.  HEAD:  Atraumatic, Normocephalic.  EYES: EOMI, PERRLA, conjunctiva and sclera clear.  NECK: Supple, No JVD.  CHEST/LUNG: Clear to auscultation bilaterally; No wheeze.  HEART: Regular rate and rhythm; S1 S2. bradycardic.   ABDOMEN: Soft, Nontender, Nondistended; Bowel sounds present. No ascites  EXTREMITIES:  2+ Peripheral Pulses, venous stases changes in LE.   NEURO: AAOx3.          Labs:                         8.5    0.88  )-----------( 32       ( 24 Jun 2022 06:45 )             26.3       24 Jun 2022 06:45    131    |  98     |  16     ----------------------------<  106    4.1     |  22     |  0.9      Ca    8.8        24 Jun 2022 06:45  Mg     1.6       24 Jun 2022 06:45    TPro  6.7    /  Alb  3.8    /  TBili  1.6    /  DBili  x      /  AST  28     /  ALT  13     /  AlkPhos  77     24 Jun 2022 06:45            Serum Pro-Brain Natriuretic Peptide: 7783 pg/mL (06-17-22 @ 11:36)      Iron 50, TIBC 378, %Sat 13 Ferritin -- 06-23-22 @ 16:04  Iron --, TIBC --, %Sat -- Ferritin 78 06-22-22 @ 06:02

## 2022-06-24 NOTE — PROGRESS NOTE ADULT - ASSESSMENT
71 y/o Male w/ PMHx for HTN, non-insulin dependent DM, cirrhosis, HCV s/p tx, former IVDU on suboxone, active smoker, CAD s/p PCI x 3 s/p 2 stents (done recently), arrives to the ED w/ mild confusion, bradycardia HR in 40s, and is found to be severely hyponatremic, hyperkalemia and bradycardic and pancytopenia .    A/P:   Pancytopenia:   likely from liver cirrhosis.   Hematology consulted. Iron level normal, B12 and folate normal.   Temp 100.2, monitor for fever, send UA, CXR, RVP. If spiked fever send blood cx and start on Cefepime.     Severe Hyponatremia and Hyperkalemia:   Acute Kidney Injury: likely pre-renal   on admission Na 109, K: 7.7 (hemolyzed, repeated 5.9)  Urine Na 23, FEUrea 25% suggesting pre-renal  Likely from overdiuresis (was on Torsemide and Aldactone).   Na improved to 131, K normalized  Cr improved from 2.2 to 0.8  Lasix 20mg IV, now PO.    Discontinue Lokelma and Sodium bicarb.    Paroxysmal Atrial Fibrillation with Slow ventricular response:   Back to sinus rhythm, still with bradycardia around 40-50, patient is asymptomatic.   EP recommended echo, plan for pacemaker placement.   Avoid AV node blocker agents. TSH 3.7 normal.   Anticoagulation on hold due to acute drop in Hb, GI is ok with anticoagulation.   HASBLED- 6 - very high risk for major bleed, possibly candidate for left atrial appendage closure device, will discuss with EP.     Acute on chronic anemia:   Possibly from GI bleeding due to esophageal varices, patient with liver cirrhosis and portal hypertension.   No active bleeding, no melena or hematochezia.   Hb was 6.6 on admission s/p 2 packed RBCs transfusion, Hb improved to 8.5,  Seen by GI, will discuss if EGD will be helpful to be done inpatient before starting Anticoagulation.   Brilinta held.     Decompensated Liver Cirrhosis: due to hepatitis C.   coagulopathy, asterixes on exam, no ascites.  Abdomen US showed cirrhotic liver, no ascites.   CT in march 2022 showed splenomegaly with gastroesophageal varices.   Lasix and Aldactone held due to Acute Kidney Injury and hyponatremia, start on Lasix 20mg po daily, resume Aldactone 50mg daily.      CAD s/p PCI  Last cath in 3/30/22 RCA: large dominant vessel, 90% heavily calcified stenosis of prox and mid RCA s/p PCI and YVETTE x 2  Brilinta discontinued on admission, I discussed with his cardiologist dr Young, if he started on Eliquis no need for Brilinta.   Continue ASA, Lipitor, no beta blocker due to bradycardia.     Left Distal peroneal DVT   Elevated D-dimer  No need to treat, can repeat duplex in one week, however he may start on Eliquis for AFIB.     Hypothyroidism   TSH 3.7, continue levothyroxine 50 mcg qd    Hx IVDU: continue suboxone     #COPD/SOB  Stable, no wheezing. Chronic tobacco abuse, continue Nicotine patch.   #Progress Note Handoff:  Pending (specify): pacemaker.   Family discussion:  Disposition: Home.

## 2022-06-25 NOTE — PROGRESS NOTE ADULT - SUBJECTIVE AND OBJECTIVE BOX
DINO LANCE  72y  Male      Patient is a 72y old  Male who presents with a chief complaint of mild confusion and bradycardia (21 Jun 2022 13:35)      INTERVAL HPI/OVERNIGHT EVENTS:  No new complaints, no fever.   Vital Signs Last 24 Hrs  T(C): 36.6 (24 Jun 2022 20:10), Max: 37.2 (24 Jun 2022 20:01)  T(F): 97.9 (24 Jun 2022 20:10), Max: 98.9 (24 Jun 2022 20:01)  HR: 49 (25 Jun 2022 05:27) (49 - 52)  BP: 123/59 (25 Jun 2022 05:27) (111/54 - 123/59)  BP(mean): --  RR: 19 (24 Jun 2022 20:10) (18 - 19)  SpO2: 97% (24 Jun 2022 20:10) (97% - 97%)      06-24-22 @ 07:01  -  06-25-22 @ 07:00  --------------------------------------------------------  IN: 867 mL / OUT: 1500 mL / NET: -633 mL            Consultant(s) Notes Reviewed:  [x ] YES  [ ] NO          MEDICATIONS  (STANDING):  aspirin  chewable 81 milliGRAM(s) Oral daily  atorvastatin 40 milliGRAM(s) Oral at bedtime  buprenorphine 8 mG/naloxone 2 mG SL  Tablet 0.5 Tablet(s) SubLingual <User Schedule>  folic acid 1 milliGRAM(s) Oral daily  furosemide    Tablet 20 milliGRAM(s) Oral daily  gabapentin 100 milliGRAM(s) Oral three times a day  heparin   Injectable 5000 Unit(s) SubCutaneous every 8 hours  hydrOXYzine hydrochloride 25 milliGRAM(s) Oral at bedtime  influenza  Vaccine (HIGH DOSE) 0.7 milliLiter(s) IntraMuscular once  iron sucrose IVPB 200 milliGRAM(s) IV Intermittent <User Schedule>  lactulose Syrup 10 Gram(s) Oral daily  levothyroxine 50 MICROGram(s) Oral daily  magnesium sulfate  IVPB 2 Gram(s) IV Intermittent every 2 hours  nicotine -  14 mG/24Hr(s) Patch 1 patch Transdermal daily  pantoprazole    Tablet 40 milliGRAM(s) Oral before breakfast  polyethylene glycol 3350 17 Gram(s) Oral daily  senna 2 Tablet(s) Oral at bedtime  spironolactone 50 milliGRAM(s) Oral daily  thiamine 100 milliGRAM(s) Oral daily    MEDICATIONS  (PRN):  aluminum hydroxide/magnesium hydroxide/simethicone Suspension 30 milliLiter(s) Oral once PRN Dyspepsia      LABS                          8.5    1.26  )-----------( 51       ( 25 Jun 2022 07:37 )             26.0     06-25    132<L>  |  97<L>  |  18  ----------------------------<  98  3.8   |  24  |  0.8    Ca    8.5      25 Jun 2022 07:37  Mg     1.6     06-25    TPro  6.5  /  Alb  3.7  /  TBili  1.4<H>  /  DBili  x   /  AST  28  /  ALT  14  /  AlkPhos  74  06-25          Lactate Trend    CARDIAC MARKERS ( 24 Jun 2022 16:53 )  x     / 0.02 ng/mL / x     / x     / x          CAPILLARY BLOOD GLUCOSE      POCT Blood Glucose.: 125 mg/dL (25 Jun 2022 11:38)        RADIOLOGY & ADDITIONAL TESTS:    Imaging Personally Reviewed:  [ ] YES  [ ] NO    HEALTH ISSUES - PROBLEM Dx:          PHYSICAL EXAM:  GENERAL: NAD, well-developed.  HEAD:  Atraumatic, Normocephalic.  EYES: EOMI, PERRLA, conjunctiva and sclera clear.  NECK: Supple, No JVD.  CHEST/LUNG: Clear to auscultation bilaterally; No wheeze.  HEART: Regular rate and rhythm; S1 S2. bradycardic.   ABDOMEN: Soft, Nontender, Nondistended; Bowel sounds present.  EXTREMITIES:  2+ Peripheral Pulses, venous stases changes in LE.   PSYCH: AAOx3.  NEUROLOGY: non-focal.  SKIN: No rashes or lesions.

## 2022-06-25 NOTE — PROGRESS NOTE ADULT - ASSESSMENT
71 y/o Male w/ PMHx for HTN, non-insulin dependent DM, cirrhosis, HCV s/p tx, former IVDU on suboxone, active smoker, CAD s/p PCI x 3 s/p 2 stents (done recently), arrives to the ED w/ mild confusion, bradycardia HR in 40s, and is found to be severely hyponatremic, hyperkalemia and bradycardic and pancytopenia .    A/P:   Pancytopenia:   likely from liver cirrhosis.   Hematology consulted. Iron level normal, B12 and folate normal.   PLT was 32, s/p platelet transfusion on 6/24.   Temp 100.2, monitor for fever, send UA, CXR, RVP. If spiked fever send blood cx and start on Cefepime.     Severe Hyponatremia and Hyperkalemia:   Acute Kidney Injury: likely pre-renal   on admission Na 109, K: 7.7 (hemolyzed, repeated 5.9)  Urine Na 23, FEUrea 25% suggesting pre-renal  Likely from overdiuresis (was on Torsemide and Aldactone).   Na improved to 131, K normalized  Cr improved from 2.2 to 0.8  Lasix 20mg IV, now PO.    Discontinue Lokelma and Sodium bicarb.    Paroxysmal Atrial Fibrillation with Slow ventricular response:   Back to sinus rhythm, still with bradycardia around 40-50, patient is asymptomatic.   EP recommended echo, plan for pacemaker placement.   Avoid AV node blocker agents. TSH 3.7 normal.   Anticoagulation on hold due to acute drop in Hb, GI is ok with anticoagulation.   HASBLED- 6 - very high risk for major bleed, possibly candidate for left atrial appendage closure device, will discuss with EP.     Acute on chronic anemia:   Possibly from GI bleeding due to esophageal varices, patient with liver cirrhosis and portal hypertension.   No active bleeding, no melena or hematochezia.   Hb was 6.6 on admission s/p 2 packed RBCs transfusion, Hb improved to 8.5,  Seen by GI, will discuss if EGD will be helpful to be done inpatient before starting Anticoagulation.   Brilinta held.     Decompensated Liver Cirrhosis: due to hepatitis C.   coagulopathy, asterixes on exam, no ascites.  Abdomen US showed cirrhotic liver, no ascites.   CT in march 2022 showed splenomegaly with gastroesophageal varices.   Lasix and Aldactone held due to Acute Kidney Injury and hyponatremia, start on Lasix 20mg po daily, resume Aldactone 50mg daily.      CAD s/p PCI  Last cath in 3/30/22 RCA: large dominant vessel, 90% heavily calcified stenosis of prox and mid RCA s/p PCI and YVETTE x 2  Brilinta discontinued on admission, I discussed with his cardiologist dr Young, if he started on Eliquis no need for Brilinta.   Continue ASA, Lipitor, no beta blocker due to bradycardia.     Left Distal peroneal DVT   Elevated D-dimer  No need to treat, can repeat duplex in one week, however he may start on Eliquis for AFIB.     Hypothyroidism   TSH 3.7, continue levothyroxine 50 mcg qd    Hx IVDU: continue Suboxone     #COPD/SOB  Stable, no wheezing. Chronic tobacco abuse, continue Nicotine patch.   #Progress Note Handoff:  Pending (specify): pacemaker, improving pancytopenia.   Family discussion:  Disposition: Home.

## 2022-06-26 NOTE — PROGRESS NOTE ADULT - ASSESSMENT
73 y/o Male w/ PMHx for HTN, non-insulin dependent DM, cirrhosis, HCV s/p tx, former IVDU on suboxone, active smoker, CAD s/p PCI x 3 s/p 2 stents (done recently), arrives to the ED w/ mild confusion, bradycardia HR in 40s, and is found to be severely hyponatremic, hyperkalemia and bradycardic and pancytopenia .    A/P:   Pancytopenia:   likely from liver cirrhosis.   Hematology consulted. Iron level normal, B12 and folate normal.   PLT was 32, s/p platelet transfusion on 6/24.     Severe Hyponatremia and Hyperkalemia:   Acute Kidney Injury: likely pre-renal   on admission Na 109, K: 7.7 (hemolyzed, repeated 5.9)  Urine Na 23, FEUrea 25% suggesting pre-renal  Likely from overdiuresis (was on Torsemide and Aldactone).   Na improved to 131, K normalized  Cr improved from 2.2 to 0.8  Continue diuretics.     Paroxysmal Atrial Fibrillation with Slow ventricular response:   Back to sinus rhythm, still with bradycardia around 40-50, patient is asymptomatic.   EP recommended echo, plan for pacemaker placement.   Avoid AV node blocker agents. TSH 3.7 normal.   Anticoagulation on hold due to acute drop in Hb, GI is ok with anticoagulation.   HASBLED- 6 - very high risk for major bleed, possibly candidate for left atrial appendage closure device, will discuss with EP.     Acute on chronic anemia:   Possibly from GI bleeding due to esophageal varices, patient with liver cirrhosis and portal hypertension.   No active bleeding, no melena or hematochezia.   Hb was 6.6 on admission s/p 2 packed RBCs transfusion, Hb improved to 8.5,  Seen by GI, will discuss if EGD will be helpful to be done inpatient before starting Anticoagulation.   Brilinta held.     Decompensated Liver Cirrhosis: due to hepatitis C.   coagulopathy, asterixes on exam, no ascites.  Abdomen US showed cirrhotic liver, no ascites.   CT in march 2022 showed splenomegaly with gastroesophageal varices.   Lasix and Aldactone resumed, start on Lasix 20mg po daily, resume Aldactone 50mg daily.      CAD s/p PCI  Last cath in 3/30/22 RCA: large dominant vessel, 90% heavily calcified stenosis of prox and mid RCA s/p PCI and YVETTE x 2  Brilinta discontinued on admission, I discussed with his cardiologist dr Young, if he started on Eliquis no need for Brilinta.   Continue ASA, Lipitor, no beta blocker due to bradycardia.     Left Distal peroneal DVT   Elevated D-dimer  No need to treat, can repeat duplex in one week, however he may start on Eliquis for AFIB.     Hypothyroidism   TSH 3.7, continue levothyroxine 50 mcg qd    Hx IVDU: continue Suboxone     #COPD/SOB  Stable, no wheezing. Chronic tobacco abuse, continue Nicotine patch.     #Progress Note Handoff:  Pending (specify): pacemaker, improving pancytopenia.   Family discussion:  Disposition: Home.

## 2022-06-26 NOTE — PROGRESS NOTE ADULT - SUBJECTIVE AND OBJECTIVE BOX
DINO LANCE  72y  Male      Patient is a 72y old  Male who presents with a chief complaint of mild confusion and bradycardia (21 Jun 2022 13:35)      INTERVAL HPI/OVERNIGHT EVENTS:  He feels ok, no new complaints.   Vital Signs Last 24 Hrs  T(C): 36.8 (26 Jun 2022 04:29), Max: 36.8 (26 Jun 2022 04:29)  T(F): 98.3 (26 Jun 2022 04:29), Max: 98.3 (26 Jun 2022 04:29)  HR: 46 (26 Jun 2022 04:29) (46 - 68)  BP: 113/53 (26 Jun 2022 04:29) (108/53 - 125/60)  BP(mean): --  RR: 18 (26 Jun 2022 04:29) (16 - 18)  SpO2: 94% (25 Jun 2022 20:44) (94% - 98%)      06-25-22 @ 07:01  -  06-26-22 @ 07:00  --------------------------------------------------------  IN: 340 mL / OUT: 1100 mL / NET: -760 mL    06-26-22 @ 07:01  -  06-26-22 @ 13:15  --------------------------------------------------------  IN: 870 mL / OUT: 850 mL / NET: 20 mL            Consultant(s) Notes Reviewed:  [x ] YES  [ ] NO          MEDICATIONS  (STANDING):  aspirin  chewable 81 milliGRAM(s) Oral daily  atorvastatin 40 milliGRAM(s) Oral at bedtime  buprenorphine 8 mG/naloxone 2 mG SL  Tablet 0.5 Tablet(s) SubLingual <User Schedule>  folic acid 1 milliGRAM(s) Oral daily  furosemide    Tablet 20 milliGRAM(s) Oral daily  gabapentin 100 milliGRAM(s) Oral three times a day  heparin   Injectable 5000 Unit(s) SubCutaneous every 8 hours  hydrOXYzine hydrochloride 25 milliGRAM(s) Oral at bedtime  influenza  Vaccine (HIGH DOSE) 0.7 milliLiter(s) IntraMuscular once  iron sucrose IVPB 200 milliGRAM(s) IV Intermittent <User Schedule>  lactulose Syrup 10 Gram(s) Oral daily  levothyroxine 50 MICROGram(s) Oral daily  nicotine -  14 mG/24Hr(s) Patch 1 patch Transdermal daily  pantoprazole    Tablet 40 milliGRAM(s) Oral before breakfast  polyethylene glycol 3350 17 Gram(s) Oral daily  senna 2 Tablet(s) Oral at bedtime  spironolactone 50 milliGRAM(s) Oral daily  thiamine 100 milliGRAM(s) Oral daily    MEDICATIONS  (PRN):  aluminum hydroxide/magnesium hydroxide/simethicone Suspension 30 milliLiter(s) Oral once PRN Dyspepsia      LABS                          8.3    1.32  )-----------( 59       ( 26 Jun 2022 07:27 )             25.4     06-26    130<L>  |  97<L>  |  18  ----------------------------<  92  4.0   |  21  |  0.8    Ca    8.5      26 Jun 2022 07:27  Mg     2.1     06-26    TPro  6.4  /  Alb  3.5  /  TBili  1.3<H>  /  DBili  x   /  AST  37  /  ALT  15  /  AlkPhos  71  06-26          Lactate Trend    CARDIAC MARKERS ( 24 Jun 2022 16:53 )  x     / 0.02 ng/mL / x     / x     / x          CAPILLARY BLOOD GLUCOSE      POCT Blood Glucose.: 111 mg/dL (26 Jun 2022 12:03)        RADIOLOGY & ADDITIONAL TESTS:    Imaging Personally Reviewed:  [ ] YES  [ ] NO    HEALTH ISSUES - PROBLEM Dx:          PHYSICAL EXAM:  GENERAL: NAD, well-developed.  HEAD:  Atraumatic, Normocephalic.  EYES: EOMI, PERRLA, conjunctiva and sclera clear.  NECK: Supple, No JVD.  CHEST/LUNG: Clear to auscultation bilaterally; No wheeze.  HEART: Regular rate and rhythm; S1 S2. bradycardic.   ABDOMEN: Soft, Nontender, Nondistended; Bowel sounds present.  EXTREMITIES:  2+ Peripheral Pulses, venous stases changes in LE.   PSYCH: AAOx3.  NEUROLOGY: non-focal.  SKIN: No rashes or lesions.

## 2022-06-26 NOTE — PROGRESS NOTE ADULT - SUBJECTIVE AND OBJECTIVE BOX
Hospital Day:  9d    Subjective:    Patient is a 72y old  Male who presents with a chief complaint of mild confusion and bradycardia (21 Jun 2022 13:35)      Admitted to medicine for a primary diagnosis of     Past Medical Hx:   Diabetes    HTN (hypertension)    Hepatitis    Arthritis      Past Sx:  H/O heart artery stent    H/O knee surgery      Allergies:  No Known Allergies    Current Meds:   Standng Meds:  aspirin  chewable 81 milliGRAM(s) Oral daily  atorvastatin 40 milliGRAM(s) Oral at bedtime  buprenorphine 8 mG/naloxone 2 mG SL  Tablet 0.5 Tablet(s) SubLingual <User Schedule>  folic acid 1 milliGRAM(s) Oral daily  furosemide    Tablet 20 milliGRAM(s) Oral daily  gabapentin 100 milliGRAM(s) Oral three times a day  heparin   Injectable 5000 Unit(s) SubCutaneous every 8 hours  hydrOXYzine hydrochloride 25 milliGRAM(s) Oral at bedtime  influenza  Vaccine (HIGH DOSE) 0.7 milliLiter(s) IntraMuscular once  iron sucrose IVPB 200 milliGRAM(s) IV Intermittent <User Schedule>  lactulose Syrup 10 Gram(s) Oral daily  levothyroxine 50 MICROGram(s) Oral daily  nicotine -  14 mG/24Hr(s) Patch 1 patch Transdermal daily  pantoprazole    Tablet 40 milliGRAM(s) Oral before breakfast  polyethylene glycol 3350 17 Gram(s) Oral daily  senna 2 Tablet(s) Oral at bedtime  spironolactone 50 milliGRAM(s) Oral daily  thiamine 100 milliGRAM(s) Oral daily    PRN Meds:  aluminum hydroxide/magnesium hydroxide/simethicone Suspension 30 milliLiter(s) Oral once PRN Dyspepsia    HOME MEDICATIONS:  aspirin 81 mg oral tablet: 1 tab(s) orally once a day  buprenorphine-naloxone 8 mg-2 mg sublingual film: 0.5 film(s) sublingual 2 times a day  metFORMIN 500 mg oral tablet: 1 tab(s) orally 2 times a day  HOLD METFORMIN FOR 48 HR AFTER CARDIAC CATH   spironolactone 100 mg oral tablet: 1 tab(s) orally once a day      Vital Signs:   T(F): 97.2 (06-25-22 @ 20:44), Max: 97.2 (06-25-22 @ 20:44)  HR: 68 (06-25-22 @ 20:44) (49 - 68)  BP: 125/60 (06-25-22 @ 20:44) (108/53 - 125/60)  RR: 18 (06-25-22 @ 20:44) (16 - 18)  SpO2: 94% (06-25-22 @ 20:44) (94% - 98%)      06-24-22 @ 07:01  -  06-25-22 @ 07:00  --------------------------------------------------------  IN: 867 mL / OUT: 1500 mL / NET: -633 mL    06-25-22 @ 07:01  -  06-26-22 @ 02:18  --------------------------------------------------------  IN: 240 mL / OUT: 1100 mL / NET: -860 mL        Physical Exam:   GENERAL: NAD  HEENT: NCAT  CHEST/LUNG: CTAB  HEART: Regular rate and rhythm; s1 s2 appreciated, No murmurs, rubs, or gallops  ABDOMEN: Soft, Nontender, Nondistended; Bowel sounds present  EXTREMITIES: No LE edema b/l  SKIN: no rashes, no new lesions  NERVOUS SYSTEM:  Alert & Oriented X3  LINES/CATHETERS:        Labs:                         8.5    1.26  )-----------( 51       ( 25 Jun 2022 07:37 )             26.0     Neutophil% 43.6, Lymphocyte% 30.2, Monocyte% 19.8, Bands% 0.8 06-25-22 @ 07:37    25 Jun 2022 07:37    132    |  97     |  18     ----------------------------<  98     3.8     |  24     |  0.8      Ca    8.5        25 Jun 2022 07:37  Mg     1.6       25 Jun 2022 07:37    TPro  6.5    /  Alb  3.7    /  TBili  1.4    /  DBili  x      /  AST  28     /  ALT  14     /  AlkPhos  74     25 Jun 2022 07:37            Serum Pro-Brain Natriuretic Peptide: 7783 pg/mL (06-17-22 @ 11:36)    Troponin 0.02, CKMB --, CK -- 06-24-22 @ 16:53    Iron 50, TIBC 378, %Sat 13 Ferritin -- 06-23-22 @ 16:04               Hospital Day:  9d    Subjective:    Patient is a 72y old  Male who presents with a chief complaint of mild confusion and bradycardia. Patient seen sleeping in bed today.     Admitted to medicine for a primary diagnosis of f Afib with bradycardia, pancytopenia, hyponatremia and hyperkalemia.      Past Medical Hx:   Diabetes    HTN (hypertension)    Hepatitis    Arthritis      Past Sx:  H/O heart artery stent    H/O knee surgery      Allergies:  No Known Allergies    Current Meds:   Standng Meds:  aspirin  chewable 81 milliGRAM(s) Oral daily  atorvastatin 40 milliGRAM(s) Oral at bedtime  buprenorphine 8 mG/naloxone 2 mG SL  Tablet 0.5 Tablet(s) SubLingual <User Schedule>  folic acid 1 milliGRAM(s) Oral daily  furosemide    Tablet 20 milliGRAM(s) Oral daily  gabapentin 100 milliGRAM(s) Oral three times a day  heparin   Injectable 5000 Unit(s) SubCutaneous every 8 hours  hydrOXYzine hydrochloride 25 milliGRAM(s) Oral at bedtime  influenza  Vaccine (HIGH DOSE) 0.7 milliLiter(s) IntraMuscular once  iron sucrose IVPB 200 milliGRAM(s) IV Intermittent <User Schedule>  lactulose Syrup 10 Gram(s) Oral daily  levothyroxine 50 MICROGram(s) Oral daily  nicotine -  14 mG/24Hr(s) Patch 1 patch Transdermal daily  pantoprazole    Tablet 40 milliGRAM(s) Oral before breakfast  polyethylene glycol 3350 17 Gram(s) Oral daily  senna 2 Tablet(s) Oral at bedtime  spironolactone 50 milliGRAM(s) Oral daily  thiamine 100 milliGRAM(s) Oral daily    PRN Meds:  aluminum hydroxide/magnesium hydroxide/simethicone Suspension 30 milliLiter(s) Oral once PRN Dyspepsia    HOME MEDICATIONS:  aspirin 81 mg oral tablet: 1 tab(s) orally once a day  buprenorphine-naloxone 8 mg-2 mg sublingual film: 0.5 film(s) sublingual 2 times a day  metFORMIN 500 mg oral tablet: 1 tab(s) orally 2 times a day  HOLD METFORMIN FOR 48 HR AFTER CARDIAC CATH   spironolactone 100 mg oral tablet: 1 tab(s) orally once a day      Vital Signs:   T(F): 97.2 (06-25-22 @ 20:44), Max: 97.2 (06-25-22 @ 20:44)  HR: 68 (06-25-22 @ 20:44) (49 - 68)  BP: 125/60 (06-25-22 @ 20:44) (108/53 - 125/60)  RR: 18 (06-25-22 @ 20:44) (16 - 18)  SpO2: 94% (06-25-22 @ 20:44) (94% - 98%)      06-24-22 @ 07:01  -  06-25-22 @ 07:00  --------------------------------------------------------  IN: 867 mL / OUT: 1500 mL / NET: -633 mL    06-25-22 @ 07:01  -  06-26-22 @ 02:18  --------------------------------------------------------  IN: 240 mL / OUT: 1100 mL / NET: -860 mL        Physical Exam:   GENERAL: NAD  HEENT: NCAT  CHEST/LUNG: CTAB  HEART: Regular rate and rhythm; s1 s2 appreciated, No murmurs, rubs, or gallops  ABDOMEN: Soft, Nontender, Nondistended; Bowel sounds present  EXTREMITIES: No LE edema b/l  SKIN: no rashes, no new lesions  NERVOUS SYSTEM:  Alert & Oriented X3  LINES/CATHETERS:        Labs:                         8.5    1.26  )-----------( 51       ( 25 Jun 2022 07:37 )             26.0     Neutophil% 43.6, Lymphocyte% 30.2, Monocyte% 19.8, Bands% 0.8 06-25-22 @ 07:37    25 Jun 2022 07:37    132    |  97     |  18     ----------------------------<  98     3.8     |  24     |  0.8      Ca    8.5        25 Jun 2022 07:37  Mg     1.6       25 Jun 2022 07:37    TPro  6.5    /  Alb  3.7    /  TBili  1.4    /  DBili  x      /  AST  28     /  ALT  14     /  AlkPhos  74     25 Jun 2022 07:37            Serum Pro-Brain Natriuretic Peptide: 7783 pg/mL (06-17-22 @ 11:36)    Troponin 0.02, CKMB --, CK -- 06-24-22 @ 16:53    Iron 50, TIBC 378, %Sat 13 Ferritin -- 06-23-22 @ 16:04

## 2022-06-27 NOTE — CONSULT NOTE ADULT - CONSULT REASON
Bradycardia
Sinus Bradycardia
Hyperkalemia/ Anemia
PROMISE
Pancytopenia
Slow Afib  Chest pain and SOB   Recent PCI
cirrhosis

## 2022-06-27 NOTE — PROGRESS NOTE ADULT - SUBJECTIVE AND OBJECTIVE BOX
Hospital Day:  10d    Subjective:    Patient is a 72y old  Male who presents with a chief complaint of mild confusion and bradycardia. No acute events overnight. Patient only complaint is no bowel movement in a week now. Other then this, no complaints.       Admitted to medicine for a primary diagnosis of Afib with bradycardia, pancytopenia, hyponatremia and hyperkalemia.    Past Medical Hx:   Diabetes    HTN (hypertension)    Hepatitis    Arthritis      Past Sx:  H/O heart artery stent    H/O knee surgery      Allergies:  No Known Allergies    Current Meds:   Stand Meds:  aspirin  chewable 81 milliGRAM(s) Oral daily  atorvastatin 40 milliGRAM(s) Oral at bedtime  buprenorphine 8 mG/naloxone 2 mG SL  Tablet 0.5 Tablet(s) SubLingual <User Schedule>  folic acid 1 milliGRAM(s) Oral daily  furosemide    Tablet 20 milliGRAM(s) Oral daily  gabapentin 100 milliGRAM(s) Oral three times a day  heparin   Injectable 5000 Unit(s) SubCutaneous every 8 hours  hydrOXYzine hydrochloride 25 milliGRAM(s) Oral at bedtime  influenza  Vaccine (HIGH DOSE) 0.7 milliLiter(s) IntraMuscular once  iron sucrose IVPB 200 milliGRAM(s) IV Intermittent <User Schedule>  lactulose Syrup 10 Gram(s) Oral daily  levothyroxine 50 MICROGram(s) Oral daily  nicotine -  14 mG/24Hr(s) Patch 1 patch Transdermal daily  pantoprazole    Tablet 40 milliGRAM(s) Oral before breakfast  polyethylene glycol 3350 17 Gram(s) Oral daily  senna 2 Tablet(s) Oral at bedtime  spironolactone 50 milliGRAM(s) Oral daily  thiamine 100 milliGRAM(s) Oral daily    PRN Meds:    HOME MEDICATIONS:  aspirin 81 mg oral tablet: 1 tab(s) orally once a day  buprenorphine-naloxone 8 mg-2 mg sublingual film: 0.5 film(s) sublingual 2 times a day  metFORMIN 500 mg oral tablet: 1 tab(s) orally 2 times a day  HOLD METFORMIN FOR 48 HR AFTER CARDIAC CATH   spironolactone 100 mg oral tablet: 1 tab(s) orally once a day      Vital Signs:   T(F): 97.6 (06-27-22 @ 05:48), Max: 98.7 (06-26-22 @ 13:46)  HR: 45 (06-27-22 @ 05:48) (45 - 51)  BP: 96/51 (06-27-22 @ 05:48) (96/51 - 117/67)  RR: 18 (06-27-22 @ 05:48) (18 - 18)  SpO2: 95% (06-26-22 @ 20:28) (95% - 95%)      06-26-22 @ 07:01  -  06-27-22 @ 07:00  --------------------------------------------------------  IN: 1290 mL / OUT: 2680 mL / NET: -1390 mL        Physical Exam:   GENERAL: NAD  HEENT: NCAT  CHEST/LUNG: crackles in bases   HEART: Regular rate and rhythm; s1 s2 appreciated, No murmurs, rubs, or gallops  ABDOMEN: Soft, Nontender, Nondistended; Bowel sounds present  EXTREMITIES: No LE edema b/l  SKIN: no rashes, no new lesions  NERVOUS SYSTEM:  Alert & Oriented X3  LINES/CATHETERS:        Labs:                         8.1    1.22  )-----------( 44       ( 27 Jun 2022 06:48 )             25.0     Neutophil% 43.5, Lymphocyte% 37.7, Monocyte% 13.9, Bands% 0.0 06-27-22 @ 06:48    27 Jun 2022 06:48    132    |  101    |  16     ----------------------------<  96     4.3     |  23     |  0.7      Ca    8.3        27 Jun 2022 06:48  Mg     2.1       27 Jun 2022 06:48    TPro  6.4    /  Alb  3.5    /  TBili  1.3    /  DBili  x      /  AST  37     /  ALT  15     /  AlkPhos  71     26 Jun 2022 07:27            Serum Pro-Brain Natriuretic Peptide: 7783 pg/mL (06-17-22 @ 11:36)    Troponin 0.02, CKMB --, CK -- 06-24-22 @ 16:53                 Hospital Day:  10d    Subjective:    Patient is a 72y old  Male who presents with a chief complaint of mild confusion and bradycardia. No acute events overnight. Patient only complaint is no bowel movement in a week now. Other then this, no complaints.       Admitted to medicine for a primary diagnosis of Afib with bradycardia, pancytopenia, hyponatremia and hyperkalemia.    Past Medical Hx:   Diabetes    HTN (hypertension)    Hepatitis    Arthritis      Past Sx:  H/O heart artery stent    H/O knee surgery      Allergies:  No Known Allergies    Current Meds:   Stand Meds:  aspirin  chewable 81 milliGRAM(s) Oral daily  atorvastatin 40 milliGRAM(s) Oral at bedtime  buprenorphine 8 mG/naloxone 2 mG SL  Tablet 0.5 Tablet(s) SubLingual <User Schedule>  folic acid 1 milliGRAM(s) Oral daily  furosemide    Tablet 20 milliGRAM(s) Oral daily  gabapentin 100 milliGRAM(s) Oral three times a day  heparin   Injectable 5000 Unit(s) SubCutaneous every 8 hours  hydrOXYzine hydrochloride 25 milliGRAM(s) Oral at bedtime  influenza  Vaccine (HIGH DOSE) 0.7 milliLiter(s) IntraMuscular once  iron sucrose IVPB 200 milliGRAM(s) IV Intermittent <User Schedule>  lactulose Syrup 10 Gram(s) Oral daily  levothyroxine 50 MICROGram(s) Oral daily  nicotine -  14 mG/24Hr(s) Patch 1 patch Transdermal daily  pantoprazole    Tablet 40 milliGRAM(s) Oral before breakfast  polyethylene glycol 3350 17 Gram(s) Oral daily  senna 2 Tablet(s) Oral at bedtime  spironolactone 50 milliGRAM(s) Oral daily  thiamine 100 milliGRAM(s) Oral daily    PRN Meds:    HOME MEDICATIONS:  aspirin 81 mg oral tablet: 1 tab(s) orally once a day  buprenorphine-naloxone 8 mg-2 mg sublingual film: 0.5 film(s) sublingual 2 times a day  metFORMIN 500 mg oral tablet: 1 tab(s) orally 2 times a day  HOLD METFORMIN FOR 48 HR AFTER CARDIAC CATH   spironolactone 100 mg oral tablet: 1 tab(s) orally once a day      Vital Signs:   T(F): 97.6 (06-27-22 @ 05:48), Max: 98.7 (06-26-22 @ 13:46)  HR: 45 (06-27-22 @ 05:48) (45 - 51)  BP: 96/51 (06-27-22 @ 05:48) (96/51 - 117/67)  RR: 18 (06-27-22 @ 05:48) (18 - 18)  SpO2: 95% (06-26-22 @ 20:28) (95% - 95%)      06-26-22 @ 07:01  -  06-27-22 @ 07:00  --------------------------------------------------------  IN: 1290 mL / OUT: 2680 mL / NET: -1390 mL        Physical Exam:   GENERAL: NAD  HEENT: NCAT  CHEST/LUNG: crackles in bases   HEART: Regular rate and rhythm; s1 s2 appreciated, No murmurs, rubs, or gallops  ABDOMEN: Soft, Nontender, Nondistended; Bowel sounds present  EXTREMITIES: No LE edema b/l  SKIN: no rashes, no new lesions  NERVOUS SYSTEM:  Alert & Oriented X3  LINES/CATHETERS:      Labs:                         8.1    1.22  )-----------( 44       ( 27 Jun 2022 06:48 )             25.0     Neutophil% 43.5, Lymphocyte% 37.7, Monocyte% 13.9, Bands% 0.0 06-27-22 @ 06:48    27 Jun 2022 06:48    132    |  101    |  16     ----------------------------<  96     4.3     |  23     |  0.7      Ca    8.3        27 Jun 2022 06:48  Mg     2.1       27 Jun 2022 06:48    TPro  6.4    /  Alb  3.5    /  TBili  1.3    /  DBili  x      /  AST  37     /  ALT  15     /  AlkPhos  71     26 Jun 2022 07:27    Serum Pro-Brain Natriuretic Peptide: 7783 pg/mL (06-17-22 @ 11:36)    Troponin 0.02, CKMB --, CK -- 06-24-22 @ 16:53

## 2022-06-27 NOTE — CONSULT NOTE ADULT - CONSULT REQUESTED DATE/TIME
18-Jun-2022 08:41
17-Jun-2022 13:16
23-Jun-2022 10:17
22-Jun-2022 11:00
17-Jun-2022 13:15
18-Jun-2022 14:30
27-Jun-2022 18:07

## 2022-06-27 NOTE — CONSULT NOTE ADULT - TIME BILLING
CTS Attending  pt interviewed and examined  case discussed with EP team  pt referred for ddd ppm  procedure explained including risks, benefits and alternatives  pt agreed to proceed  40-min consult/preop/consent    -FMR

## 2022-06-27 NOTE — CONSULT NOTE ADULT - SUBJECTIVE AND OBJECTIVE BOX
Surgeon: /Renny/Pearl    Consult requesting by: Dr. Drake    HISTORY OF PRESENT ILLNESS:  Patient is a 73 y/o Male w/ PMHx for HTN, non-insulin dependent DM, cirrhosis, HCV s/p tx, former IVDU on suboxone, active smoker, CAD s/p PCI x 3 s/p 2 stents (done recently), arrives to the ED w/ mild confusion, bradycardia HR in 40s, and is found to be severely hyponatremic, hyperkalemia and bradycardic and pancytopenia. Patient evaluated by GI and pancytopenia likely 2/2 liver cirrhosis. Also found to have anemia, no active GI bleed and no plan for scope on this admission. Not on OAC 2/2 anemia. Hyperkalemia resolved, still hyponatremic. Patient admits to intermittent chest pain. Denies dizziness, SOB or syncope. Follows with Dr Beck.      PAST MEDICAL & SURGICAL HISTORY:  Diabetes      HTN (hypertension)      Hepatitis  Untreated Hepatitis C      Arthritis      H/O heart artery stent      H/O knee surgery          MEDICATIONS  (STANDING):  aspirin  chewable 81 milliGRAM(s) Oral daily  atorvastatin 40 milliGRAM(s) Oral at bedtime  buprenorphine 8 mG/naloxone 2 mG SL  Tablet 0.5 Tablet(s) SubLingual <User Schedule>  folic acid 1 milliGRAM(s) Oral daily  furosemide    Tablet 20 milliGRAM(s) Oral daily  gabapentin 100 milliGRAM(s) Oral three times a day  heparin   Injectable 5000 Unit(s) SubCutaneous every 8 hours  hydrOXYzine hydrochloride 25 milliGRAM(s) Oral at bedtime  influenza  Vaccine (HIGH DOSE) 0.7 milliLiter(s) IntraMuscular once  iron sucrose IVPB 200 milliGRAM(s) IV Intermittent <User Schedule>  lactulose Syrup 10 Gram(s) Oral daily  levothyroxine 50 MICROGram(s) Oral daily  nicotine -  14 mG/24Hr(s) Patch 1 patch Transdermal daily  pantoprazole    Tablet 40 milliGRAM(s) Oral before breakfast  polyethylene glycol 3350 17 Gram(s) Oral daily  senna 2 Tablet(s) Oral at bedtime  spironolactone 50 milliGRAM(s) Oral daily  thiamine 100 milliGRAM(s) Oral daily    Allergies    No Known Allergies        Review of Systems  CONSTITUTIONAL: No fever, weight loss, chills, shakes, or fatigue  RESPIRATORY: No cough, wheezing, hemoptysis, or shortness of breath  CARDIOVASCULAR: + chest pain, No dyspnea, palpitations, dizziness, syncope, paroxysmal nocturnal dyspnea, orthopnea, or arm or leg swelling  GASTROINTESTINAL: No abdominal  or epigastric pain, nausea, vomiting, hematemesis, diarrhea, constipation, melena or bright red blood.  NEUROLOGICAL: No headaches, memory loss, slurred speech, limb weakness, loss of strength, numbness, or tremors  MUSCULOSKELETAL: No joint pain or swelling, muscle, back, or extremity pain      PHYSICAL EXAM  Vital Signs Last 24 Hrs  T(C): 36.7 (27 Jun 2022 14:44), Max: 36.7 (26 Jun 2022 22:06)  T(F): 98 (27 Jun 2022 14:44), Max: 98 (26 Jun 2022 22:06)  HR: 51 (27 Jun 2022 14:44) (45 - 51)  BP: 101/50 (27 Jun 2022 14:44) (96/51 - 108/59)  RR: 18 (27 Jun 2022 14:44) (18 - 18)  SpO2: 95% (26 Jun 2022 20:28) (95% - 95%)    GENERAL: In no apparent distress, well nourished, and hydrated.  NECK: Supple, No JVD   HEART: Irregular rate and rhythm; No murmurs, rubs, or gallops.  PULMONARY: Clear to auscultation and perfusion.  No rales, wheezing, or rhonchi bilaterally.  EXTREMITIES:  2+ Peripheral Pulses, no LE edema BL  NEUROLOGICAL: Grossly nonfocal  LABS:                        8.1    1.22  )-----------( 44       ( 27 Jun 2022 06:48 )             25.0     06-27    132<L>  |  101  |  16  ----------------------------<  96  4.3   |  23  |  0.7    Ca    8.3<L>      27 Jun 2022 06:48  Mg     2.1     06-27    TPro  6.4  /  Alb  3.5  /  TBili  1.3<H>  /  DBili  x   /  AST  37  /  ALT  15  /  AlkPhos  71  06-26        Covid Results: COVID-19 PCR: NotDetec (06-24-22 @ 06:30)  COVID-19 PCR: NotDetec (06-17-22 @ 11:36)  COVID-19 PCR: NotDetec (03-30-22 @ 03:15)  COVID-19 PCR: NotDetec (03-25-22 @ 02:12)        Assessment/ Plan:  NPO after midnight for PPM implant tomorrow at 11Am in [facility] by Dr. Lawson.  Type and screen; 2uPRBC's 2 PLatelets; 2 FFP on hold for OR               Surgeon: /Renny/Pearl    Consult requesting by: Dr. Drake    HISTORY OF PRESENT ILLNESS:  Patient is a 73 y/o Male w/ PMHx for HTN, non-insulin dependent DM, cirrhosis, HCV s/p tx, former IVDU on suboxone, active smoker, CAD s/p PCI x 3 s/p 2 stents (done recently), arrives to the ED w/ mild confusion, bradycardia HR in 40s, and is found to be severely hyponatremic, hyperkalemia and bradycardic and pancytopenia. Patient evaluated by GI and pancytopenia likely 2/2 liver cirrhosis. Also found to have anemia, no active GI bleed and no plan for scope on this admission. Not on OAC 2/2 anemia. Hyperkalemia resolved, still hyponatremic. Patient admits to intermittent chest pain. Denies dizziness, SOB or syncope. Follows with Dr Beck.      PAST MEDICAL & SURGICAL HISTORY:  Diabetes      HTN (hypertension)      Hepatitis  Untreated Hepatitis C      Arthritis      H/O heart artery stent      H/O knee surgery          MEDICATIONS  (STANDING):  aspirin  chewable 81 milliGRAM(s) Oral daily  atorvastatin 40 milliGRAM(s) Oral at bedtime  buprenorphine 8 mG/naloxone 2 mG SL  Tablet 0.5 Tablet(s) SubLingual <User Schedule>  folic acid 1 milliGRAM(s) Oral daily  furosemide    Tablet 20 milliGRAM(s) Oral daily  gabapentin 100 milliGRAM(s) Oral three times a day  heparin   Injectable 5000 Unit(s) SubCutaneous every 8 hours  hydrOXYzine hydrochloride 25 milliGRAM(s) Oral at bedtime  influenza  Vaccine (HIGH DOSE) 0.7 milliLiter(s) IntraMuscular once  iron sucrose IVPB 200 milliGRAM(s) IV Intermittent <User Schedule>  lactulose Syrup 10 Gram(s) Oral daily  levothyroxine 50 MICROGram(s) Oral daily  nicotine -  14 mG/24Hr(s) Patch 1 patch Transdermal daily  pantoprazole    Tablet 40 milliGRAM(s) Oral before breakfast  polyethylene glycol 3350 17 Gram(s) Oral daily  senna 2 Tablet(s) Oral at bedtime  spironolactone 50 milliGRAM(s) Oral daily  thiamine 100 milliGRAM(s) Oral daily    Allergies    No Known Allergies        Review of Systems  CONSTITUTIONAL: No fever, weight loss, chills, shakes, or fatigue  RESPIRATORY: No cough, wheezing, hemoptysis, or shortness of breath  CARDIOVASCULAR: + chest pain, No dyspnea, palpitations, dizziness, syncope, paroxysmal nocturnal dyspnea, orthopnea, or arm or leg swelling  GASTROINTESTINAL: No abdominal  or epigastric pain, nausea, vomiting, hematemesis, diarrhea, constipation, melena or bright red blood.  NEUROLOGICAL: No headaches, memory loss, slurred speech, limb weakness, loss of strength, numbness, or tremors  MUSCULOSKELETAL: No joint pain or swelling, muscle, back, or extremity pain      PHYSICAL EXAM  Vital Signs Last 24 Hrs  T(C): 36.7 (27 Jun 2022 14:44), Max: 36.7 (26 Jun 2022 22:06)  T(F): 98 (27 Jun 2022 14:44), Max: 98 (26 Jun 2022 22:06)  HR: 51 (27 Jun 2022 14:44) (45 - 51)  BP: 101/50 (27 Jun 2022 14:44) (96/51 - 108/59)  RR: 18 (27 Jun 2022 14:44) (18 - 18)  SpO2: 95% (26 Jun 2022 20:28) (95% - 95%)    GENERAL: In no apparent distress, well nourished, and hydrated.  NECK: Supple, No JVD   HEART: Irregular rate and rhythm; No murmurs, rubs, or gallops.  PULMONARY: Clear to auscultation and perfusion.  No rales, wheezing, or rhonchi bilaterally.  EXTREMITIES:  2+ Peripheral Pulses, no LE edema BL  NEUROLOGICAL: Grossly nonfocal  LABS:                        8.1    1.22  )-----------( 44       ( 27 Jun 2022 06:48 )             25.0     06-27    132<L>  |  101  |  16  ----------------------------<  96  4.3   |  23  |  0.7    Ca    8.3<L>      27 Jun 2022 06:48  Mg     2.1     06-27    TPro  6.4  /  Alb  3.5  /  TBili  1.3<H>  /  DBili  x   /  AST  37  /  ALT  15  /  AlkPhos  71  06-26        Covid Results: COVID-19 PCR: NotDetec (06-24-22 @ 06:30)  COVID-19 PCR: NotDetec (06-17-22 @ 11:36)  COVID-19 PCR: NotDetec (03-30-22 @ 03:15)  COVID-19 PCR: NotDetec (03-25-22 @ 02:12)        Assessment/ Plan:  NPO after midnight for PPM implant tomorrow at 11Am in [facility] by Dr. Lawson.  Type and screen; 2uPRBC's 2 PLatelets; 2 FFP on hold for OR      CTS Attending  pt interviewed and examined  case discussed with EP team  pt referred for ddd ppm  procedure explained including risks, benefits and alternatives  pt agreed to proceed  40-min consult/preop/consent    -FMR

## 2022-06-27 NOTE — PROGRESS NOTE ADULT - ASSESSMENT
73 y/o Male w/ PMHx for HTN, non-insulin dependent DM, cirrhosis, HCV s/p tx, former IVDU on suboxone, active smoker, CAD s/p PCI x 3 s/p 2 stents (done recently), arrives to the ED w/ mild confusion, bradycardia HR in 40s, and is found to be severely hyponatremic, hyperkalemia and bradycardic and pancytopenia .      A/P:   Paroxysmal Atrial Fibrillation with Slow ventricular response:   Back to sinus rhythm, still with bradycardia around 40-50, patient is asymptomatic.   EP recommended echo, plan for pacemaker placement.   Avoid AV node blocker agents. TSH 3.7 normal.   Anticoagulation on hold due to acute drop in Hb, GI is ok with anticoagulation.   HASBLED- 6 - very high risk for major bleed, possibly candidate for left atrial appendage closure device, will discuss with EP.   - Pacemaker not done today duee     Pancytopenia:   likely from liver cirrhosis.   Hematology consulted. Iron level normal, B12 and folate normal.   PLT was 32, s/p platelet transfusion on 6/24.     Severe Hyponatremia and Hyperkalemia:   Acute Kidney Injury: likely pre-renal   on admission Na 109, K: 7.7 (hemolyzed, repeated 5.9)  Urine Na 23, FEUrea 25% suggesting pre-renal  Likely from overdiuresis (was on Torsemide and Aldactone).   Na improved to 131, K normalized  Cr improved from 2.2 to 0.8  Continue diuretics.       Acute on chronic anemia:   Possibly from GI bleeding due to esophageal varices, patient with liver cirrhosis and portal hypertension.   No active bleeding, no melena or hematochezia.   Hb was 6.6 on admission s/p 2 packed RBCs transfusion, Hb improved to 8.5,  Seen by GI, will discuss if EGD will be helpful to be done inpatient before starting Anticoagulation.   Brilinta held.     Decompensated Liver Cirrhosis: due to hepatitis C.   coagulopathy, asterixes on exam, no ascites.  Abdomen US showed cirrhotic liver, no ascites.   CT in march 2022 showed splenomegaly with gastroesophageal varices.   Lasix and Aldactone resumed, start on Lasix 20mg po daily, resume Aldactone 50mg daily.      CAD s/p PCI  Last cath in 3/30/22 RCA: large dominant vessel, 90% heavily calcified stenosis of prox and mid RCA s/p PCI and YVETTE x 2  Brilinta discontinued on admission, I discussed with his cardiologist dr Young, if he started on Eliquis no need for Brilinta.   Continue ASA, Lipitor, no beta blocker due to bradycardia.     Left Distal peroneal DVT   Elevated D-dimer  No need to treat, can repeat duplex in one week, however he may start on Eliquis for AFIB.     Hypothyroidism   TSH 3.7, continue levothyroxine 50 mcg qd    Hx IVDU: continue Suboxone     #COPD/SOB  Stable, no wheezing. Chronic tobacco abuse, continue Nicotine patch.     #Progress Note Handoff:  Pending (specify): pacemaker, improving pancytopenia.   Family discussion:  Disposition: Home.        71 y/o Male w/ PMHx for HTN, non-insulin dependent DM, cirrhosis, HCV s/p tx, former IVDU on suboxone, active smoker, CAD s/p PCI x 3 s/p 2 stents (done recently), arrives to the ED w/ mild confusion, bradycardia HR in 40s, and is found to be severely hyponatremic, hyperkalemia and bradycardic and pancytopenia .      A/P:   Paroxysmal Atrial Fibrillation with Slow ventricular response:   Back to sinus rhythm, still with bradycardia around 40-50, patient is asymptomatic.   EP recommended echo, plan for pacemaker placement.   Avoid AV node blocker agents. TSH 3.7 normal.   Anticoagulation on hold due to acute drop in Hb, GI is ok with anticoagulation.   HASBLED- 6 - very high risk for major bleed, possibly candidate for left atrial appendage closure device, will discuss with EP.   - Pacemaker not done today due to PLT count being 44,000. today. Need it to be at least 70k   - Heme consult for clearance - pending   - Pt consult pending     Pancytopenia:   likely from liver cirrhosis.   Hematology consulted. Iron level normal, B12 and folate normal.   PLT was 32, s/p platelet transfusion on 6/24.   - PLT 44, WBC, 1.22, RBC 3.06     Severe Hyponatremia and Hyperkalemia:   Acute Kidney Injury: likely pre-renal   on admission Na 109, K: 7.7 (hemolyzed, repeated 5.9)  Urine Na 23, FEUrea 25% suggesting pre-renal  Likely from overdiuresis (was on Torsemide and Aldactone).   Na improved to 131, K normalized  Cr improved from 2.2 to 0.8  Continue diuretics.     Acute on chronic anemia:   Possibly from GI bleeding due to esophageal varices, patient with liver cirrhosis and portal hypertension.   No active bleeding, no melena or hematochezia.   Hb was 6.6 on admission s/p 2 packed RBCs transfusion, Hb improved to 8.5,  Seen by GI, will discuss if EGD will be helpful to be done inpatient before starting Anticoagulation.   Brilinta held.     Decompensated Liver Cirrhosis: due to hepatitis C.   coagulopathy, asterixes on exam, no ascites.  Abdomen US showed cirrhotic liver, no ascites.   CT in march 2022 showed splenomegaly with gastroesophageal varices.   Lasix and Aldactone resumed, start on Lasix 20mg po daily, resume Aldactone 50mg daily.      CAD s/p PCI  Last cath in 3/30/22 RCA: large dominant vessel, 90% heavily calcified stenosis of prox and mid RCA s/p PCI and YVETTE x 2  Brilinta discontinued on admission, I discussed with his cardiologist dr Young, if he started on Eliquis no need for Brilinta.   Continue ASA, Lipitor, no beta blocker due to bradycardia.     Left Distal peroneal DVT   Elevated D-dimer  No need to treat, can repeat duplex in one week, however he may start on Eliquis for AFIB.     Hypothyroidism   TSH 3.7, continue levothyroxine 50 mcg qd    Hx IVDU: continue Suboxone     #COPD/SOB  Stable, no wheezing. Chronic tobacco abuse, continue Nicotine patch.     #Progress Note Handoff:  Pending (specify): pacemaker, improving pancytopenia.   Family discussion:  Disposition: Home.

## 2022-06-28 NOTE — PACU DISCHARGE NOTE - COMMENTS
S/p PPM. Transported to PACU on monitor with O2. HD stable. Signout given.    /56  P 60  R 16  T 98.7  O2 100

## 2022-06-28 NOTE — PROGRESS NOTE ADULT - ASSESSMENT
73 y/o Male w/ PMHx for HTN, non-insulin dependent DM, cirrhosis, HCV s/p tx, former IVDU on suboxone, active smoker, CAD s/p PCI x 3 s/p 2 stents (done recently), arrives to the ED w/ mild confusion, bradycardia HR in 40s, and is found to be severely hyponatremic, hyperkalemia and bradycardic and pancytopenia .      A/P:   Paroxysmal Atrial Fibrillation with Slow ventricular response:   Back to sinus rhythm, still with bradycardia around 40-50, patient is asymptomatic.   EP recommended echo, plan for pacemaker placement.   Avoid AV node blocker agents. TSH 3.7 normal.   Anticoagulation on hold due to acute drop in Hb, GI is ok with anticoagulation.   HASBLED- 6 - very high risk for major bleed, possibly candidate for left atrial appendage closure device, will discuss with EP.   Pacemaker placement 6/28   Heme onc and EP on board w doing it and transfusing platelets as needed   ECHO 6/28 - results pending   Consult cardio regarding possible BELIA closure due to patient CI for AC.     Pancytopenia:   likely from liver cirrhosis.   Hematology consulted. Iron level normal, B12 and folate normal.   PLT was 32, s/p platelet transfusion on 6/24.   - PLT 44, WBC, 1.22, RBC 3.06     Severe Hyponatremia and Hyperkalemia:   Acute Kidney Injury: likely pre-renal   on admission Na 109, K: 7.7 (hemolyzed, repeated 5.9)  Urine Na 23, FEUrea 25% suggesting pre-renal  Likely from overdiuresis (was on Torsemide and Aldactone).   Na improved to 131, K normalized  Cr improved from 2.2 to 0.8  Continue diuretics.   Na - 131  K - 4.4       Acute on chronic anemia:   Possibly from GI bleeding due to esophageal varices, patient with liver cirrhosis and portal hypertension.   No active bleeding, no melena or hematochezia.   Hb was 6.6 on admission s/p 2 packed RBCs transfusion, Hb improved to 8.5,  Seen by GI, will discuss if EGD will be helpful to be done inpatient before starting Anticoagulation.   Brilinta held.     Decompensated Liver Cirrhosis: due to hepatitis C.   coagulopathy, asterixes on exam, no ascites.  Abdomen US showed cirrhotic liver, no ascites.   CT in march 2022 showed splenomegaly with gastroesophageal varices.   Lasix and Aldactone resumed, start on Lasix 20mg po daily, resume Aldactone 50mg daily.      CAD s/p PCI  Last cath in 3/30/22 RCA: large dominant vessel, 90% heavily calcified stenosis of prox and mid RCA s/p PCI and YVETTE x 2  Brilinta discontinued on admission, I discussed with his cardiologist dr Young, if he started on Eliquis no need for Brilinta.   Continue ASA, Lipitor, no beta blocker due to bradycardia.     Left Distal peroneal DVT   Elevated D-dimer  No need to treat, can repeat duplex in one week, however he may start on Eliquis for AFIB.     Hypothyroidism   TSH 3.7, continue levothyroxine 50 mcg qd    Hx IVDU: continue Suboxone     #COPD/SOB  Stable, no wheezing. Chronic tobacco abuse, continue Nicotine patch.     #Progress Note Handoff:  Pending (specify): pacemaker, improving pancytopenia.   Family discussion:  Disposition: Home.      73 y/o Male w/ PMHx for HTN, non-insulin dependent DM, cirrhosis, HCV s/p tx, former IVDU on suboxone, active smoker, CAD s/p PCI x 3 s/p 2 stents (done recently), arrives to the ED w/ mild confusion, bradycardia HR in 40s, and is found to be severely hyponatremic, hyperkalemia and bradycardic and pancytopenia .      A/P:   Paroxysmal Atrial Fibrillation with Slow ventricular response:   Back to sinus rhythm, still with bradycardia around 40-50, patient is asymptomatic.   EP recommended echo, plan for pacemaker placement.   Avoid AV node blocker agents. TSH 3.7 normal.   Anticoagulation on hold due to acute drop in Hb, GI is ok with anticoagulation.   HASBLED- 6 - very high risk for major bleed, possibly candidate for left atrial appendage closure device, will discuss with EP.   Pacemaker placement 6/28   Heme onc and EP on board w doing it and transfusing platelets as needed   ECHO 6/28 - EF 65%  Consult cardio regarding possible BELIA closure due to patient CI for AC.     Pancytopenia:   likely from liver cirrhosis.   Hematology consulted. Iron level normal, B12 and folate normal.   - PLT 41, WBC, 1.56, RBC 3.03     Severe Hyponatremia and Hyperkalemia: Resolved   Acute Kidney Injury: likely pre-renal   on admission Na 109, K: 7.7 (hemolyzed, repeated 5.9)  Urine Na 23, FEUrea 25% suggesting pre-renal  Likely from overdiuresis (was on Torsemide and Aldactone).   Na improved to 131, K normalized  Cr improved from 2.2 to 0.8  Continue diuretics.   Na - 131  K - 4.4     Acute on chronic anemia:   Possibly from GI bleeding due to esophageal varices, patient with liver cirrhosis and portal hypertension.   No active bleeding, no melena or hematochezia.   Hb was 6.6 on admission s/p 2 packed RBCs transfusion, Hb improved to 8.5,  Seen by GI, will discuss if EGD will be helpful to be done inpatient before starting Anticoagulation.   Brilinta held.     Decompensated Liver Cirrhosis: due to hepatitis C.   coagulopathy, asterixes on exam, no ascites.  Abdomen US showed cirrhotic liver, no ascites.   CT in march 2022 showed splenomegaly with gastroesophageal varices.   Lasix and Aldactone resumed, start on Lasix 20mg po daily, resume Aldactone 50mg daily.      CAD s/p PCI  Last cath in 3/30/22 RCA: large dominant vessel, 90% heavily calcified stenosis of prox and mid RCA s/p PCI and YVETTE x 2  Brilinta discontinued on admission, I discussed with his cardiologist dr Young, if he started on Eliquis no need for Brilinta.   Continue ASA, Lipitor, no beta blocker due to bradycardia.     Left Distal peroneal DVT   Elevated D-dimer  No need to treat, can repeat duplex in one week, however he may start on Eliquis for AFIB.     Hypothyroidism   TSH 3.7, continue levothyroxine 50 mcg qd    Hx IVDU: continue Suboxone     #COPD/SOB  Stable, no wheezing. Chronic tobacco abuse, continue Nicotine patch.     #Progress Note Handoff:  Pending (specify): pacemaker, improving pancytopenia.   Family discussion:  Disposition: Home.

## 2022-06-28 NOTE — PRE-ANESTHESIA EVALUATION ADULT - NSRADCARDRESULTSFT_GEN_ALL_CORE
TTE 3/27/22  Summary:   1. Technically goodstudy.   2. Normal global left ventricular systolic function.   3. LV Ejection Fraction by Carlin's Method with a biplane EF of 64 %.   4. Normal right atrial size.   5. Mild mitral valve regurgitation.   6. Mild tricuspid regurgitation.   7. Aorticvalve thickening with decreased leaflet opening.   8. Left atrial enlargement.   9. Mild pulmonic valve regurgitation.

## 2022-06-28 NOTE — PROGRESS NOTE ADULT - ATTENDING SUPERVISION STATEMENT
Reason for Disposition    Nursing judgment or information in reference    Additional Information    Negative: Nursing judgment, per information in Reference    Negative: Information only call about a Well Adult (no illness or injury)    Negative: Nursing judgment or information in reference    Negative: Nursing judgment or information in reference    Negative: Nursing judgment or information in reference    Negative: Nursing judgment or information in reference    Negative: Nursing judgment or information in reference    Negative: Nursing judgment or information in reference    Negative: Nursing judgment or information in reference    Negative: Nursing judgment or information in reference    Negative: Nursing judgment or information in reference    Negative: Nursing judgment or information in reference    Protocols used: NO GUIDELINE [id number]     Resident

## 2022-06-28 NOTE — PROGRESS NOTE ADULT - SUBJECTIVE AND OBJECTIVE BOX
Hospital Day:  11d    Subjective:    Patient is a 72y old  Male who presents with a chief complaint of mild confusion and bradycardia. No acut events overnight. patient states he's still constipated even after prune juice.       Admitted to medicine for a primary diagnosis of Afib with bradycardia, pancytopenia, hyponatremia and hyperkalemia.      Past Medical Hx:   Diabetes    HTN (hypertension)    Hepatitis    Arthritis      Past Sx:  H/O heart artery stent    H/O knee surgery      Allergies:  No Known Allergies    Current Meds:   Standng Meds:  aspirin  chewable 81 milliGRAM(s) Oral daily  atorvastatin 40 milliGRAM(s) Oral at bedtime  buprenorphine 8 mG/naloxone 2 mG SL  Tablet 0.5 Tablet(s) SubLingual <User Schedule>  folic acid 1 milliGRAM(s) Oral daily  furosemide    Tablet 20 milliGRAM(s) Oral daily  gabapentin 100 milliGRAM(s) Oral three times a day  heparin   Injectable 5000 Unit(s) SubCutaneous every 8 hours  hydrOXYzine hydrochloride 25 milliGRAM(s) Oral at bedtime  influenza  Vaccine (HIGH DOSE) 0.7 milliLiter(s) IntraMuscular once  iron sucrose IVPB 200 milliGRAM(s) IV Intermittent <User Schedule>  lactulose Syrup 10 Gram(s) Oral daily  levothyroxine 50 MICROGram(s) Oral daily  nicotine -  14 mG/24Hr(s) Patch 1 patch Transdermal daily  pantoprazole    Tablet 40 milliGRAM(s) Oral before breakfast  polyethylene glycol 3350 17 Gram(s) Oral daily  senna 2 Tablet(s) Oral at bedtime  spironolactone 50 milliGRAM(s) Oral daily  thiamine 100 milliGRAM(s) Oral daily    PRN Meds:    HOME MEDICATIONS:  aspirin 81 mg oral tablet: 1 tab(s) orally once a day  buprenorphine-naloxone 8 mg-2 mg sublingual film: 0.5 film(s) sublingual 2 times a day  metFORMIN 500 mg oral tablet: 1 tab(s) orally 2 times a day  HOLD METFORMIN FOR 48 HR AFTER CARDIAC CATH   spironolactone 100 mg oral tablet: 1 tab(s) orally once a day      Vital Signs:   T(F): 98.3 (06-28-22 @ 07:28), Max: 98.3 (06-28-22 @ 05:05)  HR: 54 (06-28-22 @ 11:16) (51 - 54)  BP: 105/53 (06-28-22 @ 11:16) (101/50 - 112/56)  RR: 18 (06-28-22 @ 11:16) (18 - 18)  SpO2: 95% (06-27-22 @ 19:55) (95% - 95%)      06-27-22 @ 07:01  -  06-28-22 @ 07:00  --------------------------------------------------------  IN: 795 mL / OUT: 2300 mL / NET: -1505 mL        Physical Exam:   GENERAL: NAD  HEENT: NCAT  CHEST/LUNG: CTAB  HEART: Regular rate and rhythm; s1 s2 appreciated, No murmurs, rubs, or gallops  ABDOMEN: Soft, Nontender, Nondistended; Bowel sounds present  EXTREMITIES: No LE edema b/l  SKIN: no rashes, no new lesions  NERVOUS SYSTEM:  Alert & Oriented X3  LINES/CATHETERS:        Labs:                         8.0    1.56  )-----------( 41       ( 28 Jun 2022 05:49 )             24.7     Neutophil% 50.1, Lymphocyte% 29.5, Monocyte% 16.0, Bands% 0.0 06-28-22 @ 05:49    28 Jun 2022 05:49    131    |  99     |  17     ----------------------------<  93     4.4     |  21     |  0.7      Ca    8.5        28 Jun 2022 05:49  Mg     1.8       28 Jun 2022 05:49    TPro  6.1    /  Alb  3.5    /  TBili  1.3    /  DBili  x      /  AST  30     /  ALT  14     /  AlkPhos  103    28 Jun 2022 05:49              Troponin 0.02, CKMB --, CK -- 06-24-22 @ 16:53

## 2022-06-28 NOTE — PROGRESS NOTE ADULT - ATTENDING COMMENTS
#Bradycardia:  received PPM late today.   Heme cleared the patient with current platelet count of 44. May transfuse platelets prior to procedure PRN.  f/u CArdio.   anticipate d/c tomorrow    Left Distal DVt  Paroxysmal A Fib:   Long term A/c risks > benefits (given anemia/Pancytopenia) hence medical team decided against it for now.    Discuss with Cardio if patient will be a good candidate for outpatient Watchman device?     #constipation: Please give enema    anticipate d/c tomorrow
#Bradycardia:  Pending PPM.  Heme cleared the patient with current platelet count of 44. May transfuse platelets prior to procedure PRN.  f/u CArdio.       Left Distal DVt  Paroxysmal A Fib:   Off A/c for anemia.

## 2022-06-29 NOTE — DISCHARGE NOTE PROVIDER - NSDCMRMEDTOKEN_GEN_ALL_CORE_FT
Chief complaint: Follow up in OV with MD 6/11/2019 for: The primary encounter diagnosis was Palpitations. Diagnoses of PVC's (premature ventricular contractions), History of atrial fibrillation, Atypical chest pain, and High risk medication - propafenone/RYTHMOL ([id number], restarted 03/2018) were also pertinent to this visit.    ASSESSMENT  Palpitations  - Hx of skipped beats historically associated with a chest tightness, relieved with deep breathing at times  - Has a hx of both atrial and ventricle ectopy   - Subjective increase in intensity and duration noted during consult (03/2018) .Ryhtmol restarted. Holter monitor recommended (03/20/2018), did not complete   - None noted in OV 6/11/2019 on Rythmol     History of Paroxysmal Atrial Fibrillation  - Echo (12/12/2017) MASOUD 17.9 ml/m²  - Dx (2002) Lone episode (no EKG or EMR to support) seen by Dr Ramirez. Historically with s/sx of palpitations.  Possibly on Tambocor (dates? With visual s/e) in the past. Tc of care/ consult Dr. Moreno (03/2018)  - CHADSVASC Stroke Risk Score = 0  Not on AC 2/2 low risk.  Takes baby ASA   - Subjectively notes in OV 6/11/2019 stress, atypical chest pain  - Maintaining Sinus Rhythm with a well controlled rate subjectively and objectively by PE and EKG 6/11/2019 in OV on Propafenone (Rythmol)    Premature Ventricular Contractions  - Echo (12/12/2017) LVEF 61%, IVSd 1.1cm  - Noted on ECG (3/17/2016) and mention of trigeminy on PE.   - ECG (2016) shows left bundle morphology, not RVOT. Morphology undetermined.     Atypical Chest Pain  - Subjectively noted in OV 6/11/2019, random pains in left chest wall, not exertional, likely musculoskeletal in nature, however concern while on Rythmol     High risk medication - propafenone/RYTHMOL ([id number], restarted 03/2018)  - EKG in OV 6/11/2019 QRS 106ms QTc 432ms  Creatinine (mg/dL)   Date Value   09/10/2018 0.89     GFR Estimate, Non  (no units)   Date Value   09/10/2018 >90      PLAN  - Coronary CT scan (CT cardiac calcium score) discussed with patient 2/2 atypical chest pain and being on Rythmol.  Recommended patient do as outpatient without going through insurance for cost savings. Order placed.   - Continue Propafenone (Rythmol SR) 225 mg 12-hr capsule.  Take one capsule by mouth two times daily.   - Tests ordered:   Orders Placed This Encounter   • CT Cardiac Calcium Scoring - Cash Service   • Electrocardiogram 12-Lead   - Follow up: Return in about 1 year (around 6/11/2020) for Dr Moreno.   Future Appointments   Date Time Provider Department Center   6/2/2020 12:45 PM Charbel Moreno MD [facility]     SUBJECTIVE/ HISTORY OF PRESENT ILLNESS   Bandar Zepeda is 50 year old male who presents 6/11/2019 for: The primary encounter diagnosis was Palpitations. Diagnoses of PVC's (premature ventricular contractions), History of atrial fibrillation, Atypical chest pain, and High risk medication - propafenone/RYTHMOL ([id number], restarted 03/2018) were also pertinent to this visit.    - Reports recent stress with job, finances  - Reports random pains in left chest wall, not exertional, probably muscular  - Reports no trouble exercising  - Thinks he may have had some episodes of AF  - Has felt more short of breath but doesn't know why. Has started in the last year.   - Breathing has not restricted activities  - Worries about cancer 2/2 family history  - Patient did not complete Holter monitor recommended at last office visit (03/20/2018)    REVIEW OF SYSTEMS:  Review of Systems   Constitutional: Negative.    Respiratory: Negative.    Cardiovascular: Positive for chest pain.        Atypical   Gastrointestinal: Negative.    Musculoskeletal: Negative.    Neurological: Negative.    Psychiatric/Behavioral: Negative.      I have personally reviewed and updated the following EPIC sections: Current Medications, Allergies, Problem List, Past Medical History, Past Surgical History, Social History and  Family History    Physical Exam:   Vitals:    06/11/19 1233   BP: 124/76   Pulse: 72     Physical Exam   Constitutional: He is oriented to person, place, and time and well-developed, well-nourished, and in no distress.   Neck: No JVD present.   Cardiovascular: Normal rate, regular rhythm and normal heart sounds.   No murmur heard.  Pulmonary/Chest: Effort normal and breath sounds normal. He has no wheezes. He has no rales.   Musculoskeletal: He exhibits no edema.   Neurological: He is alert and oriented to person, place, and time.   Psychiatric: Mood, affect and judgment normal.     I have personally reviewed Imaging and labs in [facility].     On 06/11/19, I, Melissa Ayala RN scribed the services personally performed by Charbel Moreno MD.    The documentation recorded by the nurse/MA accurately and completely reflects the service(s) I personally performed and the decisions made by me.     CC:   Referring Provider: Charbel Moreno MD  Patient Care Team:  Bharat Savage MD as PCP - General  Charbel Moreno MD as Electrophysiologist (Electrophysiology)     aspirin 81 mg oral tablet: 1 tab(s) orally once a day  atorvastatin 40 mg oral tablet: 1 tab(s) orally once a day (at bedtime)  Brilinta (ticagrelor) 90 mg oral tablet: 1 tab(s) orally 2 times a day MDD:2  buprenorphine-naloxone 8 mg-2 mg sublingual film: 0.5 film(s) sublingual 2 times a day  gabapentin 100 mg oral capsule: 1 cap(s) orally 3 times a day, As needed, neuropathy  Lasix 20 mg oral tablet: 1 tab(s) orally once a day   levothyroxine 50 mcg (0.05 mg) oral tablet: 1 tab(s) orally once a day  metFORMIN 500 mg oral tablet: 1 tab(s) orally 2 times a day  HOLD METFORMIN FOR 48 HR AFTER CARDIAC CATH   Metoprolol Succinate ER 25 mg oral tablet, extended release: 1 tab(s) orally once a day. Do not take if systolic blood pressure below 90 and heart rate below 60.   spironolactone 100 mg oral tablet: 1 tab(s) orally once a day   albuterol 90 mcg/inh inhalation aerosol: 2 puff(s) inhaled every 6 hours, As Needed  aspirin 81 mg oral tablet: 1 tab(s) orally once a day  atorvastatin 40 mg oral tablet: 1 tab(s) orally once a day (at bedtime)  Brilinta (ticagrelor) 90 mg oral tablet: 1 tab(s) orally 2 times a day MDD:2  buprenorphine-naloxone 8 mg-2 mg sublingual film: 0.5 film(s) sublingual 2 times a day  gabapentin 100 mg oral capsule: 1 cap(s) orally 3 times a day, As needed, neuropathy  levothyroxine 50 mcg (0.05 mg) oral tablet: 1 tab(s) orally once a day  metFORMIN 500 mg oral tablet: 1 tab(s) orally 2 times a day  HOLD METFORMIN FOR 48 HR AFTER CARDIAC CATH   spironolactone 100 mg oral tablet: 1 tab(s) orally once a day  torsemide 20 mg oral tablet: 1 tab(s) orally 2 times a day   albuterol 90 mcg/inh inhalation aerosol: 2 puff(s) inhaled every 6 hours, As Needed  aspirin 81 mg oral tablet: 1 tab(s) orally once a day  atorvastatin 40 mg oral tablet: 1 tab(s) orally once a day (at bedtime)  Brilinta (ticagrelor) 90 mg oral tablet: 1 tab(s) orally 2 times a day MDD:2  buprenorphine-naloxone 8 mg-2 mg sublingual film: 0.5 film(s) sublingual 2 times a day  furosemide 20 mg oral tablet: 1 tab(s) orally once a day  gabapentin 100 mg oral capsule: 1 cap(s) orally 3 times a day, As needed, neuropathy  levothyroxine 50 mcg (0.05 mg) oral tablet: 1 tab(s) orally once a day  metFORMIN 500 mg oral tablet: 1 tab(s) orally 2 times a day  HOLD METFORMIN FOR 48 HR AFTER CARDIAC CATH   spironolactone 100 mg oral tablet: 1 tab(s) orally once a day  torsemide 20 mg oral tablet: 1 tab(s) orally 2 times a day   albuterol 90 mcg/inh inhalation aerosol: 2 puff(s) inhaled every 6 hours, As Needed  aspirin 81 mg oral tablet: 1 tab(s) orally once a day  atorvastatin 40 mg oral tablet: 1 tab(s) orally once a day (at bedtime)  buprenorphine-naloxone 8 mg-2 mg sublingual film: 0.5 film(s) sublingual 2 times a day  folic acid 1 mg oral tablet: 1 tab(s) orally once a day  gabapentin 100 mg oral capsule: 1 cap(s) orally 3 times a day, As needed, neuropathy  levothyroxine 50 mcg (0.05 mg) oral tablet: 1 tab(s) orally once a day  metFORMIN 500 mg oral tablet: 1 tab(s) orally 2 times a day    spironolactone 100 mg oral tablet: 1 tab(s) orally once a day  thiamine 100 mg oral tablet: 1 tab(s) orally once a day  torsemide 20 mg oral tablet: 1 tab(s) orally 2 times a day

## 2022-06-29 NOTE — PROGRESS NOTE ADULT - SUBJECTIVE AND OBJECTIVE BOX
CHIEF COMPLAINT:    Patient is a 72y old  Male who presents with a chief complaint of bradycardia (2022 10:30)      INTERVAL HPI/OVERNIGHT EVENTS:    Patient seen and examined at bedside. No acute overnight events occurred.    ROS: All other systems are negative.    Medications:  Standing  aspirin  chewable 81 milliGRAM(s) Oral daily  atorvastatin 40 milliGRAM(s) Oral at bedtime  buprenorphine 8 mG/naloxone 2 mG SL  Tablet 0.5 Tablet(s) SubLingual <User Schedule>  folic acid 1 milliGRAM(s) Oral daily  furosemide    Tablet 20 milliGRAM(s) Oral daily  gabapentin 100 milliGRAM(s) Oral three times a day  heparin   Injectable 5000 Unit(s) SubCutaneous every 8 hours  hydrOXYzine hydrochloride 25 milliGRAM(s) Oral at bedtime  influenza  Vaccine (HIGH DOSE) 0.7 milliLiter(s) IntraMuscular once  iron sucrose IVPB 200 milliGRAM(s) IV Intermittent <User Schedule>  lactulose Syrup 10 Gram(s) Oral daily  levothyroxine 50 MICROGram(s) Oral daily  nicotine -  14 mG/24Hr(s) Patch 1 patch Transdermal daily  pantoprazole    Tablet 40 milliGRAM(s) Oral before breakfast  polyethylene glycol 3350 17 Gram(s) Oral daily  senna 2 Tablet(s) Oral at bedtime  spironolactone 50 milliGRAM(s) Oral daily  thiamine 100 milliGRAM(s) Oral daily    PRN Meds  ALBUTerol    90 MICROgram(s) HFA Inhaler 2 Puff(s) Inhalation every 6 hours PRN        Vital Signs:    T(F): 98.6 (22 @ 13:29), Max: 98.7 (22 @ 06:31)  HR: 60 (22 @ 13:29) (60 - 60)  BP: 114/55 (22 @ 13:29) (106/53 - 128/61)  RR: 18 (22 @ 13:29) (18 - 18)  SpO2: --  I&O's Summary    2022 07:01  -  2022 07:00  --------------------------------------------------------  IN: 520 mL / OUT: 1075 mL / NET: -555 mL    2022 07:01  -  2022 18:33  --------------------------------------------------------  IN: 350 mL / OUT: 1075 mL / NET: -725 mL      Daily     Daily Weight in k.3 (2022 06:31)  CAPILLARY BLOOD GLUCOSE      POCT Blood Glucose.: 136 mg/dL (2022 16:36)  POCT Blood Glucose.: 146 mg/dL (2022 11:31)  POCT Blood Glucose.: 114 mg/dL (2022 07:49)  POCT Blood Glucose.: 188 mg/dL (2022 21:25)      PHYSICAL EXAM:  GENERAL:  NAD  SKIN: No rashes or lesions  HEENT: Atraumatic. Normocephalic. Anicteric  NECK:  No JVD.   PULMONARY: Clear to ausculation bilaterally. No wheezing. No rales  CVS: Normal S1, S2. Regular rate and rhythm. No murmurs.  ABDOMEN/GI: Soft, Nontender, Nondistended; Bowel sounds are present  EXTREMITIES:  No edema B/L LE.  NEUROLOGIC:  No motor deficit.  PSYCH: Alert & oriented x 3, normal affect      LABS:                        8.1    1.83  )-----------( 61       ( 2022 06:09 )             24.9         131<L>  |  99  |  17  ----------------------------<  93  4.4   |  21  |  0.7    Ca    8.5      2022 05:49  Mg     1.8         TPro  6.1  /  Alb  3.5  /  TBili  1.3<H>  /  DBili  x   /  AST  30  /  ALT  14  /  AlkPhos  103                RADIOLOGY & ADDITIONAL TESTS:  Imaging or report Personally Reviewed:  [ ] YES  [ ] NO -->no new images    Telemetry reviewed independently - NSR, no acute events  EKG reviewed independently -->no new EKGs    Consultant(s) Notes Reviewed:  [ ] YES  [ ] NO  Care Discussed with Consultants/Other Providers [ ] YES  [ ] NO    Case discussed with resident  Care discussed with pt

## 2022-06-29 NOTE — DISCHARGE NOTE PROVIDER - NSDCACTIVITY_GEN_ALL_CORE
No Heavy lifting >5 lbs. Do not raise your arm above shoulder level for 4-6 weeks.   No driving for 2weeks/Do not drive or operate machinery/Do not make important decisions/No heavy lifting/straining

## 2022-06-29 NOTE — PROGRESS NOTE ADULT - ASSESSMENT
Bradycardia  -s/p PPM yetserday  -PPM functioning well as per conversation with CT surger    Left peronseal DVT  - new guidelines recommend anticoagulation but pt is too high risk    CAD s/p PCI 3/2022  - as per cardiology note aspirin only, no a/c or brillinta    Pt planned for discharge today but DME not arranged. i spoke with SW on call who reports discharge without this is unsafe discharge, therefore pt to remain in hospital until DME can be confirmed in AM.       Bradycardia  -s/p PPM yetserday  -PPM functioning well as per conversation with CT surger    Left peronseal DVT  - new guidelines recommend anticoagulation but pt is too high risk    CAD s/p PCI 3/2022  - as per cardiology note aspirin only, no a/c or brillinta    Pancytopenia  - likely due to liver cirrhosis  - outpt gi/hematology follow up    Pt planned for discharge today but DME not arranged. i spoke with SW on call who reports discharge without this is unsafe discharge, therefore pt to remain in hospital until DME can be confirmed in AM.

## 2022-06-29 NOTE — DISCHARGE NOTE PROVIDER - CARE PROVIDER_API CALL
Tony Dozier  MEDICINE  [address] [place] [address]  Phone: [phone]  Fax: [phone]  Follow Up Time:     Jersey Maxwell)  Hematology; Internal Medicine; Medical Oncology  [address] [address]  Phone: [phone]  Fax: [phone]  Follow Up Time:     Terrence Drake; JUDE)  [place] [address]  Phone: [phone]  Fax: [phone]  Follow Up Time:     Taurus Laurent)  Internal Medicine; Nephrology  [address] [place] [address]  Phone: [phone]  Fax: [phone]  Follow Up Time:

## 2022-06-29 NOTE — PROGRESS NOTE ADULT - SUBJECTIVE AND OBJECTIVE BOX
Patient is cleared for discharge from cardiac surgery standpoint.  Keep dressing clean/dry and intact.  Ok to shower with dressing.  Do not lift, push, or pull anything heavier than 10 lbs for 2 weeks.  Do not lift left arm above shoulder height for 2 weeks.  Follow-up appointment with Dr. Lawson scheduled for Wednesday 7/6/2022 at 12:00pm. Patient is cleared for discharge from cardiac surgery standpoint.  Keep dressing clean/dry and intact.  Ok to shower with dressing.  Do not lift, push, or pull anything heavier than 10 lbs for 2 weeks.  Do not lift left arm above shoulder height for 2 weeks.  Follow-up appointment with Dr. Lawson scheduled for Wednesday 7/6/2022 at 1:30pm.

## 2022-06-29 NOTE — DISCHARGE NOTE PROVIDER - HOSPITAL COURSE
73 YO M w/ PMHx significant for HTN, NIDDM, cirrhosis, HCV s/p tx, former IVDU on suboxone, active smoker, CAD s/p PCI x 3 s/p 2 stents (done recently). Pt is here w/ mild confusion, bradycardia HR in 40s , severe hyponatremia. Of note he was here recent.ly for bilateral LE swelling and SOB, was found to have bradycardia on admission. While in the hospital, patient underwent stress test which was positive, then went to cath lab which showed 90% heavily calcified stenosis of prox and mid RCA s/p PCI and YVETTE x 2. Course was complicated by chronic pancytopenia, for which heme/onc saw the patient. Folate, b12 were normal. Abd US showed enlarged spleen, suspicious for sequestration. He was  also seen by hepatology for cirrhosis. On this admission: afebrile, BP soft, HR in 40s. SpO2 100% on RA. trop 0.04 on arrival, pro-bnp 7783 VB.26/37/21/16 pancytopenia (Hg 6.6, baseline 9-10 w/ pancytopenia, Na 109, Cl 90, K 7.7, CXR mild congestion.     SDU COURSE:  Patient was evaluated by pulm/crit and determined to be critically ill and transferred to SDU. Patient was consistently bradycardic in the 40s overnight and throughout the SDU course. Patient complained of right UE pain. Upon physical exam, RUE looked visibly more swollen and a duplex US was ordered. No other significant events happened throughout SDU course. It was determined that patient should be transferred to telemetry to monitor vitals and consult Cardiology for AFib, Bradycardia, and indication for anticoagulation.    Patient admitted for bradycardia, severe hypona         73 YO M w/ PMHx significant for HTN, NIDDM, cirrhosis, HCV s/p tx, former IVDU on suboxone, active smoker, CAD s/p PCI x 3 s/p 2 stents (done recently). Pt is here w/ mild confusion, bradycardia HR in 40s , severe hyponatremia. Of note he was here recent.ly for bilateral LE swelling and SOB, was found to have bradycardia on admission. While in the hospital, patient underwent stress test which was positive, then went to cath lab which showed 90% heavily calcified stenosis of prox and mid RCA s/p PCI and YVETTE x 2. Course was complicated by chronic pancytopenia, for which heme/onc saw the patient. Folate, b12 were normal. Abd US showed enlarged spleen, suspicious for sequestration. He was  also seen by hepatology for cirrhosis. On this admission: afebrile, BP soft, HR in 40s. SpO2 100% on RA. trop 0.04 on arrival, pro-bnp 7783 VB.26/37/21/16 pancytopenia (Hg 6.6, baseline 9-10 w/ pancytopenia, Na 109, Cl 90, K 7.7, CXR mild congestion.     SDU COURSE:  Patient was evaluated by pulm/crit and determined to be critically ill and transferred to SDU. Patient was consistently bradycardic in the 40s overnight and throughout the SDU course. Patient complained of right UE pain. Upon physical exam, RUE looked visibly more swollen and a duplex US was ordered. No other significant events happened throughout SDU course. It was determined that patient should be transferred to telemetry to monitor vitals and consult Cardiology for AFib, Bradycardia, and indication for anticoagulation.    Patient admitted for bradycardia, severe hyponatremia, hyperkalemia,          73 YO M w/ PMHx significant for HTN, NIDDM, cirrhosis, HCV s/p tx, former IVDU on suboxone, active smoker, CAD s/p PCI x 3 s/p 2 stents (done recently). Pt is here w/ mild confusion, bradycardia HR in 40s , severe hyponatremia. Of note he was here recent.ly for bilateral LE swelling and SOB, was found to have bradycardia on admission. While in the hospital, patient underwent stress test which was positive, then went to cath lab which showed 90% heavily calcified stenosis of prox and mid RCA s/p PCI and YVETTE x 2. Course was complicated by chronic pancytopenia, for which heme/onc saw the patient. Folate, b12 were normal. Abd US showed enlarged spleen, suspicious for sequestration. He was  also seen by hepatology for cirrhosis. On this admission: afebrile, BP soft, HR in 40s. SpO2 100% on RA. trop 0.04 on arrival, pro-bnp 7783 VB.26/37/21/16 pancytopenia (Hg 6.6, baseline 9-10 w/ pancytopenia, Na 109, Cl 90, K 7.7, CXR mild congestion.     SDU COURSE:  Patient was evaluated by pulm/crit and determined to be critically ill and transferred to SDU. Patient was consistently bradycardic in the 40s overnight and throughout the SDU course. Patient complained of right UE pain. Upon physical exam, RUE looked visibly more swollen and a duplex US was ordered. No other significant events happened throughout SDU course. It was determined that patient should be transferred to telemetry to monitor vitals and consult Cardiology for AFib, Bradycardia, and indication for anticoagulation.    Patient admitted for paroxysmal atrial fibrillation with slow ventricular response, bradycardia, severe hyponatremia, hyperkalemia, pancytopenia, ANGELO. Electrolyte abnromalities were corrected. Patient Angelo also resolved since. These were likely from overdiuresis. Patient had pancytopenia likely from liver cirrhosis. GI saw patient and recommended against AC due to risk for bleed. Patient found to have a fib w slow ventricular response. Patient had pacemaker placed yesterday . Procedure successful.    Patient seen and examined by me today. Stable for discharge.

## 2022-06-29 NOTE — DISCHARGE NOTE PROVIDER - CARE PROVIDERS DIRECT ADDRESSES
,DirectAddress_Unknown,[email or web address]

## 2022-06-29 NOTE — DISCHARGE NOTE PROVIDER - NSDCCPCAREPLAN_GEN_ALL_CORE_FT
PRINCIPAL DISCHARGE DIAGNOSIS  Diagnosis: Bradycardia  Assessment and Plan of Treatment: You were found to have bradycardia. This is when your heart beats slower then usual. When this happens you can feel dizzy, like youre about to pass out, and fatigue. Electrophysiology saw you and recommended a pacemaker placement. Packmaker was placed 6/28 and was successful. This device will help control the heart rate. Please continue to take your medications as prescribed and follow up outpatient with your cardiologist in one to two weeks.  Follow-up appointment with Dr. Lawson scheduled for Wednesday 7/6/2022 at 1:30pm.  - FU in the  office for wound check with ___with Dr Goins in 1 month  [address] [place] [address], [address]  [phone]         SECONDARY DISCHARGE DIAGNOSES  Diagnosis: Hyperkalemia  Assessment and Plan of Treatment: You were found to have hyperkalemia. This is when your potassium levels are high. This can cause muscle weakness, arythmias, and chest pain. You were treated with medications to lower your potassium level and it resolved. Please continue to take your medications as prescribed and follow up outpatient with your primary medical doctor in one to two weeks.    Diagnosis: Hyponatremia  Assessment and Plan of Treatment: You were found to have hyponatremia. This is when your sodiumlevels are low. This can cause fatigue, malaise, and lethargy. You were treated with medications to increase your potassium level and it resolved. Please continue to take your medications as prescribed and follow up outpatient with your primary medical doctor in one to two weeks.    Diagnosis: PROMISE (acute kidney injury)  Assessment and Plan of Treatment: You were found to have an acute kidney injury. This is when your kidney function decreases. This likely occured due to the diuretics you were being given. This can cause symptoms such as peeing less, confusion, dehydration, etc. This has since resolved. Please continue to take your medications as prescribe and follow up outpatient with your Primary medical doctor in one to two weeks.    Diagnosis: Pancytopenia  Assessment and Plan of Treatment: You were found to have pancytopenia, meaning a decrease in white blood cells, red bloood cells, and platelets. This was likely from yuor cirrhosis.     PRINCIPAL DISCHARGE DIAGNOSIS  Diagnosis: Bradycardia  Assessment and Plan of Treatment: You were found to have bradycardia. This is when your heart beats slower then usual. When this happens you can feel dizzy, like youre about to pass out, and fatigue. Electrophysiology saw you and recommended a pacemaker placement. Packmaker was placed 6/28 and was successful. This device will help control the heart rate. Please continue to take your medications as prescribed and follow up outpatient with your cardiologist in one to two weeks.  Follow-up appointment with Dr. Lawson scheduled for Wednesday 7/6/2022 at 1:30pm.  - FU in the  office for wound check with ___with Dr Goins in 1 month  [address] [place] [address], [address]  [phone]         SECONDARY DISCHARGE DIAGNOSES  Diagnosis: Hyperkalemia  Assessment and Plan of Treatment: You were found to have hyperkalemia. This is when your potassium levels are high. This can cause muscle weakness, arythmias, and chest pain. You were treated with medications to lower your potassium level and it resolved. Please continue to take your medications as prescribed and follow up outpatient with your primary medical doctor in one to two weeks.    Diagnosis: Hyponatremia  Assessment and Plan of Treatment: You were found to have hyponatremia. This is when your sodiumlevels are low. This can cause fatigue, malaise, and lethargy. You were treated with medications to increase your potassium level and it resolved. Please continue to take your medications as prescribed and follow up outpatient with your primary medical doctor in one to two weeks.    Diagnosis: PROMISE (acute kidney injury)  Assessment and Plan of Treatment: You were found to have an acute kidney injury. This is when your kidney function decreases. This likely occured due to the diuretics you were being given. This can cause symptoms such as peeing less, confusion, dehydration, etc. This has since resolved. Please continue to take your medications as prescribe and follow up outpatient with your Primary medical doctor in one to two weeks.     PRINCIPAL DISCHARGE DIAGNOSIS  Diagnosis: Bradycardia  Assessment and Plan of Treatment: You were found to have bradycardia. This is when your heart beats slower then usual. When this happens you can feel dizzy, like youre about to pass out, and fatigue. Electrophysiology saw you and recommended a pacemaker placement. Packmaker was placed 6/28 and was successful. This device will help control the heart rate. Please continue to take your medications as prescribed and follow up outpatient with your cardiologist in one to two weeks.  Follow-up appointment with Dr. Lawson scheduled for Wednesday 7/6/2022 at 1:30pm.  - FU in the  office for wound check with ___with Dr Goins in 1 month  [address] [place] [address], [address]  [phone]   Keep bandage on chest until your follow up appointment with Dr. Lawson on 7/6. You may shower but you cannot raise arm above head or lift more than 5 pounds.         SECONDARY DISCHARGE DIAGNOSES  Diagnosis: Hyperkalemia  Assessment and Plan of Treatment: You were found to have hyperkalemia. This is when your potassium levels are high. This can cause muscle weakness, arythmias, and chest pain. You were treated with medications to lower your potassium level and it resolved. Please continue to take your medications as prescribed and follow up outpatient with your primary medical doctor in one to two weeks.    Diagnosis: Hyponatremia  Assessment and Plan of Treatment: You were found to have hyponatremia. This is when your sodiumlevels are low. This can cause fatigue, malaise, and lethargy. You were treated with medications to increase your potassium level and it resolved. Please continue to take your medications as prescribed and follow up outpatient with your primary medical doctor in one to two weeks.    Diagnosis: PROMISE (acute kidney injury)  Assessment and Plan of Treatment: You were found to have an acute kidney injury. This is when your kidney function decreases. This likely occured due to the diuretics you were being given. This can cause symptoms such as peeing less, confusion, dehydration, etc. This has since resolved. Please continue to take your medications as prescribe and follow up outpatient with your Primary medical doctor in one to two weeks.

## 2022-06-29 NOTE — PROGRESS NOTE ADULT - PROVIDER SPECIALTY LIST ADULT
CT Surgery
Critical Care
Hospitalist
Internal Medicine
Internal Medicine
Nephrology
Electrophysiology
Hospitalist
Hospitalist
Internal Medicine
Nephrology
Nephrology
Cardiology
Gastroenterology
Hospitalist
Internal Medicine
Internal Medicine
Pulmonology

## 2022-06-29 NOTE — PROGRESS NOTE ADULT - ASSESSMENT
A/P   Cardiologist: Dr Beck    Assessment: 71 y/o Male w/ PMHx for HTN, non-insulin dependent DM, cirrhosis, HCV s/p tx, former IVDU on suboxone, active smoker, CAD s/p PCI x 3 s/p 2 stents (done recently), arrives to the ED w/ mild confusion, bradycardia HR in 40s, and is found to be severely hyponatremic, hyperkalemia and bradycardic and pancytopenia    Patient s/p DC  PPM implant ([facility])    Impression:  Slow AF, HR 40s  Hyponatremia  Pancytopenia  CAD sp PCI of prox and mid RCA (3/22)  Hx IVDA on Suboxone  Liver Cirrhosis  DM  HTN        - CXR as above   - Device interrogation done, awaiting review by attending    - FU in the EP office for wound check with ___with Dr Goins in 1 month  [address] [place] [address], [address]  [phone]       No Heavy lifting >5 lbs. Do not raise your arm above shoulder level for 4-6 weeks.   No driving for 2weeks

## 2022-06-29 NOTE — PROGRESS NOTE ADULT - NS ATTEND AMEND GEN_ALL_CORE FT
covering Dr. Goins  complex patient  s/p PPM placement  no complications  outpatient f/u with EP  I interrogated and reprogrammed device - I checked thresholds manually

## 2022-06-29 NOTE — DISCHARGE NOTE PROVIDER - NSDCFUSCHEDAPPT_GEN_ALL_CORE_FT
Joaquim Lawson  [facility]  CTSURG [address]  Scheduled Appointment: 07/06/2022    Ami Chu  [facility]  CARDIOLOGY [address]  Scheduled Appointment: 08/12/2022

## 2022-06-29 NOTE — DISCHARGE NOTE PROVIDER - PROVIDER TOKENS
PROVIDER:[TOKEN:[[id number]]],PROVIDER:[TOKEN:[[id number]]],PROVIDER:[TOKEN:[[id number]]],PROVIDER:[TOKEN:[[id number]]]

## 2022-06-29 NOTE — PROGRESS NOTE ADULT - SUBJECTIVE AND OBJECTIVE BOX
INTERVAL HPI/OVERNIGHT EVENTS:    Patient s/p DC PPM implant  No event over night. Pt without complains    MEDICATIONS  (STANDING):  aspirin  chewable 81 milliGRAM(s) Oral daily  atorvastatin 40 milliGRAM(s) Oral at bedtime  buprenorphine 8 mG/naloxone 2 mG SL  Tablet 0.5 Tablet(s) SubLingual <User Schedule>  ceFAZolin   IVPB 1000 milliGRAM(s) IV Intermittent every 8 hours  folic acid 1 milliGRAM(s) Oral daily  furosemide    Tablet 20 milliGRAM(s) Oral daily  gabapentin 100 milliGRAM(s) Oral three times a day  heparin   Injectable 5000 Unit(s) SubCutaneous every 8 hours  hydrOXYzine hydrochloride 25 milliGRAM(s) Oral at bedtime  influenza  Vaccine (HIGH DOSE) 0.7 milliLiter(s) IntraMuscular once  iron sucrose IVPB 200 milliGRAM(s) IV Intermittent <User Schedule>  lactulose Syrup 10 Gram(s) Oral daily  levothyroxine 50 MICROGram(s) Oral daily  nicotine -  14 mG/24Hr(s) Patch 1 patch Transdermal daily  pantoprazole    Tablet 40 milliGRAM(s) Oral before breakfast  polyethylene glycol 3350 17 Gram(s) Oral daily  senna 2 Tablet(s) Oral at bedtime  spironolactone 50 milliGRAM(s) Oral daily  thiamine 100 milliGRAM(s) Oral daily    MEDICATIONS  (PRN):      Allergies    No Known Allergies    Intolerances          Vital Signs Last 24 Hrs  T(C): 37.1 (29 Jun 2022 06:31), Max: 37.1 (28 Jun 2022 14:22)  T(F): 98.7 (29 Jun 2022 06:31), Max: 98.7 (28 Jun 2022 14:22)  HR: 60 (29 Jun 2022 06:31) (54 - 65)  BP: 106/53 (29 Jun 2022 06:31) (95/54 - 143/63)  BP(mean): --  RR: 18 (29 Jun 2022 06:31) (14 - 18)  SpO2: 98% (28 Jun 2022 14:40) (98% - 99%)    GENERAL: In no apparent distress, well nourished, and hydrated.  HEART: Regular rate and rhythm; No murmurs, rubs, or gallops.  	Wound healing well; No hematoma; no bleeding  PULMONARY: Clear to auscultation and perfusion.  No rales, wheezing, or rhonchi bilaterally.  ABDOMEN: Soft, Nontender, Nondistended; Bowel sounds present  EXTREMITIES:  2+ Peripheral Pulses, No clubbing, cyanosis, or edema  NEUROLOGICAL: Grossly nonfocal    12 Lead ECG:   Ventricular Rate 60 BPM    Atrial Rate 60 BPM    P-R Interval 306 ms    QRS Duration 104 ms    Q-T Interval 418 ms    QTC Calculation(Bazett) 418 ms    R Axis 39 degrees    T Axis 40 degrees    Diagnosis Line Atrial-paced rhythm with prolonged AV conduction  Abnormal ECG    Confirmed by CHRISTIANO NAZARIO MD ([id number]) on 6/29/2022 9:43:26 AM (06-28-22 @ 23:34)      RADIOLOGY & ADDITIONAL TESTS:  < from: Xray Chest 1 View- PORTABLE-Routine (Xray Chest 1 View- PORTABLE-Routine in AM.) (06.29.22 @ 05:17) >  Findings:    Support devices: Left chest wall pacemaker.    Cardiac/mediastinum/hilum: Unchanged.    Lung parenchyma/Pleura: Increased bibasilar opacities/atelectasis. No   pneumothorax.    Skeleton/soft tissues: Unchanged.    Impression:    Increased bibasilar opacities/atelectasis.    < end of copied text >            Wound and dressing instructions per Dr Lawson team

## 2022-07-05 PROBLEM — Z95.0 S/P CARDIAC PACEMAKER PROCEDURE: Status: ACTIVE | Noted: 2022-01-01

## 2022-08-23 NOTE — ED ADULT NURSE NOTE - EXTENSIONS OF SELF_ADULT
None Oral Minoxidil Pregnancy And Lactation Text: This medication should only be used when clearly needed if you are pregnant, attempting to become pregnant or breast feeding.

## 2022-09-11 NOTE — ED PROVIDER NOTE - OBJECTIVE STATEMENT
72 year old male with phmx of HTN, NIDDM, cirrhosis, HCV s/p tx, former IVDU on suboxone, active smoker, CAD s/p PCI x 3 s/p 2 stents comes in cardiac arrest. pt was found down for atleast 45 mins and ems started cpr. pt was shocked 8 times by the aed and given 300 of amio and 6 epis. pt here was coded for another 10 minutes before pronoucing at 1753. pt was in asystole during pulse checks here in the ed with no cardiac activity on pocus.

## 2022-09-11 NOTE — ED ADULT NURSE NOTE - CHIEF COMPLAINT QUOTE
Pt BIBA from home (+) cardiac arrest, found unresponsive @ 17:00, CPR initiated by paramedics, pt given defibrillation x 11 shocks, intubated PTA, no ROSC.

## 2022-09-11 NOTE — ED PROVIDER NOTE - PROGRESS NOTE DETAILS
RADHA: Patient found to be in cardiac arrest in the bathroom at 5pm (approximately 1 hour PTA), last seen alive several hours before that and therefore full down time unknown. Had been given 6 epis, 300mg amiodarone, 16 shocks PTA - per EMS patient was in vfib initially - without response. On arrival patient in asystole rhythm with cardiac standstill on US. Given additional 1 epi with ongoing compressions but given prolonged down time, asystole, cardiac standstill, patient ultimately pronounced at 17:53.

## 2022-09-11 NOTE — ED PROVIDER NOTE - CLINICAL SUMMARY MEDICAL DECISION MAKING FREE TEXT BOX
Patient presented s/p being found to be in cardiac arrest in the bathroom at 5pm (approximately 1 hour PTA), last seen alive several hours before that and therefore full down time unknown. Had been given 6 epis, 300mg amiodarone, 16 shocks PTA - per EMS patient was in vfib initially - without response. On arrival patient in asystole rhythm with cardiac standstill on US. Given additional epi with ongoing compressions and ACLS but given prolonged down time, asystole, cardiac standstill, patient ultimately pronounced at 17:53.

## 2022-09-11 NOTE — ED PROVIDER NOTE - PHYSICAL EXAMINATION
CONSTITUTIONAL:  in severe acute distress.   SKIN: warm, dry  HEAD: Normocephalic; atraumatic.  EYES: unresponsive  ENT: intubated  NECK: Supple; non tender.  CARD:  Regular rate and rhythm.   RESP: intubated  ABD: distended  EXT: unresponsive  LYMPH: No acute cervical adenopathy.  NEURO: unresponsive

## 2022-09-11 NOTE — ED ADULT TRIAGE NOTE - CHIEF COMPLAINT QUOTE
Pt BIBA from home (+) cardiac arrest, found unresponsive @ 17:00, CPR initiated by paramedics, pt given defibrillation x 11 shocks, intubated PTA, no ROSC. Pt BIBA from home (+) cardiac arrest, found unresponsive @ 17:00, CPR initiated by paramedics, pt given defibrillation x 8 shocks, intubated PTA, no ROSC. Total ~ downtime 45 minutes as per EMS.

## 2022-10-11 NOTE — PACU DISCHARGE NOTE - NAUSEA/VOMITING:
Patient : Micah Wilhelm Age: 50 year old Sex: male   MRN: [id number] Encounter Date: 10/11/2022      History     Chief Complaint   Patient presents with   • Knee Pain     HPI  10/11/2022  3:29 PM Micah Wilhelm is a 50 year old male who presents to the ED via EMS for evaluation of R knee pain over the last few days. He states he woke up 2 days ago with the pain and that he had a \"hard time standing up\" due to the pain. Pt also c/o difficulty ambulating secondary to the pain. He ambulates with a walker at baseline. Pt reports he lives at home alone. He denies any falls or injuries. Pt has not taken any pain medications. He denies fever, chills, or cough. Per pt, he has had a previous MRI for his left knee. He denies use of illicit substances. He denies hx/o gout. There are no further complaints or modifying factors at this time.    PCP: Levy Mcneal DO    Chart Review: I reviewed the patient's medications, allergies, and past medical and surgical history in Epic. Pt had an MRI4 months ago that showed degenerative changes in his meniscus and degenerative changes of his patellar femoral compartment.    No Known Allergies    Current Discharge Medication List      Prior to Admission Medications    Details   lisinopril (ZESTRIL) 40 MG tablet Take 1 tablet by mouth once daily  Qty: 90 tablet, Refills: 0      torsemide (DEMADEX) 20 MG tablet Take 2 tablets by mouth daily.  Qty: 60 tablet, Refills: 3      hydrALAZINE (APRESOLINE) 10 MG tablet Take 1 tablet by mouth in the morning and 1 tablet at noon and 1 tablet in the evening.  Qty: 90 tablet, Refills: 3      rivaroxaban (Xarelto) 20 MG Tab Take 1 tablet by mouth daily (with breakfast).  Qty: 30 tablet, Refills: 3      silver sulfADIAZINE (THERMAZENE) 1 % cream Apply topically daily. Apply to a thickness of 1/16 inch (\"nickel thick\").  Qty: 50 g, Refills: 0      metoPROLOL succinate (TOPROL-XL) 50 MG 24 hr tablet Take 1 tablet by mouth daily.  Qty: 90 tablet, Refills: 3       oxygen (O2) gas Inhale 2 L/min into the lungs continuous.         New Prescriptions    Details   predniSONE (DELTASONE) 20 MG tablet Take 2 tablets by mouth daily.  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 No significant change    • ECHO M-MODE/2D/DOPPLER (ROUTINE)  05/29/2018    LVEF - 66% Mod Pul HTN RVSP 71.7 mHg.  Mild LA enlargement   • ECHO STRESS ECHO  07/26/2018    No evidence of myocardial ischemia with Dobutamin stress   • LAPAROSCOPIC CHOLECYSTECTOMY  08/13/2020    Dr Benjamin Mark   • MUSCLE BIOPSY     • PULMONARY FUNCTION TEST  07/13/2018   • RIGHT HEART CATH  08/01/2018       Family History   Problem Relation Age of Onset   • Hypertension Mother    • Hypertension Father    • Diabetes Father        Social History     Tobacco Use   • Smoking status: Never Smoker   • Smokeless tobacco: Never Used   Substance Use Topics   • Alcohol use: Yes     Comment: during holidays with family, \"only a few\"   • Drug use: No       E-cigarette/Vaping     E-Cigarette/Vaping Substances & Devices       Review of Systems   Constitutional: Negative for chills and fever.   HENT: Negative for congestion.    Eyes: Negative for photophobia and visual disturbance.   Respiratory: Negative for cough, chest tightness and shortness of  breath.    Cardiovascular: Negative for chest pain.   Gastrointestinal: Negative for abdominal pain, diarrhea, nausea and vomiting.   Genitourinary: Negative for decreased urine volume, difficulty urinating and dysuria.   Musculoskeletal: Positive for arthralgias (Right knee). Negative for myalgias.   Skin: Negative for rash.   Neurological: Negative for dizziness, weakness and headaches.   Hematological: Does not bruise/bleed easily.     Physical Exam     ED Triage Vitals [10/11/22 1434]   ED Triage Vitals Group      Temp 98.6 °F (37 °C)      Heart Rate 81      Resp 19      BP (!) 187/117      SpO2 97 %      EtCO2 mmHg       Height       Weight       Weight Scale Used       BMI (Calculated)       IBW/kg (Calculated)        Physical Exam  Vitals and nursing note reviewed.   Constitutional:       General: He is not in acute distress.     Appearance: He is well-developed.      Comments: Increased BMI   HENT:      Head: Normocephalic.   Eyes:      Conjunctiva/sclera: Conjunctivae normal.      Pupils: Pupils are equal, round, and reactive to light.   Cardiovascular:      Rate and Rhythm: Normal rate and regular rhythm.      Pulses:           Radial pulses are 2+ on the right side and 2+ on the left side.      Heart sounds: Normal heart sounds.   Pulmonary:      Effort: Pulmonary effort is normal. No respiratory distress.      Breath sounds: Normal breath sounds. No wheezing.   Abdominal:      General: Bowel sounds are normal.      Palpations: Abdomen is soft. There is no mass.      Tenderness: There is no abdominal tenderness. There is no guarding.   Musculoskeletal:         General: Normal range of motion.      Cervical back: Normal range of motion and neck supple.      Right knee: Effusion present. Tenderness present.      Comments: There are veno static changes to the bilateral lower legs.   Lymphadenopathy:      Cervical: No cervical adenopathy.   Skin:     General: Skin is warm and dry.      Findings: No rash.    Neurological:      Mental Status: He is alert and oriented to person, place, and time.      GCS: GCS eye subscore is 4. GCS verbal subscore is 5. GCS motor subscore is 6.      Cranial Nerves: No cranial nerve deficit.      Sensory: No sensory deficit.   Psychiatric:         Speech: Speech normal.         Behavior: Behavior normal.         Thought Content: Thought content normal.       ED Course     Arthrocentesis    Date/Time: 10/11/2022 5:56 PM  Performed by: Nj Tirado MD  Authorized by: Nj Tirado MD     Consent:     Consent obtained:  Verbal and written    Consent given by:  Patient    Risks, benefits, and alternatives were discussed: yes      Risks discussed:  Incomplete drainage, infection, pain and bleeding    Alternatives discussed:  Delayed treatment  Location:     Location:  Knee    Knee:  R knee  Anesthesia:     Anesthesia method:  None  Procedure details:     Needle gauge:  18 G    Approach:  Medial    Aspirate amount:  3 cc    Aspirate characteristics:  Blood-tinged (Joint fluid followed by a little blood)    Specimen collected: yes    Post-procedure details:     Procedure completion:  Tolerated well, no immediate complications      Lab Results     Results for orders placed or performed during the hospital encounter of 10/11/22   Synovial Fluid Analysis (includes count, differential and crystal analysis) (performable only)   Result Value Ref Range    Fluid Color Red     Fluid Clarity Hazy     Fluid Volume 4 mL    TNC Fluid 10,039 (H) 0 - 200 /mcL    Crystals, Uric Acid Intracellular Uric Acid Crystals Present (A) None Seen    Crystals, Calcium Pyrophosphate None Seen None Seen   Differential, Synovial   Result Value Ref Range    Neutrophil, Fluid 95 (H) 0 - 25 %    Monocytes, Fluid 5 0 - 71 %    Total Cells Counted, Synovial Fluid 100    Anaerobe/Aerobe, Bacterial Culture With Gram Stain    Specimen: Knee, Right; Fine Needle Aspirate   Result Value Ref Range    CULTURE WITH GRAM STAIN,  ANAEROBE/AEROBE Culture in progress.     Gram Stain Many Polymorphonuclear cells.     Gram Stain No epithelial cells seen.     Gram Stain No organisms seen.        Radiology Results     Imaging Results          XR Knee 3 View Right (Final result)  Result time 10/11/22 17:19:32    Final result                 Impression:    IMPRESSION:  Small joint effusion suprapatellar bursa. I do not see an  acute fracture.               Narrative:    EXAM:  XR KNEE 3 VW RIGHT    DATE OF EXAM:  10/11/2022 4:53 PM.    HISTORY:  Pain or Swelling.    COMPARISON:  3/12/2022.    FINDINGS:  3 views. There may be mild medial joint narrowing. There is  calcification along the medial proximal tibia consistent with old  ligamentous injury. I do not see an acute fracture. There is some fluid  present within the suprapatellar bursa.                                ED Medication Orders (From admission, onward)    Ordered Start     Status Ordering Provider    10/11/22 1813 10/11/22 1814  acetaminophen (TYLENOL) tablet 1,000 mg  ONCE         Last MAR action: Given SILVIANO GREEN  Vitals  Vitals:    10/11/22 1828 10/11/22 1933 10/11/22 1958 10/11/22 2030   BP: (!) 171/87 (!) 161/82 (!) 152/81 (!) 160/85   BP Location:       Patient Position:       Pulse: 86 74 79 72   Resp:       Temp:       TempSrc:       SpO2: 94% 95% 95% 96%       ED Course  3:33 PM Initial Impression: I evaluated the pt at bedside and performed the physical exam. Plan will be for XR imaging. Pt declined medication for pain management.    5:43 PM I updated the pt on his XR results. We discussed the plan for an arthrocentesis. The pt is agreeable and gives his consent.    6:08 PM Synovial fluid obtained and sent for culture.    8:46 PM I updated the pt on his cultures from the synovial fluid indicating gout. We discussed the plan for him to return home. All questions answered. Pt is agreeable.    9:25 PM Per RN, pt failed ambulation trial. We will attempt  None pain control.    10:46 PM I rechecked the pt. We discussed the plan for pain management. Pt is agreeable.    JUNE Obrien has gout of his right knee. He initially refused pain medications. He was not able to walk and does not feel like he can go home. He will be given morphine to see if this can control his pain sufficiently to go home. Dr. Velázquez will follow on his status.    Critical Care    No Critical Care     Critical Care time spent on this patient outside of billable procedures:  None      Clinical Impression:  The primary encounter diagnosis was Acute gout of right knee, unspecified cause. Diagnoses of Acute pain of right knee, Other secondary pulmonary hypertension (CMS/HCC), Venous stasis ulcers of both lower extremities (CMS/HCC), and Lymphedema of both lower extremities were also pertinent to this visit.  _________    Lesia Nichols acting as a scribe for Dr. Nj Tirado  Dictation # [id number]  Scribe: Lesia Tirado MD  10/13/22 1681

## 2022-11-11 ENCOUNTER — APPOINTMENT (OUTPATIENT)
Dept: CARDIOLOGY | Facility: CLINIC | Age: 72
End: 2022-11-11

## 2023-01-01 NOTE — PATIENT PROFILE ADULT - NSFALLSECTIONLABEL_GEN_A_CORE
Goal Outcome Evaluation:           Overall Patient Progress: no changeOverall Patient Progress: no change    Outcome Evaluation: VSS on RA. SR desats increased with and after feedings. X1 SRHR dip. PO 19, 18, 21, 20. Voiding/stooling. 2 open area on bottom using triad. No contact from parents.       .

## 2023-06-26 NOTE — PATIENT PROFILE ADULT - DO YOU LACK THE NECESSARY SUPPORT TO HELP YOU COPE WITH LIFE CHALLENGES?
DR MARCOS PT    PT PHONED OFFICE AND IS REQUESTING REFILL ON    XANAX 0.5 MG    [facility]   no

## 2024-01-02 NOTE — PROCEDURE NOTE - NSLEVEL_SKIN_A_CORE
Has been off thyroid medication x 1 month   -restart medication   -recheck labs after taking medication x 1 month  Referred to endocrinology per patient request   subqutaneous

## 2024-06-14 NOTE — ED PROVIDER NOTE - CHPI ED SYMPTOMS POS
No protocol for requested medication.    Medication: benzonatate (TESSALON PERLES) 100 MG capsule   Last office visit date: 05/30/2024  Pharmacy: [facility] - [place], [place] - [address]     Order pended, routed to clinician for review.    chest pain,

## 2024-08-13 NOTE — ED ADULT NURSE NOTE - NS ED NURSE LEVEL OF CONSCIOUSNESS ORIENTATION
PATIENT AWARE THAT WE WILL HAVE THE 4 BOTTLES OF ENTRESTO READY FOR PICKUP IN [place] ON 8/16/24   Oriented - self; Oriented - place; Oriented - time

## 2024-09-14 NOTE — H&P ADULT - DOES THIS PATIENT HAVE A HISTORY OF OR HAS BEEN DX WITH HEART FAILURE?
Patient resting in [facility]. Patient hypoxic on RA placed on NC on 2L. Patient now SPO2 94%   unknown